# Patient Record
Sex: FEMALE | Race: BLACK OR AFRICAN AMERICAN | NOT HISPANIC OR LATINO | Employment: FULL TIME | URBAN - METROPOLITAN AREA
[De-identification: names, ages, dates, MRNs, and addresses within clinical notes are randomized per-mention and may not be internally consistent; named-entity substitution may affect disease eponyms.]

---

## 2017-09-05 ENCOUNTER — OFFICE VISIT (OUTPATIENT)
Dept: NEUROLOGY | Facility: CLINIC | Age: 57
End: 2017-09-05
Payer: COMMERCIAL

## 2017-09-05 VITALS
SYSTOLIC BLOOD PRESSURE: 133 MMHG | WEIGHT: 216.06 LBS | HEIGHT: 67 IN | HEART RATE: 82 BPM | BODY MASS INDEX: 33.91 KG/M2 | DIASTOLIC BLOOD PRESSURE: 87 MMHG

## 2017-09-05 DIAGNOSIS — R20.0 BILATERAL LEG NUMBNESS: ICD-10-CM

## 2017-09-05 PROCEDURE — 99204 OFFICE O/P NEW MOD 45 MIN: CPT | Mod: S$GLB,,, | Performed by: PSYCHIATRY & NEUROLOGY

## 2017-09-05 PROCEDURE — 3008F BODY MASS INDEX DOCD: CPT | Mod: S$GLB,,, | Performed by: PSYCHIATRY & NEUROLOGY

## 2017-09-05 PROCEDURE — 99999 PR PBB SHADOW E&M-EST. PATIENT-LVL III: CPT | Mod: PBBFAC,,, | Performed by: PSYCHIATRY & NEUROLOGY

## 2017-09-05 NOTE — PROGRESS NOTES
Subjective:       Patient ID: Brianna Cantu is a 56 y.o. female.    Chief Complaint:  Numbness      History of Present Illness  HPI   This is a 56-year-old -American female who is self-referred for evaluation of bilateral leg numbness and occasional pain.  She has had these symptoms for over 3 years and about 2 years ago was referred to see neurology by her primary care physician.  At that time she saw a neurologist at [facility] for only 1 visit and never did return back.  No workup was done.  The patient denies any history of trauma.  Symptoms are intermittent usually most prominent on awakening in the morning when her thighs feel stiff and she has numbness going down the lateral aspects of her thighs to her feet with occasional pain.  Symptoms appear to be more prominent on the right when she reports that she occasionally limps.  However as the day progresses she does feel better she starts moving.  She denies any bowel or bladder difficulties.    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  She does lift heavy loads at times.  She has had occasional back pain however reports that this is nonradiating.  Sleep and appetite is otherwise good.  She last saw her primary care physician over a year ago.  She does report having had blood work done for an annual fitness examination about 3 months ago.  Blood work done with a year ago included a hemoglobin A1c that was 5.7, B12 and thyroid studies that were normal.       Review of Systems  Review of Systems   Constitutional: Negative.    HENT: Negative.  Negative for hearing loss.    Eyes: Negative.  Negative for visual disturbance.   Respiratory: Negative.  Negative for shortness of breath.    Cardiovascular: Negative.  Negative for chest pain and palpitations.   Gastrointestinal: Negative.    Genitourinary: Negative.    Musculoskeletal: Positive for back pain. Negative for gait problem and neck pain.   Skin: Negative.    Neurological: Positive for  numbness. Negative for tremors, seizures, syncope, speech difficulty, weakness and headaches.   Psychiatric/Behavioral: Negative.  Negative for confusion and decreased concentration.       Objective:      Neurologic Exam     Mental Status   Oriented to person, place, and time.   Registration: recalls 3 of 3 objects. Follows 3 step commands.   Attention: normal. Concentration: normal.   Speech: speech is normal   Level of consciousness: alert  Knowledge: good.   Able to name object. Able to read. Able to repeat. Able to write. Normal comprehension.     Cranial Nerves   Cranial nerves II through XII intact.     Motor Exam   Muscle bulk: normal  Overall muscle tone: normal    Strength   Strength 5/5 throughout.     Sensory Exam   Light touch normal.   Proprioception normal.   Pinprick normal.     Gait, Coordination, and Reflexes     Gait  Gait: normal    Coordination   Romberg: negative  Finger to nose coordination: normal    Tremor   Resting tremor: absent  Intention tremor: absent  Action tremor: absent    Reflexes   Right brachioradialis: 1+  Left brachioradialis: 1+  Right biceps: 1+  Left biceps: 1+  Right triceps: 1+  Left triceps: 1+  Right patellar: 1+  Left patellar: 1+  Right achilles: 0  Left achilles: 0  Right plantar: normal  Left plantar: normal      Physical Exam   Constitutional: She is oriented to person, place, and time. She appears well-developed and well-nourished.   HENT:   Head: Normocephalic and atraumatic.   Neck: Normal range of motion. Neck supple. Carotid bruit is not present.   Neurological: She is oriented to person, place, and time. She has normal strength. She has a normal Finger-Nose-Finger Test and a normal Romberg Test. Gait normal.   Reflex Scores:       Tricep reflexes are 1+ on the right side and 1+ on the left side.       Bicep reflexes are 1+ on the right side and 1+ on the left side.       Brachioradialis reflexes are 1+ on the right side and 1+ on the left side.       Patellar  reflexes are 1+ on the right side and 1+ on the left side.       Achilles reflexes are 0 on the right side and 0 on the left side.  Psychiatric: Her speech is normal.   Vitals reviewed.        Assessment:        1. Bilateral leg numbness            Plan:       Discussed with patient.  Her symptoms may be related to a lumbar radiculopathy that is intermittent.  However a sensory peripheral neuropathy needs to be considered and ruled out.  Will get an EMG/NCS lower extremities.  In addition she is advised to have her physician fax copies of the blood test done about 3 months ago.  Patient will follow-up a couple of weeks after the EMG is completed.          prescription called to pharmacy

## 2017-09-29 ENCOUNTER — TELEPHONE (OUTPATIENT)
Dept: NEUROLOGY | Facility: CLINIC | Age: 57
End: 2017-09-29

## 2017-09-29 NOTE — TELEPHONE ENCOUNTER
----- Message from Chani Figueroa sent at 9/29/2017 12:25 PM CDT -----  Contact: Pt      _  1st Request  _  2nd Request  _  3rd Request    Please refill the medication(s) listed below. Please call the patient when the prescription(s) is ready for  at this phone number [phone].           Medication #1 Valacyclovir 1 mg     Preferred Pharmacy:  [facility] [address] [place] [address] [address] AT [facility]

## 2017-09-29 NOTE — TELEPHONE ENCOUNTER
Patient advised to contact PCP for this medication as he is the original prescribing doctor for this medication.

## 2018-07-31 ENCOUNTER — LAB VISIT (OUTPATIENT)
Dept: LAB | Facility: OTHER | Age: 58
End: 2018-07-31
Attending: FAMILY MEDICINE
Payer: COMMERCIAL

## 2018-07-31 ENCOUNTER — TELEPHONE (OUTPATIENT)
Dept: INTERNAL MEDICINE | Facility: CLINIC | Age: 58
End: 2018-07-31

## 2018-07-31 ENCOUNTER — OFFICE VISIT (OUTPATIENT)
Dept: INTERNAL MEDICINE | Facility: CLINIC | Age: 58
End: 2018-07-31
Attending: FAMILY MEDICINE
Payer: COMMERCIAL

## 2018-07-31 VITALS
WEIGHT: 216.5 LBS | HEART RATE: 85 BPM | DIASTOLIC BLOOD PRESSURE: 84 MMHG | SYSTOLIC BLOOD PRESSURE: 130 MMHG | BODY MASS INDEX: 33.98 KG/M2 | HEIGHT: 67 IN | OXYGEN SATURATION: 96 %

## 2018-07-31 DIAGNOSIS — Z00.00 ROUTINE GENERAL MEDICAL EXAMINATION AT A HEALTH CARE FACILITY: ICD-10-CM

## 2018-07-31 DIAGNOSIS — R35.0 FREQUENT URINATION: ICD-10-CM

## 2018-07-31 DIAGNOSIS — R10.13 INTRACTABLE EPIGASTRIC ABDOMINAL PAIN: ICD-10-CM

## 2018-07-31 DIAGNOSIS — N39.46 MIXED STRESS AND URGE URINARY INCONTINENCE: ICD-10-CM

## 2018-07-31 DIAGNOSIS — R53.83 FATIGUE, UNSPECIFIED TYPE: ICD-10-CM

## 2018-07-31 DIAGNOSIS — J45.20 MILD INTERMITTENT ASTHMA WITHOUT COMPLICATION: ICD-10-CM

## 2018-07-31 DIAGNOSIS — Z00.00 ROUTINE GENERAL MEDICAL EXAMINATION AT A HEALTH CARE FACILITY: Primary | ICD-10-CM

## 2018-07-31 LAB
ALBUMIN SERPL BCP-MCNC: 3.9 G/DL
ALP SERPL-CCNC: 68 U/L
ALT SERPL W/O P-5'-P-CCNC: 17 U/L
ANION GAP SERPL CALC-SCNC: 9 MMOL/L
AST SERPL-CCNC: 21 U/L
BASOPHILS # BLD AUTO: 0.01 K/UL
BASOPHILS NFR BLD: 0.1 %
BILIRUB SERPL-MCNC: 0.3 MG/DL
BUN SERPL-MCNC: 13 MG/DL
CALCIUM SERPL-MCNC: 9.6 MG/DL
CHLORIDE SERPL-SCNC: 107 MMOL/L
CHOLEST SERPL-MCNC: 163 MG/DL
CHOLEST/HDLC SERPL: 3 {RATIO}
CO2 SERPL-SCNC: 26 MMOL/L
CREAT SERPL-MCNC: 1.1 MG/DL
DIFFERENTIAL METHOD: NORMAL
EOSINOPHIL # BLD AUTO: 0.2 K/UL
EOSINOPHIL NFR BLD: 1.6 %
ERYTHROCYTE [DISTWIDTH] IN BLOOD BY AUTOMATED COUNT: 14.1 %
EST. GFR  (AFRICAN AMERICAN): >60 ML/MIN/1.73 M^2
EST. GFR  (NON AFRICAN AMERICAN): 56 ML/MIN/1.73 M^2
ESTIMATED AVG GLUCOSE: 105 MG/DL
GLUCOSE SERPL-MCNC: 83 MG/DL
HBA1C MFR BLD HPLC: 5.3 %
HCT VFR BLD AUTO: 40.9 %
HDLC SERPL-MCNC: 55 MG/DL
HDLC SERPL: 33.7 %
HGB BLD-MCNC: 13.4 G/DL
LDLC SERPL CALC-MCNC: 86.8 MG/DL
LYMPHOCYTES # BLD AUTO: 2.9 K/UL
LYMPHOCYTES NFR BLD: 31.4 %
MCH RBC QN AUTO: 28.9 PG
MCHC RBC AUTO-ENTMCNC: 32.8 G/DL
MCV RBC AUTO: 88 FL
MONOCYTES # BLD AUTO: 0.7 K/UL
MONOCYTES NFR BLD: 7.6 %
NEUTROPHILS # BLD AUTO: 5.4 K/UL
NEUTROPHILS NFR BLD: 59.1 %
NONHDLC SERPL-MCNC: 108 MG/DL
PLATELET # BLD AUTO: 332 K/UL
PMV BLD AUTO: 9.5 FL
POTASSIUM SERPL-SCNC: 3.5 MMOL/L
PROT SERPL-MCNC: 7.8 G/DL
RBC # BLD AUTO: 4.63 M/UL
SODIUM SERPL-SCNC: 142 MMOL/L
TRIGL SERPL-MCNC: 106 MG/DL
TSH SERPL DL<=0.005 MIU/L-ACNC: 1.36 UIU/ML
WBC # BLD AUTO: 9.21 K/UL

## 2018-07-31 PROCEDURE — 36415 COLL VENOUS BLD VENIPUNCTURE: CPT

## 2018-07-31 PROCEDURE — 99999 PR PBB SHADOW E&M-EST. PATIENT-LVL IV: CPT | Mod: PBBFAC,,, | Performed by: FAMILY MEDICINE

## 2018-07-31 PROCEDURE — 83036 HEMOGLOBIN GLYCOSYLATED A1C: CPT

## 2018-07-31 PROCEDURE — 85025 COMPLETE CBC W/AUTO DIFF WBC: CPT

## 2018-07-31 PROCEDURE — 80053 COMPREHEN METABOLIC PANEL: CPT

## 2018-07-31 PROCEDURE — 84443 ASSAY THYROID STIM HORMONE: CPT

## 2018-07-31 PROCEDURE — 99386 PREV VISIT NEW AGE 40-64: CPT | Mod: S$GLB,,, | Performed by: FAMILY MEDICINE

## 2018-07-31 PROCEDURE — 80061 LIPID PANEL: CPT

## 2018-07-31 RX ORDER — VALACYCLOVIR HYDROCHLORIDE 1 G/1
TABLET, FILM COATED ORAL
COMMUNITY
Start: 2018-07-06 | End: 2020-03-19 | Stop reason: SDUPTHER

## 2018-07-31 RX ORDER — OMEPRAZOLE 40 MG/1
40 CAPSULE, DELAYED RELEASE ORAL DAILY
Qty: 30 CAPSULE | Refills: 11 | Status: SHIPPED | OUTPATIENT
Start: 2018-07-31 | End: 2018-08-15

## 2018-07-31 NOTE — PROGRESS NOTES
"CHIEF COMPLAINT: Establish a primary care physician    HISTORY OF PRESENT ILLNESS: The patient is a very pleasant 57 year-old black female.  The patient has a 1 year h/o of worsening epigastric abd pain.  Alternating constip/diarrhea.    She also has urinary incontinence of mixed type.    It has been a while since the patient has seen a primary care physician.  The patient wishes to establish a primary care physician.  The patient would also like to get some basic blood work done.    She had a hyst for cervical CA in 1993.    REVIEW OF SYSTEMS:  GENERAL: No fever, chills, fatigability or weight loss.  SKIN: No rashes, itching or changes in color or texture of skin.  HEAD: No headaches or recent head trauma.  EYES: Visual acuity fine. No photophobia, ocular pain or diplopia.  EARS: Denies ear pain, discharge or vertigo.  NOSE: No loss of smell, no epistaxis or postnasal drip.  MOUTH & THROAT: No hoarseness or change in voice. No excessive gum bleeding.  NODES: Denies swollen glands.  CHEST: Denies HARMON, cyanosis, wheezing, cough and sputum production.  CARDIOVASCULAR: Denies chest pain, PND, orthopnea or reduced exercise tolerance.  ABDOMEN: Appetite fine. No weight loss. Denies hematemesis or blood in stool.  URINARY: No flank pain, dysuria or hematuria.  PERIPHERAL VASCULAR: No claudication or cyanosis.  MUSCULOSKELETAL: No joint stiffness or swelling. Denies back pain.  NEUROLOGIC: No history of seizures, paralysis, alteration of gait or coordination.    SOCIAL HISTORY: The patient does not smoke.  The patient consumes alcohol socially.  The patient is single.  Works at [employer] in [employer].    PHYSICAL EXAMINATION:   Blood pressure 130/84, pulse 85, height 5' 7" (1.702 m), weight 98.2 kg (216 lb 7.9 oz), SpO2 96 %.    APPEARANCE: Well nourished, well developed, in no acute distress.    HEAD: Normocephalic, atraumatic.  EYES: PERRL. EOMI.  Conjunctivae without injection and  anicteric  EARS: TM's intact. Light " reflex normal. No retraction or perforation.    NOSE: Mucosa pink. Airway clear.  MOUTH & THROAT: No tonsillar enlargement. No pharyngeal erythema or exudate. No stridor.  NECK: Supple.   NODES: No cervical, axillary or inguinal lymph node enlargement.  CHEST: Lungs clear to auscultation.  No retractions are noted.  No rales or rhonchi are present.  CARDIOVASCULAR: Normal S1, S2. No rubs, murmurs or gallops.  ABDOMEN: Bowel sounds normal. Not distended. Soft. No tenderness or masses.  No ascites is noted.  MUSCULOSKELETAL:  There is no clubbing, cyanosis, or edema of the extremities x4.  There is full range of motion of the lumbar spine.  There is full range of motion of the extremities x4.  There is no deformity noted.    NEUROLOGIC:       Normal speech development.      Hearing normal.      Normal gait.      Motor and sensory exams grossly normal.      DTR's normal.  PSYCHIATRIC: Patient is alert and oriented x3.  Thought processes are all normal.  There is no homicidality.  There is no suicidality.  There is no evidence of psychosis.    LABORATORY/RADIOLOGY:   Chart reviewed.  We will update blood work today.    ASSESSMENT:   Annual  Abd pain  Alternating diarrhea/constipation   Urinary frequency/urgency  Mixed urinary incont    PLAN:  We will follow-up blood work which we expect to be normal.  Urology and gastroenterology referrals  Return to clinic in one year.

## 2018-08-01 ENCOUNTER — TELEPHONE (OUTPATIENT)
Dept: INTERNAL MEDICINE | Facility: CLINIC | Age: 58
End: 2018-08-01

## 2018-08-01 NOTE — TELEPHONE ENCOUNTER
----- Message from Brenton Hughes MD sent at 8/1/2018 10:44 AM CDT -----  Blood work looks good.  Cholesterol is particularly good.  No changes in medications.  Followup as scheduled with gastro and Urology.

## 2018-08-03 ENCOUNTER — TELEPHONE (OUTPATIENT)
Dept: UROLOGY | Facility: CLINIC | Age: 58
End: 2018-08-03

## 2018-08-03 ENCOUNTER — OFFICE VISIT (OUTPATIENT)
Dept: UROLOGY | Facility: CLINIC | Age: 58
End: 2018-08-03
Attending: FAMILY MEDICINE
Payer: COMMERCIAL

## 2018-08-03 VITALS
BODY MASS INDEX: 33.87 KG/M2 | WEIGHT: 215.81 LBS | HEIGHT: 67 IN | HEART RATE: 83 BPM | DIASTOLIC BLOOD PRESSURE: 86 MMHG | SYSTOLIC BLOOD PRESSURE: 145 MMHG

## 2018-08-03 DIAGNOSIS — N39.46 MIXED INCONTINENCE: Primary | ICD-10-CM

## 2018-08-03 DIAGNOSIS — R35.0 URINARY FREQUENCY: ICD-10-CM

## 2018-08-03 DIAGNOSIS — N39.44 NOCTURNAL ENURESIS: ICD-10-CM

## 2018-08-03 LAB
BILIRUB SERPL-MCNC: ABNORMAL MG/DL
BLOOD URINE, POC: 50
COLOR, POC UA: YELLOW
GLUCOSE UR QL STRIP: ABNORMAL
KETONES UR QL STRIP: ABNORMAL
LEUKOCYTE ESTERASE URINE, POC: ABNORMAL
NITRITE, POC UA: ABNORMAL
PH, POC UA: 6
POC RESIDUAL URINE VOLUME: 49 ML (ref 0–100)
PROTEIN, POC: 30
SPECIFIC GRAVITY, POC UA: 1
UROBILINOGEN, POC UA: ABNORMAL

## 2018-08-03 PROCEDURE — 3008F BODY MASS INDEX DOCD: CPT | Mod: CPTII,S$GLB,, | Performed by: NURSE PRACTITIONER

## 2018-08-03 PROCEDURE — 87086 URINE CULTURE/COLONY COUNT: CPT

## 2018-08-03 PROCEDURE — 51798 US URINE CAPACITY MEASURE: CPT | Mod: S$GLB,,, | Performed by: NURSE PRACTITIONER

## 2018-08-03 PROCEDURE — 99204 OFFICE O/P NEW MOD 45 MIN: CPT | Mod: 25,S$GLB,, | Performed by: NURSE PRACTITIONER

## 2018-08-03 PROCEDURE — 81002 URINALYSIS NONAUTO W/O SCOPE: CPT | Mod: S$GLB,,, | Performed by: NURSE PRACTITIONER

## 2018-08-03 RX ORDER — OXYBUTYNIN CHLORIDE 10 MG/1
10 TABLET, EXTENDED RELEASE ORAL DAILY
Qty: 30 TABLET | Refills: 11 | Status: SHIPPED | OUTPATIENT
Start: 2018-08-03 | End: 2018-12-12

## 2018-08-03 NOTE — TELEPHONE ENCOUNTER
----- Message from Lorie Esquivel sent at 8/3/2018 11:02 AM CDT -----  Contact: Pt   Name of Who is Calling: DOM VARNER [[id number]]    What is the request in detail: Patient states the cost of her RX mirabegron 50 mg Tb24 is $300, patient is requesting a discount card or an alternative medication. Please contact to further discuss and advise      Can the clinic reply by [facility]: No       What Number to Call Back [facility] not [facility]: [phone]

## 2018-08-03 NOTE — PROGRESS NOTES
"Subjective:      Brianna Cantu is a 57 y.o. female who was referred by Dr. Hughes for evaluation of mixed incontinence.      The patient has a history of cervical cancer treated with JAVY and radiation. Reports frequency, urgency, and nocturia 3-8x/night. Also with the gradual onset of mixed incontinence over the last few years. States that she wears at least 3 pads per day, which are changed due to saturation. Also reports nocturnal enuresis with the leakage of a full bladder. Doing kegels without relief. States that she took bladder medications in the past; however she is unable to recall if they were beneficial. Denies dysuria, fever/chills and flank pain. Denies hematuria and recurrent UTIs. Also reports the leakage of stool.     The following portions of the patient's history were reviewed and updated as appropriate: allergies, current medications, past family history, past medical history, past social history, past surgical history and problem list.    Review of Systems  Constitutional: no fever or chills  ENT: no nasal congestion or sore throat  Respiratory: no cough or shortness of breath  Cardiovascular: no chest pain or palpitations  Gastrointestinal: no nausea or vomiting, tolerating diet  Genitourinary: as per HPI  Hematologic/Lymphatic: no easy bruising or lymphadenopathy  Musculoskeletal: no arthralgias or myalgias  Neurological: no seizures or tremors  Behavioral/Psych: no auditory or visual hallucinations     Objective:   Vital Signs:BP (!) 145/86 (BP Location: Right arm, Patient Position: Sitting, BP Method: Large (Automatic))   Pulse 83   Ht 5' 7" (1.702 m)   Wt 97.9 kg (215 lb 13.3 oz)   BMI 33.80 kg/m²     Physical Exam   General: alert and oriented, no acute distress  Head: normocephalic, atraumatic  Neck: supple, no lymphadenopathy, normal ROM, no masses  Respiratory: Symmetric expansion, non-labored breathing  Cardiovascular: regular rate and rhythm, nomal pulses, no peripheral " edema  Abdomen: soft, non tender, non distended, no palpable masses, no hernias, no hepatomegaly or splenomegaly  Pelvic: not examined   Lymphatic: no inguinal nodes  Skin: normal coloration and turgor, no rashes, no suspicious skin lesions noted  Neuro: alert and oriented x3, no gross deficits  Psych: normal judgment and insight, normal mood/affect and non-anxious    Lab Review   Urinalysis demonstrates positive for leukocytes (+), red blood cells (50 magi/mL), protein (+)  PVR: 49 mL  Lab Results   Component Value Date    WBC 9.21 07/31/2018    HGB 13.4 07/31/2018    HCT 40.9 07/31/2018    MCV 88 07/31/2018     07/31/2018     Lab Results   Component Value Date    CREATININE 1.1 07/31/2018    BUN 13 07/31/2018       Imaging   None    Assessment:     1. Mixed incontinence        Plan:   Brianna was seen today for urinary frequency.    Diagnoses and all orders for this visit:    Mixed incontinence  -     POCT URINE DIPSTICK WITHOUT MICROSCOPE  -     POCT Bladder Scan  -     mirabegron 50 mg Tb24; Take 1 tablet (50 mg total) by mouth once daily.  -     Ambulatory Referral to Physical/Occupational Therapy  -     Urine culture    Nocturnal enuresis    Urinary frequency    Plan:  --Urine culture today  --Discussed common bladder irritants such as caffeine, alcohol, citrus, and acidic and spicy foods. Encouraged to minimize in diet to reduce symptoms.  --Bladder symptoms are likely related to XRT   --Trial of myrbetriq 50 mg  --Referral to PFPT  --Follow up in 8 weeks with PVR

## 2018-08-03 NOTE — LETTER
August 3, 2018      Brenton Hughes MD  [address]  [facility] [address]  [facility] [address]           [facility]  [address] [place] [address], [facility] [address]  [facility] [address]  Phone: [phone]  Fax: [phone]          Patient: Brianna Cantu   MR Number: [id number]   YOB: 1960   Date of Visit: 8/3/2018       Dear Dr. Brenton Hughes:    Thank you for referring Brianna Cantu to me for evaluation. Attached you will find relevant portions of my assessment and plan of care.    If you have questions, please do not hesitate to call me. I look forward to following Brianna Cantu along with you.    Sincerely,    Michelle Guzman, NP    Enclosure  CC:  No Recipients    If you would like to receive this communication electronically, please contact [email or web address] or [phone] to request more information on [employer] access.    For providers and/or their staff who would like to refer a patient to [facility], please contact us through our one-stop-shop provider referral line, [facility] [place], at [phone].    If you feel you have received this communication in error or would no longer like to receive these types of communications, please e-mail [email or web address]

## 2018-08-05 LAB
BACTERIA UR CULT: NORMAL
BACTERIA UR CULT: NORMAL

## 2018-08-09 ENCOUNTER — TELEPHONE (OUTPATIENT)
Dept: GASTROENTEROLOGY | Facility: CLINIC | Age: 58
End: 2018-08-09

## 2018-08-15 ENCOUNTER — OFFICE VISIT (OUTPATIENT)
Dept: GASTROENTEROLOGY | Facility: CLINIC | Age: 58
End: 2018-08-15
Payer: COMMERCIAL

## 2018-08-15 VITALS
SYSTOLIC BLOOD PRESSURE: 137 MMHG | WEIGHT: 217.38 LBS | HEART RATE: 88 BPM | HEIGHT: 67 IN | DIASTOLIC BLOOD PRESSURE: 88 MMHG | BODY MASS INDEX: 34.12 KG/M2

## 2018-08-15 DIAGNOSIS — R19.4 CHANGE IN BOWEL HABIT: ICD-10-CM

## 2018-08-15 DIAGNOSIS — R19.7 DIARRHEA, UNSPECIFIED TYPE: ICD-10-CM

## 2018-08-15 DIAGNOSIS — R10.9 ABDOMINAL PAIN, UNSPECIFIED ABDOMINAL LOCATION: Primary | ICD-10-CM

## 2018-08-15 PROCEDURE — 99999 PR PBB SHADOW E&M-EST. PATIENT-LVL III: CPT | Mod: PBBFAC,,, | Performed by: INTERNAL MEDICINE

## 2018-08-15 PROCEDURE — 3008F BODY MASS INDEX DOCD: CPT | Mod: CPTII,S$GLB,, | Performed by: INTERNAL MEDICINE

## 2018-08-15 PROCEDURE — 99204 OFFICE O/P NEW MOD 45 MIN: CPT | Mod: GC,S$GLB,, | Performed by: INTERNAL MEDICINE

## 2018-08-15 NOTE — PROGRESS NOTES
Gastroenterology Clinic    Reason for visit: The primary encounter diagnosis was Abdominal pain, unspecified abdominal location. Diagnoses of Diarrhea, unspecified type and Change in bowel habit were also pertinent to this visit.  Referring Provider/PCP: Brenton Hughes MD    History of Present Illness:  Brianna Cantu is a 57 y.o. female with a history of depression, cervical cancer (1993), diverticulitis, low back pain who is presenting for initial evaluation of moses-umbilical pain, diarrhea, and fecal incontinence x2 years.    She was recently seen by her PCP (Dr. Hughes, 7/31/18) during which time she noted a 1 year history of epigastric pain and alternative constipation and diarrhea. Labs were remarkable for normal CBC, MCV, TSH, BMP, LFT.    She notes that she has never had prior abdominal issues with the exception of diverticulitis ~2008 with negative colonoscopy afterwards. She had cervical cancer in 1998 s/p JAVY-BSO and radiation therapy, otherwise no abdominal surgeries.    She reports that she was well until ~2 years ago when her symptoms began. She denies any specific inciting event/stresser. She notes that she began to develop moses-umbilical abdominal pain which would occur intermittently (1x/ms) and slowly progress from 1->7/10. Denies any associated symptoms though notes pain occasionally alleviated with BM. Denies any issues with PO intake, no dysphagia, odynophagia, occasional heartburn (1x/week, relieved with tums), no nausea or vomiting.    Regarding her diarrhea she notes that >2 yr ago she was at baseline with 1-2BM/day, but currently is having 3-5BM/day which are all diarrheal. Denies any mucus or bloody BM. Denies steatorrhea. She acknowledges frequent (weekly) large volume fecal incontinence episodes because of which she will wear pads. She notes that at time of incontinence she will have urgency to use bathroom but unable to reach the bathroom in time. +ve nocturnal bowel  movements.    On dietary review she endorses lactose free diet, is not using excessive artificial sweeteners (2 packet splenda/day). No significant intake of diet soda. Has not tried any other diets such as gluten free. Has not taken any NSAIDS x3 weeks. Was given omeprazole by PCP but noted that she was feeling off so stopped taking it. Has not tried any OTC/herbal medications. Has not tried any anti-spasmodic/diarrheal medications.    On further review she endorses associated urinary incontinence (stress and urgency) which is not always temporally associated with fecal incontinence - was recently seen by urologist and prescribed oxybutynin and referred for pelvic floor rehab. Otherwise she endorses episodes of feeling sweaty and flushed but not temporally related with BM. She endorses chronic low back pain and sciatica but denies any gait issues.    PEndoHx:  Colonoscopy (10/2008) Dr. Blanton. Indication: periumbilical abdominal pain, anal bleeding. Excellent prep.  - anal examination unremarkable  - small internal hemorrhoids  - colon unremarkable.    Endoscopy (10/2008) Dr. Blanton. Indication: epigastric pain  - small hiatal hernia  - atrophic gastritis    Review of Systems:  Constitutional: no fever, chills or change in weight. Occasional sweats. Endorses frequent thirst and drinking water throughout the day.  Eyes: no visual changes. +ve dry eyes  ENT: no sore throat or dysphagia. +ve dry throat.  Respiratory: no cough or shortness of breath   Cardiovascular: no chest pain or palpitations   Gastrointestinal: as per HPI  Hematologic/Lymphatic: no easy bruising or lymphadenopathy   Musculoskeletal: endorses muscle aches including neck, shoulder, back.  Neurological: no change in mental status. Occasional episodes of tingling of lower extremities (improving). No gait abnormalities.   Behavioral/Psych: no change in mood. Scarred to exercise due to incontinence. no recent stresses.    Medical History:  Past Medical  History:   Diagnosis Date    Allergic rhinitis 4/27/2014    Anxiety 4/27/2014    Hx of cervical cancer 4/27/2014    Obesity 4/27/2014    Vitamin D deficiency 4/27/2014       Past Surgical History:   Procedure Laterality Date    TOTAL ABDOMINAL HYSTERECTOMY     No history of other abdominal surgeries.  Nuliparous.    Family History   Problem Relation Age of Onset    Hypertension Mother    No family history of cancer. Sister and grandmother with  DM    Social History     Socioeconomic History    Marital status:      Spouse name: Not on file    Number of children: Not on file    Years of education: Not on file    Highest education level: Not on file   Social Needs    Financial resource strain: Not on file    Food insecurity - worry: Not on file    Food insecurity - inability: Not on file    Transportation needs - medical: Not on file    Transportation needs - non-medical: Not on file   Occupational History    Not on file   Tobacco Use    Smoking status: Current Every Day Smoker    Smokeless tobacco: Never Used   Substance and Sexual Activity    Alcohol use: No     Alcohol/week: 0.0 oz    Drug use: No    Sexual activity: Not Currently   Other Topics Concern    Not on file   Social History Narrative    Not on file   No smoking. Social ETOH. Single. Sonja day [facility] department)    Current Outpatient Medications on File Prior to Visit   Medication Sig Dispense Refill    albuterol 90 mcg/actuation inhaler Inhale 2 puffs into the lungs every 6 (six) hours as needed for Wheezing. 18 g 0    escitalopram (LEXAPRO) 10 MG tablet Take 1 tablet (10 mg total) by mouth once daily. 30 tablet 5    fluticasone (FLONASE) 50 mcg/actuation nasal spray 1 spray daily as needed.       ibuprofen (ADVIL,MOTRIN) 800 MG tablet Take 1 tablet (800 mg total) by mouth 2 (two) times daily as needed for Pain. 60 tablet 1    mirabegron 50 mg Tb24 Take 1 tablet (50 mg total) by mouth once daily. 30 tablet 11     mv,Ca,Fe,min-FA-hrb oviaz569 (ONE-A-DAY WEIGHTSMART) 200-18-0.4 mg Tab Take by mouth. 1 Tablet Oral Every day      oxybutynin (DITROPAN-XL) 10 MG 24 hr tablet Take 1 tablet (10 mg total) by mouth once daily. 30 tablet 11    valACYclovir (VALTREX) 1000 MG tablet       [DISCONTINUED] omeprazole (PRILOSEC) 40 MG capsule Take 1 capsule (40 mg total) by mouth once daily. 30 capsule 11     No current facility-administered medications on file prior to visit.        Review of patient's allergies indicates:  No Known Allergies    Physical Exam:  General: Alert and Oriented x3, no distress. Well dressed and groomed. No distress.  Vitals:    08/15/18 1549   BP: 137/88   Pulse: 88     HEENT: Normocephalic, Atraumatic. No scleral icterus. No oral lesions.  Lymph: No cervical lymphadenopathy  Resp: Good air entry bilaterally, no adventitious sounds  Cardiac: S1 and S2 normal  Abdomen: Hypoactive bowel sounds. Non-distended. Normal tympany. Soft. Non-tender. No peritoneal signs.  Extremities: No peripheral edema. Normal bilateral pedal and radial pulses.  Neurologic: No gross neurological Deficits  Psych: Calm, cooperative. Normal mood and affect.        Laboratory:  Lab Results   Component Value Date     07/31/2018    K 3.5 07/31/2018     07/31/2018    CO2 26 07/31/2018    BUN 13 07/31/2018    CREATININE 1.1 07/31/2018    CALCIUM 9.6 07/31/2018    ANIONGAP 9 07/31/2018    ESTGFRAFRICA >60 07/31/2018    EGFRNONAA 56 (A) 07/31/2018       Lab Results   Component Value Date    ALT 17 07/31/2018    AST 21 07/31/2018    ALKPHOS 68 07/31/2018    BILITOT 0.3 07/31/2018       Lab Results   Component Value Date    WBC 9.21 07/31/2018    HGB 13.4 07/31/2018    HCT 40.9 07/31/2018    MCV 88 07/31/2018     07/31/2018       Microbiology:  No Pertinent Microbiology    Imaging:  No Pertinent Imaging    Assessment:  Brianna Cantu is a 57 y.o. female who is presenting for initial evaluation of moses-umbilical  abdominal pain, diarrhea and incontinence.    Given her history of pelvic surgery (JAVY-BSO) and combined urinary and fecal incontinence her incontinence could be secondary to pelvic floor dysfunction.     However, this does not provide and explanation for her change in bowel habits or abdominal pain. She had a negative laboratory evaluation recently including TSH, BMP, CBC and LFT, and unremarkable EGD/[place] in 2008. Unlikely infectious related as no foreign travel and symptoms persistent x2 year. She has chronic low back pain but symptoms are not suggestive of a MSK etiology (i.e. Cauda). She has intermittent symptoms of flushing and sweating but not related to her pain or BM so unlikely a process such as carcinoid.    Plan:  1. EGD and Colonoscopy  2. Had recent negative laboratory investigation, do not need additional labs at this time.  3. If workup unremarkable will consider referral for pelvic floor dysfunction (may already be receiving this PT for her urinary incontinence).  4. Can consider evaluation for biliary etiology including RUQ but moses-umbilical pain and no PO related symptoms make this less likely.  5. Return to clinic in 8 weeks.      Xavier Crouch MD  Gastroenterology Fellow (PGY IV)  Pager: [phone]    No orders of the defined types were placed in this encounter.

## 2018-08-21 ENCOUNTER — TELEPHONE (OUTPATIENT)
Dept: GASTROENTEROLOGY | Facility: CLINIC | Age: 58
End: 2018-08-21

## 2018-08-21 ENCOUNTER — TELEPHONE (OUTPATIENT)
Dept: INTERNAL MEDICINE | Facility: CLINIC | Age: 58
End: 2018-08-21

## 2018-08-21 DIAGNOSIS — R79.89 LOW VITAMIN D LEVEL: Primary | ICD-10-CM

## 2018-08-21 DIAGNOSIS — Z12.11 SPECIAL SCREENING FOR MALIGNANT NEOPLASMS, COLON: Primary | ICD-10-CM

## 2018-08-21 RX ORDER — POLYETHYLENE GLYCOL 3350, SODIUM SULFATE ANHYDROUS, SODIUM BICARBONATE, SODIUM CHLORIDE, POTASSIUM CHLORIDE 236; 22.74; 6.74; 5.86; 2.97 G/4L; G/4L; G/4L; G/4L; G/4L
4 POWDER, FOR SOLUTION ORAL ONCE
Qty: 4000 ML | Refills: 0 | Status: SHIPPED | OUTPATIENT
Start: 2018-08-21 | End: 2018-09-15

## 2018-08-21 NOTE — TELEPHONE ENCOUNTER
Her last vitamin-D is from over a year ago.  If she wants we can repeat it and see if it has improved.  I would recommend getting on over-the-counter vitamin D supplementation for at least a month prior to rechecking the lab.

## 2018-08-21 NOTE — TELEPHONE ENCOUNTER
----- Message from Mary Lou Escalante sent at 8/21/2018  2:54 PM CDT -----  Contact: Self- [phone]  Miko- pt called to determine a stool kit would be mailed to her- states she was told it needed to be done before her scope- please contact pt at [phone]

## 2018-08-21 NOTE — TELEPHONE ENCOUNTER
----- Message from Tita Russell sent at 8/21/2018  2:49 PM CDT -----  Contact: DOM VARNER [[id number]]            Name of Who is Calling: DOM VARNER [[id number]]    What is the request in detail: Patient called she stated that when she had her colonoscopy done they told her that her Vitamin D was low. Please call her to advise.       Can the clinic reply by [facility]: no      What Number to Call Back if not in [place]: [phone]

## 2018-09-19 ENCOUNTER — TELEPHONE (OUTPATIENT)
Dept: ENDOSCOPY | Facility: HOSPITAL | Age: 58
End: 2018-09-19

## 2018-09-24 ENCOUNTER — TELEPHONE (OUTPATIENT)
Dept: GASTROENTEROLOGY | Facility: CLINIC | Age: 58
End: 2018-09-24

## 2018-09-24 ENCOUNTER — ANESTHESIA (OUTPATIENT)
Dept: ENDOSCOPY | Facility: HOSPITAL | Age: 58
End: 2018-09-24
Payer: COMMERCIAL

## 2018-09-24 ENCOUNTER — ANESTHESIA EVENT (OUTPATIENT)
Dept: ENDOSCOPY | Facility: HOSPITAL | Age: 58
End: 2018-09-24
Payer: COMMERCIAL

## 2018-09-24 ENCOUNTER — HOSPITAL ENCOUNTER (OUTPATIENT)
Facility: HOSPITAL | Age: 58
Discharge: HOME | End: 2018-09-24
Attending: INTERNAL MEDICINE | Admitting: INTERNAL MEDICINE
Payer: COMMERCIAL

## 2018-09-24 VITALS
HEIGHT: 67 IN | OXYGEN SATURATION: 100 % | SYSTOLIC BLOOD PRESSURE: 157 MMHG | WEIGHT: 216 LBS | HEART RATE: 72 BPM | RESPIRATION RATE: 18 BRPM | DIASTOLIC BLOOD PRESSURE: 88 MMHG | TEMPERATURE: 98 F | BODY MASS INDEX: 33.9 KG/M2

## 2018-09-24 DIAGNOSIS — K62.4 RECTAL STRICTURE: Primary | ICD-10-CM

## 2018-09-24 DIAGNOSIS — R19.8 CHANGE IN BOWEL FUNCTION: Primary | ICD-10-CM

## 2018-09-24 PROCEDURE — 63600175 PHARM REV CODE 636 W HCPCS: Performed by: NURSE ANESTHETIST, CERTIFIED REGISTERED

## 2018-09-24 PROCEDURE — 37000009 HC ANESTHESIA EA ADD 15 MINS: Performed by: INTERNAL MEDICINE

## 2018-09-24 PROCEDURE — 88305 TISSUE EXAM BY PATHOLOGIST: CPT | Mod: 26,,, | Performed by: PATHOLOGY

## 2018-09-24 PROCEDURE — 88342 IMHCHEM/IMCYTCHM 1ST ANTB: CPT | Mod: 26,,, | Performed by: PATHOLOGY

## 2018-09-24 PROCEDURE — E9220 PRA ENDO ANESTHESIA: HCPCS | Mod: ,,, | Performed by: NURSE ANESTHETIST, CERTIFIED REGISTERED

## 2018-09-24 PROCEDURE — 27201012 HC FORCEPS, HOT/COLD, DISP: Performed by: INTERNAL MEDICINE

## 2018-09-24 PROCEDURE — 37000008 HC ANESTHESIA 1ST 15 MINUTES: Performed by: INTERNAL MEDICINE

## 2018-09-24 PROCEDURE — 88305 TISSUE EXAM BY PATHOLOGIST: CPT | Performed by: PATHOLOGY

## 2018-09-24 PROCEDURE — 43239 EGD BIOPSY SINGLE/MULTIPLE: CPT | Mod: ,,, | Performed by: INTERNAL MEDICINE

## 2018-09-24 PROCEDURE — 43239 EGD BIOPSY SINGLE/MULTIPLE: CPT | Performed by: INTERNAL MEDICINE

## 2018-09-24 PROCEDURE — 25000003 PHARM REV CODE 250: Performed by: INTERNAL MEDICINE

## 2018-09-24 PROCEDURE — 45380 COLONOSCOPY AND BIOPSY: CPT | Performed by: INTERNAL MEDICINE

## 2018-09-24 PROCEDURE — 45380 COLONOSCOPY AND BIOPSY: CPT | Mod: 52,51,, | Performed by: INTERNAL MEDICINE

## 2018-09-24 RX ORDER — PROPOFOL 10 MG/ML
VIAL (ML) INTRAVENOUS CONTINUOUS PRN
Status: DISCONTINUED | OUTPATIENT
Start: 2018-09-24 | End: 2018-09-24

## 2018-09-24 RX ORDER — SODIUM CHLORIDE 9 MG/ML
INJECTION, SOLUTION INTRAVENOUS CONTINUOUS
Status: DISCONTINUED | OUTPATIENT
Start: 2018-09-24 | End: 2018-09-24 | Stop reason: HOSPADM

## 2018-09-24 RX ORDER — PROPOFOL 10 MG/ML
VIAL (ML) INTRAVENOUS
Status: DISCONTINUED | OUTPATIENT
Start: 2018-09-24 | End: 2018-09-24

## 2018-09-24 RX ADMIN — SODIUM CHLORIDE: 0.9 INJECTION, SOLUTION INTRAVENOUS at 10:09

## 2018-09-24 RX ADMIN — PROPOFOL 100 MG: 10 INJECTION, EMULSION INTRAVENOUS at 10:09

## 2018-09-24 RX ADMIN — PROPOFOL 150 MCG/KG/MIN: 10 INJECTION, EMULSION INTRAVENOUS at 10:09

## 2018-09-24 NOTE — ANESTHESIA POSTPROCEDURE EVALUATION
"Anesthesia Post Evaluation    Patient: Brianna Cantu    Procedure(s) Performed: Procedure(s) (LRB):  COLONOSCOPY (N/A)  EGD (ESOPHAGOGASTRODUODENOSCOPY) (N/A)    Final Anesthesia Type: general  Patient location during evaluation: PACU  Patient participation: Yes- Able to Participate  Level of consciousness: awake and alert and oriented  Post-procedure vital signs: reviewed and stable  Pain management: adequate  Airway patency: patent  PONV status at discharge: No PONV  Anesthetic complications: no      Cardiovascular status: stable  Respiratory status: unassisted  Hydration status: euvolemic  Follow-up not needed.        Visit Vitals  BP (!) 157/88 (BP Location: Left arm)   Pulse 72   Temp 36.6 °C (97.9 °F) (Temporal)   Resp 18   Ht 5' 7" (1.702 m)   Wt 98 kg (216 lb)   SpO2 100%   Breastfeeding? No   BMI 33.83 kg/m²       Pain/Phil Score: Pain Assessment Performed: Yes (9/24/2018 11:48 AM)  Presence of Pain: denies (9/24/2018 11:48 AM)  Phil Score: 9 (9/24/2018 11:18 AM)        "

## 2018-09-24 NOTE — [employer] PATIENT INSTRUCTIONS
Discharge Summary/Instructions after an Endoscopic Procedure  Patient Name: Brianna Cantu  Patient MRN: [id number]  Patient YOB: 1960 Monday, September 24, 2018  Montrell Gan MD  RESTRICTIONS:  During your procedure today, you received medications for sedation.  These   medications may affect your judgment, balance and coordination.  Therefore,   for 24 hours, you have the following restrictions:   - DO NOT drive a car, operate machinery, make legal/financial decisions,   sign important papers or drink alcohol.    ACTIVITY:  Today: no heavy lifting, straining or running due to procedural   sedation/anesthesia.  The following day: return to full activity including work.  DIET:  Eat and drink normally unless instructed otherwise.     TREATMENT FOR COMMON SIDE EFFECTS:  - Mild abdominal pain, nausea, belching, bloating or excessive gas:  rest,   eat lightly and use a heating pad.  - Sore Throat: treat with throat lozenges and/or gargle with warm salt   water.  - Because air was used during the procedure, expelling large amounts of air   from your rectum or belching is normal.  - If a bowel prep was taken, you may not have a bowel movement for 1-3 days.    This is normal.  SYMPTOMS TO WATCH FOR AND REPORT TO YOUR PHYSICIAN:  1. Abdominal pain or bloating, other than gas cramps.  2. Chest pain.  3. Back pain.  4. Signs of infection such as: chills or fever occurring within 24 hours   after the procedure.  5. Rectal bleeding, which would show as bright red, maroon, or black stools.   (A tablespoon of blood from the rectum is not serious, especially if   hemorrhoids are present.)  6. Vomiting.  7. Weakness or dizziness.  GO DIRECTLY TO THE NEAREST EMERGENCY ROOM IF YOU HAVE ANY OF THE FOLLOWING:      Difficulty breathing              Chills and/or fever over 101 F   Persistent vomiting and/or vomiting blood   Severe abdominal pain   Severe chest pain   Black, tarry stools   Bleeding- more than one  tablespoon   Any other symptom or condition that you feel may need urgent attention  Your doctor recommends these additional instructions:  If any biopsies were taken, your doctors clinic will contact you in 1 to 2   weeks with any results.  - Patient has a contact number available for emergencies.  The signs and   symptoms of potential delayed complications were discussed with the   patient.  Return to normal activities tomorrow.  Written discharge   instructions were provided to the patient.   - Discharge patient to home.   - Resume previous diet.   - Continue present medications.   - Await pathology results.   - Perform a CT scan (computed tomography) of abdomen and pelvis with oral   and rectal contrast at appointment to be scheduled.   For questions, problems or results please call your physician - Montrell Gan MD at Work:  [phone].  [facility] [place], EMERGENCY ROOM PHONE NUMBER: [phone]  IF A COMPLICATION OR EMERGENCY SITUATION ARISES AND YOU ARE UNABLE TO REACH   YOUR PHYSICIAN - GO DIRECTLY TO THE EMERGENCY ROOM.  Montrell Gan MD  9/24/2018 11:17:00 AM  This report has been verified and signed electronically.  [employer]

## 2018-09-24 NOTE — TELEPHONE ENCOUNTER
Spoke with patient.  Aware CT had been scheduled for 10/2 for 1:45.  Aware she will need to report for 12:45.  Will need to fast 4 hours prior to the appointment time.  Will mail confirmation.

## 2018-09-24 NOTE — TRANSFER OF CARE
"Anesthesia Transfer of Care Note    Patient: Brianna Cantu    Procedure(s) Performed: Procedure(s) (LRB):  COLONOSCOPY (N/A)  EGD (ESOPHAGOGASTRODUODENOSCOPY) (N/A)    Patient location: OPS    Anesthesia Type: general    Transport from OR: Transported from OR on room air with adequate spontaneous ventilation    Post pain: adequate analgesia    Post assessment: no apparent anesthetic complications and tolerated procedure well    Post vital signs: stable    Level of consciousness: awake, alert and oriented    Nausea/Vomiting: no nausea/vomiting    Complications: none    Transfer of care protocol was followed      Last vitals:   Visit Vitals  /78 (BP Location: Left arm, Patient Position: Lying)   Pulse 82   Temp 36.5 °C (97.7 °F) (Temporal)   Resp 16   Ht 5' 7" (1.702 m)   Wt 98 kg (216 lb)   SpO2 99%   Breastfeeding? No   BMI 33.83 kg/m²     "

## 2018-09-24 NOTE — TELEPHONE ENCOUNTER
----- Message from Trinity Hancock sent at 9/24/2018  1:28 PM CDT -----  Contact: self [phone]  Miko - wants to know when her pet scan will be - please call patient at [phone]

## 2018-09-24 NOTE — TELEPHONE ENCOUNTER
----- Message from Montrell Gan MD sent at 9/24/2018 11:25 AM CDT -----  Please schedule CT scan as ordered.

## 2018-09-24 NOTE — PROVATION PATIENT INSTRUCTIONS
Discharge Summary/Instructions after an Endoscopic Procedure  Patient Name: Brianna Cantu  Patient MRN: [id number]  Patient YOB: 1960 Monday, September 24, 2018  Montrell Gan MD  RESTRICTIONS:  During your procedure today, you received medications for sedation.  These   medications may affect your judgment, balance and coordination.  Therefore,   for 24 hours, you have the following restrictions:   - DO NOT drive a car, operate machinery, make legal/financial decisions,   sign important papers or drink alcohol.    ACTIVITY:  Today: no heavy lifting, straining or running due to procedural   sedation/anesthesia.  The following day: return to full activity including work.  DIET:  Eat and drink normally unless instructed otherwise.     TREATMENT FOR COMMON SIDE EFFECTS:  - Mild abdominal pain, nausea, belching, bloating or excessive gas:  rest,   eat lightly and use a heating pad.  - Sore Throat: treat with throat lozenges and/or gargle with warm salt   water.  - Because air was used during the procedure, expelling large amounts of air   from your rectum or belching is normal.  - If a bowel prep was taken, you may not have a bowel movement for 1-3 days.    This is normal.  SYMPTOMS TO WATCH FOR AND REPORT TO YOUR PHYSICIAN:  1. Abdominal pain or bloating, other than gas cramps.  2. Chest pain.  3. Back pain.  4. Signs of infection such as: chills or fever occurring within 24 hours   after the procedure.  5. Rectal bleeding, which would show as bright red, maroon, or black stools.   (A tablespoon of blood from the rectum is not serious, especially if   hemorrhoids are present.)  6. Vomiting.  7. Weakness or dizziness.  GO DIRECTLY TO THE NEAREST EMERGENCY ROOM IF YOU HAVE ANY OF THE FOLLOWING:      Difficulty breathing              Chills and/or fever over 101 F   Persistent vomiting and/or vomiting blood   Severe abdominal pain   Severe chest pain   Black, tarry stools   Bleeding- more than one  tablespoon   Any other symptom or condition that you feel may need urgent attention  Your doctor recommends these additional instructions:  If any biopsies were taken, your doctors clinic will contact you in 1 to 2   weeks with any results.  - Patient has a contact number available for emergencies.  The signs and   symptoms of potential delayed complications were discussed with the   patient.  Return to normal activities tomorrow.  Written discharge   instructions were provided to the patient.   - Discharge patient to home.   - Resume previous diet.   - Continue present medications.   - Await pathology results.   For questions, problems or results please call your physician - Montrell Gan MD at Work:  [phone].  [facility] [place], EMERGENCY ROOM PHONE NUMBER: [phone]  IF A COMPLICATION OR EMERGENCY SITUATION ARISES AND YOU ARE UNABLE TO REACH   YOUR PHYSICIAN - GO DIRECTLY TO THE EMERGENCY ROOM.  Montrell Gan MD  9/24/2018 10:59:49 AM  This report has been verified and signed electronically.  [employer]

## 2018-09-24 NOTE — H&P
Short Stay Endoscopy History and Physical    PCP - Brenton Hughes MD    Procedure - EGD/Colonoscopy  Sedation: GA  ASA - per anesthesia  Mallampati - per anesthesia  History of Anesthesia problems - no  Family history Anesthesia problems -  no     HPI:  This is a 58 y.o. female here for evaluation of : Change in bowel    Reflux - no  Dysphagia - no  Abdominal pain - no  Diarrhea - no    ROS:  Constitutional: No fevers, chills, No weight loss  ENT: No allergies  CV: No chest pain  Pulm: No cough, No shortness of breath  Ophtho: No vision changes  GI: see HPI  Medical History:  has a past medical history of Allergic rhinitis (4/27/2014), Anxiety (4/27/2014), cervical cancer (4/27/2014), Obesity (4/27/2014), and Vitamin D deficiency (4/27/2014).    Surgical History:  has a past surgical history that includes Total abdominal hysterectomy.    Family History: family history includes Hypertension in her mother.. Otherwise no colon cancer, inflammatory bowel disease, or GI malignancies.    Social History:  reports that she has been smoking.  she has never used smokeless tobacco. She reports that she does not drink alcohol or use drugs.    Review of patient's allergies indicates:  No Known Allergies    Medications:   Medications Prior to Admission   Medication Sig Dispense Refill Last Dose    albuterol 90 mcg/actuation inhaler Inhale 2 puffs into the lungs every 6 (six) hours as needed for Wheezing. 18 g 0 Past Week at Unknown time    escitalopram (LEXAPRO) 10 MG tablet Take 1 tablet (10 mg total) by mouth once daily. 30 tablet 5 Past Week at Unknown time    fluticasone (FLONASE) 50 mcg/actuation nasal spray 1 spray daily as needed.    Past Week at Unknown time    ibuprofen (ADVIL,MOTRIN) 800 MG tablet Take 1 tablet (800 mg total) by mouth 2 (two) times daily as needed for Pain. 60 tablet 1 Past Week at Unknown time    mirabegron 50 mg Tb24 Take 1 tablet (50 mg total) by mouth once daily. 30 tablet 11 Past Week  at Unknown time    mv,Ca,Fe,min-FA-hrb [id number] (ONE-A-DAY WEIGHTSMART) 200-18-0.4 mg Tab Take by mouth. 1 Tablet Oral Every day   Past Week at Unknown time    oxybutynin (DITROPAN-XL) 10 MG 24 hr tablet Take 1 tablet (10 mg total) by mouth once daily. 30 tablet 11 Past Month at Unknown time    valACYclovir (VALTREX) 1000 MG tablet    Past Week at Unknown time       Objective Findings:    Vital Signs: Per nursing notes.    Physical Exam:  General Appearance: Well appearing in no acute distress  Head:   Normocephalic, without obvious abnormality  Eyes:    No scleral icterus  Airway: Open  Neck: No restriction in mobility  Lungs: CTA bilaterally in anterior and posterior fields, no wheezes, no crackles.  Heart:  Regular rate and rhythm, S1, S2 normal, no murmurs heard  Abdomen: Soft, non tender, non distended      Labs:  Lab Results   Component Value Date    WBC 9.21 07/31/2018    HGB 13.4 07/31/2018    HCT 40.9 07/31/2018     07/31/2018    CHOL 163 07/31/2018    TRIG 106 07/31/2018    HDL 55 07/31/2018    ALT 17 07/31/2018    AST 21 07/31/2018     07/31/2018    K 3.5 07/31/2018     07/31/2018    CREATININE 1.1 07/31/2018    BUN 13 07/31/2018    CO2 26 07/31/2018    TSH 1.364 07/31/2018    HGBA1C 5.3 07/31/2018         I have explained the risks and benefits of endoscopy procedures to the patient including but not limited to bleeding, perforation, infection, and death.    Thank you so much for allowing me to participate in the care of Brianna Gan MD

## 2018-09-24 NOTE — ANESTHESIA PREPROCEDURE EVALUATION
09/24/2018  Brianna Cantu is a 58 y.o., female.  Past Medical History:   Diagnosis Date    Allergic rhinitis 4/27/2014    Anxiety 4/27/2014    Hx of cervical cancer 4/27/2014    Obesity 4/27/2014    Vitamin D deficiency 4/27/2014       Past Surgical History:   Procedure Laterality Date    COLONOSCOPY      ESOPHAGOGASTRODUODENOSCOPY      TOTAL ABDOMINAL HYSTERECTOMY       Anesthesia Evaluation    I have reviewed the Patient Summary Reports.     I have reviewed the Medications.     Review of Systems  Anesthesia Hx:  No problems with previous Anesthesia    Social:  Smoker, Social Alcohol Use    Hematology/Oncology:         -- Cancer in past history:   Cardiovascular:   Exercise tolerance: good    Pulmonary:   Asthma    Renal/:  Renal/ Normal     Hepatic/GI:  Hepatic/GI Normal    Musculoskeletal:  Musculoskeletal Normal    Neurological:  Neurology Normal    Endocrine:  Endocrine Normal    Psych:   Psychiatric History          Physical Exam  General:  Obesity    Airway/Jaw/Neck:  Airway Findings: Mouth Opening: Normal Tongue: Normal  General Airway Assessment: Adult  Mallampati: II  Improves to II with phonation.  TM Distance: Normal, at least 6 cm       Chest/Lungs:  Chest/Lungs Findings:        Mental Status:  Mental Status Findings:  Cooperative, Alert and Oriented         Anesthesia Plan  Type of Anesthesia, risks & benefits discussed:  Anesthesia Type:  general  Patient's Preference: general  Intra-op Monitoring Plan: standard ASA monitors  Intra-op Monitoring Plan Comments:   Post Op Pain Control Plan: IV/PO Opioids PRN  Post Op Pain Control Plan Comments:   Induction:   IV  Beta Blocker:  Patient is not currently on a Beta-Blocker (No further documentation required).       Informed Consent: Patient understands risks and agrees with Anesthesia plan.  Questions answered. Anesthesia consent  signed with patient.  ASA Score: 2     Day of Surgery Review of History & Physical:  There are no significant changes.  H&P update referred to the surgeon.         Ready For Surgery From Anesthesia Perspective.

## 2018-09-27 ENCOUNTER — TELEPHONE (OUTPATIENT)
Dept: GASTROENTEROLOGY | Facility: CLINIC | Age: 58
End: 2018-09-27

## 2018-09-27 NOTE — TELEPHONE ENCOUNTER
----- Message from Montrell Gan MD sent at 9/27/2018  9:51 AM CDT -----  Biopsies are fine, but will wait on CT scan.

## 2018-09-28 ENCOUNTER — TELEPHONE (OUTPATIENT)
Dept: GASTROENTEROLOGY | Facility: CLINIC | Age: 58
End: 2018-09-28

## 2018-09-28 NOTE — TELEPHONE ENCOUNTER
----- Message from Skye See sent at 9/28/2018  8:31 AM CDT -----  Contact: self [phone]  Patient Returning Call from [facility]    Who Left Message for Patient: Ely   Communication Preference: self [phone]  Additional Information:

## 2018-10-02 ENCOUNTER — HOSPITAL ENCOUNTER (OUTPATIENT)
Dept: RADIOLOGY | Facility: HOSPITAL | Age: 58
Discharge: HOME | End: 2018-10-02
Attending: INTERNAL MEDICINE
Payer: COMMERCIAL

## 2018-10-02 DIAGNOSIS — K62.4 RECTAL STRICTURE: ICD-10-CM

## 2018-10-02 PROCEDURE — 25500020 PHARM REV CODE 255: Performed by: INTERNAL MEDICINE

## 2018-10-02 PROCEDURE — 74177 CT ABD & PELVIS W/CONTRAST: CPT | Mod: TC

## 2018-10-02 PROCEDURE — 74177 CT ABD & PELVIS W/CONTRAST: CPT | Mod: 26,,, | Performed by: RADIOLOGY

## 2018-10-02 RX ADMIN — IOHEXOL 60 ML: 350 INJECTION, SOLUTION INTRAVENOUS at 02:10

## 2018-10-02 RX ADMIN — IOHEXOL 100 ML: 350 INJECTION, SOLUTION INTRAVENOUS at 02:10

## 2018-10-03 ENCOUNTER — TELEPHONE (OUTPATIENT)
Dept: GASTROENTEROLOGY | Facility: CLINIC | Age: 58
End: 2018-10-03

## 2018-10-03 NOTE — TELEPHONE ENCOUNTER
----- Message from Montrell Gan MD sent at 10/3/2018  7:20 AM CDT -----  Please schedule CRS appointment with : Rectal stricture.

## 2018-10-10 ENCOUNTER — OFFICE VISIT (OUTPATIENT)
Dept: SURGERY | Facility: CLINIC | Age: 58
End: 2018-10-10
Payer: COMMERCIAL

## 2018-10-10 VITALS
HEART RATE: 76 BPM | WEIGHT: 220.25 LBS | BODY MASS INDEX: 34.57 KG/M2 | SYSTOLIC BLOOD PRESSURE: 168 MMHG | HEIGHT: 67 IN | DIASTOLIC BLOOD PRESSURE: 89 MMHG

## 2018-10-10 DIAGNOSIS — K56.699 STENOSIS COLON: Primary | ICD-10-CM

## 2018-10-10 PROCEDURE — 99204 OFFICE O/P NEW MOD 45 MIN: CPT | Mod: S$GLB,,, | Performed by: COLON & RECTAL SURGERY

## 2018-10-10 PROCEDURE — 3008F BODY MASS INDEX DOCD: CPT | Mod: CPTII,S$GLB,, | Performed by: COLON & RECTAL SURGERY

## 2018-10-10 PROCEDURE — 99999 PR PBB SHADOW E&M-EST. PATIENT-LVL III: CPT | Mod: PBBFAC,,, | Performed by: COLON & RECTAL SURGERY

## 2018-10-10 NOTE — LETTER
October 10, 2018      Montrell Gan MD  [address]  [facility] [address]           [facility]  [address]  [facility] [address]  Phone: [phone]          Patient: Brianna Cantu   MR Number: [id number]   YOB: 1960   Date of Visit: 10/10/2018       Dear Dr. Montrell Gan:    Thank you for referring Brianna Cantu to me for evaluation. Attached you will find relevant portions of my assessment and plan of care.    If you have questions, please do not hesitate to call me. I look forward to following Brianna Cantu along with you.    Sincerely,    CELIO Washington MD    Enclosure  CC:  No Recipients    If you would like to receive this communication electronically, please contact [email or web address] or [phone] to request more information on [email or web address] Link access.    For providers and/or their staff who would like to refer a patient to [facility], please contact us through our one-stop-shop provider referral line, [facility] [place], at [phone].    If you feel you have received this communication in error or would no longer like to receive these types of communications, please e-mail [email or web address]

## 2018-10-10 NOTE — PROGRESS NOTES
CRS Office Visit History and Physical    Referring Md:   Montrell Gan Md  [address] [place] [address]    SUBJECTIVE:     Chief Complaint:  Rectal stricture    History of Present Illness:  Patient is a 58 y.o. female presents with rectal stricture seen at colonoscopy.  This could not be traversed.  Biopsies were negative for malignancy..     She reports that she developed irregularity 6 months ago.  She states that she had either loose stools or constipated bowel movements without much in between.  She had some hypogastric discomfort as well. She states that after her colonoscopy she has improved.  She states that her bowel movements are much less irregular.  They tend to be formed or mow she.  There every day.  She denies any further abdominal pain.  She denies any rectal bleeding.    She has a history of cervical cancer at the age of 33.  She underwent in 1993 total abdominal hysterectomy, external beam radiation therapy as well as catheter interstitial radiation therapy.    Last Colonoscopy:  September 24, 2018  Findings:       The perianal and digital rectal examinations were normal.       A benign-appearing, intrinsic severe stenosis was found in the        proximal rectum and was non-traversed. Biopsies were taken with a        cold forceps for histology. Estimated blood loss was minimal.    Pathology specimens:  SPECIMEN  1) Duodenum, rule out celiac.  2) Stomach, rule out H. pylori.  3) Rectal stricture biopsy.  FINAL PATHOLOGIC DIAGNOSIS  1. BIOPSIES OF DUODENUM:  NO SIGNIFICANT HISTOLOGIC ALTERATION  NO EVIDENCE OF CELIAC DISEASE  2. GASTRIC BIOPSIES:  MODERATE NONSPECIFIC CHRONIC INFLAMMATION  NO HELICOBACTER HAVE BEEN IDENTIFIED WITH A SPECIAL STAIN  THE POSITIVE AND NEGATIVE CONTROLS STAINED APPROPRIATELY  3. RECTAL BIOPSIES:  LARGELY INNOCUOUS COLONIC MUCOSA WITH A SMALL AREA OF ADENOMATOUS GLANDS  THE POSSIBILITY OF AN UN BIOPSIED MORE OMINOUS AREA CANNOT BE EXCLUDED  Diagnosed by:  Isacc Paz M.D.  (Electronically Signed: 2018-09-27 09:17:23)      Past Medical History:   Diagnosis Date    Allergic rhinitis 4/27/2014    Anxiety 4/27/2014    Hx of cervical cancer 4/27/2014    Obesity 4/27/2014    Vitamin D deficiency 4/27/2014       Past Surgical History:   Procedure Laterality Date    COLONOSCOPY      COLONOSCOPY N/A 9/24/2018    Procedure: COLONOSCOPY;  Surgeon: Montrell Gan MD;  [facility]  Service: Endoscopy;  Laterality: N/A;    COLONOSCOPY N/A 9/24/2018    [facility]formed by [facility] at [facility] (4[facility])    EGD (ESOPHAGOGASTRODUODENOSCOPY) N/A 9/24/2018    Performed by Montrell Gan MD at [facility] (4TH FLR)    ESOPHAGOGASTRODUODENOSCOPY      ESOPHAGOGASTRODUODENOSCOPY N/A 9/24/2018    Procedure: EGD (ESOPHAGOGASTRODUODENOSCOPY);  Surgeon: Montrell Gan MD;  Location: [address]);  Service: Endoscopy;  Laterality: N/A;    TOTAL ABDOMINAL HYSTERECTOMY         Review of patient's allergies indicates:  No Known Allergies    Current Outpatient Medications on File Prior to Visit   Medication Sig Dispense Refill    albuterol 90 mcg/actuation inhaler Inhale 2 puffs into the lungs every 6 (six) hours as needed for Wheezing. 18 g 0    escitalopram (LEXAPRO) 10 MG tablet Take 1 tablet (10 mg total) by mouth once daily. 30 tablet 5    fluticasone (FLONASE) 50 mcg/actuation nasal spray 1 spray daily as needed.       ibuprofen (ADVIL,MOTRIN) 800 MG tablet Take 1 tablet (800 mg total) by mouth 2 (two) times daily as needed for Pain. 60 tablet 1    mirabegron 50 mg Tb24 Take 1 tablet (50 mg total) by mouth once daily. 30 tablet 11    mv,Ca,Fe,min-FA-hrb [id number] (ONE-A-DAY WEIGHTSMART) 200-18-0.4 mg Tab Take by mouth. 1 Tablet Oral Every day      oxybutynin (DITROPAN-XL) 10 MG 24 hr tablet Take 1 tablet (10 mg total) by mouth once daily. 30 tablet 11    valACYclovir (VALTREX) 1000 MG tablet        No current facility-administered  "medications on file prior to visit.        Family History   Problem Relation Age of Onset    Hypertension Mother        Social History     Tobacco Use    Smoking status: Current Every Day Smoker     Packs/day: 0.50     Types: Cigarettes    Smokeless tobacco: Never Used   Substance Use Topics    Alcohol use: Yes     Alcohol/week: 0.0 oz     Comment: occasional    Drug use: No        Review of Systems:  ROS:  GENERAL: No fever, chills, fatigability or weight loss.  Integument:  No rashes, redness, icterus  CHEST: Denies HARMON, cyanosis, wheezing, cough and sputum production.  CARDIOVASCULAR: Denies chest pain, PND, orthopnea or reduced exercise tolerance.  GI:  Denies abd pain, dysphagia, nausea, vomiting, no hematemesis, no rectal pain  : Denies burning on urination, no hematuria, no bacteriuria  MSK:  No deformities, swelling, joint pain swelling  Neurologic:  No HAs, seizures, weakness, paresthesias, gait problems      OBJECTIVE:     Vital Signs (Most Recent)  BP (!) 168/89 (BP Location: Right arm, Patient Position: Sitting, BP Method: Large (Automatic))   Pulse 76   Ht 5' 7" (1.702 m)   Wt 99.9 kg (220 lb 3.8 oz)   BMI 34.49 kg/m²     Physical Exam:  General: Black or  female in no distress   Skin/ Sclera anicteric  HEENT: anicteric, normocephalic, extraocular movements intact   Neck trachea midline  Chest symmetric, nl excursions, no retractions  Respiratory: respirations are even and unlabored  COR RRR   Abdomen - inspection well-healed low midline incision. No hernias.  soft NT ND.  No masses, no  Organomegaly  Extremities: Warm dry and intact.  NO CCE  Neuro: alert and oriented x 4.  Moves all extremities.         CT scan abdomen pelvis        Impression       No evidence of rectal stricture.  Sigmoid colon does appear slightly tortuous and small in caliber, nonspecific.    Mild hepatomegaly.  No focal hepatic lesions.    Tiny bilateral renal hypodensities, too small to characterize " but likely cysts.  Additional hypodensity in the mid left kidney is also likely a cyst but measures slightly higher in density than would be expected for such.  Consider further evaluation with renal ultrasound if clinically indicated.    Status post hysterectomy.    This report was flagged in Epic as abnormal.  This report was flagged in Epic as containing an incidental finding.    Electronically signed by resident: Phi Sotelo  Date: 10/02/2018  Time: 15:06       ASSESSMENT/PLAN:   Probable radiation induced stricture or stenosis of the sigmoid colon  Symptoms of bowel irregularity have improved      Recommend  Long discussion with patient and her sister regarding options for management.  Although the stricture prevents passage of the colonoscope her symptoms are minimal to absent and therefore I do not believe resection is indicated  She certainly needs colorectal cancer screening and options include stool studies versus virtual colonoscopy.  I would favor the latter.  She will return to see me in 3 months and we will arrange for this.

## 2018-10-24 ENCOUNTER — OFFICE VISIT (OUTPATIENT)
Dept: GASTROENTEROLOGY | Facility: CLINIC | Age: 58
End: 2018-10-24
Payer: COMMERCIAL

## 2018-10-24 VITALS
HEIGHT: 67 IN | BODY MASS INDEX: 34.12 KG/M2 | SYSTOLIC BLOOD PRESSURE: 144 MMHG | HEART RATE: 78 BPM | WEIGHT: 217.38 LBS | DIASTOLIC BLOOD PRESSURE: 93 MMHG

## 2018-10-24 DIAGNOSIS — N81.84 PELVIC MUSCLE WASTING: Primary | ICD-10-CM

## 2018-10-24 PROCEDURE — 99213 OFFICE O/P EST LOW 20 MIN: CPT | Mod: S$GLB,,, | Performed by: INTERNAL MEDICINE

## 2018-10-24 PROCEDURE — 99999 PR PBB SHADOW E&M-EST. PATIENT-LVL III: CPT | Mod: PBBFAC,,, | Performed by: INTERNAL MEDICINE

## 2018-10-24 PROCEDURE — 3008F BODY MASS INDEX DOCD: CPT | Mod: CPTII,S$GLB,, | Performed by: INTERNAL MEDICINE

## 2018-10-24 NOTE — PROGRESS NOTES
[facility]    Reason for visit: The encounter diagnosis was Pelvic muscle wasting.  Referring Provider/PCP: Brenton Hughes MD    History of Present Illness:  Brianna Cantu is a 58 y.o. female with a history of depression, cervical cancer (1993), diverticulitis, low back pain who is presenting for follow-up evaluation of moses-umbilical pain, diarrhea, and fecal incontinence x2 years.    Was seen in clinic for evaluation of 6 months of intermittent abdominal pain. Recommended for EGD and colonoscopy for evaluation. EGD was unremarkable. Colonoscopy demonstrated a rectal stricture (not noted on follow-up CT). Biopsies were negative. She saw CRS who discussed options with the patient. They felt likely radiation stricture and as asymptomatic from the stricture did not elect for bowel resection. She is considering options for CRC surveillance and will discuss with CRS during follow-up appointment.    She presents today for follow-up evaluation. She notes that since her last appointment, and more specifically since her colonoscopy, her abdominal pain has resolved. She does however note the persistence of diarrhea but more bothersome/distressing is the persistence of fecal incontinence.    She notes that yesterday for example she was walking to work and suddenly had fecal urgency and was unable to reach a bathroom in time and had fecal incontinence. She notes that because of this she had to go home and missed a day of work. She notes this is starting to impact her work and make her very self conscious. She has tried to wear diaper in the past but notes this is not a great solution for her. She notes that this will not occur all the time, and had decreased frequency since she had her colonoscopy. She had identified some trigger such as spicy food and dairy and has been working to eliminate these from her diet. She notes that she had initially cut out coffee but has recently resumed it in  her diet (using coffee mate, no milk/cream).     Regarding diarrhea she notes that it is currently soft stools 2-3/day, non-bloody, non-mucus. Stools can vary in caliber and be large and formed on occasion. She remarks that she may be constipated the day prior to episode of fecal incontinence but is not certain. Endorses some bloating and gassy sensation.     PEndoHx:  EGD. (9/24/18). Dr. Gan. Indication:Abdominal pain.  - small hiatal hernia  - normal duodenum  - moderate non-specific chronic inflammation. H Pylori negative.    Colonoscopy. (9/24/18) Provider: Dr. Gan. Indication: Abdominal pain. Extent: Rectum. Preparation:Good.  - stricture of proximal rectum [largely innocuous colonic mucosa with small area of adenomatous glands]  - unable to transverse stricture    Colonoscopy (10/2008) Dr. Blanton. Indication: periumbilical abdominal pain, anal bleeding. Excellent prep.  - anal examination unremarkable  - small internal hemorrhoids  - colon unremarkable.     Endoscopy (10/2008) Dr. Blanton. Indication: epigastric pain  - small hiatal hernia  - atrophic gastritis    Review of Systems:   Constitutional: no fever, chills or change in weight   Eyes: no visual changes   ENT: no sore throat or dysphagia  Respiratory: no cough or shortness of breath   Cardiovascular: no chest pain or palpitations   Gastrointestinal: as per HPI  Genitourinary: no dysuria or hematuria. Frequent urinary incontinence.  Hematologic/Lymphatic: no easy bruising or lymphadenopathy   Musculoskeletal: no arthralgias or myalgias   Neurological: no change in mental status  Behavioral/Psych: feels ordonez.    Medical History:  Past Medical History:   Diagnosis Date    Allergic rhinitis 4/27/2014    Anxiety 4/27/2014    Hx of cervical cancer 4/27/2014    Obesity 4/27/2014    Vitamin D deficiency 4/27/2014       Past Surgical History:   Procedure Laterality Date    COLONOSCOPY      COLONOSCOPY N/A 9/24/2018    Procedure: COLONOSCOPY;   Surgeon: Montrell Gan MD;  Location: [address]);  Service: Endoscopy;  Laterality: N/A;    COLONOSCOPY N/A 9/24/2018    [facility]    EGD (ESOPHAGOGASTRODUODENOSCOPY) N/A 9/24/2018    [facility]    ESOPHAGOGASTRODUODENOSCOPY      ESOPHAGOGASTRODUODENOSCOPY N/A 9/24/2018    Procedure: EGD (ESOPHAGOGASTRODUODENOSCOPY);  Surgeon: Montrell Gan MD;  Location: [address]);  Service: Endoscopy;  Laterality: N/A;    TOTAL ABDOMINAL HYSTERECTOMY         Family History   Problem Relation Age of Onset    Hypertension Mother        Social History     Socioeconomic History    Marital status:      Spouse name: Not on file    Number of children: Not on file    Years of education: Not on file    Highest education level: Not on file   Social Needs    Financial resource strain: Not on file    Food insecurity - worry: Not on file    Food insecurity - inability: Not on file    Transportation needs - medical: Not on file    Transportation needs - non-medical: Not on file   Occupational History    Not on file   Tobacco Use    Smoking status: Current Every Day Smoker     Packs/day: 0.50     Types: Cigarettes    Smokeless tobacco: Never Used   Substance and Sexual Activity    Alcohol use: Yes     Alcohol/week: 0.0 oz     Comment: occasional    Drug use: No    Sexual activity: Not Currently   Other Topics Concern    Not on file   Social History Narrative    Not on file       Current Outpatient Medications on File Prior to Visit   Medication Sig Dispense Refill    fluticasone (FLONASE) 50 mcg/actuation nasal spray 1 spray daily as needed.       ibuprofen (ADVIL,MOTRIN) 800 MG tablet Take 1 tablet (800 mg total) by mouth 2 (two) times daily as needed for Pain. 60 tablet 1    mirabegron 50 mg Tb24 Take 1 tablet (50 mg total) by mouth once daily. 30 tablet 11    mv,Ca,Fe,min-FA-hrb [id number]  (ONE-A-DAY WEIGHTSMART) 200-18-0.4 mg Tab Take by mouth. 1 Tablet Oral Every day      albuterol 90 mcg/actuation inhaler Inhale 2 puffs into the lungs every 6 (six) hours as needed for Wheezing. 18 g 0    escitalopram (LEXAPRO) 10 MG tablet Take 1 tablet (10 mg total) by mouth once daily. 30 tablet 5    oxybutynin (DITROPAN-XL) 10 MG 24 hr tablet Take 1 tablet (10 mg total) by mouth once daily. 30 tablet 11    valACYclovir (VALTREX) 1000 MG tablet        No current facility-administered medications on file prior to visit.        Review of patient's allergies indicates:  No Known Allergies    Physical Exam:  General: Alert and Oriented x3, no distress. Is emotional when discussing fecal incontinence but overall is optimistic and happy the abdominal pain has resolved.  Vitals:    10/24/18 1301   BP: (!) 144/93   Pulse: 78     HEENT: Normocephalic, Atraumatic. No scleral icterus.  Lymph: No cervical lymphadenopathy  Resp: Good air entry bilaterally, no adventitious sounds.  Cardiac: S1 and S2 normal.  Abdomen: Normoactive bowel sounds. Non-distended. Normal tympany. Soft. Non-tender. No peritoneal signs.  Extremities: No peripheral edema. Normal bilateral pedal and radial pulses.  Neurologic: No gross neurological Deficits  Psych: Calm, cooperative. Normal mood and affect.    Laboratory:  Lab Results   Component Value Date     07/31/2018    K 3.5 07/31/2018     07/31/2018    CO2 26 07/31/2018    BUN 13 07/31/2018    CREATININE 1.1 07/31/2018    CALCIUM 9.6 07/31/2018    ANIONGAP 9 07/31/2018    ESTGFRAFRICA >60 07/31/2018    EGFRNONAA 56 (A) 07/31/2018       Lab Results   Component Value Date    ALT 17 07/31/2018    AST 21 07/31/2018    ALKPHOS 68 07/31/2018    BILITOT 0.3 07/31/2018       Lab Results   Component Value Date    WBC 9.21 07/31/2018    HGB 13.4 07/31/2018    HCT 40.9 07/31/2018    MCV 88 07/31/2018     07/31/2018       Microbiology:  No Pertinent Microbiology    Imaging:  CT AP  (10/2/18)  - liver enlarged (18.5cm), no focal lesions  - GB, panc, stomach normal  - bowel without obstruction or inflammation  - sigmoid colon tortuous and small caliber without wall thickening. No focal narrowing seen.    Assessment:  Brianna Cantu is a 58 y.o. female who is presenting for follow-up evaluation of fecal incontinence, abdominal pain and diarrhea.    Since last appointment she underwent an unremarkable EGD and colonoscopy with rectal stricture (benign pathology, likely secondary to prior cervical cancer radiation). Her abdominal pain has resolved but she has persistent episodes of fecal incontinence which are distressing.    Given her history of pelvic surgeries, and frequent urinary incontinence, I suspect that she has pelvic muscle wasting which is contributing to fecal incontinence. Additionally, she endorses dietary correlation with foods such as dairy and that her episodes have increased since re-introduction of coffee which may indicate coffee as a trigger or the creamer (i.e. Coffee mate). Additionally she notes that her incontinence frequently happens after an episode of constipation which given the known rectal stricture may be a factor.      Plan:  1. Fecal Incontinence  1. Recommend pelvic floor therapy (referral placed)  2. Trial of dulcolax in the evening to try to encourage BM in the AM before work and maintain normal bowel regiment  3. Dietary monitoring and elimination. Recommended trial of coffee without the coffee mate or alternative creamer. May need to wait on coffee until near a bathroom  2. CRC Screening: discussed that given the stricture will be unable to complete colonoscopy. She is following with CRS and was recently offered FIT testing vs CT Colonography. Discussed the merrits of both options. She will consider and discuss further with CRS or may return to our clinic to discuss as well.  Follow-up if symptoms worsen or fail to improve.    Xavier Crouch,  MD  Gastroenterology Fellow (PGY IV)  Phone: [phone]  Pager: [phone]    Orders Placed This Encounter   Procedures    Ambulatory Referral to Physical/Occupational Therapy

## 2018-11-08 ENCOUNTER — PATIENT MESSAGE (OUTPATIENT)
Dept: SURGERY | Facility: CLINIC | Age: 58
End: 2018-11-08

## 2018-12-11 NOTE — PROGRESS NOTES
CRS Office Visit History and Physical    Referring Md:   No referring provider defined for this encounter.    SUBJECTIVE:     Chief Complaint:  Rectal stricture    History of Present Illness:  Patient is a 58 y.o. female presents with proximal rectal stricture seen at colonoscopy.  This could not be traversed.  Biopsies were negative for malignancy..     She reports that she developed irregularity 6 months ago.  She states that she had either loose stools or constipated bowel movements without much in between. No rectal bleeding. She also notes incontinence to urine daily and to stool about once every two weeks. The consistency of the stool does not make a difference. She can occasionally hold gas but not always. This interferes with work and she is worried about losing her job. She has had an ultrasound of her bladder but no workup of fecal incontinence has been performed.    She has a history of cervical cancer at the age of 33.  She underwent in 1993 total abdominal hysterectomy, external beam radiation therapy as well as catheter interstitial radiation therapy.    Last Colonoscopy:  September 24, 2018  Findings:       The perianal and digital rectal examinations were normal.       A benign-appearing, intrinsic severe stenosis was found in the        proximal rectum and was non-traversed. Biopsies were taken with a        cold forceps for histology. Estimated blood loss was minimal.    Pathology specimens:  SPECIMEN  1) Duodenum, rule out celiac.  2) Stomach, rule out H. pylori.  3) Rectal stricture biopsy.  FINAL PATHOLOGIC DIAGNOSIS  1. BIOPSIES OF DUODENUM:  NO SIGNIFICANT HISTOLOGIC ALTERATION  NO EVIDENCE OF CELIAC DISEASE  2. GASTRIC BIOPSIES:  MODERATE NONSPECIFIC CHRONIC INFLAMMATION  NO HELICOBACTER HAVE BEEN IDENTIFIED WITH A SPECIAL STAIN  THE POSITIVE AND NEGATIVE CONTROLS STAINED APPROPRIATELY  3. RECTAL BIOPSIES:  LARGELY INNOCUOUS COLONIC MUCOSA WITH A SMALL AREA OF ADENOMATOUS GLANDS  THE  POSSIBILITY OF AN UN BIOPSIED MORE OMINOUS AREA CANNOT BE EXCLUDED  Diagnosed by: Isacc aPz M.D.  (Electronically Signed: 2018-09-27 09:17:23)      Past Medical History:   Diagnosis Date    Allergic rhinitis 4/27/2014    Anxiety 4/27/2014    Hx of cervical cancer 4/27/2014    Obesity 4/27/2014    Vitamin D deficiency 4/27/2014       Past Surgical History:   Procedure Laterality Date    COLONOSCOPY      COLONOSCOPY N/A 9/24/2018    Procedure: COLONOSCOPY;  Surgeon: Montrell Gan MD;  Location: [address]);  Service: Endoscopy;  Laterality: N/A;    COLONOSCOPY N/A 9/24/2018    Performed by Montrell Gan MD at [facility] ([address] [place] [address])    EGD (ESOPHAGOGASTRODUODENOSCOPY) N/A 9/24/2018    Performed by Montrell Gan MD at [address])    ESOPHAGOGASTRODUODENOSCOPY      ESOPHAGOGASTRODUODENOSCOPY N/A 9/24/2018    Procedure: EGD (ESOPHAGOGASTRODUODENOSCOPY);  Surgeon: Montrell Gan MD;  Location: [address]);  Service: Endoscopy;  Laterality: N/A;    TOTAL ABDOMINAL HYSTERECTOMY         Review of patient's allergies indicates:  No Known Allergies    Current Outpatient Medications on File Prior to Visit   Medication Sig Dispense Refill    fluticasone (FLONASE) 50 mcg/actuation nasal spray 1 spray daily as needed.       ibuprofen (ADVIL,MOTRIN) 800 MG tablet Take 1 tablet (800 mg total) by mouth 2 (two) times daily as needed for Pain. 60 tablet 1    mv,Ca,Fe,min-FA-hrb [id number] (ONE-A-DAY WEIGHTSMART) 200-18-0.4 mg Tab Take by mouth. 1 Tablet Oral Every day      valACYclovir (VALTREX) 1000 MG tablet       [DISCONTINUED] mirabegron 50 mg Tb24 Take 1 tablet (50 mg total) by mouth once daily. 30 tablet 11    [DISCONTINUED] oxybutynin (DITROPAN-XL) 10 MG 24 hr tablet Take 1 tablet (10 mg total) by mouth once daily. 30 tablet 11    albuterol 90 mcg/actuation inhaler Inhale 2 puffs into the lungs every 6 (six) hours as needed for Wheezing. 18 g 0    escitalopram (LEXAPRO) 10  "MG tablet Take 1 tablet (10 mg total) by mouth once daily. 30 tablet 5     No current facility-administered medications on file prior to visit.        Family History   Problem Relation Age of Onset    Hypertension Mother        Social History     Tobacco Use    Smoking status: Current Every Day Smoker     Packs/day: 0.50     Types: Cigarettes    Smokeless tobacco: Never Used   Substance Use Topics    Alcohol use: Yes     Alcohol/week: 0.0 oz     Comment: occasional    Drug use: No        Review of Systems:  ROS:  GENERAL: No fever, chills, fatigability or weight loss.  Integument:  No rashes, redness, icterus  CHEST: Denies HARMON, cyanosis, wheezing, cough and sputum production.  CARDIOVASCULAR: Denies chest pain, PND, orthopnea or reduced exercise tolerance.  GI:  Denies abd pain, dysphagia, nausea, vomiting, no hematemesis, no rectal pain  : Denies burning on urination, no hematuria, no bacteriuria  MSK:  No deformities, swelling, joint pain swelling  Neurologic:  No HAs, seizures, weakness, paresthesias, gait problems      OBJECTIVE:     Vital Signs (Most Recent)  BP (!) 143/88 (BP Location: Left arm, Patient Position: Sitting, BP Method: Large (Automatic))   Pulse 72   Ht 5' 7" (1.702 m)   Wt 99 kg (218 lb 4.1 oz)   BMI 34.18 kg/m²     Physical Exam:  Nontoxic  BP (!) 143/88 (BP Location: Left arm, Patient Position: Sitting, BP Method: Large (Automatic))   Pulse 72   Ht 5' 7" (1.702 m)   Wt 99 kg (218 lb 4.1 oz)   BMI 34.18 kg/m²     General: Black or  female in no distress   Skin/ Sclera anicteric  HEENT: anicteric, normocephalic, extraocular movements intact   Neck trachea midline  Chest symmetric, nl excursions, no retractions  Respiratory: respirations are even and unlabored  COR RRR   Abdomen - inspection well-healed low midline incision. No hernias.  Nondistended  soft NT.  No masses, no  Organomegaly  Extremities: Warm dry and intact.  NO CCE  Neuro: alert and oriented x 4.  " Moves all extremities.         CT scan abdomen pelvis        Impression       No evidence of rectal stricture.  Sigmoid colon does appear slightly tortuous and small in caliber, nonspecific.    Mild hepatomegaly.  No focal hepatic lesions.    Tiny bilateral renal hypodensities, too small to characterize but likely cysts.  Additional hypodensity in the mid left kidney is also likely a cyst but measures slightly higher in density than would be expected for such.  Consider further evaluation with renal ultrasound if clinically indicated.    Status post hysterectomy.    This report was flagged in Epic as abnormal.  This report was flagged in Epic as containing an incidental finding.    Electronically signed by resident: Phi Sotelo  Date: 10/02/2018  Time: 15:06       ASSESSMENT/PLAN:   Probable radiation induced stricture or stenosis of the upper rectum  Fecal incontinence likely related to above  Needs colon cancer screening    Recommend  Barium enema to assess status of stricture, possible progression narrowing  FIT test for screening given inability to pass upper GI scope; patient provided with kit  RTC following above 2 weeks    Terrell Lunsford     I have personally obtained a history and performed a physical exam with and independent to my resident and discussed the findings and plan with the patient.  I agree with the above findings and plan with the following exceptions:  None    If there is evidence of proximal dilatation and a severe stricture the patient will likely require surgical resection.    George GRANADOS MD, FACS, FASCRS  Staff Surgeon  Dept of Colon and Rectal Surgery  [facility] [place], [place]

## 2018-12-12 ENCOUNTER — OFFICE VISIT (OUTPATIENT)
Dept: SURGERY | Facility: CLINIC | Age: 58
End: 2018-12-12
Payer: COMMERCIAL

## 2018-12-12 VITALS
BODY MASS INDEX: 34.26 KG/M2 | WEIGHT: 218.25 LBS | HEART RATE: 72 BPM | HEIGHT: 67 IN | DIASTOLIC BLOOD PRESSURE: 88 MMHG | SYSTOLIC BLOOD PRESSURE: 143 MMHG

## 2018-12-12 DIAGNOSIS — Z12.11 SCREENING FOR MALIGNANT NEOPLASM OF COLON: ICD-10-CM

## 2018-12-12 DIAGNOSIS — K62.4 RECTAL STRICTURE: Primary | ICD-10-CM

## 2018-12-12 PROCEDURE — 99999 PR PBB SHADOW E&M-EST. PATIENT-LVL III: CPT | Mod: PBBFAC,,, | Performed by: COLON & RECTAL SURGERY

## 2018-12-12 PROCEDURE — 3008F BODY MASS INDEX DOCD: CPT | Mod: CPTII,S$GLB,, | Performed by: COLON & RECTAL SURGERY

## 2018-12-12 PROCEDURE — 99213 OFFICE O/P EST LOW 20 MIN: CPT | Mod: S$GLB,,, | Performed by: COLON & RECTAL SURGERY

## 2018-12-24 ENCOUNTER — LAB VISIT (OUTPATIENT)
Dept: LAB | Facility: HOSPITAL | Age: 58
End: 2018-12-24
Attending: FAMILY MEDICINE
Payer: COMMERCIAL

## 2018-12-24 DIAGNOSIS — Z12.11 SCREENING FOR MALIGNANT NEOPLASM OF COLON: ICD-10-CM

## 2018-12-24 LAB — HEMOCCULT STL QL IA: NEGATIVE

## 2018-12-24 PROCEDURE — 82274 ASSAY TEST FOR BLOOD FECAL: CPT

## 2018-12-26 ENCOUNTER — TELEPHONE (OUTPATIENT)
Dept: RADIOLOGY | Facility: HOSPITAL | Age: 58
End: 2018-12-26

## 2018-12-27 ENCOUNTER — HOSPITAL ENCOUNTER (OUTPATIENT)
Dept: RADIOLOGY | Facility: HOSPITAL | Age: 58
Discharge: HOME | End: 2018-12-27
Attending: STUDENT IN AN ORGANIZED HEALTH CARE EDUCATION/TRAINING PROGRAM
Payer: COMMERCIAL

## 2018-12-27 DIAGNOSIS — K62.4 RECTAL STRICTURE: ICD-10-CM

## 2018-12-27 PROCEDURE — 74270 X-RAY XM COLON 1CNTRST STD: CPT | Mod: TC

## 2018-12-27 PROCEDURE — 74270 X-RAY XM COLON 1CNTRST STD: CPT | Mod: 26,,, | Performed by: RADIOLOGY

## 2019-05-21 ENCOUNTER — HOSPITAL ENCOUNTER (OUTPATIENT)
Dept: RADIOLOGY | Facility: OTHER | Age: 59
Discharge: HOME | End: 2019-05-21
Attending: FAMILY MEDICINE
Payer: COMMERCIAL

## 2019-05-21 DIAGNOSIS — Z12.39 SCREENING FOR BREAST CANCER: ICD-10-CM

## 2019-05-21 DIAGNOSIS — Z12.39 SCREENING FOR BREAST CANCER: Primary | ICD-10-CM

## 2019-05-21 PROCEDURE — 77063 MAMMO DIGITAL SCREENING BILAT WITH TOMOSYNTHESIS_CAD: ICD-10-PCS | Mod: 26,,, | Performed by: RADIOLOGY

## 2019-05-21 PROCEDURE — 77063 BREAST TOMOSYNTHESIS BI: CPT | Mod: 26,,, | Performed by: RADIOLOGY

## 2019-05-21 PROCEDURE — 77067 SCR MAMMO BI INCL CAD: CPT | Mod: 26,,, | Performed by: RADIOLOGY

## 2019-05-21 PROCEDURE — 77067 SCR MAMMO BI INCL CAD: CPT | Mod: TC

## 2019-05-21 PROCEDURE — 77067 MAMMO DIGITAL SCREENING BILAT WITH TOMOSYNTHESIS_CAD: ICD-10-PCS | Mod: 26,,, | Performed by: RADIOLOGY

## 2019-07-15 ENCOUNTER — HOSPITAL ENCOUNTER (OUTPATIENT)
Dept: CARDIOLOGY | Facility: OTHER | Age: 59
Discharge: HOME | End: 2019-07-15
Attending: INTERNAL MEDICINE
Payer: COMMERCIAL

## 2019-07-15 ENCOUNTER — HOSPITAL ENCOUNTER (OUTPATIENT)
Dept: RADIOLOGY | Facility: OTHER | Age: 59
Discharge: HOME | End: 2019-07-15
Attending: INTERNAL MEDICINE
Payer: COMMERCIAL

## 2019-07-15 ENCOUNTER — OFFICE VISIT (OUTPATIENT)
Dept: INTERNAL MEDICINE | Facility: CLINIC | Age: 59
End: 2019-07-15
Attending: INTERNAL MEDICINE
Payer: COMMERCIAL

## 2019-07-15 VITALS
WEIGHT: 218.94 LBS | HEIGHT: 67 IN | SYSTOLIC BLOOD PRESSURE: 106 MMHG | DIASTOLIC BLOOD PRESSURE: 80 MMHG | BODY MASS INDEX: 34.36 KG/M2 | HEART RATE: 84 BPM

## 2019-07-15 DIAGNOSIS — R42 DIZZINESS: ICD-10-CM

## 2019-07-15 DIAGNOSIS — Z00.00 ANNUAL PHYSICAL EXAM: Primary | ICD-10-CM

## 2019-07-15 DIAGNOSIS — J45.20 MILD INTERMITTENT ASTHMA WITHOUT COMPLICATION: ICD-10-CM

## 2019-07-15 DIAGNOSIS — Z11.59 NEED FOR HEPATITIS C SCREENING TEST: ICD-10-CM

## 2019-07-15 DIAGNOSIS — R35.0 URINARY FREQUENCY: ICD-10-CM

## 2019-07-15 DIAGNOSIS — Z78.0 POST-MENOPAUSAL: ICD-10-CM

## 2019-07-15 DIAGNOSIS — N39.46 MIXED STRESS AND URGE URINARY INCONTINENCE: ICD-10-CM

## 2019-07-15 DIAGNOSIS — I83.93 VARICOSE VEINS OF BOTH LOWER EXTREMITIES, UNSPECIFIED WHETHER COMPLICATED: ICD-10-CM

## 2019-07-15 DIAGNOSIS — F17.200 TOBACCO USE DISORDER: ICD-10-CM

## 2019-07-15 LAB
BILIRUB UR QL STRIP: NEGATIVE
CLARITY UR REFRACT.AUTO: CLEAR
COLOR UR AUTO: YELLOW
GLUCOSE UR QL STRIP: NEGATIVE
HGB UR QL STRIP: ABNORMAL
KETONES UR QL STRIP: NEGATIVE
LEUKOCYTE ESTERASE UR QL STRIP: NEGATIVE
NITRITE UR QL STRIP: NEGATIVE
PH UR STRIP: 5 [PH] (ref 5–8)
PROT UR QL STRIP: NEGATIVE
SP GR UR STRIP: 1.01 (ref 1–1.03)
URN SPEC COLLECT METH UR: ABNORMAL

## 2019-07-15 PROCEDURE — 99999 PR PBB SHADOW E&M-EST. PATIENT-LVL IV: CPT | Mod: PBBFAC,,, | Performed by: INTERNAL MEDICINE

## 2019-07-15 PROCEDURE — 77080 DEXA BONE DENSITY SPINE HIP: ICD-10-PCS | Mod: 26,,, | Performed by: RADIOLOGY

## 2019-07-15 PROCEDURE — 99396 PR PREVENTIVE VISIT,EST,40-64: ICD-10-PCS | Mod: 25,S$GLB,, | Performed by: INTERNAL MEDICINE

## 2019-07-15 PROCEDURE — 77080 DXA BONE DENSITY AXIAL: CPT | Mod: 26,,, | Performed by: RADIOLOGY

## 2019-07-15 PROCEDURE — 81003 URINALYSIS AUTO W/O SCOPE: CPT

## 2019-07-15 PROCEDURE — 77080 DXA BONE DENSITY AXIAL: CPT | Mod: TC

## 2019-07-15 PROCEDURE — 99396 PREV VISIT EST AGE 40-64: CPT | Mod: 25,S$GLB,, | Performed by: INTERNAL MEDICINE

## 2019-07-15 PROCEDURE — 99999 PR PBB SHADOW E&M-EST. PATIENT-LVL IV: ICD-10-PCS | Mod: PBBFAC,,, | Performed by: INTERNAL MEDICINE

## 2019-07-15 PROCEDURE — 93005 ELECTROCARDIOGRAM TRACING: CPT

## 2019-07-15 PROCEDURE — 93010 EKG 12-LEAD: ICD-10-PCS | Mod: ,,, | Performed by: INTERNAL MEDICINE

## 2019-07-15 PROCEDURE — 93010 ELECTROCARDIOGRAM REPORT: CPT | Mod: ,,, | Performed by: INTERNAL MEDICINE

## 2019-07-15 RX ORDER — FLUTICASONE PROPIONATE AND SALMETEROL 250; 50 UG/1; UG/1
1 POWDER RESPIRATORY (INHALATION) 2 TIMES DAILY
Qty: 180 EACH | Refills: 0 | Status: SHIPPED | OUTPATIENT
Start: 2019-07-15 | End: 2019-08-04 | Stop reason: SDUPTHER

## 2019-07-15 NOTE — PROGRESS NOTES
Subjective:       Patient ID: Brianna Cantu is a 58 y.o. female.    Chief Complaint: Panic Attack    Here for urgent visit  Pt normally cared for by my colleague Dr. Hughes and patient is new to me. I have reviewed patient's past medical, surgical, and social history in addition to MAR and allergies.     57 yo F with PMHx of anxiety, asthma, allergic rhinitis, vit d deficiency    After having coffee this morning she was getting out of her typical warm/hot shower when she developed sudden lightheadedness and palpitations and she became panicky and nervous. She reports a Hx of anxiety and depression but denies any recent worsening of symptoms or Hx of panic attacks.  1 week earlier she had 2 seconds of lightheadedness that occurred when she turned her head quickly. She admits she does not consistently hydrate due to daytime and nighttime urinary frequency and urgency with resulting incontinence. She report chronic dry mouth and dry eyes.  Hx of alternating constipation and loose stools at baseline. She denies focal weakness, facial asymmetry.  Exam has wheezing in all fields. She reports having URI symptoms in February and has had a cough since then. She walks the stairs up and down for exercise daily and had not any change in her stamina, CP with exertion. She continues to smoke. She is not interested in quiting.   Hx of JAVY with TERRY. Had DXA approx 10 yrs prior that was reportedly normal.      Review of Systems   Constitutional: Negative for chills, fatigue, fever and unexpected weight change.   HENT: Negative for ear pain, hearing loss, postnasal drip, tinnitus, trouble swallowing and voice change.    Respiratory: Negative for cough, chest tightness, shortness of breath and wheezing.    Cardiovascular: Negative for chest pain, palpitations and leg swelling.   Gastrointestinal: Negative for abdominal pain, blood in stool, diarrhea, nausea and vomiting.   Endocrine: Negative for polydipsia, polyphagia and  "polyuria.   Genitourinary: Negative for difficulty urinating, dysuria, hematuria and vaginal bleeding.   Skin: Negative for rash.   Allergic/Immunologic: Negative for food allergies.   Neurological: Negative for dizziness, numbness and headaches.   Hematological: Does not bruise/bleed easily.   Psychiatric/Behavioral: The patient is nervous/anxious.        Objective:      Vitals:    07/15/19 0959   BP: 106/80   Pulse: 84   Weight: 99.3 kg (218 lb 14.7 oz)   Height: 5' 7" (1.702 m)      Physical Exam   Constitutional: She is oriented to person, place, and time. She appears well-developed and well-nourished. She does not have a sickly appearance. No distress.   HENT:   Head: Normocephalic and atraumatic.   Eyes: Conjunctivae and EOM are normal. Right eye exhibits no discharge. Left eye exhibits no discharge. No scleral icterus.   Pulmonary/Chest: Effort normal. No respiratory distress.   Abdominal: Normal appearance. She exhibits no distension.   Neurological: She is alert and oriented to person, place, and time.   Skin: Skin is warm and dry. She is not diaphoretic.   Psychiatric: She has a normal mood and affect. Her speech is normal.       Assessment:       1. Annual physical exam    2. Urinary frequency    3. Mixed stress and urge urinary incontinence    4. Need for hepatitis C screening test    5. Dizziness    6. Varicose veins of both lower extremities, unspecified whether complicated    7. Post-menopausal    8. Mild intermittent asthma without complication    9. Tobacco use disorder        Plan:       Brianna was seen today for panic attack.    Diagnoses and all orders for this visit:    Annual physical exam  -     Comprehensive metabolic panel; Future  -     Lipid panel; Future  -     TSH; Future  -     CBC auto differential; Future  -     Hemoglobin A1c; Future    Urinary frequency  -     Urinalysis; Future  -     Urinalysis    Mixed stress and urge urinary incontinence  -     Ambulatory consult to Physical " Therapy    Need for hepatitis C screening test  -     Hepatitis C antibody; Future    Dizziness  No red flags. Volume depletion in setting of vasodilation in hot shower. If symptoms persist will get MRI/MRA, holter, etc.   -     SCHEDULED EKG 12-LEAD (to Muse); Future    Varicose veins of both lower extremities, unspecified whether complicated  -     Ambulatory referral to Vascular Surgery    Post-menopausal  -     DXA Bone Density Spine And Hip; Future    Mild intermittent asthma without complication  -     fluticasone-salmeterol diskus inhaler 250-50 mcg; Inhale 1 puff into the lungs 2 (two) times daily.    Tobacco use disorder   Patient counseled extensively on need for tobacco cessation and the risk associated with continuing, including but not limited to head/neck cancer, lung cancer, bladder cancer, increased risk of stroke and heart attack, development of chronic lung disease, etc.  Patient has been made aware of cessation assistance including medications, nicotine replacement, personal support.     Patient is not interested in quitting at this time.  Will continue to discuss at each visit                 Sean Darby MD  Internal Medicine-[facility]        Side effects of medication(s) were discussed in detail and patient voiced understanding.  Patient will call back for any issues or complications.

## 2019-07-17 DIAGNOSIS — R07.9 ACUTE CHEST PAIN: ICD-10-CM

## 2019-07-17 DIAGNOSIS — R94.31 ABNORMAL EKG: Primary | ICD-10-CM

## 2019-07-18 ENCOUNTER — IMMUNIZATION (OUTPATIENT)
Dept: PHARMACY | Facility: CLINIC | Age: 59
End: 2019-07-18
Payer: COMMERCIAL

## 2019-07-19 ENCOUNTER — TELEPHONE (OUTPATIENT)
Dept: INTERNAL MEDICINE | Facility: CLINIC | Age: 59
End: 2019-07-19

## 2019-07-23 NOTE — TELEPHONE ENCOUNTER
----- Message from Mary Lou Escalante sent at 8/9/2018  8:36 AM CDT -----  Contact: Self- [phone]  Pt called to schedule a np appt- being referred by Dr. Hughes for Intractable epigastric abdominal pain [R10.13]- please contact pt at [phone]   Left message for patient to call the clinic back.

## 2019-08-04 DIAGNOSIS — R05.9 COUGH: ICD-10-CM

## 2019-08-04 DIAGNOSIS — J40 BRONCHITIS: Primary | ICD-10-CM

## 2019-08-04 DIAGNOSIS — M25.559 ARTHRALGIA OF HIP: ICD-10-CM

## 2019-08-04 NOTE — TELEPHONE ENCOUNTER
----- Message from Mary Lou Goodwin sent at 8/2/2019 12:31 PM CDT -----  Contact: DOM VARNER [[id number]]  Name of Who is Calling: DOM VARNER [[id number]]    What is the request in detail: Patient is calling to check the status of an inhaler for sinus and one for asthma that was suppose to be called in and is needing a refill on rx ibuprofen (ADVIL,MOTRIN) 800 MG tablet. Patient would like to establish care with Dr. Cruz as well ....Please contact to further discuss and advise      Can the clinic reply by [facility]: Yes     What [facility] [phone].

## 2019-08-05 RX ORDER — IBUPROFEN 800 MG/1
800 TABLET ORAL 2 TIMES DAILY PRN
Qty: 60 TABLET | Refills: 1 | Status: SHIPPED | OUTPATIENT
Start: 2019-08-05 | End: 2022-05-13 | Stop reason: SDUPTHER

## 2019-08-05 RX ORDER — FLUTICASONE PROPIONATE AND SALMETEROL 250; 50 UG/1; UG/1
1 POWDER RESPIRATORY (INHALATION) 2 TIMES DAILY
Qty: 180 EACH | Refills: 0 | Status: SHIPPED | OUTPATIENT
Start: 2019-08-05 | End: 2021-05-13 | Stop reason: SDUPTHER

## 2019-08-05 RX ORDER — ALBUTEROL SULFATE 90 UG/1
2 AEROSOL, METERED RESPIRATORY (INHALATION) EVERY 6 HOURS PRN
Qty: 18 G | Refills: 0 | Status: SHIPPED | OUTPATIENT
Start: 2019-08-05 | End: 2020-03-19 | Stop reason: SDUPTHER

## 2019-11-05 ENCOUNTER — TELEPHONE (OUTPATIENT)
Dept: PRIMARY CARE CLINIC | Facility: CLINIC | Age: 59
End: 2019-11-05

## 2019-11-05 NOTE — TELEPHONE ENCOUNTER
----- Message from Kirill Duffy sent at 11/5/2019 12:25 PM CST -----  Contact: DOM VARNER [[id number]]  Name of Who is Calling: DOM VARNER [[id number]]      What is the request in detail: Pt is requesting a call back from clinical team in regard to getting referral to vein clinic at [facility].Please contact to further discuss and advise.          Can the clinic reply by [facility]:       What Number to Call Back if not in [facility]: [phone]

## 2019-11-05 NOTE — TELEPHONE ENCOUNTER
Spoke to Ms. Cantu and patient states that the Vascular Surgeon that she was referred to do see patient for her type of problem.  Patient has another Dr. To see but do not have the information on her and she will call the office with the correct information.

## 2019-11-07 ENCOUNTER — TELEPHONE (OUTPATIENT)
Dept: INTERNAL MEDICINE | Facility: CLINIC | Age: 59
End: 2019-11-07

## 2019-11-07 ENCOUNTER — OFFICE VISIT (OUTPATIENT)
Dept: INTERNAL MEDICINE | Facility: CLINIC | Age: 59
End: 2019-11-07
Payer: COMMERCIAL

## 2019-11-07 VITALS
DIASTOLIC BLOOD PRESSURE: 84 MMHG | BODY MASS INDEX: 34.6 KG/M2 | SYSTOLIC BLOOD PRESSURE: 124 MMHG | OXYGEN SATURATION: 98 % | WEIGHT: 220.44 LBS | HEART RATE: 85 BPM | HEIGHT: 67 IN

## 2019-11-07 DIAGNOSIS — R03.0 ELEVATED BLOOD-PRESSURE READING WITHOUT DIAGNOSIS OF HYPERTENSION: ICD-10-CM

## 2019-11-07 DIAGNOSIS — R07.9 CHEST PAIN, UNSPECIFIED TYPE: Primary | ICD-10-CM

## 2019-11-07 DIAGNOSIS — R60.0 LEG EDEMA: ICD-10-CM

## 2019-11-07 DIAGNOSIS — I83.93 VARICOSE VEINS OF BOTH LOWER EXTREMITIES, UNSPECIFIED WHETHER COMPLICATED: Primary | ICD-10-CM

## 2019-11-07 DIAGNOSIS — F17.210 LIGHT CIGARETTE SMOKER (1-9 CIGS/DAY): ICD-10-CM

## 2019-11-07 PROCEDURE — 99215 PR OFFICE/OUTPT VISIT, EST, LEVL V, 40-54 MIN: ICD-10-PCS | Mod: S$GLB,,, | Performed by: INTERNAL MEDICINE

## 2019-11-07 PROCEDURE — 99999 PR PBB SHADOW E&M-EST. PATIENT-LVL III: CPT | Mod: PBBFAC,,, | Performed by: INTERNAL MEDICINE

## 2019-11-07 PROCEDURE — 99406 PR TOBACCO USE CESSATION INTERMEDIATE 3-10 MINUTES: ICD-10-PCS | Mod: S$GLB,,, | Performed by: INTERNAL MEDICINE

## 2019-11-07 PROCEDURE — 99406 BEHAV CHNG SMOKING 3-10 MIN: CPT | Mod: S$GLB,,, | Performed by: INTERNAL MEDICINE

## 2019-11-07 PROCEDURE — 99215 OFFICE O/P EST HI 40 MIN: CPT | Mod: S$GLB,,, | Performed by: INTERNAL MEDICINE

## 2019-11-07 PROCEDURE — 99999 PR PBB SHADOW E&M-EST. PATIENT-LVL III: ICD-10-PCS | Mod: PBBFAC,,, | Performed by: INTERNAL MEDICINE

## 2019-11-07 RX ORDER — LORATADINE 10 MG/1
10 TABLET ORAL DAILY PRN
COMMUNITY
End: 2022-06-10

## 2019-11-07 RX ORDER — PANTOPRAZOLE SODIUM 40 MG/1
40 TABLET, DELAYED RELEASE ORAL EVERY MORNING
Qty: 90 TABLET | Refills: 3 | Status: SHIPPED | OUTPATIENT
Start: 2019-11-07 | End: 2020-12-10

## 2019-11-07 NOTE — TELEPHONE ENCOUNTER
----- Message from Marissa Neal MA sent at 11/7/2019  3:25 PM CST -----  Contact: Pt       ----- Message -----  From: Justin Cardenas  Sent: 11/7/2019   2:46 PM CST  To: Moe Carrizales Staff    Type:  Patient Returning Call    Who Called: DOM VARNER [[id number]]    Who Left Message for Patient: Kiki     Does the patient know what this is regarding?:    Best Call Back Number: [phone] (home)     Additional Information:

## 2019-11-07 NOTE — PATIENT INSTRUCTIONS
Tips to Control Acid Reflux    To control acid reflux, youll need to make some basic diet and lifestyle changes. The simple steps outlined below may be all youll need to ease discomfort.  Watch what you eat  · Avoid fatty foods and spicy foods.  · Eat fewer acidic foods, such as citrus and tomato-based foods. These can increase symptoms.  · Limit drinking alcohol, caffeine, and fizzy beverages. All increase acid reflux.  · Try limiting chocolate, peppermint, and spearmint. These can worsen acid reflux in some people.  Watch when you eat  · Avoid lying down for 3 hours after eating.  · Do not snack before going to bed.  Raise your head  Raising your head and upper body by 4 to 6 inches helps limit reflux when youre lying down. Put blocks under the head of your bed frame to raise it.  Other changes  · Lose weight, if you need to  · Dont exercise near bedtime  · Avoid tight-fitting clothes  · Limit aspirin and ibuprofen  · Stop smoking   Date Last Reviewed: 7/1/2016  © 9691-0643 [employer]. [address] [place] [address], [place] [address]. All rights reserved. This information is not intended as a substitute for professional medical care. Always follow your healthcare professional's instructions.

## 2019-11-07 NOTE — LETTER
November 7, 2019      [facility] [place] [address] [facility] [address]  [facility] [address]  Phone: [phone]  Fax: [phone]       Patient: Brianna Cantu   YOB: 1960  Date of Visit: 11/07/2019    To Whom It May Concern:    Camron Cantu  was at [facility] on 11/07/2019. She may return to work/school on 11/8/2019 with no restrictions. If you have any questions or concerns, or if I can be of further assistance, please do not hesitate to contact me.    Sincerely,    Marissa Neal MA

## 2019-11-07 NOTE — PROGRESS NOTES
"Subjective:       Patient ID: Brianna Cantu is a 59 y.o. female who  has a past medical history of Allergic rhinitis (4/27/2014), Anxiety (4/27/2014), cervical cancer (4/27/2014), Obesity (4/27/2014), and Vitamin D deficiency (4/27/2014).    Chief Complaint: Chest Pain and Hypertension     History was obtained from the patient and supplemented through chart review  She is a patient of my colleague, Dr. Hughes.  Was last seen by Dr. Cruz  for a panic attack.  Wants to switch to Dr. Cruz.  -discussed care with Dr. Cruz.    Works at [employer]    CP:  Was seen  for panic attack with lightheadedness, palpitations.  Was dehydrated.  Was thought to be decreased p.o. intake.  If symptoms persisted, was considering MRI/MRA, Holter.      CBC, lytes, TSH WNL .    EKG was NSR, no axis deviation, normal P-R, QRS intervals, no LVH, atrial enlargement or ST changes.  Poor R-wave progression.  No prior for comparison.  Stress test ordered, but not performed.    Had mild heartburn yesterday.  Ate a greasy sausage, egg, cheese sandwich today.  Had a few cigs during her break.  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  Walked back to her dept and felt mid chest "pressure, bubble".  No radiation.  Felt better after she loosened her bra.  Some SOB, dizziness, felt tired.  No pleuritic pain.  CP improved with sitting/rest.  No N/V, diaphoresis.  Has chronic intermittent LLE edema x 1 year d/t L fractured ankle; intermittent L ankle pain with weather changes. No recent immobility.  Had CP before d/t decreased PO intake.  FHx h/o HTN, but no heart disease.      Called EMT at work and asked for GERD med.  Felt more uncomfortable after a few minutes, so asked EMT to check her BP.  /114.  Took PPI and chewed a few baby ASA.  Was "seeing stars".  EMT called EMS d/t CP, HTN.  /117, 160/88.  EKG was reportedly normal.  BP improved to 150/82.  Mid CP lasted 30 minutes, so she " declined going to the ED.    Elevated BP without diagnosis of HTN:  As above.  Has never been on BP meds.    Tobacco use:  Realizes that she should quit.    Review of Systems   Constitutional: Negative for fever and unexpected weight change.   HENT: Negative for rhinorrhea and sneezing.    Eyes: Negative for redness and itching.   Respiratory: Positive for shortness of breath and wheezing.         Mild wheezing at night   Cardiovascular: Positive for chest pain and leg swelling.   Gastrointestinal: Negative for nausea and vomiting.   Genitourinary: Negative for dysuria and hematuria.   Musculoskeletal: Negative for gait problem and joint swelling.   Skin: Negative for color change and rash.   Neurological: Negative for dizziness and light-headedness.   Hematological: Negative for adenopathy.   Psychiatric/Behavioral: Negative for confusion. The patient is not nervous/anxious.        Past Medical History:   Diagnosis Date    Allergic rhinitis 4/27/2014    Anxiety 4/27/2014    Hx of cervical cancer 4/27/2014    Obesity 4/27/2014    Vitamin D deficiency 4/27/2014     Past Surgical History:   Procedure Laterality Date    COLONOSCOPY      COLONOSCOPY N/A 9/24/2018    Procedure: COLONOSCOPY;  Surgeon: Montrell Gan MD;  Location: [address]);  Service: Endoscopy;  Laterality: N/A;    ESOPHAGOGASTRODUODENOSCOPY      ESOPHAGOGASTRODUODENOSCOPY N/A 9/24/2018    Procedure: EGD (ESOPHAGOGASTRODUODENOSCOPY);  Surgeon: Montrell Gan MD;  Location: [address]);  Service: Endoscopy;  Laterality: N/A;    TOTAL ABDOMINAL HYSTERECTOMY       Family History   Problem Relation Age of Onset    Hypertension Mother     Hypertension Sister     Heart disease Neg Hx      Social History     Socioeconomic History    Marital status:      Spouse name: Not on file    Number of children: Not on file    Years of education: Not on file    Highest education level: Not on file   Occupational History     "Not on file   Social Needs    Financial resource strain: Not on file    Food insecurity:     Worry: Not on file     Inability: Not on file    Transportation needs:     Medical: Not on file     Non-medical: Not on file   Tobacco Use    Smoking status: Current Every Day Smoker     Packs/day: 0.50     Types: Cigarettes    Smokeless tobacco: Never Used   Substance and Sexual Activity    Alcohol use: Yes     Alcohol/week: 0.0 standard drinks     Comment: occasional    Drug use: No    Sexual activity: Not Currently   Lifestyle    Physical activity:     Days per week: Not on file     Minutes per session: Not on file    Stress: Not on file   Relationships    Social connections:     Talks on phone: Not on file     Gets together: Not on file     Attends Baptism service: Not on file     Active member of club or organization: Not on file     Attends meetings of clubs or organizations: Not on file     Relationship status: Not on file   Other Topics Concern    Not on file   Social History Narrative    Not on file     Objective:      Vitals:    11/07/19 1311   BP: 124/84   Pulse: 85   SpO2: 98%   Weight: 100 kg (220 lb 7.4 oz)   Height: 5' 7" (1.702 m)      Physical Exam   Constitutional: She appears well-developed and well-nourished. No distress.   HENT:   Head: Normocephalic and atraumatic.   Nose: Nose normal.   Mouth/Throat: Oropharynx is clear and moist. No oropharyngeal exudate.   Eyes: Pupils are equal, round, and reactive to light. EOM are normal. Right eye exhibits no discharge. Left eye exhibits no discharge. No scleral icterus.   Neck: Neck supple. No tracheal deviation present. No thyromegaly present.   Cardiovascular: Normal rate, regular rhythm, normal heart sounds and intact distal pulses.   No murmur heard.  Pulmonary/Chest: Effort normal and breath sounds normal. No respiratory distress. She has no wheezes. She exhibits no tenderness.   Slight expiratory wheeze in all lung fields.  Speaking in " complete sentences.  No accessory muscle use.   Abdominal: Soft. Bowel sounds are normal. She exhibits no distension. There is no tenderness.   Musculoskeletal: She exhibits edema. She exhibits no tenderness or deformity.   +1 BLE Edema. No Homans' sign. No calf TTP.   Lymphadenopathy:     She has no cervical adenopathy.   Neurological: She is alert. No cranial nerve deficit. Gait normal.   Skin: Skin is warm and dry. She is not diaphoretic. No erythema.   Psychiatric: She has a normal mood and affect. Her behavior is normal.         Lab Results   Component Value Date    WBC 9.29 07/15/2019    HGB 14.4 07/15/2019    HCT 44.5 07/15/2019     07/15/2019    CHOL 154 07/15/2019    TRIG 86 07/15/2019    HDL 57 07/15/2019    ALT 17 07/15/2019    AST 20 07/15/2019     07/15/2019    K 4.1 07/15/2019     07/15/2019    CREATININE 1.1 07/15/2019    BUN 12 07/15/2019    CO2 27 07/15/2019    TSH 1.571 07/15/2019    HGBA1C 5.4 07/15/2019       The 10-year ASCVD risk score (Vance NADINE Jr., et al., 2013) is: 6.5%    Values used to calculate the score:      Age: 59 years      Sex: Female      Is Non- : Yes      Diabetic: No      Tobacco smoker: Yes      Systolic Blood Pressure: 124 mmHg      Is BP treated: No      HDL Cholesterol: 57 mg/dL      Total Cholesterol: 154 mg/dL    (Imaging have been independently reviewed)  DEXA with osteopenia.    EKG was NSR, no axis deviation, normal P-R, QRS intervals, no LVH, atrial enlargement or ST changes.  Poor R-wave progression.  No prior for comparison.    Assessment:       1. Chest pain, unspecified type    2. Leg edema    3. Elevated blood-pressure reading without diagnosis of hypertension    4. Light cigarette smoker (1-9 cigs/day)          Plan:       Brianna was seen today for chest pain and hypertension.    Diagnoses and all orders for this visit:    Chest pain, unspecified type  Comments:  Risk factors for CAD are age, race, tobacco. EKG w/o ST  changes.  Reschedule stress test. Trial of PPI.  Orders:  -     pantoprazole (PROTONIX) 40 MG tablet; Take 1 tablet (40 mg total) by mouth every morning.    Leg edema  Comments:  Possibly d/t L ankle sx, but concerning d/t CP as above. No pleuritic pain. Check venous U/S  Orders:  -     US Lower Extremity Veins Left; Future    Elevated blood-pressure reading without diagnosis of hypertension  Comments:  Wnl today.  Continue to monitor.    Light cigarette smoker (1-9 cigs/day)  Comments:  Counseled for 5 min on tobacco cessation.    Other orders  -     Cancel: EKG 12-lead; Future           Side effects of medication(s) were discussed in detail and patient voiced understanding.  Patient will call back for any issues or complications.     RTC in 1 month(s) or sooner PRN for CP, elevated BP, wheezing, results of colonoscopy.  Wants to switch to Dr. Cruz.

## 2019-11-11 ENCOUNTER — HOSPITAL ENCOUNTER (OUTPATIENT)
Dept: RADIOLOGY | Facility: OTHER | Age: 59
Discharge: HOME | End: 2019-11-11
Attending: INTERNAL MEDICINE
Payer: COMMERCIAL

## 2019-11-11 DIAGNOSIS — R60.0 LEG EDEMA: ICD-10-CM

## 2019-11-11 PROCEDURE — 93971 EXTREMITY STUDY: CPT | Mod: TC,LT

## 2019-11-11 PROCEDURE — 93971 EXTREMITY STUDY: CPT | Mod: 26,LT,, | Performed by: RADIOLOGY

## 2019-11-11 PROCEDURE — 93971 US LOWER EXTREMITY VEINS LEFT: ICD-10-PCS | Mod: 26,LT,, | Performed by: RADIOLOGY

## 2019-11-18 ENCOUNTER — TELEPHONE (OUTPATIENT)
Dept: INTERNAL MEDICINE | Facility: CLINIC | Age: 59
End: 2019-11-18

## 2019-11-18 NOTE — TELEPHONE ENCOUNTER
Spoke to Ms. Cantu, and informed her that I will check to see if Dr. Cruz received the paper work for [facility].  Patient states understanding with no further questions or concerns.

## 2019-11-18 NOTE — TELEPHONE ENCOUNTER
----- Message from Orion Osullivan, Patient Care Assistant sent at 11/18/2019  3:55 PM CST -----  Contact: Self   Pt is calling in regards to [facility] paperwork  Please advise, pt can be reached at [phone]

## 2019-11-19 ENCOUNTER — HOSPITAL ENCOUNTER (OUTPATIENT)
Dept: CARDIOLOGY | Facility: OTHER | Age: 59
Discharge: HOME | End: 2019-11-19
Attending: INTERNAL MEDICINE
Payer: COMMERCIAL

## 2019-11-19 ENCOUNTER — HOSPITAL ENCOUNTER (OUTPATIENT)
Dept: RADIOLOGY | Facility: OTHER | Age: 59
Discharge: HOME | End: 2019-11-19
Attending: INTERNAL MEDICINE
Payer: COMMERCIAL

## 2019-11-19 VITALS
HEART RATE: 73 BPM | BODY MASS INDEX: 34.53 KG/M2 | HEIGHT: 67 IN | WEIGHT: 220 LBS | DIASTOLIC BLOOD PRESSURE: 89 MMHG | SYSTOLIC BLOOD PRESSURE: 145 MMHG

## 2019-11-19 DIAGNOSIS — R07.9 ACUTE CHEST PAIN: ICD-10-CM

## 2019-11-19 DIAGNOSIS — R94.31 ABNORMAL EKG: ICD-10-CM

## 2019-11-19 LAB
CV STRESS BASE HR: 73 BPM
DIASTOLIC BLOOD PRESSURE: 89 MMHG
OHS CV CPX 85 PERCENT MAX PREDICTED HEART RATE MALE: 131
OHS CV CPX MAX PREDICTED HEART RATE: 154
OHS CV CPX PATIENT IS FEMALE: 1
OHS CV CPX PATIENT IS MALE: 0
OHS CV CPX PEAK DIASTOLIC BLOOD PRESSURE: 105 MMHG
OHS CV CPX PEAK HEAR RATE: 163 BPM
OHS CV CPX PEAK RATE PRESSURE PRODUCT: NORMAL
OHS CV CPX PEAK SYSTOLIC BLOOD PRESSURE: 212 MMHG
OHS CV CPX PERCENT MAX PREDICTED HEART RATE ACHIEVED: 106
OHS CV CPX RATE PRESSURE PRODUCT PRESENTING: NORMAL
STRESS ECHO TARGET HR: 137 BPM
SYSTOLIC BLOOD PRESSURE: 145 MMHG

## 2019-11-19 PROCEDURE — 93017 CV STRESS TEST TRACING ONLY: CPT

## 2019-11-19 PROCEDURE — 93018 CV STRESS TEST I&R ONLY: CPT | Mod: ,,, | Performed by: INTERNAL MEDICINE

## 2019-11-19 PROCEDURE — A9502 TC99M TETROFOSMIN: HCPCS

## 2019-11-19 PROCEDURE — 93018 TREADMILL STRESS TEST (CUPID ONLY): ICD-10-PCS | Mod: ,,, | Performed by: INTERNAL MEDICINE

## 2019-11-19 PROCEDURE — 78452 NM MYOCARDIAL PERFUSION SPECT MULTI STUDY: ICD-10-PCS | Mod: 26,,, | Performed by: RADIOLOGY

## 2019-11-19 PROCEDURE — 78452 HT MUSCLE IMAGE SPECT MULT: CPT | Mod: 26,,, | Performed by: RADIOLOGY

## 2019-11-19 PROCEDURE — 93016 TREADMILL STRESS TEST (CUPID ONLY): ICD-10-PCS | Mod: ,,, | Performed by: INTERNAL MEDICINE

## 2019-11-19 PROCEDURE — 93016 CV STRESS TEST SUPVJ ONLY: CPT | Mod: ,,, | Performed by: INTERNAL MEDICINE

## 2019-12-02 ENCOUNTER — TELEPHONE (OUTPATIENT)
Dept: INTERNAL MEDICINE | Facility: CLINIC | Age: 59
End: 2019-12-02

## 2019-12-02 NOTE — TELEPHONE ENCOUNTER
Called pt and she got a message from simon in [employer] and stated that the paper work needed to be fax off today I informed pt that she will need to contact that department back because that who is handling her paper work

## 2019-12-02 NOTE — TELEPHONE ENCOUNTER
----- Message from Kiki Troy sent at 12/2/2019  9:33 AM CST -----  Contact: Patient     Name of Who is Calling: DOM VARNER [[id number]]      What is the request in detail: Patient is to speak with the staff in regards to having her [place] paperwork faxed today... Please contact to further discuss and advise       Can the clinic reply by [facility]: No      What Number to Call Back if not in [facility]: [phone]

## 2019-12-09 ENCOUNTER — OFFICE VISIT (OUTPATIENT)
Dept: INTERNAL MEDICINE | Facility: CLINIC | Age: 59
End: 2019-12-09
Attending: INTERNAL MEDICINE
Payer: COMMERCIAL

## 2019-12-09 ENCOUNTER — PATIENT OUTREACH (OUTPATIENT)
Dept: ADMINISTRATIVE | Facility: OTHER | Age: 59
End: 2019-12-09

## 2019-12-09 ENCOUNTER — OFFICE VISIT (OUTPATIENT)
Dept: GASTROENTEROLOGY | Facility: CLINIC | Age: 59
End: 2019-12-09
Payer: COMMERCIAL

## 2019-12-09 VITALS
BODY MASS INDEX: 35.54 KG/M2 | WEIGHT: 221.13 LBS | DIASTOLIC BLOOD PRESSURE: 92 MMHG | SYSTOLIC BLOOD PRESSURE: 147 MMHG | HEIGHT: 66 IN | HEART RATE: 81 BPM

## 2019-12-09 VITALS
WEIGHT: 219.81 LBS | BODY MASS INDEX: 35.32 KG/M2 | DIASTOLIC BLOOD PRESSURE: 90 MMHG | HEIGHT: 66 IN | SYSTOLIC BLOOD PRESSURE: 136 MMHG | OXYGEN SATURATION: 97 % | HEART RATE: 85 BPM

## 2019-12-09 DIAGNOSIS — R15.9 INCONTINENCE OF FECES, UNSPECIFIED FECAL INCONTINENCE TYPE: Primary | ICD-10-CM

## 2019-12-09 DIAGNOSIS — K59.00 CONSTIPATION, UNSPECIFIED CONSTIPATION TYPE: ICD-10-CM

## 2019-12-09 DIAGNOSIS — R10.9 ABDOMINAL PAIN, UNSPECIFIED ABDOMINAL LOCATION: ICD-10-CM

## 2019-12-09 DIAGNOSIS — R19.7 DIARRHEA, UNSPECIFIED TYPE: ICD-10-CM

## 2019-12-09 DIAGNOSIS — K58.9 IRRITABLE BOWEL SYNDROME, UNSPECIFIED TYPE: Primary | ICD-10-CM

## 2019-12-09 DIAGNOSIS — R06.00 PND (PAROXYSMAL NOCTURNAL DYSPNEA): ICD-10-CM

## 2019-12-09 PROCEDURE — 99999 PR PBB SHADOW E&M-EST. PATIENT-LVL IV: ICD-10-PCS | Mod: PBBFAC,,, | Performed by: NURSE PRACTITIONER

## 2019-12-09 PROCEDURE — 99214 PR OFFICE/OUTPT VISIT, EST, LEVL IV, 30-39 MIN: ICD-10-PCS | Mod: S$GLB,,, | Performed by: INTERNAL MEDICINE

## 2019-12-09 PROCEDURE — 99214 OFFICE O/P EST MOD 30 MIN: CPT | Mod: S$GLB,,, | Performed by: INTERNAL MEDICINE

## 2019-12-09 PROCEDURE — 99999 PR PBB SHADOW E&M-EST. PATIENT-LVL V: CPT | Mod: PBBFAC,,, | Performed by: INTERNAL MEDICINE

## 2019-12-09 PROCEDURE — 99214 PR OFFICE/OUTPT VISIT, EST, LEVL IV, 30-39 MIN: ICD-10-PCS | Mod: S$GLB,,, | Performed by: NURSE PRACTITIONER

## 2019-12-09 PROCEDURE — 99999 PR PBB SHADOW E&M-EST. PATIENT-LVL V: ICD-10-PCS | Mod: PBBFAC,,, | Performed by: INTERNAL MEDICINE

## 2019-12-09 PROCEDURE — 99214 OFFICE O/P EST MOD 30 MIN: CPT | Mod: S$GLB,,, | Performed by: NURSE PRACTITIONER

## 2019-12-09 PROCEDURE — 99999 PR PBB SHADOW E&M-EST. PATIENT-LVL IV: CPT | Mod: PBBFAC,,, | Performed by: NURSE PRACTITIONER

## 2019-12-09 RX ORDER — DICYCLOMINE HYDROCHLORIDE 10 MG/1
10 CAPSULE ORAL
Qty: 120 CAPSULE | Refills: 3 | Status: SHIPPED | OUTPATIENT
Start: 2019-12-09 | End: 2020-01-08

## 2019-12-09 NOTE — PROGRESS NOTES
[facility] Consultation Note    Reason for Consult:  The primary encounter diagnosis was Incontinence of feces, unspecified fecal incontinence type. Diagnoses of Constipation, unspecified constipation type and Diarrhea, unspecified type were also pertinent to this visit.    PCP:   Sean Cruz   [address] / [place] [address]    Referring MD:  Sean Cruz Md  [address]  [address]  [place] [address]    HPI:  This is a 59 y.o. female here for evaluation of fecal incontinence and diarrhea. Established pt last seen by Dr. Crouch 10/2018, visit reviewed. New to me.     Pt has a hx of the fecal incontinence. Was followed by CRS. Last seen 12/2018 by CRS, was told to RTC in 2 weeks, this was not done. 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    About once a week she will have an accident.  She goes btwn constipation and diarrhea. Occasionally normal BM. Denies hematochezia.  Gets a center lower abd pain, notices this when she is constipated  She also gets muscle cramps easily, so she is not fond of having another colonoscopy   She has tried changing her diet.  Pelvic floor therapy was recommended. But she was not able to have that performed due to cost.   Rarely has GERD.      ROS:  Constitutional: No fevers, chills, No weight loss  ENT: No allergies  CV: No chest pain  Pulm: No cough, No shortness of breath  Ophtho: No vision changes  GI: see HPI  Derm: No rash  Heme: No lymphadenopathy, No bruising  MSK: No arthritis  : No dysuria, No hematuria  Endo: No hot or cold intolerance  Neuro: No syncope, No seizure  Psych: No anxiety, No depression    Medical History:  has a past medical history of Allergic rhinitis (4/27/2014), Anxiety (4/27/2014), cervical cancer (4/27/2014), Obesity (4/27/2014), and Vitamin D deficiency (4/27/2014).    Surgical History:  has a past surgical history  "that includes Total abdominal hysterectomy; Esophagogastroduodenoscopy; Colonoscopy; Colonoscopy (N/A, 9/24/2018); and Esophagogastroduodenoscopy (N/A, 9/24/2018).    Family History: family history includes Hypertension in her mother and sister..     Social History:  reports that she has been smoking cigarettes. She has been smoking about 0.50 packs per day. She has never used smokeless tobacco. She reports that she drinks alcohol. She reports that she does not use drugs.    Review of patient's allergies indicates:  No Known Allergies    Current Outpatient Medications on File Prior to Visit   Medication Sig Dispense Refill    albuterol (PROVENTIL/VENTOLIN HFA) 90 mcg/actuation inhaler Inhale 2 puffs into the lungs every 6 (six) hours as needed for Wheezing. 18 g 0    fluticasone (FLONASE) 50 mcg/actuation nasal spray 1 spray daily as needed.       ibuprofen (ADVIL,MOTRIN) 800 MG tablet Take 1 tablet (800 mg total) by mouth 2 (two) times daily as needed for Pain. 60 tablet 1    loratadine (CLARITIN) 10 mg tablet Take 10 mg by mouth daily as needed.      mv,Ca,min-folic acid-vit K1 (ONE-A-DAY WOMEN'S 50 PLUS) 400-20 mcg Tab Take by mouth once daily.      pantoprazole (PROTONIX) 40 MG tablet Take 1 tablet (40 mg total) by mouth every morning. 90 tablet 3    valACYclovir (VALTREX) 1000 MG tablet       dicyclomine (BENTYL) 10 MG capsule Take 1 capsule (10 mg total) by mouth 4 (four) times daily before meals and nightly. (Patient not taking: Reported on 12/9/2019) 120 capsule 3    fluticasone-salmeterol diskus inhaler 250-50 mcg Inhale 1 puff into the lungs 2 (two) times daily. 180 each 0     No current facility-administered medications on file prior to visit.          Objective Findings:    Vital Signs:  BP (!) 147/92   Pulse 81   Ht 5' 6" (1.676 m)   Wt 100.3 kg (221 lb 1.9 oz)   BMI 35.69 kg/m²   Body mass index is 35.69 kg/m².    Physical Exam:  General Appearance: Well appearing in no acute distress. " Very emotional and crying during   Head:   Normocephalic, without obvious abnormality  Eyes:    No scleral icterus  ENT: Neck supple  Lungs: CTA bilaterally in anterior and posterior fields, no wheezes, no crackles.  Heart:  Regular rate and rhythm, S1, S2 normal, no murmurs heard  Abdomen: Soft, non tender, non distended with positive bowel sounds in all four quadrants. No hepatosplenomegaly, ascites, or mass  Extremities: No edema  Skin: No rash  Neurologic: AAO x 3      Labs:  Lab Results   Component Value Date    WBC 9.29 07/15/2019    HGB 14.4 07/15/2019    HCT 44.5 07/15/2019     07/15/2019    CRP 0.31 08/25/2016    CHOL 154 07/15/2019    TRIG 86 07/15/2019    HDL 57 07/15/2019    ALT 17 07/15/2019    AST 20 07/15/2019     07/15/2019    K 4.1 07/15/2019     07/15/2019    CREATININE 1.1 07/15/2019    BUN 12 07/15/2019    CO2 27 07/15/2019    TSH 1.571 07/15/2019    HGBA1C 5.4 07/15/2019       Imaging reviewed:  12/27/18 barium enema normal.  Endoscopy: reviewed    9/2018 colonoscopy Stricture in the proximal rectum, biopsied.                         Unable to traverse.  9/2018 egd   - Small hiatal hernia.                        - Normal examined duodenum, biopsied.                        - Biopsies were taken also for Helicobacter pylori                         testing.    Assessment:  Ms. Cantu is a 59-year-old female with:  1. Incontinence of feces, unspecified fecal incontinence type    2. Constipation, unspecified constipation type    3. Diarrhea, unspecified type         Would like pt to meed with CRS given questionable hx of rectal stricture in the past. We did discuss in detail treatment for her sx which includes fiber and Pelvic floor therapy. She did not want to have PFT at this time but may be open in the future.    Recommendations:  1. Fiber diet/supplement  2. CRS refferal  3. Bentyl for abd pain  4. FODMAP diet discussed and printed in AVS for gas and abd discomfort    F/u with  GI PRN    Order summary:  Orders Placed This Encounter    Ambulatory Referral to Colorectal Surgery     25 MINUTES TOTAL FACE TO FACE >50% SPENT IN COUNSELING AND COORDINATION OF CARE     Thank you so much for allowing me to participate in the care of Brianna Lynn, TESSAP-C

## 2019-12-09 NOTE — PATIENT INSTRUCTIONS
Foods to avoid to help abdominal bloating:  -Dairy products  -raffinose, stachyose, and verbascose, the undigestible oligosaccharides abundant in legumes  -foods with high levels of fructose  -cabbage, onions, broccoli, brussel sprouts, wheat, and potatoes   -sorbitol (which may be contained in diet foods and chewing gum  -trial of low FODMAP diet.     http://www.ibsdiets.org/fodmap-diet/fodmap-food-list/   Foods that contribute to excess gas formation:   -Lactose-Milk or dairy containing products such as cheese, yogurt, and ice cream  -Fructose-Fructose is a sugar found naturally in fruits, fruit juices, some vegetables and honey. Fructose is also a basic component in table sugar (sucrose), and high-fructose corn syrup is used to miracle many processed foods and beverages. This includes High-fructose corn syrup, honey, agave syrup, invert sugar, maple-flavored syrup, molasses, palm or coconut sugar, and sorghum  Sorbitol- A sugar substitute found in many gums, candies, and products with sugar substitutes as well as naturally occurring food items such as apples, pears, peaches, and prunes.  Raffinose. A complex sugar found in beans, cabbage, brussels sprouts, broccoli, asparagus, other vegetables, and whole grains.  -Carbonated beverages

## 2019-12-09 NOTE — LETTER
December 11, 2019      Sean Cruz MD  [address]  [address]  [facility] [address]           [facility] - Gastroenterology  [address]  [facility] [address]  Phone: [phone]  Fax: [phone]          Patient: Brianna Cantu   MR Number: [id number]   YOB: 1960   Date of Visit: 12/9/2019       Dear Dr. Sean Cruz:    Thank you for referring Brianna Cantu to me for evaluation. Attached you will find relevant portions of my assessment and plan of care.    If you have questions, please do not hesitate to call me. I look forward to following Brianna Cantu along with you.    Sincerely,    Rossana Lynn, NP    Enclosure  CC:  No Recipients    If you would like to receive this communication electronically, please contact [email or web address] or [phone] to request more information on [employer] Link access.    For providers and/or their staff who would like to refer a patient to [facility], please contact us through our one-stop-shop provider referral line, [facility], at [phone].    If you feel you have received this communication in error or would no longer like to receive these types of communications, please e-mail [email or web address]

## 2019-12-09 NOTE — PATIENT INSTRUCTIONS
1)Start taking fiber supplement every day with 64 oz of water.  2) Start taking the Bentyl Every day  3) Start the Fodmap diet.     The following foods have been identified as being high in FODMAPs:   THESE ARE FOODS TO AVOID     Apples  Apricots  Blackberries  Cherries  Grapefruit  Wes  Nectarines  Peaches  Pears  Plums and prunes  Pomegranates  Watermelon  High concentration of fructose from canned fruit, dried fruit or fruit juice  Grains    Barley  Couscous  Farro  Rye  Semolina  Wheat  Lactose-Containing Foods    Buttermilk  Cream  Custard  Ice cream  Margarine  Milk (cow, goat, sheep)  Soft cheese, including cottage cheese and ricotta  Yogurt (regular and Greek)  Dairy Substitutes    Oat milk (although a 1/8 serving is considered low-FODMAP)  Soy milk (U[place]  Legumes    Baked beans  Black-eyed peas  Butter beans  Chickpeas  Lentils  Kidney beans  Lima beans  Soybeans  Split peas  Sweeteners    Agave  Fructose  High fructose corn syrup  Honey  Isomalt  Maltitol  Mannitol  Molasses  Sorbitol  Xylitol  Vegetables    Artichokes  Asparagus  Beets  Tyler sprouts  Cauliflower  Celery  Garlic  Leeks  Mushrooms  Okra  Onions  Peas  Scallions (white parts)  Shallots  Snow peas  Sugar snap peas          The following foods have been identified as being low in FODMAPs:  THESE ARE FOODS THAT ARE OKAY TO EAT  Fruits    Avocado (limit 1/8 of whole)  Banana  Blueberry  Cantaloupe  Grapes  Honeydew melon  Kiwi  Lemon  Lime  Mandarin oranges  Olives  Orange  Papaya  Plantain  Pineapple  Raspberry  Rhubarb  Strawberry  Tangelo  Sweeteners    Artificial sweeteners that do not end in -ol  Brown sugar  Glucose  Maple syrup  Powdered sugar  Sugar (sucrose)  Dairy and Alternatives    Montrose milk  Coconut milk (limit 1/2 cup)  Hemp milk  Rice milk  Butter  Certain cheeses, such as  brie, camembert, mozzarella, Parmesan  Lactose-free products, such as lactose-free milk, ice cream, and yogurt  Vegetables    Arugula ([facility]  lettuce)  Bamboo shoots  Bell peppers  Broccoli  Bok nelly  Carrots  Celeriac  Litzy greens  Common Cabbage  Corn (half a cob)  Eggplant  Endive  Fennel  Green beans  Kale  Lettuce  Parsley  Parsnip  Potato  Radicchio   Scallions (green parts only)  Spinach, baby  Squash  Sweet potato  Swiss chard  Tomato  Turnip  Water chestnut  Zucchini  Grains    Amaranth  Brown rice  Bulgur wheat (limit to 1/4 cup cooked)  Oats  Gluten-free products  Quinoa  Spelt products  Nuts    Almonds (limit 10)  Brazil nuts  Hazelnuts (limit 10)  Macadamia nuts  Peanuts  Pecan  Pine nuts  Walnuts  Seeds    [place]  Rao  Pumpkin  Sesame  Sunflower  Protein Sources    Beef  Chicken  Eggs  Fish  Lamb  Pork  Shellfish  Tofu and tempeh  [place]

## 2019-12-09 NOTE — PROGRESS NOTES
Subjective:       Patient ID: Brianna Cantu is a 59 y.o. female.    Chief Complaint: Hypertension and Follow-up    Here for urgent visit    57 yo F with PMHx of anxiety, asthma, allergic rhinitis, vit d deficiency here at my request. She was seen by my colleague for increase stress and panic attacks and elevated BP in the setting of CP after eating a breakfast sandwich. In the interim I, not her PCP have received [facility] paperwork for pt to be allowed to belate or miss work for panic attacks. She has chosen me as her PCP but her and I have not yet established care yet.     Abdominal pain-once a week, lower abdominal pain,  Feels like a pressure and soreness,stays for minutes to hours, aggravated by stand up and feels a lot of pressure. She reports fecal incontinence once a week which results in her having to go back home and change her clothes. She had colonoscopy 09/2018 and was unable to complete due to stricture. She had CT followed by barium enema ordered via [facility] in colorectal that was also normal. She admits to an intolerance to dairy and for the most, aprt, but not all the time avoids it. She has been adjusting her diet,ea ting more fruits and vegetables. She does not add sugar. She reports alternating loose stools and constipation. Has tried probiotics.     She suffers from chronic insomnia, waking up choking at times, wakes feeling un refreshed, chronic fatigue.   Stress levels have continued due to job itself and symptoms above.       Review of Systems   Constitutional: Positive for fatigue. Negative for chills, fever and unexpected weight change.   HENT: Negative for ear pain, hearing loss, postnasal drip, tinnitus, trouble swallowing and voice change.    Respiratory: Negative for cough, chest tightness, shortness of breath and wheezing.    Cardiovascular: Negative for chest pain, palpitations and leg swelling.   Gastrointestinal: Positive for abdominal distention, abdominal pain, constipation and  "diarrhea. Negative for blood in stool, nausea and vomiting.   Endocrine: Negative for polydipsia, polyphagia and polyuria.   Genitourinary: Negative for difficulty urinating, dysuria, hematuria and vaginal bleeding.   Musculoskeletal: Positive for arthralgias and back pain.   Skin: Negative for rash.   Allergic/Immunologic: Negative for food allergies.   Neurological: Negative for dizziness, numbness and headaches.   Hematological: Does not bruise/bleed easily.   Psychiatric/Behavioral: Positive for dysphoric mood. The patient is nervous/anxious.        Objective:      Vitals:    12/09/19 0903   BP: (!) 136/90   Pulse: 85   SpO2: 97%   Weight: 99.7 kg (219 lb 12.8 oz)   Height: 5' 6" (1.676 m)      Physical Exam   Constitutional: She is oriented to person, place, and time. She appears well-developed and well-nourished. No distress.   HENT:   Head: Normocephalic and atraumatic.   Mouth/Throat: Oropharynx is clear and moist. No oropharyngeal exudate.   Eyes: Pupils are equal, round, and reactive to light. Conjunctivae and EOM are normal. No scleral icterus.   Neck: No thyromegaly present.   Cardiovascular: Normal rate, regular rhythm and normal heart sounds.   No murmur heard.  Pulmonary/Chest: Effort normal and breath sounds normal. She has no wheezes. She has no rales.   Abdominal: Soft. She exhibits no distension. There is no tenderness.   Musculoskeletal: She exhibits no edema or tenderness.   Lymphadenopathy:     She has no cervical adenopathy.   Neurological: She is alert and oriented to person, place, and time.   Skin: Skin is warm and dry.   Psychiatric: She has a normal mood and affect. Her behavior is normal.       Assessment:       1. Irritable bowel syndrome, unspecified type    2. PND (paroxysmal nocturnal dyspnea)        Plan:       Brianna was seen today for hypertension and follow-up.    Diagnoses and all orders for this visit:    Irritable bowel syndrome, unspecified type  -     Ambulatory Referral " to Psychiatry  -     Ambulatory Referral to Gastroenterology  -     Ambulatory Referral to Psychology  -     Tissue transglutaminase, IgA; Future  -     IgA; Future  -     dicyclomine (BENTYL) 10 MG capsule; Take 1 capsule (10 mg total) by mouth 4 (four) times daily before meals and nightly.    FMLA paperwork completed but pt aware she must continue to work towards Tx of symptoms. Start with psyllium husk everyday, probiotics and f/u with GI. Avoid dairy.     PND (paroxysmal nocturnal dyspnea)  -     Ambulatory referral to Sleep Disorders    F/u in 6-8 weeks                 Sean Darby MD  Internal Medicine-[facility]        Side effects of medication(s) were discussed in detail and patient voiced understanding.  Patient will call back for any issues or complications.

## 2019-12-10 ENCOUNTER — IMMUNIZATION (OUTPATIENT)
Dept: PHARMACY | Facility: CLINIC | Age: 59
End: 2019-12-10
Payer: COMMERCIAL

## 2019-12-11 ENCOUNTER — TELEPHONE (OUTPATIENT)
Dept: GASTROENTEROLOGY | Facility: CLINIC | Age: 59
End: 2019-12-11

## 2019-12-11 NOTE — TELEPHONE ENCOUNTER
MA contacted pt to give lab results per Rossana . Pt yanely not answer, MA left message for pt to give the clinic a call.      ----- Message from Rossana Lynn NP sent at 12/11/2019 11:34 AM CST -----  Celiac labs normal

## 2019-12-11 NOTE — TELEPHONE ENCOUNTER
MA returned pt call to give test results per Rossana. pt verbalized understanding.       ----- Message from Maulik Ellis sent at 12/11/2019  2:00 PM CST -----  Contact: Patient @ [phone]  Patient returning a missed call, pls return call

## 2019-12-18 ENCOUNTER — PATIENT OUTREACH (OUTPATIENT)
Dept: ADMINISTRATIVE | Facility: OTHER | Age: 59
End: 2019-12-18

## 2019-12-18 NOTE — PROGRESS NOTES
"CRS Office Visit History and Physical    Referring MD:   Rossana Lynn, Teressa  [address] [place] [address]    SUBJECTIVE:     Chief Complaint: Fecal incontinence.    History of Present Illness:  The patient is established patient to this practice.   Course is as follows:  Patient is a 59 y.o. female presents with long standing fecal incontinence.      Seen by Dr Washington in 2018 for proximal rectal stricture, not able to be traversed with endoscope. Recommended barium enema and FIT test.  She has a history of cervical cancer at the age of 33.  She underwent in 1993 total abdominal hysterectomy, external beam radiation therapy as well as catheter interstitial radiation therapy.    Last Colonoscopy: September 24, 2018  The perianal and digital rectal examinations were normal.  A benign-appearing, intrinsic severe stenosis was found in the proximal rectum and was non-traversed. Biopsies were taken with a cold forceps for histology. Estimated blood loss was minimal.  Pathology specimens:  3. RECTAL BIOPSIES:  LARGELY INNOCUOUS COLONIC MUCOSA WITH A SMALL AREA OF ADENOMATOUS GLANDS THE POSSIBILITY OF AN UN BIOPSIED MORE OMINOUS AREA CANNOT BE EXCLUDED    Since last visit, symptoms are relatively unchanged.  Has about 1 accident per week.  Usually more with loose stools but can happen with solid stool 1-2x/year.   Was recommended to start fiber supplementation and Bentyl during her last GI visit, has not started these yet.      Prior colonoscopy: Yes - 2018 failed because of "stricture", likely tight angulation related to radiation  Prior abdominal surgery: Yes - JAVY w/ pelvic XRT  Prior pelvic or anorectal surgery: No  Family history of colon/rectal cancer: No  Family history of IBD: No  Steroid or other immunosuppression: No  Blood thinners: No  Current stool softeners or fiber supplement: No  Active smoking: Yes   Prior deliveries: No  complicated by tear: No    [place] (FI):  Leakage of solid stool: " Rarely (1)     Leakage of liquid stool: Usually (3)        Leakage of flatus:  Usually (3)      Wear a pad:   Sometimes (2)         Alter life:   Usually (3)        Total: 12    [place] (ODS):  How long on toilet:  11-20min(2)   How many times/day: 2 (1)    Digitation:  0   Laxatives:  0   Enemas:   0   Incomplete stool: Always (4)  Strain:   <50% (2)   Total: 9    Stool consistency/[place]: 5  Bowel movements per month: Daily     Leakage of urine: Yes (seeing urology)    Feel something bulging through vagina? No      Family History of Colon Cancer / IBD: none    Review of patient's allergies indicates:  No Known Allergies    Past Medical History:   Diagnosis Date    Allergic rhinitis 4/27/2014    Anxiety 4/27/2014    Hx of cervical cancer 4/27/2014    Obesity 4/27/2014    Vitamin D deficiency 4/27/2014     Past Surgical History:   Procedure Laterality Date    COLONOSCOPY      COLONOSCOPY N/A 9/24/2018    Procedure: COLONOSCOPY;  Surgeon: Montrell Gan MD;  Location: [address]);  Service: Endoscopy;  Laterality: N/A;    ESOPHAGOGASTRODUODENOSCOPY      ESOPHAGOGASTRODUODENOSCOPY N/A 9/24/2018    Procedure: EGD (ESOPHAGOGASTRODUODENOSCOPY);  Surgeon: Montrell Gan MD;  Location: [address]);  Service: Endoscopy;  Laterality: N/A;    TOTAL ABDOMINAL HYSTERECTOMY       Family History   Problem Relation Age of Onset    Hypertension Mother     Hypertension Sister     Heart disease Neg Hx      Social History     Tobacco Use    Smoking status: Current Every Day Smoker     Packs/day: 0.50     Types: Cigarettes    Smokeless tobacco: Never Used   Substance Use Topics    Alcohol use: Yes     Alcohol/week: 0.0 standard drinks     Comment: occasional    Drug use: No        Review of Systems:  Review of Systems   Gastrointestinal: Positive for abdominal pain and diarrhea. Negative for blood in stool, nausea and vomiting.   All other systems reviewed and are negative.      OBJECTIVE:  "    Vital Signs (Most Recent)  BP (!) 154/90 (BP Location: Left arm, Patient Position: Sitting, BP Method: Large (Automatic))   Pulse 80   Ht 5' 6" (1.676 m)   Wt 99.2 kg (218 lb 11.1 oz)   BMI 35.30 kg/m²     Physical Exam:  General: Black or  female in no distress   Neuro: Alert and oriented x 4.  Moves all extremities.     HEENT: No icterus.  Trachea midline  Respiratory: Respirations are even and unlabored  Cardiac: Regular rate  Abdomen: soft, obese  Extremities: Warm dry and intact  Skin: No rashes  Anorectal:  External exam with no skin excoriation, no evidence of scarring.  Full perineal body. No prolapse of rectal or hemorrhoidal tissue with Valsalva.  No anterior compartment prolapse with Valsalva.  Digital exam reveals strong resting tone, and strong squeeze.  Good pelvic floor coordination with Valsalva, no significant rectocele.  No levator tenderness.    Imaging:   Barium enema (12/2018)  FINDINGS:  There was filling of the rectum and colon. The appendix filled.  No reflux in the terminal ileum.  No filling defects, constricting lesions, mass or mucosal changes identified.  There are no diverticuli demonstrated.  Mild to moderate residual seen post evacuation.      Impression       1.   Normal barium enema with appendiceal reflux.     FIT (Dec 2018) --> Negative    ASSESSMENT/PLAN:     58 y/o F with fecal incontinence, mixed liquid and solid stool.  Leakage seems to revolve around abdominal cramps and inability to make it to the restroom in time.  This may be related to radiation changes in the rectum and descending/sigmoid colon.  There is evidence of some lead-piping on barium enema, no evidence of severe stricture or obstruction.  Agree with Evelin   Will add fibercon to see if a bulk agent gives her more control  She already has pelvic floor PT ordered, previously unable to afford out of pocket but can go now  Rectal manometry to assess for compliance  She needs yearly FIT " testing for colon cancer screening as colonoscopy may be high risk for her given the appearance of her colon on BE.    We discussed that given history of radiation and long segment of rectum and colon involved on barium enema, surgery with resection and anastomosis may not give her significant benefit from a continence standpoint.  F/u in 3 months    Kiki Izaguirre MD  Staff Surgeon  Colon & Rectal Surgery

## 2019-12-20 ENCOUNTER — OFFICE VISIT (OUTPATIENT)
Dept: SURGERY | Facility: CLINIC | Age: 59
End: 2019-12-20
Attending: COLON & RECTAL SURGERY
Payer: COMMERCIAL

## 2019-12-20 VITALS
WEIGHT: 218.69 LBS | HEIGHT: 66 IN | SYSTOLIC BLOOD PRESSURE: 154 MMHG | DIASTOLIC BLOOD PRESSURE: 90 MMHG | HEART RATE: 80 BPM | BODY MASS INDEX: 35.15 KG/M2

## 2019-12-20 DIAGNOSIS — Z12.11 SCREENING FOR COLON CANCER: Primary | ICD-10-CM

## 2019-12-20 DIAGNOSIS — R15.9 INCONTINENCE OF FECES, UNSPECIFIED FECAL INCONTINENCE TYPE: ICD-10-CM

## 2019-12-20 PROCEDURE — 99213 OFFICE O/P EST LOW 20 MIN: CPT | Mod: S$GLB,,, | Performed by: COLON & RECTAL SURGERY

## 2019-12-20 PROCEDURE — 99999 PR PBB SHADOW E&M-EST. PATIENT-LVL III: ICD-10-PCS | Mod: PBBFAC,,, | Performed by: COLON & RECTAL SURGERY

## 2019-12-20 PROCEDURE — 99213 PR OFFICE/OUTPT VISIT, EST, LEVL III, 20-29 MIN: ICD-10-PCS | Mod: S$GLB,,, | Performed by: COLON & RECTAL SURGERY

## 2019-12-20 PROCEDURE — 99999 PR PBB SHADOW E&M-EST. PATIENT-LVL III: CPT | Mod: PBBFAC,,, | Performed by: COLON & RECTAL SURGERY

## 2019-12-20 NOTE — PATIENT INSTRUCTIONS
You were seen today for incontinence, likely related to radiation changes in your colon.    Please start FIberCon.  Take as directed on package instructions  Please follow up with pelvic floor physical therapy if able  We will order anorectal manometry to assess compliance of the lower colon and muscle tone  We will order a FIT Test.  Your last test was a year ago.  You will need these in place of colonoscopy, yearly.

## 2019-12-20 NOTE — LETTER
December 20, 2019      Rossana Lynn, NP  [address]  [facility] [address]           [facility]  [address]  [facility] [address]  Phone: [phone]          Patient: Brianna Cantu   MR Number: [id number]   YOB: 1960   Date of Visit: 12/20/2019       Dear Rossana Lynn:    Thank you for referring Brianna Cantu to me for evaluation. Attached you will find relevant portions of my assessment and plan of care.    If you have questions, please do not hesitate to call me. I look forward to following Brianna Cantu along with you.    Sincerely,    Kiki Izaguirre MD    Enclosure  CC:  No Recipients    If you would like to receive this communication electronically, please contact [email or web address] or [phone] to request more information on [facility] Link access.    For providers and/or their staff who would like to refer a patient to [facility], please contact us through our one-stop-shop provider referral line, [facility], at [phone].    If you feel you have received this communication in error or would no longer like to receive these types of communications, please e-mail [email or web address]

## 2020-01-28 ENCOUNTER — TELEPHONE (OUTPATIENT)
Dept: INTERNAL MEDICINE | Facility: CLINIC | Age: 60
End: 2020-01-28

## 2020-03-02 ENCOUNTER — TELEPHONE (OUTPATIENT)
Dept: INTERNAL MEDICINE | Facility: CLINIC | Age: 60
End: 2020-03-02

## 2020-03-02 NOTE — TELEPHONE ENCOUNTER
----- Message from Soni Concepcion sent at 3/2/2020  8:58 AM CST -----  Contact: Self/   [phone]  Type: RX Refill Request    Who Called:   Patient    Refill or New Rx:  Rx    RX Name and Strength:  Antibiotics due to cold    Preferred Pharmacy with phone number:  [id number] - [address] AT [facility] [place] [place] [address]    Local or Mail Order:  Local    Ordering Provider:  BRIDGER Cruz    Would the patient rather a call back or a response via [facility]?   Call back    Best Call Back Number:   [phone]

## 2020-03-05 ENCOUNTER — PATIENT OUTREACH (OUTPATIENT)
Dept: ADMINISTRATIVE | Facility: OTHER | Age: 60
End: 2020-03-05

## 2020-03-06 NOTE — PROGRESS NOTES
LINKS immunization registry, Care Everywhere and Health Maintenance updated.  Chart reviewed for overdue Proactive [facility] health maintenance testing.  Patient needs annual FitKit per Dr Izaguirre's 12/20/19 visit note.

## 2020-03-11 ENCOUNTER — PATIENT OUTREACH (OUTPATIENT)
Dept: ADMINISTRATIVE | Facility: OTHER | Age: 60
End: 2020-03-11

## 2020-03-12 NOTE — PROGRESS NOTES
Chart reviewed.   Immunizations: Triggered Imm Registry     Orders placed: n/a  Upcoming appts to satisfy ADILIA topics: n/a

## 2020-03-16 ENCOUNTER — TELEPHONE (OUTPATIENT)
Dept: INTERNAL MEDICINE | Facility: CLINIC | Age: 60
End: 2020-03-16

## 2020-03-16 NOTE — TELEPHONE ENCOUNTER
----- Message from Alicia Bright sent at 3/16/2020  1:38 PM CDT -----  Contact: Patient [phone]  Type:  Patient Returning Call    Who Called: Patient    Who Left Message for Patient: Not sure    Does the patient know what this is regarding?: Video Conference Appointment    Would the patient rather a call back or a response via [facility]? Call back    Best Call Back Number: [phone]

## 2020-03-18 ENCOUNTER — TELEPHONE (OUTPATIENT)
Dept: INTERNAL MEDICINE | Facility: CLINIC | Age: 60
End: 2020-03-18

## 2020-03-18 ENCOUNTER — TELEPHONE (OUTPATIENT)
Dept: ADMINISTRATIVE | Facility: HOSPITAL | Age: 60
End: 2020-03-18

## 2020-03-18 NOTE — TELEPHONE ENCOUNTER
----- Message from Ary Castle sent at 3/18/2020  2:11 PM CDT -----  Contact: DOM VARNER [[id number]]  Name of Who is Calling: DOM VARNER [[id number]]      What is the request in detail: Pt is calling to speak to staff in regards to confirming her virtual visit .... Please call to further assist .       Can the clinic reply by [facility]: N       What [facility]: [phone]

## 2020-03-19 ENCOUNTER — TELEPHONE (OUTPATIENT)
Dept: INTERNAL MEDICINE | Facility: CLINIC | Age: 60
End: 2020-03-19

## 2020-06-05 ENCOUNTER — TELEPHONE (OUTPATIENT)
Dept: INTERNAL MEDICINE | Facility: CLINIC | Age: 60
End: 2020-06-05

## 2020-06-05 NOTE — TELEPHONE ENCOUNTER
----- Message from Afshan Unger sent at 6/5/2020 10:51 AM CDT -----  Contact: DOM VARNER [[id number]]  Who called:DOM VARNER [[id number]]    What is the request in detail: Patient is requesting a call back. She states she would like to schedule her physical. There was no available appointments for physicals. She would like to further discuss.   Please advise.    Can the clinic reply by [facility]? Y    Best call back number: [phone]    Additional Information: N/A

## 2020-07-10 ENCOUNTER — TELEPHONE (OUTPATIENT)
Dept: INTERNAL MEDICINE | Facility: CLINIC | Age: 60
End: 2020-07-10

## 2020-07-10 DIAGNOSIS — Z12.31 ENCOUNTER FOR SCREENING MAMMOGRAM FOR BREAST CANCER: Primary | ICD-10-CM

## 2020-07-10 NOTE — TELEPHONE ENCOUNTER
----- Message from Goldie Castro sent at 7/10/2020 10:15 AM CDT -----  Regarding: Patient Access  Name of Who is Calling:   Brianna Cordero      What is the request in detail:  Patient called requesting to schedule her annual mammogram. Please put the orders in at your earliest convenience further advise.    THANKS!      Reply by [facility]:  NO      Call Back:  [phone]

## 2020-07-17 ENCOUNTER — OFFICE VISIT (OUTPATIENT)
Dept: INTERNAL MEDICINE | Facility: CLINIC | Age: 60
End: 2020-07-17
Attending: INTERNAL MEDICINE
Payer: COMMERCIAL

## 2020-07-17 ENCOUNTER — LAB VISIT (OUTPATIENT)
Dept: LAB | Facility: OTHER | Age: 60
End: 2020-07-17
Attending: INTERNAL MEDICINE
Payer: COMMERCIAL

## 2020-07-17 VITALS
OXYGEN SATURATION: 96 % | SYSTOLIC BLOOD PRESSURE: 142 MMHG | BODY MASS INDEX: 35.22 KG/M2 | HEART RATE: 92 BPM | HEIGHT: 66 IN | DIASTOLIC BLOOD PRESSURE: 96 MMHG | WEIGHT: 219.13 LBS

## 2020-07-17 DIAGNOSIS — K56.699 STENOSIS COLON: ICD-10-CM

## 2020-07-17 DIAGNOSIS — Z00.00 ANNUAL PHYSICAL EXAM: Primary | ICD-10-CM

## 2020-07-17 DIAGNOSIS — J45.20 MILD INTERMITTENT ASTHMA WITHOUT COMPLICATION: ICD-10-CM

## 2020-07-17 DIAGNOSIS — Z12.11 COLON CANCER SCREENING: ICD-10-CM

## 2020-07-17 DIAGNOSIS — Z00.00 ANNUAL PHYSICAL EXAM: ICD-10-CM

## 2020-07-17 PROBLEM — R19.8 CHANGE IN BOWEL FUNCTION: Status: RESOLVED | Noted: 2018-09-24 | Resolved: 2020-07-17

## 2020-07-17 PROBLEM — R07.9 CHEST PAIN: Status: RESOLVED | Noted: 2019-11-07 | Resolved: 2020-07-17

## 2020-07-17 LAB
ALBUMIN SERPL BCP-MCNC: 3.9 G/DL (ref 3.5–5.2)
ALP SERPL-CCNC: 66 U/L (ref 55–135)
ALT SERPL W/O P-5'-P-CCNC: 17 U/L (ref 10–44)
ANION GAP SERPL CALC-SCNC: 9 MMOL/L (ref 8–16)
AST SERPL-CCNC: 20 U/L (ref 10–40)
BASOPHILS # BLD AUTO: 0.02 K/UL (ref 0–0.2)
BASOPHILS NFR BLD: 0.2 % (ref 0–1.9)
BILIRUB SERPL-MCNC: 0.2 MG/DL (ref 0.1–1)
BUN SERPL-MCNC: 10 MG/DL (ref 6–20)
CALCIUM SERPL-MCNC: 9.6 MG/DL (ref 8.7–10.5)
CHLORIDE SERPL-SCNC: 106 MMOL/L (ref 95–110)
CO2 SERPL-SCNC: 24 MMOL/L (ref 23–29)
CREAT SERPL-MCNC: 1 MG/DL (ref 0.5–1.4)
DIFFERENTIAL METHOD: NORMAL
EOSINOPHIL # BLD AUTO: 0.3 K/UL (ref 0–0.5)
EOSINOPHIL NFR BLD: 3.1 % (ref 0–8)
ERYTHROCYTE [DISTWIDTH] IN BLOOD BY AUTOMATED COUNT: 13.2 % (ref 11.5–14.5)
EST. GFR  (AFRICAN AMERICAN): >60 ML/MIN/1.73 M^2
EST. GFR  (NON AFRICAN AMERICAN): >60 ML/MIN/1.73 M^2
GLUCOSE SERPL-MCNC: 89 MG/DL (ref 70–110)
HCT VFR BLD AUTO: 42 % (ref 37–48.5)
HGB BLD-MCNC: 13.6 G/DL (ref 12–16)
IMM GRANULOCYTES # BLD AUTO: 0.04 K/UL (ref 0–0.04)
IMM GRANULOCYTES NFR BLD AUTO: 0.5 % (ref 0–0.5)
LYMPHOCYTES # BLD AUTO: 2.4 K/UL (ref 1–4.8)
LYMPHOCYTES NFR BLD: 27.7 % (ref 18–48)
MCH RBC QN AUTO: 29 PG (ref 27–31)
MCHC RBC AUTO-ENTMCNC: 32.4 G/DL (ref 32–36)
MCV RBC AUTO: 90 FL (ref 82–98)
MONOCYTES # BLD AUTO: 0.8 K/UL (ref 0.3–1)
MONOCYTES NFR BLD: 8.9 % (ref 4–15)
NEUTROPHILS # BLD AUTO: 5.2 K/UL (ref 1.8–7.7)
NEUTROPHILS NFR BLD: 59.6 % (ref 38–73)
NRBC BLD-RTO: 0 /100 WBC
PLATELET # BLD AUTO: 332 K/UL (ref 150–350)
PMV BLD AUTO: 9.3 FL (ref 9.2–12.9)
POTASSIUM SERPL-SCNC: 4.1 MMOL/L (ref 3.5–5.1)
PROT SERPL-MCNC: 7.8 G/DL (ref 6–8.4)
RBC # BLD AUTO: 4.69 M/UL (ref 4–5.4)
SODIUM SERPL-SCNC: 139 MMOL/L (ref 136–145)
TSH SERPL DL<=0.005 MIU/L-ACNC: 1.25 UIU/ML (ref 0.4–4)
WBC # BLD AUTO: 8.74 K/UL (ref 3.9–12.7)

## 2020-07-17 PROCEDURE — 85025 COMPLETE CBC W/AUTO DIFF WBC: CPT

## 2020-07-17 PROCEDURE — 80053 COMPREHEN METABOLIC PANEL: CPT

## 2020-07-17 PROCEDURE — 84443 ASSAY THYROID STIM HORMONE: CPT

## 2020-07-17 PROCEDURE — 99396 PREV VISIT EST AGE 40-64: CPT | Mod: S$GLB,,, | Performed by: INTERNAL MEDICINE

## 2020-07-17 PROCEDURE — 99999 PR PBB SHADOW E&M-EST. PATIENT-LVL V: ICD-10-PCS | Mod: PBBFAC,,, | Performed by: INTERNAL MEDICINE

## 2020-07-17 PROCEDURE — 99999 PR PBB SHADOW E&M-EST. PATIENT-LVL V: CPT | Mod: PBBFAC,,, | Performed by: INTERNAL MEDICINE

## 2020-07-17 PROCEDURE — 83036 HEMOGLOBIN GLYCOSYLATED A1C: CPT

## 2020-07-17 PROCEDURE — 36415 COLL VENOUS BLD VENIPUNCTURE: CPT

## 2020-07-17 PROCEDURE — 80061 LIPID PANEL: CPT

## 2020-07-17 PROCEDURE — 99396 PR PREVENTIVE VISIT,EST,40-64: ICD-10-PCS | Mod: S$GLB,,, | Performed by: INTERNAL MEDICINE

## 2020-07-17 RX ORDER — HYDROCHLOROTHIAZIDE 12.5 MG/1
12.5 TABLET ORAL DAILY
Qty: 90 TABLET | Refills: 1 | Status: SHIPPED | OUTPATIENT
Start: 2020-07-17 | End: 2021-05-13 | Stop reason: SDUPTHER

## 2020-07-17 NOTE — PROGRESS NOTES
"Subjective:       Patient ID: Brianna Cantu is a 59 y.o. female.    Chief Complaint: Annual Exam    Here for annual visit    BP remains elevated. Denies frequent HA intermittent blurry vision, dizziness, CP, or SOB.    Rectal stricture and unable to get successful colonoscopy in 2018. Was seen by colorectal department Zina who recommended continuing witth FIT kit for colon CA screening on account of her barium enema studies and concern she is too high risk. Concern for post XRT changes 2/2 cervical CA Tx Hx. For fecal urge incontinence rec fiber and bentyl. Manometry is next step.            Review of Systems   Constitutional: Negative for chills, fatigue, fever and unexpected weight change.   HENT: Negative for ear pain, hearing loss, postnasal drip, tinnitus, trouble swallowing and voice change.    Respiratory: Negative for cough, chest tightness, shortness of breath and wheezing.    Cardiovascular: Negative for chest pain, palpitations and leg swelling.   Gastrointestinal: Negative for abdominal pain, blood in stool, diarrhea, nausea and vomiting.   Endocrine: Negative for polydipsia, polyphagia and polyuria.   Genitourinary: Negative for difficulty urinating, dysuria, hematuria and vaginal bleeding.   Skin: Negative for rash.   Allergic/Immunologic: Negative for food allergies.   Neurological: Negative for dizziness, numbness and headaches.   Hematological: Does not bruise/bleed easily.   Psychiatric/Behavioral: The patient is not nervous/anxious.        Objective:      Vitals:    07/17/20 1051   BP: (!) 142/96   Pulse: 92   SpO2: 96%   Weight: 99.4 kg (219 lb 2.2 oz)   Height: 5' 6" (1.676 m)      Physical Exam  Constitutional:       General: She is not in acute distress.     Appearance: She is well-developed.   HENT:      Head: Normocephalic and atraumatic.      Mouth/Throat:      Pharynx: No oropharyngeal exudate.   Eyes:      General: No scleral icterus.     Conjunctiva/sclera: Conjunctivae normal.    "   Pupils: Pupils are equal, round, and reactive to light.   Neck:      Thyroid: No thyromegaly.   Cardiovascular:      Rate and Rhythm: Normal rate and regular rhythm.      Heart sounds: Normal heart sounds. No murmur.   Pulmonary:      Effort: Pulmonary effort is normal.      Breath sounds: Normal breath sounds. No wheezing or rales.   Abdominal:      General: There is no distension.      Palpations: Abdomen is soft.      Tenderness: There is no abdominal tenderness.   Musculoskeletal:         General: No tenderness.   Lymphadenopathy:      Cervical: No cervical adenopathy.   Skin:     General: Skin is warm and dry.   Neurological:      Mental Status: She is alert and oriented to person, place, and time.   Psychiatric:         Behavior: Behavior normal.         Assessment:       1. Annual physical exam    2. Colon cancer screening    3. Mild intermittent asthma without complication    4. Stenosis colon        Plan:       Brianna was seen today for annual exam.    Diagnoses and all orders for this visit:    Annual physical exam  -     Comprehensive metabolic panel; Future  -     Lipid Panel; Future  -     TSH; Future  -     CBC auto differential; Future  -     Hemoglobin A1C; Future    Colon cancer screening  -     Ambulatory referral/consult to Gastroenterology; Future  -     Fecal Immunochemical Test (iFOBT); Future    HTN  -    START  hydroCHLOROthiazide (HYDRODIURIL) 12.5 MG Tab; Take 1 tablet (12.5 mg total) by mouth once daily.   f/u in 3-4 weeks for nurse visit for BP check vs virtual visit to review detailed home BP log    Mild intermittent asthma without complication   controlled. Continue current regimen    Stenosis colon   F/u colorectal      RTC in 6 months or sooner prn            Sean Darby MD  Internal Medicine-[facility]        Side effects of medication(s) were discussed in detail and patient voiced understanding.  Patient will call back for any issues or complications.

## 2020-07-18 LAB
CHOLEST SERPL-MCNC: 149 MG/DL (ref 120–199)
CHOLEST/HDLC SERPL: 2.8 {RATIO} (ref 2–5)
ESTIMATED AVG GLUCOSE: 103 MG/DL (ref 68–131)
HBA1C MFR BLD HPLC: 5.2 % (ref 4–5.6)
HDLC SERPL-MCNC: 53 MG/DL (ref 40–75)
HDLC SERPL: 35.6 % (ref 20–50)
LDLC SERPL CALC-MCNC: 78.2 MG/DL (ref 63–159)
NONHDLC SERPL-MCNC: 96 MG/DL
TRIGL SERPL-MCNC: 89 MG/DL (ref 30–150)

## 2020-07-24 ENCOUNTER — HOSPITAL ENCOUNTER (OUTPATIENT)
Dept: RADIOLOGY | Facility: OTHER | Age: 60
Discharge: HOME | End: 2020-07-24
Attending: INTERNAL MEDICINE
Payer: COMMERCIAL

## 2020-07-24 DIAGNOSIS — Z12.31 ENCOUNTER FOR SCREENING MAMMOGRAM FOR BREAST CANCER: ICD-10-CM

## 2020-07-24 PROCEDURE — 77063 BREAST TOMOSYNTHESIS BI: CPT | Mod: 26,,, | Performed by: RADIOLOGY

## 2020-07-24 PROCEDURE — 77067 SCR MAMMO BI INCL CAD: CPT | Mod: TC

## 2020-07-24 PROCEDURE — 77063 MAMMO DIGITAL SCREENING BILAT WITH TOMOSYNTHESIS_CAD: ICD-10-PCS | Mod: 26,,, | Performed by: RADIOLOGY

## 2020-07-24 PROCEDURE — 77067 MAMMO DIGITAL SCREENING BILAT WITH TOMOSYNTHESIS_CAD: ICD-10-PCS | Mod: 26,,, | Performed by: RADIOLOGY

## 2020-07-24 PROCEDURE — 77067 SCR MAMMO BI INCL CAD: CPT | Mod: 26,,, | Performed by: RADIOLOGY

## 2021-01-05 ENCOUNTER — PATIENT MESSAGE (OUTPATIENT)
Dept: ADMINISTRATIVE | Facility: HOSPITAL | Age: 61
End: 2021-01-05

## 2021-03-10 ENCOUNTER — IMMUNIZATION (OUTPATIENT)
Dept: INTERNAL MEDICINE | Facility: CLINIC | Age: 61
End: 2021-03-10
Payer: COMMERCIAL

## 2021-03-10 DIAGNOSIS — Z23 NEED FOR VACCINATION: Primary | ICD-10-CM

## 2021-03-10 PROCEDURE — 0001A COVID-19, MRNA, LNP-S, PF, 30 MCG/0.3 ML DOSE VACCINE: ICD-10-PCS | Mod: CV19,S$GLB,, | Performed by: INTERNAL MEDICINE

## 2021-03-10 PROCEDURE — 91300 COVID-19, MRNA, LNP-S, PF, 30 MCG/0.3 ML DOSE VACCINE: CPT | Mod: S$GLB,,, | Performed by: INTERNAL MEDICINE

## 2021-03-10 PROCEDURE — 0001A COVID-19, MRNA, LNP-S, PF, 30 MCG/0.3 ML DOSE VACCINE: CPT | Mod: CV19,S$GLB,, | Performed by: INTERNAL MEDICINE

## 2021-03-10 PROCEDURE — 91300 COVID-19, MRNA, LNP-S, PF, 30 MCG/0.3 ML DOSE VACCINE: ICD-10-PCS | Mod: S$GLB,,, | Performed by: INTERNAL MEDICINE

## 2021-03-31 ENCOUNTER — IMMUNIZATION (OUTPATIENT)
Dept: INTERNAL MEDICINE | Facility: CLINIC | Age: 61
End: 2021-03-31
Payer: COMMERCIAL

## 2021-03-31 DIAGNOSIS — Z23 NEED FOR VACCINATION: Primary | ICD-10-CM

## 2021-03-31 PROCEDURE — 0002A COVID-19, MRNA, LNP-S, PF, 30 MCG/0.3 ML DOSE VACCINE: CPT | Mod: CV19,S$GLB,, | Performed by: INTERNAL MEDICINE

## 2021-03-31 PROCEDURE — 0002A COVID-19, MRNA, LNP-S, PF, 30 MCG/0.3 ML DOSE VACCINE: ICD-10-PCS | Mod: CV19,S$GLB,, | Performed by: INTERNAL MEDICINE

## 2021-03-31 PROCEDURE — 91300 COVID-19, MRNA, LNP-S, PF, 30 MCG/0.3 ML DOSE VACCINE: ICD-10-PCS | Mod: S$GLB,,, | Performed by: INTERNAL MEDICINE

## 2021-03-31 PROCEDURE — 91300 COVID-19, MRNA, LNP-S, PF, 30 MCG/0.3 ML DOSE VACCINE: CPT | Mod: S$GLB,,, | Performed by: INTERNAL MEDICINE

## 2021-04-02 ENCOUNTER — NURSE TRIAGE (OUTPATIENT)
Dept: ADMINISTRATIVE | Facility: CLINIC | Age: 61
End: 2021-04-02

## 2021-04-02 DIAGNOSIS — J30.2 SEASONAL ALLERGIC RHINITIS, UNSPECIFIED TRIGGER: Primary | ICD-10-CM

## 2021-04-05 ENCOUNTER — PATIENT MESSAGE (OUTPATIENT)
Dept: ADMINISTRATIVE | Facility: HOSPITAL | Age: 61
End: 2021-04-05

## 2021-04-05 DIAGNOSIS — J30.2 SEASONAL ALLERGIC RHINITIS, UNSPECIFIED TRIGGER: Primary | ICD-10-CM

## 2021-04-05 RX ORDER — FLUTICASONE PROPIONATE 50 MCG
1 SPRAY, SUSPENSION (ML) NASAL DAILY
Qty: 16 G | Refills: 3 | Status: SHIPPED | OUTPATIENT
Start: 2021-04-05 | End: 2022-07-01

## 2021-04-05 RX ORDER — FLUTICASONE PROPIONATE 50 MCG
1 SPRAY, SUSPENSION (ML) NASAL DAILY PRN
Qty: 16 G | Refills: 0 | Status: SHIPPED | OUTPATIENT
Start: 2021-04-05

## 2021-04-27 ENCOUNTER — TELEPHONE (OUTPATIENT)
Dept: INTERNAL MEDICINE | Facility: CLINIC | Age: 61
End: 2021-04-27

## 2021-05-04 ENCOUNTER — TELEPHONE (OUTPATIENT)
Dept: INTERNAL MEDICINE | Facility: CLINIC | Age: 61
End: 2021-05-04

## 2021-05-06 ENCOUNTER — TELEPHONE (OUTPATIENT)
Dept: INTERNAL MEDICINE | Facility: CLINIC | Age: 61
End: 2021-05-06

## 2021-05-11 ENCOUNTER — TELEPHONE (OUTPATIENT)
Dept: INTERNAL MEDICINE | Facility: CLINIC | Age: 61
End: 2021-05-11

## 2021-05-13 ENCOUNTER — LAB VISIT (OUTPATIENT)
Dept: LAB | Facility: OTHER | Age: 61
End: 2021-05-13
Attending: INTERNAL MEDICINE
Payer: COMMERCIAL

## 2021-05-13 ENCOUNTER — PATIENT OUTREACH (OUTPATIENT)
Dept: ADMINISTRATIVE | Facility: OTHER | Age: 61
End: 2021-05-13

## 2021-05-13 ENCOUNTER — OFFICE VISIT (OUTPATIENT)
Dept: INTERNAL MEDICINE | Facility: CLINIC | Age: 61
End: 2021-05-13
Attending: INTERNAL MEDICINE
Payer: COMMERCIAL

## 2021-05-13 VITALS
SYSTOLIC BLOOD PRESSURE: 142 MMHG | WEIGHT: 230.63 LBS | OXYGEN SATURATION: 97 % | BODY MASS INDEX: 36.2 KG/M2 | HEART RATE: 85 BPM | DIASTOLIC BLOOD PRESSURE: 96 MMHG | HEIGHT: 67 IN

## 2021-05-13 DIAGNOSIS — R25.2 MUSCLE CRAMPING: ICD-10-CM

## 2021-05-13 DIAGNOSIS — Z11.4 SCREENING FOR HIV WITHOUT PRESENCE OF RISK FACTORS: ICD-10-CM

## 2021-05-13 DIAGNOSIS — R06.81 WITNESSED EPISODE OF APNEA: ICD-10-CM

## 2021-05-13 DIAGNOSIS — N39.46 MIXED STRESS AND URGE URINARY INCONTINENCE: ICD-10-CM

## 2021-05-13 DIAGNOSIS — J40 BRONCHITIS: ICD-10-CM

## 2021-05-13 DIAGNOSIS — I10 BENIGN ESSENTIAL HYPERTENSION: ICD-10-CM

## 2021-05-13 DIAGNOSIS — Z12.11 COLON CANCER SCREENING: ICD-10-CM

## 2021-05-13 DIAGNOSIS — K58.9 IRRITABLE BOWEL SYNDROME, UNSPECIFIED TYPE: Primary | ICD-10-CM

## 2021-05-13 DIAGNOSIS — K56.699 COLONIC STRICTURE: ICD-10-CM

## 2021-05-13 DIAGNOSIS — J45.20 MILD INTERMITTENT ASTHMA WITHOUT COMPLICATION: ICD-10-CM

## 2021-05-13 LAB
25(OH)D3+25(OH)D2 SERPL-MCNC: 19 NG/ML (ref 30–96)
ALBUMIN SERPL BCP-MCNC: 3.7 G/DL (ref 3.5–5.2)
ALP SERPL-CCNC: 72 U/L (ref 55–135)
ALT SERPL W/O P-5'-P-CCNC: 18 U/L (ref 10–44)
ANION GAP SERPL CALC-SCNC: 9 MMOL/L (ref 8–16)
AST SERPL-CCNC: 17 U/L (ref 10–40)
BASOPHILS # BLD AUTO: 0.03 K/UL (ref 0–0.2)
BASOPHILS NFR BLD: 0.4 % (ref 0–1.9)
BILIRUB SERPL-MCNC: 0.3 MG/DL (ref 0.1–1)
BUN SERPL-MCNC: 15 MG/DL (ref 6–20)
CALCIUM SERPL-MCNC: 9.4 MG/DL (ref 8.7–10.5)
CHLORIDE SERPL-SCNC: 107 MMOL/L (ref 95–110)
CHOLEST SERPL-MCNC: 145 MG/DL (ref 120–199)
CHOLEST/HDLC SERPL: 2.8 {RATIO} (ref 2–5)
CO2 SERPL-SCNC: 26 MMOL/L (ref 23–29)
CREAT SERPL-MCNC: 0.9 MG/DL (ref 0.5–1.4)
DIFFERENTIAL METHOD: NORMAL
EOSINOPHIL # BLD AUTO: 0.2 K/UL (ref 0–0.5)
EOSINOPHIL NFR BLD: 2.3 % (ref 0–8)
ERYTHROCYTE [DISTWIDTH] IN BLOOD BY AUTOMATED COUNT: 13.4 % (ref 11.5–14.5)
EST. GFR  (AFRICAN AMERICAN): >60 ML/MIN/1.73 M^2
EST. GFR  (NON AFRICAN AMERICAN): >60 ML/MIN/1.73 M^2
ESTIMATED AVG GLUCOSE: 105 MG/DL (ref 68–131)
GLUCOSE SERPL-MCNC: 84 MG/DL (ref 70–110)
HBA1C MFR BLD: 5.3 % (ref 4–5.6)
HCT VFR BLD AUTO: 42.3 % (ref 37–48.5)
HDLC SERPL-MCNC: 51 MG/DL (ref 40–75)
HDLC SERPL: 35.2 % (ref 20–50)
HGB BLD-MCNC: 13.6 G/DL (ref 12–16)
HIV 1+2 AB+HIV1 P24 AG SERPL QL IA: NEGATIVE
IMM GRANULOCYTES # BLD AUTO: 0.03 K/UL (ref 0–0.04)
IMM GRANULOCYTES NFR BLD AUTO: 0.4 % (ref 0–0.5)
LDLC SERPL CALC-MCNC: 80.8 MG/DL (ref 63–159)
LYMPHOCYTES # BLD AUTO: 2.2 K/UL (ref 1–4.8)
LYMPHOCYTES NFR BLD: 25.8 % (ref 18–48)
MAGNESIUM SERPL-MCNC: 2.5 MG/DL (ref 1.6–2.6)
MCH RBC QN AUTO: 29.2 PG (ref 27–31)
MCHC RBC AUTO-ENTMCNC: 32.2 G/DL (ref 32–36)
MCV RBC AUTO: 91 FL (ref 82–98)
MONOCYTES # BLD AUTO: 0.8 K/UL (ref 0.3–1)
MONOCYTES NFR BLD: 9 % (ref 4–15)
NEUTROPHILS # BLD AUTO: 5.3 K/UL (ref 1.8–7.7)
NEUTROPHILS NFR BLD: 62.1 % (ref 38–73)
NONHDLC SERPL-MCNC: 94 MG/DL
NRBC BLD-RTO: 0 /100 WBC
PLATELET # BLD AUTO: 353 K/UL (ref 150–450)
PMV BLD AUTO: 9.7 FL (ref 9.2–12.9)
POTASSIUM SERPL-SCNC: 3.8 MMOL/L (ref 3.5–5.1)
PROT SERPL-MCNC: 7.5 G/DL (ref 6–8.4)
RBC # BLD AUTO: 4.65 M/UL (ref 4–5.4)
SODIUM SERPL-SCNC: 142 MMOL/L (ref 136–145)
TRIGL SERPL-MCNC: 66 MG/DL (ref 30–150)
TSH SERPL DL<=0.005 MIU/L-ACNC: 1.66 UIU/ML (ref 0.4–4)
WBC # BLD AUTO: 8.44 K/UL (ref 3.9–12.7)

## 2021-05-13 PROCEDURE — 99214 OFFICE O/P EST MOD 30 MIN: CPT | Mod: S$GLB,,, | Performed by: INTERNAL MEDICINE

## 2021-05-13 PROCEDURE — 99214 PR OFFICE/OUTPT VISIT, EST, LEVL IV, 30-39 MIN: ICD-10-PCS | Mod: S$GLB,,, | Performed by: INTERNAL MEDICINE

## 2021-05-13 PROCEDURE — 82306 VITAMIN D 25 HYDROXY: CPT | Performed by: INTERNAL MEDICINE

## 2021-05-13 PROCEDURE — 86703 HIV-1/HIV-2 1 RESULT ANTBDY: CPT | Performed by: INTERNAL MEDICINE

## 2021-05-13 PROCEDURE — 36415 COLL VENOUS BLD VENIPUNCTURE: CPT | Performed by: INTERNAL MEDICINE

## 2021-05-13 PROCEDURE — 84443 ASSAY THYROID STIM HORMONE: CPT | Performed by: INTERNAL MEDICINE

## 2021-05-13 PROCEDURE — 99999 PR PBB SHADOW E&M-EST. PATIENT-LVL V: ICD-10-PCS | Mod: PBBFAC,,, | Performed by: INTERNAL MEDICINE

## 2021-05-13 PROCEDURE — 99999 PR PBB SHADOW E&M-EST. PATIENT-LVL V: CPT | Mod: PBBFAC,,, | Performed by: INTERNAL MEDICINE

## 2021-05-13 PROCEDURE — 85025 COMPLETE CBC W/AUTO DIFF WBC: CPT | Performed by: INTERNAL MEDICINE

## 2021-05-13 PROCEDURE — 83735 ASSAY OF MAGNESIUM: CPT | Performed by: INTERNAL MEDICINE

## 2021-05-13 PROCEDURE — 83036 HEMOGLOBIN GLYCOSYLATED A1C: CPT | Performed by: INTERNAL MEDICINE

## 2021-05-13 PROCEDURE — 80061 LIPID PANEL: CPT | Performed by: INTERNAL MEDICINE

## 2021-05-13 PROCEDURE — 80053 COMPREHEN METABOLIC PANEL: CPT | Performed by: INTERNAL MEDICINE

## 2021-05-13 RX ORDER — HYDROCHLOROTHIAZIDE 12.5 MG/1
12.5 TABLET ORAL DAILY
Qty: 90 TABLET | Refills: 2 | Status: SHIPPED | OUTPATIENT
Start: 2021-05-13 | End: 2021-05-13

## 2021-05-13 RX ORDER — AMLODIPINE AND VALSARTAN 5; 160 MG/1; MG/1
1 TABLET ORAL DAILY
Qty: 90 TABLET | Refills: 3 | Status: SHIPPED | OUTPATIENT
Start: 2021-05-13 | End: 2022-03-31 | Stop reason: SDUPTHER

## 2021-05-13 RX ORDER — FLUTICASONE PROPIONATE AND SALMETEROL 250; 50 UG/1; UG/1
1 POWDER RESPIRATORY (INHALATION) 2 TIMES DAILY
Qty: 180 EACH | Refills: 1 | Status: SHIPPED | OUTPATIENT
Start: 2021-05-13 | End: 2022-03-31 | Stop reason: SDUPTHER

## 2021-05-17 ENCOUNTER — PATIENT OUTREACH (OUTPATIENT)
Dept: ADMINISTRATIVE | Facility: HOSPITAL | Age: 61
End: 2021-05-17

## 2021-05-20 ENCOUNTER — OFFICE VISIT (OUTPATIENT)
Dept: SLEEP MEDICINE | Facility: CLINIC | Age: 61
End: 2021-05-20
Attending: INTERNAL MEDICINE
Payer: COMMERCIAL

## 2021-05-20 DIAGNOSIS — R06.81 WITNESSED EPISODE OF APNEA: ICD-10-CM

## 2021-05-20 PROCEDURE — 99499 UNLISTED E&M SERVICE: CPT | Mod: S$GLB,,, | Performed by: INTERNAL MEDICINE

## 2021-05-20 PROCEDURE — 99499 NO LOS: ICD-10-PCS | Mod: S$GLB,,, | Performed by: INTERNAL MEDICINE

## 2021-05-26 ENCOUNTER — TELEPHONE (OUTPATIENT)
Dept: UROLOGY | Facility: CLINIC | Age: 61
End: 2021-05-26

## 2021-05-27 ENCOUNTER — OFFICE VISIT (OUTPATIENT)
Dept: UROGYNECOLOGY | Facility: CLINIC | Age: 61
End: 2021-05-27
Payer: COMMERCIAL

## 2021-05-27 VITALS
SYSTOLIC BLOOD PRESSURE: 141 MMHG | BODY MASS INDEX: 37.21 KG/M2 | HEIGHT: 66 IN | WEIGHT: 231.5 LBS | DIASTOLIC BLOOD PRESSURE: 91 MMHG

## 2021-05-27 DIAGNOSIS — Z12.11 SPECIAL SCREENING FOR MALIGNANT NEOPLASM OF COLON: Primary | ICD-10-CM

## 2021-05-27 DIAGNOSIS — N39.46 MIXED STRESS AND URGE URINARY INCONTINENCE: ICD-10-CM

## 2021-05-27 PROCEDURE — 99204 PR OFFICE/OUTPT VISIT, NEW, LEVL IV, 45-59 MIN: ICD-10-PCS | Mod: S$GLB,,, | Performed by: OBSTETRICS & GYNECOLOGY

## 2021-05-27 PROCEDURE — 99999 PR PBB SHADOW E&M-EST. PATIENT-LVL III: CPT | Mod: PBBFAC,,, | Performed by: OBSTETRICS & GYNECOLOGY

## 2021-05-27 PROCEDURE — 99999 PR PBB SHADOW E&M-EST. PATIENT-LVL III: ICD-10-PCS | Mod: PBBFAC,,, | Performed by: OBSTETRICS & GYNECOLOGY

## 2021-05-27 PROCEDURE — 99204 OFFICE O/P NEW MOD 45 MIN: CPT | Mod: S$GLB,,, | Performed by: OBSTETRICS & GYNECOLOGY

## 2021-06-03 RX ORDER — MIRABEGRON 25 MG/1
25 TABLET, FILM COATED, EXTENDED RELEASE ORAL DAILY
Qty: 30 TABLET | Refills: 11 | Status: SHIPPED | OUTPATIENT
Start: 2021-06-03 | End: 2022-06-10

## 2021-06-04 LAB — NONINV COLON CA DNA+OCC BLD SCRN STL QL: NEGATIVE

## 2021-12-09 ENCOUNTER — IMMUNIZATION (OUTPATIENT)
Dept: INTERNAL MEDICINE | Facility: CLINIC | Age: 61
End: 2021-12-09
Payer: OTHER GOVERNMENT

## 2021-12-09 DIAGNOSIS — Z23 NEED FOR VACCINATION: Primary | ICD-10-CM

## 2021-12-09 PROCEDURE — 91300 COVID-19, MRNA, LNP-S, PF, 30 MCG/0.3 ML DOSE VACCINE: CPT | Mod: PBBFAC

## 2021-12-09 PROCEDURE — 0003A COVID-19, MRNA, LNP-S, PF, 30 MCG/0.3 ML DOSE VACCINE: CPT | Mod: PBBFAC

## 2022-02-23 DIAGNOSIS — Z12.31 OTHER SCREENING MAMMOGRAM: ICD-10-CM

## 2022-03-31 ENCOUNTER — LAB VISIT (OUTPATIENT)
Dept: LAB | Facility: OTHER | Age: 62
End: 2022-03-31
Payer: COMMERCIAL

## 2022-03-31 ENCOUNTER — OFFICE VISIT (OUTPATIENT)
Dept: INTERNAL MEDICINE | Facility: CLINIC | Age: 62
End: 2022-03-31
Payer: COMMERCIAL

## 2022-03-31 VITALS
DIASTOLIC BLOOD PRESSURE: 90 MMHG | OXYGEN SATURATION: 98 % | WEIGHT: 233.25 LBS | BODY MASS INDEX: 37.09 KG/M2 | SYSTOLIC BLOOD PRESSURE: 136 MMHG | HEART RATE: 87 BPM

## 2022-03-31 DIAGNOSIS — R35.0 URINARY FREQUENCY: ICD-10-CM

## 2022-03-31 DIAGNOSIS — R60.0 LOWER EXTREMITY EDEMA: ICD-10-CM

## 2022-03-31 DIAGNOSIS — R05.9 COUGH: Primary | ICD-10-CM

## 2022-03-31 DIAGNOSIS — R05.9 COUGH: ICD-10-CM

## 2022-03-31 DIAGNOSIS — J45.20 MILD INTERMITTENT ASTHMA WITHOUT COMPLICATION: ICD-10-CM

## 2022-03-31 LAB
BILIRUB UR QL STRIP: NEGATIVE
BNP SERPL-MCNC: 15 PG/ML (ref 0–99)
CLARITY UR REFRACT.AUTO: CLEAR
COLOR UR AUTO: YELLOW
GLUCOSE UR QL STRIP: NEGATIVE
HGB UR QL STRIP: NEGATIVE
KETONES UR QL STRIP: NEGATIVE
LEUKOCYTE ESTERASE UR QL STRIP: NEGATIVE
NITRITE UR QL STRIP: NEGATIVE
PH UR STRIP: 6 [PH] (ref 5–8)
PROT UR QL STRIP: NEGATIVE
SP GR UR STRIP: 1.01 (ref 1–1.03)
URN SPEC COLLECT METH UR: NORMAL

## 2022-03-31 PROCEDURE — 3080F DIAST BP >= 90 MM HG: CPT | Mod: CPTII,S$GLB,, | Performed by: PHYSICIAN ASSISTANT

## 2022-03-31 PROCEDURE — 3080F PR MOST RECENT DIASTOLIC BLOOD PRESSURE >= 90 MM HG: ICD-10-PCS | Mod: CPTII,S$GLB,, | Performed by: PHYSICIAN ASSISTANT

## 2022-03-31 PROCEDURE — 4010F ACE/ARB THERAPY RXD/TAKEN: CPT | Mod: CPTII,S$GLB,, | Performed by: PHYSICIAN ASSISTANT

## 2022-03-31 PROCEDURE — 99999 PR PBB SHADOW E&M-EST. PATIENT-LVL IV: ICD-10-PCS | Mod: PBBFAC,,, | Performed by: PHYSICIAN ASSISTANT

## 2022-03-31 PROCEDURE — 99999 PR PBB SHADOW E&M-EST. PATIENT-LVL IV: CPT | Mod: PBBFAC,,, | Performed by: PHYSICIAN ASSISTANT

## 2022-03-31 PROCEDURE — 36415 COLL VENOUS BLD VENIPUNCTURE: CPT | Performed by: PHYSICIAN ASSISTANT

## 2022-03-31 PROCEDURE — 1160F PR REVIEW ALL MEDS BY PRESCRIBER/CLIN PHARMACIST DOCUMENTED: ICD-10-PCS | Mod: CPTII,S$GLB,, | Performed by: PHYSICIAN ASSISTANT

## 2022-03-31 PROCEDURE — 1159F PR MEDICATION LIST DOCUMENTED IN MEDICAL RECORD: ICD-10-PCS | Mod: CPTII,S$GLB,, | Performed by: PHYSICIAN ASSISTANT

## 2022-03-31 PROCEDURE — 99214 OFFICE O/P EST MOD 30 MIN: CPT | Mod: S$GLB,,, | Performed by: PHYSICIAN ASSISTANT

## 2022-03-31 PROCEDURE — 3008F BODY MASS INDEX DOCD: CPT | Mod: CPTII,S$GLB,, | Performed by: PHYSICIAN ASSISTANT

## 2022-03-31 PROCEDURE — 1159F MED LIST DOCD IN RCRD: CPT | Mod: CPTII,S$GLB,, | Performed by: PHYSICIAN ASSISTANT

## 2022-03-31 PROCEDURE — 1160F RVW MEDS BY RX/DR IN RCRD: CPT | Mod: CPTII,S$GLB,, | Performed by: PHYSICIAN ASSISTANT

## 2022-03-31 PROCEDURE — 99214 PR OFFICE/OUTPT VISIT, EST, LEVL IV, 30-39 MIN: ICD-10-PCS | Mod: S$GLB,,, | Performed by: PHYSICIAN ASSISTANT

## 2022-03-31 PROCEDURE — 83880 ASSAY OF NATRIURETIC PEPTIDE: CPT | Performed by: PHYSICIAN ASSISTANT

## 2022-03-31 PROCEDURE — 3008F PR BODY MASS INDEX (BMI) DOCUMENTED: ICD-10-PCS | Mod: CPTII,S$GLB,, | Performed by: PHYSICIAN ASSISTANT

## 2022-03-31 PROCEDURE — 4010F PR ACE/ARB THEARPY RXD/TAKEN: ICD-10-PCS | Mod: CPTII,S$GLB,, | Performed by: PHYSICIAN ASSISTANT

## 2022-03-31 PROCEDURE — 3075F PR MOST RECENT SYSTOLIC BLOOD PRESS GE 130-139MM HG: ICD-10-PCS | Mod: CPTII,S$GLB,, | Performed by: PHYSICIAN ASSISTANT

## 2022-03-31 PROCEDURE — 3075F SYST BP GE 130 - 139MM HG: CPT | Mod: CPTII,S$GLB,, | Performed by: PHYSICIAN ASSISTANT

## 2022-03-31 PROCEDURE — 81003 URINALYSIS AUTO W/O SCOPE: CPT | Performed by: PHYSICIAN ASSISTANT

## 2022-03-31 RX ORDER — AMLODIPINE AND VALSARTAN 5; 160 MG/1; MG/1
1 TABLET ORAL DAILY
Qty: 90 TABLET | Refills: 3 | Status: SHIPPED | OUTPATIENT
Start: 2022-03-31 | End: 2023-08-24 | Stop reason: SDUPTHER

## 2022-03-31 RX ORDER — ALBUTEROL SULFATE 90 UG/1
2 AEROSOL, METERED RESPIRATORY (INHALATION) EVERY 6 HOURS PRN
Qty: 18 G | Refills: 11 | Status: SHIPPED | OUTPATIENT
Start: 2022-03-31

## 2022-03-31 RX ORDER — FLUTICASONE PROPIONATE AND SALMETEROL 250; 50 UG/1; UG/1
1 POWDER RESPIRATORY (INHALATION) 2 TIMES DAILY
Qty: 180 EACH | Refills: 1 | Status: SHIPPED | OUTPATIENT
Start: 2022-03-31 | End: 2022-08-18

## 2022-03-31 NOTE — PROGRESS NOTES
"INTERNAL MEDICINE URGENT VISIT NOTE    CHIEF COMPLAINT     Chief Complaint   Patient presents with    Back Pain       HPI     Brianna DEV Cantu is a 61 y.o. female who presents for an urgent visit today.    PCP is Sean Cruz MD, patient is new to me.     Patient presents with complaints of lower abdominal pain   HA with sinus pressure   Had diarrhea yesterday -triggered by stress and poor sleep.     Lower back pain on and off for the past three years  -seems to be better when she takes valtrex. Sometimes back pain is associated with a rash too but rash is not just on back, everywhere. Rash also comes and goes, no rash present today  Rash is itchy and sometimes resembles "shingles blisters"  First time she had shingles was in her teens -has been on valtrex daily for years.   Compliant with daily valtrex prophylaxis.   She admits she is concerned that this back pain is related to HSV virus?     Post nasal drip has been present for years.   Takes xyzal daily   Does nasal sprays often Flonase and saline  She is out of prescription inhalers  She has trouble laying down at night because of SOB that she attributes to post nasal drip and mucous.   She has noticed swelling in her legs left leg greater than right -has been present for the past few months.   Has varicose veins - wears compression socks daily   Has noticed weight gain -3-4 lbs, no changes in dietary habits.   Has noticed abd bloating and has IBS flares with stress - had a big stressor two nights ago -thought her  went to the [facility] in the middle of the night without telling her -he suffers form PTSD and Ms. Cantu often worries about him.     She had a negative cardiac stress test in 2019    Has been checking BP at home, reports that it has been in normal range 120-117/70's   Checks BP every other day.   Has not been taking BP meds for the past 3 months - "BP has been good without them-- 120's"      Past Medical History:  Past Medical " History:   Diagnosis Date    Allergic rhinitis 4/27/2014    Anxiety 4/27/2014    Hx of cervical cancer 4/27/2014    Obesity 4/27/2014    Vitamin D deficiency 4/27/2014       Home Medications:  Prior to Admission medications    Medication Sig Start Date End Date Taking? Authorizing Provider   albuterol (PROVENTIL/VENTOLIN HFA) 90 mcg/actuation inhaler Inhale 2 puffs into the lungs every 6 (six) hours as needed for Wheezing. 4/1/21 4/1/22 Yes Sean Cruz MD   amlodipine-valsartan (EXFORGE) 5-160 mg per tablet Take 1 tablet by mouth once daily. 5/13/21 5/13/22 Yes Sean Cruz MD   ergocalciferol (ERGOCALCIFEROL) 50,000 unit Cap Take 1 capsule (50,000 Units total) by mouth every 7 days. 5/24/21  Yes Sean Cruz MD   fluticasone propionate (FLONASE) 50 mcg/actuation nasal spray 1 spray (50 mcg total) by Each Nostril route once daily. 4/5/21  Yes Lior Mckoy MD   fluticasone propionate (FLONASE) 50 mcg/actuation nasal spray 1 spray (50 mcg total) by Each Nostril route daily as needed for Allergies. 4/5/21  Yes Lior Mckoy MD   ibuprofen (ADVIL,MOTRIN) 800 MG tablet Take 1 tablet (800 mg total) by mouth 2 (two) times daily as needed for Pain. 8/5/19  Yes Brenton Hughes MD   loratadine (CLARITIN) 10 mg tablet Take 10 mg by mouth daily as needed.   Yes Historical Provider   mirabegron (MYRBETRIQ) 25 mg Tb24 ER tablet Take 1 tablet (25 mg total) by mouth once daily. 6/3/21 6/3/22 Yes Abdon English MD   mv,Ca,min-folic acid-vit K1 400-20 mcg Tab Take by mouth once daily.   Yes Historical Provider   pantoprazole (PROTONIX) 40 MG tablet TAKE 1 TABLET(40 MG) BY MOUTH EVERY MORNING 12/10/20  Yes Sean Cruz MD   valACYclovir (VALTREX) 1000 MG tablet Take 1 tablet (1,000 mg total) by mouth every 12 (twelve) hours. 3/19/20  Yes Sean Cruz MD   fluticasone-salmeterol diskus inhaler 250-50 mcg Inhale 1 puff into the lungs 2 (two) times daily. 5/13/21 6/12/21   Sean Cruz MD       Review of Systems:  Review of Systems   Constitutional: Negative for chills and fever.   HENT: Positive for congestion. Negative for sore throat and trouble swallowing.    Eyes: Negative for visual disturbance.   Respiratory: Positive for cough and shortness of breath.    Cardiovascular: Negative for chest pain.   Gastrointestinal: Positive for abdominal pain. Negative for constipation, diarrhea, nausea and vomiting.   Genitourinary: Negative for dysuria and flank pain.   Musculoskeletal: Positive for back pain. Negative for neck pain and neck stiffness.        Lower extremity edema left greater than right    Skin: Negative for rash.   Neurological: Negative for dizziness, syncope, weakness and headaches.   Psychiatric/Behavioral: Negative for confusion.       Health Maintainence:   Immunizations:  Health Maintenance       Date Due Completion Date    Pneumococcal Vaccines (Age 0-64) (1 of 2 - PPSV23) Never done ---    Mammogram 07/24/2021 7/24/2020    Influenza Vaccine (1) 09/01/2021 10/10/2017    COVID-19 Vaccine (4 - Booster for Pfizer series) 05/09/2022 12/9/2021    Lipid Panel 05/13/2022 5/13/2021    Colorectal Cancer Screening 09/24/2028 6/1/2021    TETANUS VACCINE 07/15/2029 7/15/2019           PHYSICAL EXAM     BP (!) 136/90 (BP Location: Right arm, Patient Position: Sitting)   Pulse 87   Wt 105.8 kg (233 lb 4 oz)   SpO2 98%   BMI 37.09 kg/m²     Physical Exam  Vitals and nursing note reviewed.   Constitutional:       Appearance: Normal appearance.      Comments: Overweight female in NAD or apparent pain. She makes good eye contact, speaks in clear full sentences and ambulates with ease.   She becomes tearful when she speaks of her  and the stress that she has been under lately.    HENT:      Head: Normocephalic and atraumatic.      Nose: Nose normal.      Mouth/Throat:      Pharynx: Oropharynx is clear.   Eyes:      Conjunctiva/sclera: Conjunctivae normal.    Cardiovascular:      Rate and Rhythm: Normal rate and regular rhythm.      Pulses: Normal pulses.      Comments: Varicose veins in lower extremities   Pulmonary:      Effort: No respiratory distress.   Abdominal:      Tenderness: There is no abdominal tenderness.   Musculoskeletal:         General: Normal range of motion.      Cervical back: No rigidity.      Comments: There is no C T or L midline bony TTP crepitus or step-offs  There is some soft tissue TTP in the lumbar region b/l  No overlying skin changes  No rashes   Skin:     General: Skin is warm and dry.      Capillary Refill: Capillary refill takes less than 2 seconds.      Findings: No rash.   Neurological:      General: No focal deficit present.      Mental Status: She is alert.      Gait: Gait normal.   Psychiatric:         Mood and Affect: Mood normal.         LABS     Lab Results   Component Value Date    LABA1C 5.7 (H) 08/25/2016    HGBA1C 5.3 05/13/2021     CMP  Sodium   Date Value Ref Range Status   05/13/2021 142 136 - 145 mmol/L Final     Potassium   Date Value Ref Range Status   05/13/2021 3.8 3.5 - 5.1 mmol/L Final     Chloride   Date Value Ref Range Status   05/13/2021 107 95 - 110 mmol/L Final     CO2   Date Value Ref Range Status   05/13/2021 26 23 - 29 mmol/L Final     Glucose   Date Value Ref Range Status   05/13/2021 84 70 - 110 mg/dL Final     BUN   Date Value Ref Range Status   05/13/2021 15 6 - 20 mg/dL Final     Creatinine   Date Value Ref Range Status   05/13/2021 0.9 0.5 - 1.4 mg/dL Final     Calcium   Date Value Ref Range Status   05/13/2021 9.4 8.7 - 10.5 mg/dL Final     Total Protein   Date Value Ref Range Status   05/13/2021 7.5 6.0 - 8.4 g/dL Final     Albumin   Date Value Ref Range Status   05/13/2021 3.7 3.5 - 5.2 g/dL Final     Total Bilirubin   Date Value Ref Range Status   05/13/2021 0.3 0.1 - 1.0 mg/dL Final     Comment:     For infants and newborns, interpretation of results should be based  on gestational age, weight  and in agreement with clinical  observations.    Premature Infant recommended reference ranges:  Up to 24 hours.............<8.0 mg/dL  Up to 48 hours............<12.0 mg/dL  3-5 days..................<15.0 mg/dL  6-29 days.................<15.0 mg/dL       Alkaline Phosphatase   Date Value Ref Range Status   05/13/2021 72 55 - 135 U/L Final     AST   Date Value Ref Range Status   05/13/2021 17 10 - 40 U/L Final     ALT   Date Value Ref Range Status   05/13/2021 18 10 - 44 U/L Final     Anion Gap   Date Value Ref Range Status   05/13/2021 9 8 - 16 mmol/L Final     eGFR if    Date Value Ref Range Status   05/13/2021 >60 >60 mL/min/1.73 m^2 Final     eGFR if non    Date Value Ref Range Status   05/13/2021 >60 >60 mL/min/1.73 m^2 Final     Comment:     Calculation used to obtain the estimated glomerular filtration  rate (eGFR) is the CKD-EPI equation.        Lab Results   Component Value Date    WBC 8.44 05/13/2021    HGB 13.6 05/13/2021    HCT 42.3 05/13/2021    MCV 91 05/13/2021     05/13/2021     Lab Results   Component Value Date    CHOL 145 05/13/2021    CHOL 149 07/17/2020    CHOL 154 07/15/2019     Lab Results   Component Value Date    HDL 51 05/13/2021    HDL 53 07/17/2020    HDL 57 07/15/2019     Lab Results   Component Value Date    LDLCALC 80.8 05/13/2021    LDLCALC 78.2 07/17/2020    LDLCALC 79.8 07/15/2019     Lab Results   Component Value Date    TRIG 66 05/13/2021    TRIG 89 07/17/2020    TRIG 86 07/15/2019     Lab Results   Component Value Date    CHOLHDL 35.2 05/13/2021    CHOLHDL 35.6 07/17/2020    CHOLHDL 37.0 07/15/2019     Lab Results   Component Value Date    TSH 1.664 05/13/2021       ASSESSMENT/PLAN     Brianna Cantu is a 61 y.o. female     Brianna was seen today for multiple complaints. She needs to bet back on BP meds for better BP control. Will check echo, bnp, CXR and US of lower extremity to r/o cardiovascular cause of symptoms. Expect that if  these test are reassuring, her back pain is from msk strain. Will refill inhaler. Will check UA for UTI as cause of constellation of symptoms without clear etiology. She is amenable to plan and aware of ED prompts.     Diagnoses and all orders for this visit:    Cough  -     albuterol (PROVENTIL/VENTOLIN HFA) 90 mcg/actuation inhaler; Inhale 2 puffs into the lungs every 6 (six) hours as needed for Wheezing.  -     BNP; Future  -     X-Ray Chest PA And Lateral; Future  -     Echo; Future  -     US Lower Extremity Veins Bilateral; Future    Mild intermittent asthma without complication  -     fluticasone-salmeterol diskus inhaler 250-50 mcg; Inhale 1 puff into the lungs 2 (two) times daily.  -     X-Ray Chest PA And Lateral; Future    Lower extremity edema  -     BNP; Future  -     Echo; Future    Urinary frequency  -     URINALYSIS    Other orders  -     amlodipine-valsartan (EXFORGE) 5-160 mg per tablet; Take 1 tablet by mouth once daily.          RTC in 4 weeks, sooner if needed.     Patient was counseled on when and how to seek emergent care.       Mary Ann Houston PA-C   Department of Internal Medicine - [facility]   10:37 AM

## 2022-04-01 ENCOUNTER — HOSPITAL ENCOUNTER (OUTPATIENT)
Dept: RADIOLOGY | Facility: OTHER | Age: 62
Discharge: HOME | End: 2022-04-01
Attending: PHYSICIAN ASSISTANT
Payer: COMMERCIAL

## 2022-04-01 DIAGNOSIS — R05.9 COUGH: ICD-10-CM

## 2022-04-01 DIAGNOSIS — J45.20 MILD INTERMITTENT ASTHMA WITHOUT COMPLICATION: ICD-10-CM

## 2022-04-01 PROCEDURE — 93970 EXTREMITY STUDY: CPT | Mod: TC

## 2022-04-01 PROCEDURE — 71046 XR CHEST PA AND LATERAL: ICD-10-PCS | Mod: 26,,, | Performed by: RADIOLOGY

## 2022-04-01 PROCEDURE — 71046 X-RAY EXAM CHEST 2 VIEWS: CPT | Mod: TC,FY

## 2022-04-01 PROCEDURE — 93970 EXTREMITY STUDY: CPT | Mod: 26,,, | Performed by: RADIOLOGY

## 2022-04-01 PROCEDURE — 71046 X-RAY EXAM CHEST 2 VIEWS: CPT | Mod: 26,,, | Performed by: RADIOLOGY

## 2022-04-01 PROCEDURE — 93970 US LOWER EXTREMITY VEINS BILATERAL: ICD-10-PCS | Mod: 26,,, | Performed by: RADIOLOGY

## 2022-04-07 ENCOUNTER — HOSPITAL ENCOUNTER (OUTPATIENT)
Dept: CARDIOLOGY | Facility: OTHER | Age: 62
Discharge: HOME | End: 2022-04-07
Attending: PHYSICIAN ASSISTANT
Payer: COMMERCIAL

## 2022-04-07 VITALS
WEIGHT: 233 LBS | SYSTOLIC BLOOD PRESSURE: 136 MMHG | HEIGHT: 66 IN | HEART RATE: 87 BPM | DIASTOLIC BLOOD PRESSURE: 90 MMHG | BODY MASS INDEX: 37.45 KG/M2

## 2022-04-07 DIAGNOSIS — R60.0 LOWER EXTREMITY EDEMA: ICD-10-CM

## 2022-04-07 DIAGNOSIS — R05.9 COUGH: ICD-10-CM

## 2022-04-07 LAB
ASCENDING AORTA: 2.76 CM
AV INDEX (PROSTH): 0.86
AV MEAN GRADIENT: 3 MMHG
AV PEAK GRADIENT: 6 MMHG
AV VALVE AREA: 3.58 CM2
AV VELOCITY RATIO: 0.74
BSA FOR ECHO PROCEDURE: 2.22 M2
CV ECHO LV RWT: 0.32 CM
DOP CALC AO PEAK VEL: 1.21 M/S
DOP CALC AO VTI: 20.9 CM
DOP CALC LVOT AREA: 4.2 CM2
DOP CALC LVOT DIAMETER: 2.31 CM
DOP CALC LVOT PEAK VEL: 0.9 M/S
DOP CALC LVOT STROKE VOLUME: 74.85 CM3
DOP CALCLVOT PEAK VEL VTI: 17.87 CM
E WAVE DECELERATION TIME: 177.23 MSEC
E/A RATIO: 0.61
E/E' RATIO: 12.89 M/S
ECHO LV POSTERIOR WALL: 0.81 CM (ref 0.6–1.1)
EJECTION FRACTION: 55 %
FRACTIONAL SHORTENING: 29 % (ref 28–44)
INTERVENTRICULAR SEPTUM: 0.79 CM (ref 0.6–1.1)
IVRT: 92.27 MSEC
LA MAJOR: 4.63 CM
LA MINOR: 4.94 CM
LA WIDTH: 3.72 CM
LEFT ATRIUM SIZE: 3.55 CM
LEFT ATRIUM VOLUME INDEX MOD: 26.8 ML/M2
LEFT ATRIUM VOLUME INDEX: 25.2 ML/M2
LEFT ATRIUM VOLUME MOD: 57 CM3
LEFT ATRIUM VOLUME: 53.66 CM3
LEFT INTERNAL DIMENSION IN SYSTOLE: 3.6 CM (ref 2.1–4)
LEFT VENTRICLE DIASTOLIC VOLUME INDEX: 57.02 ML/M2
LEFT VENTRICLE DIASTOLIC VOLUME: 121.46 ML
LEFT VENTRICLE MASS INDEX: 65 G/M2
LEFT VENTRICLE SYSTOLIC VOLUME INDEX: 25.6 ML/M2
LEFT VENTRICLE SYSTOLIC VOLUME: 54.5 ML
LEFT VENTRICULAR INTERNAL DIMENSION IN DIASTOLE: 5.06 CM (ref 3.5–6)
LEFT VENTRICULAR MASS: 138.59 G
LV LATERAL E/E' RATIO: 9.67 M/S
LV SEPTAL E/E' RATIO: 19.33 M/S
MV A" WAVE DURATION": 9.23 MSEC
MV PEAK A VEL: 0.95 M/S
MV PEAK E VEL: 0.58 M/S
MV STENOSIS PRESSURE HALF TIME: 51.4 MS
MV VALVE AREA P 1/2 METHOD: 4.28 CM2
PISA TR MAX VEL: 1.41 M/S
PULM VEIN S/D RATIO: 1.79
PV PEAK D VEL: 0.29 M/S
PV PEAK S VEL: 0.52 M/S
PV PEAK VELOCITY: 0.64 CM/S
RA MAJOR: 4.17 CM
RA PRESSURE: 3 MMHG
RIGHT VENTRICULAR END-DIASTOLIC DIMENSION: 3.11 CM
RV TISSUE DOPPLER FREE WALL SYSTOLIC VELOCITY 1 (APICAL 4 CHAMBER VIEW): 12.94 CM/S
SINUS: 3.17 CM
STJ: 3 CM
TDI LATERAL: 0.06 M/S
TDI SEPTAL: 0.03 M/S
TDI: 0.05 M/S
TR MAX PG: 8 MMHG
TRICUSPID ANNULAR PLANE SYSTOLIC EXCURSION: 2.47 CM
TV REST PULMONARY ARTERY PRESSURE: 11 MMHG

## 2022-04-07 PROCEDURE — 93306 TTE W/DOPPLER COMPLETE: CPT

## 2022-04-07 PROCEDURE — 93306 TTE W/DOPPLER COMPLETE: CPT | Mod: 26,,, | Performed by: INTERNAL MEDICINE

## 2022-04-07 PROCEDURE — 93306 ECHO (CUPID ONLY): ICD-10-PCS | Mod: 26,,, | Performed by: INTERNAL MEDICINE

## 2022-04-08 ENCOUNTER — TELEPHONE (OUTPATIENT)
Dept: INTERNAL MEDICINE | Facility: CLINIC | Age: 62
End: 2022-04-08
Payer: COMMERCIAL

## 2022-04-08 NOTE — TELEPHONE ENCOUNTER
Patient states that she will consult in provider at upcoming appointment for recommendations of provider at was discussed at visit. Patient upcoming appointment is 4/21/22 at 10:30.

## 2022-04-08 NOTE — TELEPHONE ENCOUNTER
----- Message from Haresh Monique sent at 4/8/2022  1:18 PM CDT -----  Contact: Patient  The pt called and would like to have a nurse call her back    Mary Ann Houston referred her to someone and the pt forgot the name    The pt can be reached at [phone]

## 2022-04-18 ENCOUNTER — PATIENT OUTREACH (OUTPATIENT)
Dept: ADMINISTRATIVE | Facility: HOSPITAL | Age: 62
End: 2022-04-18
Payer: OTHER GOVERNMENT

## 2022-04-21 ENCOUNTER — OFFICE VISIT (OUTPATIENT)
Dept: INTERNAL MEDICINE | Facility: CLINIC | Age: 62
End: 2022-04-21
Payer: COMMERCIAL

## 2022-04-21 VITALS
DIASTOLIC BLOOD PRESSURE: 74 MMHG | BODY MASS INDEX: 37.5 KG/M2 | SYSTOLIC BLOOD PRESSURE: 122 MMHG | OXYGEN SATURATION: 98 % | WEIGHT: 232.38 LBS | HEART RATE: 92 BPM

## 2022-04-21 DIAGNOSIS — F41.9 ANXIETY: ICD-10-CM

## 2022-04-21 DIAGNOSIS — R19.7 DIARRHEA, UNSPECIFIED TYPE: ICD-10-CM

## 2022-04-21 DIAGNOSIS — Z09 FOLLOW UP: Primary | ICD-10-CM

## 2022-04-21 PROCEDURE — 1159F MED LIST DOCD IN RCRD: CPT | Mod: CPTII,S$GLB,, | Performed by: PHYSICIAN ASSISTANT

## 2022-04-21 PROCEDURE — 99214 PR OFFICE/OUTPT VISIT, EST, LEVL IV, 30-39 MIN: ICD-10-PCS | Mod: S$GLB,,, | Performed by: PHYSICIAN ASSISTANT

## 2022-04-21 PROCEDURE — 3044F HG A1C LEVEL LT 7.0%: CPT | Mod: CPTII,S$GLB,, | Performed by: PHYSICIAN ASSISTANT

## 2022-04-21 PROCEDURE — 3078F DIAST BP <80 MM HG: CPT | Mod: CPTII,S$GLB,, | Performed by: PHYSICIAN ASSISTANT

## 2022-04-21 PROCEDURE — 1160F PR REVIEW ALL MEDS BY PRESCRIBER/CLIN PHARMACIST DOCUMENTED: ICD-10-PCS | Mod: CPTII,S$GLB,, | Performed by: PHYSICIAN ASSISTANT

## 2022-04-21 PROCEDURE — 3008F PR BODY MASS INDEX (BMI) DOCUMENTED: ICD-10-PCS | Mod: CPTII,S$GLB,, | Performed by: PHYSICIAN ASSISTANT

## 2022-04-21 PROCEDURE — 1160F RVW MEDS BY RX/DR IN RCRD: CPT | Mod: CPTII,S$GLB,, | Performed by: PHYSICIAN ASSISTANT

## 2022-04-21 PROCEDURE — 3074F PR MOST RECENT SYSTOLIC BLOOD PRESSURE < 130 MM HG: ICD-10-PCS | Mod: CPTII,S$GLB,, | Performed by: PHYSICIAN ASSISTANT

## 2022-04-21 PROCEDURE — 3008F BODY MASS INDEX DOCD: CPT | Mod: CPTII,S$GLB,, | Performed by: PHYSICIAN ASSISTANT

## 2022-04-21 PROCEDURE — 99999 PR PBB SHADOW E&M-EST. PATIENT-LVL V: CPT | Mod: PBBFAC,,, | Performed by: PHYSICIAN ASSISTANT

## 2022-04-21 PROCEDURE — 1159F PR MEDICATION LIST DOCUMENTED IN MEDICAL RECORD: ICD-10-PCS | Mod: CPTII,S$GLB,, | Performed by: PHYSICIAN ASSISTANT

## 2022-04-21 PROCEDURE — 3078F PR MOST RECENT DIASTOLIC BLOOD PRESSURE < 80 MM HG: ICD-10-PCS | Mod: CPTII,S$GLB,, | Performed by: PHYSICIAN ASSISTANT

## 2022-04-21 PROCEDURE — 3074F SYST BP LT 130 MM HG: CPT | Mod: CPTII,S$GLB,, | Performed by: PHYSICIAN ASSISTANT

## 2022-04-21 PROCEDURE — 99214 OFFICE O/P EST MOD 30 MIN: CPT | Mod: S$GLB,,, | Performed by: PHYSICIAN ASSISTANT

## 2022-04-21 PROCEDURE — 3044F PR MOST RECENT HEMOGLOBIN A1C LEVEL <7.0%: ICD-10-PCS | Mod: CPTII,S$GLB,, | Performed by: PHYSICIAN ASSISTANT

## 2022-04-21 PROCEDURE — 4010F ACE/ARB THERAPY RXD/TAKEN: CPT | Mod: CPTII,S$GLB,, | Performed by: PHYSICIAN ASSISTANT

## 2022-04-21 PROCEDURE — 99999 PR PBB SHADOW E&M-EST. PATIENT-LVL V: ICD-10-PCS | Mod: PBBFAC,,, | Performed by: PHYSICIAN ASSISTANT

## 2022-04-21 PROCEDURE — 4010F PR ACE/ARB THEARPY RXD/TAKEN: ICD-10-PCS | Mod: CPTII,S$GLB,, | Performed by: PHYSICIAN ASSISTANT

## 2022-04-21 RX ORDER — METHOCARBAMOL 500 MG/1
500 TABLET, FILM COATED ORAL 4 TIMES DAILY
Qty: 40 TABLET | Refills: 0 | Status: SHIPPED | OUTPATIENT
Start: 2022-04-21 | End: 2022-05-01

## 2022-04-21 NOTE — PROGRESS NOTES
INTERNAL MEDICINE PROGRESS NOTE    CHIEF COMPLAINT     Chief Complaint   Patient presents with    Follow-up       HPI     Brianna Cantu is a 61 y.o. female who presents for a follow-up visit today.    PCP is Sean Cruz MD, patient is known to me.     Last OV Plan  Brianna was seen today for multiple complaints. She needs to bet back on BP meds for better BP control. Will check echo, bnp, CXR and US of lower extremity to r/o cardiovascular cause of symptoms. Expect that if these test are reassuring, her back pain is from msk strain. Will refill inhaler. Will check UA for UTI as cause of constellation of symptoms without clear etiology. She is amenable to plan and aware of ED prompts.     BP in good range today in office   Echo - EF is normal, G1DD  BNP - normal   CXR - no acute pathology   US of lower extremities - normal   UA - normal    Today in clinic:     Still having bowel trobl -needs to follow-up with [facility]  Anxiety - referral placed to   abdominal muscle crampiong/spams - seems to be related ot abdominal wall muscles  - will start trila of robaxin      Past Medical History:  Past Medical History:   Diagnosis Date    Allergic rhinitis 4/27/2014    Anxiety 4/27/2014    Hx of cervical cancer 4/27/2014    Obesity 4/27/2014    Vitamin D deficiency 4/27/2014       Home Medications:  Prior to Admission medications    Medication Sig Start Date End Date Taking? Authorizing Provider   albuterol (PROVENTIL/VENTOLIN HFA) 90 mcg/actuation inhaler Inhale 2 puffs into the lungs every 6 (six) hours as needed for Wheezing. 3/31/22 3/31/23 Yes Mary Ann Houston PA-C   amlodipine-valsartan (EXFORGE) 5-160 mg per tablet Take 1 tablet by mouth once daily. 3/31/22 3/31/23 Yes Mary Ann Houston PA-C   ergocalciferol (ERGOCALCIFEROL) 50,000 unit Cap Take 1 capsule (50,000 Units total) by mouth every 7 days. 5/24/21  Yes Sean Cruz MD   fluticasone propionate (FLONASE) 50 mcg/actuation nasal spray 1  spray (50 mcg total) by Each Nostril route once daily. 4/5/21  Yes Lior Mckoy MD   fluticasone propionate (FLONASE) 50 mcg/actuation nasal spray 1 spray (50 mcg total) by Each Nostril route daily as needed for Allergies. 4/5/21  Yes Lior Mckoy MD   fluticasone-salmeterol diskus inhaler 250-50 mcg Inhale 1 puff into the lungs 2 (two) times daily. 3/31/22 4/30/22 Yes Mary Ann Houston PA-C   ibuprofen (ADVIL,MOTRIN) 800 MG tablet Take 1 tablet (800 mg total) by mouth 2 (two) times daily as needed for Pain. 8/5/19  Yes Brenton Hughes MD   loratadine (CLARITIN) 10 mg tablet Take 10 mg by mouth daily as needed.   Yes Historical Provider   mirabegron (MYRBETRIQ) 25 mg Tb24 ER tablet Take 1 tablet (25 mg total) by mouth once daily. 6/3/21 6/3/22 Yes Abdon English MD   mv,Ca,min-folic acid-vit K1 400-20 mcg Tab Take by mouth once daily.   Yes Historical Provider   pantoprazole (PROTONIX) 40 MG tablet TAKE 1 TABLET(40 MG) BY MOUTH EVERY MORNING 12/10/20  Yes Sean Cruz MD   valACYclovir (VALTREX) 1000 MG tablet Take 1 tablet (1,000 mg total) by mouth every 12 (twelve) hours. 3/19/20  Yes Sean Curz MD       Review of Systems:  Review of Systems   Constitutional: Negative for chills and fever.   HENT: Negative for sore throat and trouble swallowing.    Eyes: Negative for visual disturbance.   Respiratory: Negative for cough and shortness of breath.    Cardiovascular: Negative for chest pain.   Gastrointestinal: Negative for abdominal pain, constipation, diarrhea, nausea and vomiting.   Genitourinary: Negative for dysuria and flank pain.   Musculoskeletal: Negative for back pain, neck pain and neck stiffness.   Skin: Negative for rash.   Neurological: Negative for dizziness, syncope, weakness and headaches.   Psychiatric/Behavioral: Negative for confusion.       Health Maintainence:   Immunizations:  Health Maintenance       Date Due Completion Date    Pneumococcal Vaccines  (Age 0-64) (1 - PCV) Never done ---    Mammogram 07/24/2021 7/24/2020    Influenza Vaccine (1) 09/01/2021 10/10/2017    COVID-19 Vaccine (4 - Booster for Pfizer series) 03/09/2022 12/9/2021    Lipid Panel 05/13/2022 5/13/2021    Colorectal Cancer Screening 09/24/2028 6/1/2021    TETANUS VACCINE 07/15/2029 7/15/2019           PHYSICAL EXAM     /74 (BP Location: Right arm, Patient Position: Sitting)   Pulse 92   Wt 105.4 kg (232 lb 5.8 oz)   SpO2 98%   BMI 37.50 kg/m²     Physical Exam  Vitals and nursing note reviewed.   Constitutional:       Appearance: Normal appearance.      Comments: Elderly female in NAD or apparent pain. She makes good eye contact speaks in clear full sentences and ambulates with ease.    HENT:      Head: Normocephalic and atraumatic.      Nose: Nose normal.      Mouth/Throat:      Pharynx: Oropharynx is clear.   Eyes:      Conjunctiva/sclera: Conjunctivae normal.   Cardiovascular:      Rate and Rhythm: Normal rate and regular rhythm.      Pulses: Normal pulses.   Pulmonary:      Effort: No respiratory distress.   Abdominal:      Tenderness: There is no abdominal tenderness.   Musculoskeletal:         General: Normal range of motion.      Cervical back: No rigidity.   Skin:     General: Skin is warm and dry.      Capillary Refill: Capillary refill takes less than 2 seconds.      Findings: No rash.   Neurological:      General: No focal deficit present.      Mental Status: She is alert.      Gait: Gait normal.   Psychiatric:         Mood and Affect: Mood normal.         LABS     Lab Results   Component Value Date    LABA1C 5.7 (H) 08/25/2016    HGBA1C 5.3 05/13/2021     CMP  Sodium   Date Value Ref Range Status   05/13/2021 142 136 - 145 mmol/L Final     Potassium   Date Value Ref Range Status   05/13/2021 3.8 3.5 - 5.1 mmol/L Final     Chloride   Date Value Ref Range Status   05/13/2021 107 95 - 110 mmol/L Final     CO2   Date Value Ref Range Status   05/13/2021 26 23 - 29 mmol/L  Final     Glucose   Date Value Ref Range Status   05/13/2021 84 70 - 110 mg/dL Final     BUN   Date Value Ref Range Status   05/13/2021 15 6 - 20 mg/dL Final     Creatinine   Date Value Ref Range Status   05/13/2021 0.9 0.5 - 1.4 mg/dL Final     Calcium   Date Value Ref Range Status   05/13/2021 9.4 8.7 - 10.5 mg/dL Final     Total Protein   Date Value Ref Range Status   05/13/2021 7.5 6.0 - 8.4 g/dL Final     Albumin   Date Value Ref Range Status   05/13/2021 3.7 3.5 - 5.2 g/dL Final     Total Bilirubin   Date Value Ref Range Status   05/13/2021 0.3 0.1 - 1.0 mg/dL Final     Comment:     For infants and newborns, interpretation of results should be based  on gestational age, weight and in agreement with clinical  observations.    Premature Infant recommended reference ranges:  Up to 24 hours.............<8.0 mg/dL  Up to 48 hours............<12.0 mg/dL  3-5 days..................<15.0 mg/dL  6-29 days.................<15.0 mg/dL       Alkaline Phosphatase   Date Value Ref Range Status   05/13/2021 72 55 - 135 U/L Final     AST   Date Value Ref Range Status   05/13/2021 17 10 - 40 U/L Final     ALT   Date Value Ref Range Status   05/13/2021 18 10 - 44 U/L Final     Anion Gap   Date Value Ref Range Status   05/13/2021 9 8 - 16 mmol/L Final     eGFR if    Date Value Ref Range Status   05/13/2021 >60 >60 mL/min/1.73 m^2 Final     eGFR if non    Date Value Ref Range Status   05/13/2021 >60 >60 mL/min/1.73 m^2 Final     Comment:     Calculation used to obtain the estimated glomerular filtration  rate (eGFR) is the CKD-EPI equation.        Lab Results   Component Value Date    WBC 8.44 05/13/2021    HGB 13.6 05/13/2021    HCT 42.3 05/13/2021    MCV 91 05/13/2021     05/13/2021     Lab Results   Component Value Date    CHOL 145 05/13/2021    CHOL 149 07/17/2020    CHOL 154 07/15/2019     Lab Results   Component Value Date    HDL 51 05/13/2021    HDL 53 07/17/2020    HDL 57 07/15/2019      Lab Results   Component Value Date    LDLCALC 80.8 05/13/2021    LDLCALC 78.2 07/17/2020    LDLCALC 79.8 07/15/2019     Lab Results   Component Value Date    TRIG 66 05/13/2021    TRIG 89 07/17/2020    TRIG 86 07/15/2019     Lab Results   Component Value Date    CHOLHDL 35.2 05/13/2021    CHOLHDL 35.6 07/17/2020    CHOLHDL 37.0 07/15/2019     Lab Results   Component Value Date    TSH 1.664 05/13/2021       ASSESSMENT/PLAN     Brianna Cantu is a 61 y.o. female     Brianna was seen today for follow-up.    Diagnoses and all orders for this visit:    Follow up    Diarrhea, unspecified type  -     Ambulatory referral/consult to Colorectal Surgery; Future    Anxiety  -     Ambulatory referral/consult to Primary Care Behavioral Health (Non-Opioids); Future    Other orders  -     methocarbamoL (ROBAXIN) 500 MG Tab; Take 1 tablet (500 mg total) by mouth 4 (four) times daily. for 10 days    Follow up with PCP in 3 months for symptom re-check sooner if needed.         Mary Ann Houston PA-C   Department of Internal Medicine - [facility]   10:52 AM

## 2022-04-26 ENCOUNTER — LAB VISIT (OUTPATIENT)
Dept: LAB | Facility: OTHER | Age: 62
End: 2022-04-26
Attending: INTERNAL MEDICINE
Payer: COMMERCIAL

## 2022-04-26 ENCOUNTER — OFFICE VISIT (OUTPATIENT)
Dept: INTERNAL MEDICINE | Facility: CLINIC | Age: 62
End: 2022-04-26
Attending: INTERNAL MEDICINE
Payer: COMMERCIAL

## 2022-04-26 ENCOUNTER — TELEPHONE (OUTPATIENT)
Dept: INTERNAL MEDICINE | Facility: CLINIC | Age: 62
End: 2022-04-26
Payer: OTHER GOVERNMENT

## 2022-04-26 VITALS
SYSTOLIC BLOOD PRESSURE: 124 MMHG | OXYGEN SATURATION: 97 % | DIASTOLIC BLOOD PRESSURE: 88 MMHG | WEIGHT: 233.44 LBS | HEIGHT: 66 IN | HEART RATE: 99 BPM | BODY MASS INDEX: 37.52 KG/M2

## 2022-04-26 DIAGNOSIS — Z00.00 ANNUAL PHYSICAL EXAM: ICD-10-CM

## 2022-04-26 DIAGNOSIS — Z71.6 ENCOUNTER FOR TOBACCO USE CESSATION COUNSELING: ICD-10-CM

## 2022-04-26 DIAGNOSIS — I10 BENIGN ESSENTIAL HYPERTENSION: ICD-10-CM

## 2022-04-26 DIAGNOSIS — J45.20 MILD INTERMITTENT ASTHMA WITHOUT COMPLICATION: ICD-10-CM

## 2022-04-26 DIAGNOSIS — E66.9 OBESITY, UNSPECIFIED CLASSIFICATION, UNSPECIFIED OBESITY TYPE, UNSPECIFIED WHETHER SERIOUS COMORBIDITY PRESENT: ICD-10-CM

## 2022-04-26 DIAGNOSIS — F17.200 TOBACCO USE DISORDER: ICD-10-CM

## 2022-04-26 DIAGNOSIS — Z12.31 ENCOUNTER FOR SCREENING MAMMOGRAM FOR MALIGNANT NEOPLASM OF BREAST: ICD-10-CM

## 2022-04-26 DIAGNOSIS — E55.9 VITAMIN D DEFICIENCY: ICD-10-CM

## 2022-04-26 DIAGNOSIS — Z00.00 ANNUAL PHYSICAL EXAM: Primary | ICD-10-CM

## 2022-04-26 LAB
ALBUMIN SERPL BCP-MCNC: 3.7 G/DL (ref 3.5–5.2)
ALP SERPL-CCNC: 66 U/L (ref 55–135)
ALT SERPL W/O P-5'-P-CCNC: 15 U/L (ref 10–44)
ANION GAP SERPL CALC-SCNC: 11 MMOL/L (ref 8–16)
AST SERPL-CCNC: 20 U/L (ref 10–40)
BASOPHILS # BLD AUTO: 0.02 K/UL (ref 0–0.2)
BASOPHILS NFR BLD: 0.2 % (ref 0–1.9)
BILIRUB SERPL-MCNC: 0.4 MG/DL (ref 0.1–1)
BUN SERPL-MCNC: 13 MG/DL (ref 8–23)
CALCIUM SERPL-MCNC: 9.3 MG/DL (ref 8.7–10.5)
CHLORIDE SERPL-SCNC: 109 MMOL/L (ref 95–110)
CHOLEST SERPL-MCNC: 145 MG/DL (ref 120–199)
CHOLEST/HDLC SERPL: 2.7 {RATIO} (ref 2–5)
CO2 SERPL-SCNC: 23 MMOL/L (ref 23–29)
CREAT SERPL-MCNC: 1.1 MG/DL (ref 0.5–1.4)
DIFFERENTIAL METHOD: NORMAL
EOSINOPHIL # BLD AUTO: 0.2 K/UL (ref 0–0.5)
EOSINOPHIL NFR BLD: 2 % (ref 0–8)
ERYTHROCYTE [DISTWIDTH] IN BLOOD BY AUTOMATED COUNT: 13.5 % (ref 11.5–14.5)
EST. GFR  (AFRICAN AMERICAN): >60 ML/MIN/1.73 M^2
EST. GFR  (NON AFRICAN AMERICAN): 54 ML/MIN/1.73 M^2
ESTIMATED AVG GLUCOSE: 108 MG/DL (ref 68–131)
GLUCOSE SERPL-MCNC: 86 MG/DL (ref 70–110)
HBA1C MFR BLD: 5.4 % (ref 4–5.6)
HCT VFR BLD AUTO: 42 % (ref 37–48.5)
HDLC SERPL-MCNC: 54 MG/DL (ref 40–75)
HDLC SERPL: 37.2 % (ref 20–50)
HGB BLD-MCNC: 13.9 G/DL (ref 12–16)
IMM GRANULOCYTES # BLD AUTO: 0.03 K/UL (ref 0–0.04)
IMM GRANULOCYTES NFR BLD AUTO: 0.3 % (ref 0–0.5)
LDLC SERPL CALC-MCNC: 72.8 MG/DL (ref 63–159)
LYMPHOCYTES # BLD AUTO: 2.1 K/UL (ref 1–4.8)
LYMPHOCYTES NFR BLD: 21.6 % (ref 18–48)
MCH RBC QN AUTO: 29.8 PG (ref 27–31)
MCHC RBC AUTO-ENTMCNC: 33.1 G/DL (ref 32–36)
MCV RBC AUTO: 90 FL (ref 82–98)
MONOCYTES # BLD AUTO: 0.9 K/UL (ref 0.3–1)
MONOCYTES NFR BLD: 8.8 % (ref 4–15)
NEUTROPHILS # BLD AUTO: 6.7 K/UL (ref 1.8–7.7)
NEUTROPHILS NFR BLD: 67.1 % (ref 38–73)
NONHDLC SERPL-MCNC: 91 MG/DL
NRBC BLD-RTO: 0 /100 WBC
PLATELET # BLD AUTO: 328 K/UL (ref 150–450)
PMV BLD AUTO: 9.5 FL (ref 9.2–12.9)
POTASSIUM SERPL-SCNC: 3.7 MMOL/L (ref 3.5–5.1)
PROT SERPL-MCNC: 7.2 G/DL (ref 6–8.4)
RBC # BLD AUTO: 4.67 M/UL (ref 4–5.4)
SODIUM SERPL-SCNC: 143 MMOL/L (ref 136–145)
TRIGL SERPL-MCNC: 91 MG/DL (ref 30–150)
TSH SERPL DL<=0.005 MIU/L-ACNC: 2.68 UIU/ML (ref 0.4–4)
WBC # BLD AUTO: 9.93 K/UL (ref 3.9–12.7)

## 2022-04-26 PROCEDURE — 80061 LIPID PANEL: CPT | Performed by: INTERNAL MEDICINE

## 2022-04-26 PROCEDURE — 80053 COMPREHEN METABOLIC PANEL: CPT | Performed by: INTERNAL MEDICINE

## 2022-04-26 PROCEDURE — 3079F PR MOST RECENT DIASTOLIC BLOOD PRESSURE 80-89 MM HG: ICD-10-PCS | Mod: CPTII,S$GLB,, | Performed by: INTERNAL MEDICINE

## 2022-04-26 PROCEDURE — 3074F PR MOST RECENT SYSTOLIC BLOOD PRESSURE < 130 MM HG: ICD-10-PCS | Mod: CPTII,S$GLB,, | Performed by: INTERNAL MEDICINE

## 2022-04-26 PROCEDURE — 3044F PR MOST RECENT HEMOGLOBIN A1C LEVEL <7.0%: ICD-10-PCS | Mod: CPTII,S$GLB,, | Performed by: INTERNAL MEDICINE

## 2022-04-26 PROCEDURE — 3044F HG A1C LEVEL LT 7.0%: CPT | Mod: CPTII,S$GLB,, | Performed by: INTERNAL MEDICINE

## 2022-04-26 PROCEDURE — 84443 ASSAY THYROID STIM HORMONE: CPT | Performed by: INTERNAL MEDICINE

## 2022-04-26 PROCEDURE — 99999 PR PBB SHADOW E&M-EST. PATIENT-LVL V: ICD-10-PCS | Mod: PBBFAC,,, | Performed by: INTERNAL MEDICINE

## 2022-04-26 PROCEDURE — 83036 HEMOGLOBIN GLYCOSYLATED A1C: CPT | Performed by: INTERNAL MEDICINE

## 2022-04-26 PROCEDURE — 3079F DIAST BP 80-89 MM HG: CPT | Mod: CPTII,S$GLB,, | Performed by: INTERNAL MEDICINE

## 2022-04-26 PROCEDURE — 99999 PR PBB SHADOW E&M-EST. PATIENT-LVL V: CPT | Mod: PBBFAC,,, | Performed by: INTERNAL MEDICINE

## 2022-04-26 PROCEDURE — 4010F PR ACE/ARB THEARPY RXD/TAKEN: ICD-10-PCS | Mod: CPTII,S$GLB,, | Performed by: INTERNAL MEDICINE

## 2022-04-26 PROCEDURE — 3008F BODY MASS INDEX DOCD: CPT | Mod: CPTII,S$GLB,, | Performed by: INTERNAL MEDICINE

## 2022-04-26 PROCEDURE — 4010F ACE/ARB THERAPY RXD/TAKEN: CPT | Mod: CPTII,S$GLB,, | Performed by: INTERNAL MEDICINE

## 2022-04-26 PROCEDURE — 99396 PR PREVENTIVE VISIT,EST,40-64: ICD-10-PCS | Mod: S$GLB,,, | Performed by: INTERNAL MEDICINE

## 2022-04-26 PROCEDURE — 85025 COMPLETE CBC W/AUTO DIFF WBC: CPT | Performed by: INTERNAL MEDICINE

## 2022-04-26 PROCEDURE — 3008F PR BODY MASS INDEX (BMI) DOCUMENTED: ICD-10-PCS | Mod: CPTII,S$GLB,, | Performed by: INTERNAL MEDICINE

## 2022-04-26 PROCEDURE — 3074F SYST BP LT 130 MM HG: CPT | Mod: CPTII,S$GLB,, | Performed by: INTERNAL MEDICINE

## 2022-04-26 PROCEDURE — 36415 COLL VENOUS BLD VENIPUNCTURE: CPT | Performed by: INTERNAL MEDICINE

## 2022-04-26 PROCEDURE — 99396 PREV VISIT EST AGE 40-64: CPT | Mod: S$GLB,,, | Performed by: INTERNAL MEDICINE

## 2022-04-26 RX ORDER — OXYBUTYNIN CHLORIDE 10 MG/1
10 TABLET, EXTENDED RELEASE ORAL DAILY
Qty: 90 TABLET | Refills: 1 | Status: SHIPPED | OUTPATIENT
Start: 2022-04-26 | End: 2022-08-18

## 2022-04-26 NOTE — TELEPHONE ENCOUNTER
Upon leaving room 7 to discharge pt, pt asked me about her FMLA papers that Dr. Cruz. I was confused and informed pt that Dr. Cruz never mentioned these forms to me. Pt said please make sure that we don't lose and said that its in my hands. She stated that it was lost the last time and asked to please not let that happen again. I was barely able to explain to the pt the process of how FMLA works because she said she had already knew the process. Pt says she'll come pick forms up

## 2022-04-26 NOTE — PROGRESS NOTES
Subjective:       Patient ID: Brianna Cantu is a 61 y.o. female.    Chief Complaint: Generalized Body Aches    Here for urgent visit  60 yo F with PMHx of anxiety, asthma, allergic rhinitis, vit d deficiency     Memory difficulty lately. Forgetful lately. Fatigued, chronic and daily. Nocturia 3 times nightly, up to 5-6 times nightly. We will start here. No night sweats, weight loss,       Still having bowel trouble - needs to follow-up with CRS. Requesting continued FMLA for this.  Anxiety - referral placed to bobby      Rectal stricture and unable to get successful colonoscopy in 2018. Was seen by colorectal department Zina who recommended continuing [facility] FIT kit for colon CA screening on account of her barium enema studies and concern she is too high risk. Concern for post XRT changes 2/2 cervical CA Tx Hx. For fecal urge incontinence rec fiber and bentyl. Manometry is next step     ### fecal incontinence ###  12/9/2019 HPI She reports fecal incontinence once a week which results in her having to go back home and change her clothes. She had colonoscopy 09/2018 and was unable to complete due to stricture. She had CT followed by barium enema ordered via Felix in colorectal that was also normal. She admits to an intolerance to dairy and for the most, aprt, but not all the time avoids it. She has been adjusting her diet,ea ting more fruits and vegetables. She does not add sugar. She reports alternating loose stools and constipation. Has tried probiotics.     ### HTN ###  amlodipine-valsartan (EXFORGE) 5-160 mg per tablet; Take 1 tablet by mouth once daily.      Seen by Dr Washington in 2018 for proximal rectal stricture, not able to be traversed with endoscope. Recommended barium enema and FIT test.  She has a history of cervical cancer at the age of 33.  She underwent in 1993 total abdominal hysterectomy, external beam radiation therapy as well as catheter interstitial radiation therapy  Generalized body  "ach      Review of Systems   Constitutional: Negative for chills, fatigue, fever and unexpected weight change.   HENT: Negative for ear pain, hearing loss, postnasal drip, tinnitus, trouble swallowing and voice change.    Respiratory: Negative for cough, chest tightness, shortness of breath and wheezing.    Cardiovascular: Negative for chest pain, palpitations and leg swelling.   Gastrointestinal: Positive for diarrhea. Negative for abdominal pain, blood in stool, nausea and vomiting.   Endocrine: Negative for polydipsia, polyphagia and polyuria.   Genitourinary: Negative for difficulty urinating, dysuria, hematuria and vaginal bleeding.   Skin: Negative for rash.   Allergic/Immunologic: Negative for food allergies.   Neurological: Negative for dizziness, numbness and headaches.   Hematological: Does not bruise/bleed easily.   Psychiatric/Behavioral: The patient is not nervous/anxious.        Objective:      Vitals:    04/26/22 0805   BP: 124/88   Pulse: 99   SpO2: 97%   Weight: 105.9 kg (233 lb 7.5 oz)   Height: 5' 6" (1.676 m)      Physical Exam    Assessment:       1. Annual physical exam    2. Tobacco use disorder    3. Benign essential hypertension    4. Vitamin D deficiency    5. Mild intermittent asthma without complication    6. Obesity, unspecified classification, unspecified obesity type, unspecified whether serious comorbidity present    7. Encounter for screening mammogram for malignant neoplasm of breast    8. Encounter for tobacco use cessation counseling        Plan:       Brianna was seen today for generalized body aches.    Diagnoses and all orders for this visit:    Annual physical exam  -     Comprehensive Metabolic Panel; Future  -     Lipid Panel; Future  -     TSH; Future  -     CBC Auto Differential; Future  -     Hemoglobin A1C; Future    Tobacco use disorder    Benign essential hypertension    Vitamin D deficiency    Mild intermittent asthma without complication    Obesity, unspecified " classification, unspecified obesity type, unspecified whether serious comorbidity present    Encounter for screening mammogram for malignant neoplasm of breast  -     Mammo Digital Screening Bilat w/ Quang; Future    Encounter for tobacco use cessation counseling  -     Ambulatory referral/consult to Smoking Cessation Program; Future    Other orders  -     oxybutynin (DITROPAN-XL) 10 MG 24 hr tablet; Take 1 tablet (10 mg total) by mouth once daily.           Sean Darby MD  Internal Medicine-[facility]        Side effects of medication(s) were discussed in detail and patient voiced understanding.  Patient will call back for any issues or complications.

## 2022-04-28 ENCOUNTER — HOSPITAL ENCOUNTER (OUTPATIENT)
Dept: RADIOLOGY | Facility: HOSPITAL | Age: 62
Discharge: HOME | End: 2022-04-28
Attending: INTERNAL MEDICINE
Payer: COMMERCIAL

## 2022-04-28 VITALS — BODY MASS INDEX: 37.45 KG/M2 | WEIGHT: 233 LBS | HEIGHT: 66 IN

## 2022-04-28 DIAGNOSIS — Z12.31 OTHER SCREENING MAMMOGRAM: ICD-10-CM

## 2022-04-28 PROCEDURE — 77067 SCR MAMMO BI INCL CAD: CPT | Mod: 26,,, | Performed by: RADIOLOGY

## 2022-04-28 PROCEDURE — 77063 BREAST TOMOSYNTHESIS BI: CPT | Mod: 26,,, | Performed by: RADIOLOGY

## 2022-04-28 PROCEDURE — 77067 MAMMO DIGITAL SCREENING BILAT WITH TOMO: ICD-10-PCS | Mod: 26,,, | Performed by: RADIOLOGY

## 2022-04-28 PROCEDURE — 77063 BREAST TOMOSYNTHESIS BI: CPT | Mod: TC

## 2022-04-28 PROCEDURE — 77063 MAMMO DIGITAL SCREENING BILAT WITH TOMO: ICD-10-PCS | Mod: 26,,, | Performed by: RADIOLOGY

## 2022-05-04 ENCOUNTER — PATIENT MESSAGE (OUTPATIENT)
Dept: INTERNAL MEDICINE | Facility: CLINIC | Age: 62
End: 2022-05-04
Payer: OTHER GOVERNMENT

## 2022-05-05 ENCOUNTER — TELEPHONE (OUTPATIENT)
Dept: INTERNAL MEDICINE | Facility: CLINIC | Age: 62
End: 2022-05-05
Payer: OTHER GOVERNMENT

## 2022-05-05 NOTE — TELEPHONE ENCOUNTER
Scanned/emailed FMLA forms to [email or web address] on 5/4/2022 and 5/5/2022 attached confirmation sent forms to these forms located in my file cabinet.

## 2022-05-09 ENCOUNTER — PATIENT MESSAGE (OUTPATIENT)
Dept: INTERNAL MEDICINE | Facility: CLINIC | Age: 62
End: 2022-05-09
Payer: OTHER GOVERNMENT

## 2022-05-11 ENCOUNTER — PATIENT MESSAGE (OUTPATIENT)
Dept: INTERNAL MEDICINE | Facility: CLINIC | Age: 62
End: 2022-05-11
Payer: OTHER GOVERNMENT

## 2022-05-12 ENCOUNTER — PATIENT MESSAGE (OUTPATIENT)
Dept: SMOKING CESSATION | Facility: CLINIC | Age: 62
End: 2022-05-12
Payer: OTHER GOVERNMENT

## 2022-05-12 ENCOUNTER — CLINICAL SUPPORT (OUTPATIENT)
Dept: SMOKING CESSATION | Facility: CLINIC | Age: 62
End: 2022-05-12
Payer: COMMERCIAL

## 2022-05-12 DIAGNOSIS — F17.210 LIGHT CIGARETTE SMOKER (1-9 CIGS/DAY): Primary | ICD-10-CM

## 2022-05-12 PROCEDURE — 99404 PREV MED CNSL INDIV APPRX 60: CPT | Mod: S$GLB,,,

## 2022-05-12 PROCEDURE — 99999 PR PBB SHADOW E&M-EST. PATIENT-LVL I: CPT | Mod: PBBFAC,,,

## 2022-05-12 PROCEDURE — 99404 PR PREVENT COUNSEL,INDIV,60 MIN: ICD-10-PCS | Mod: S$GLB,,,

## 2022-05-12 PROCEDURE — 99999 PR PBB SHADOW E&M-EST. PATIENT-LVL I: ICD-10-PCS | Mod: PBBFAC,,,

## 2022-05-12 RX ORDER — IBUPROFEN 200 MG
1 TABLET ORAL DAILY
Qty: 28 PATCH | Refills: 0 | Status: SHIPPED | OUTPATIENT
Start: 2022-05-12 | End: 2022-07-01

## 2022-05-12 RX ORDER — DM/P-EPHED/ACETAMINOPH/DOXYLAM 30-7.5/3
2 LIQUID (ML) ORAL
Qty: 144 LOZENGE | Refills: 0 | Status: SHIPPED | OUTPATIENT
Start: 2022-05-12 | End: 2022-07-01

## 2022-05-13 DIAGNOSIS — M25.559 ARTHRALGIA OF HIP: ICD-10-CM

## 2022-05-13 DIAGNOSIS — E55.9 VITAMIN D DEFICIENCY: ICD-10-CM

## 2022-05-13 DIAGNOSIS — B00.9 HSV (HERPES SIMPLEX VIRUS) INFECTION: Primary | ICD-10-CM

## 2022-05-13 NOTE — TELEPHONE ENCOUNTER
No new care gaps identified.  [facility] Embedded Care Gaps. Reference number: [id number]. 5/13/2022   12:28:48 PM CDT

## 2022-05-15 RX ORDER — ERGOCALCIFEROL 1.25 MG/1
50000 CAPSULE ORAL
Qty: 20 CAPSULE | Refills: 3 | Status: SHIPPED | OUTPATIENT
Start: 2022-05-15 | End: 2022-07-01

## 2022-05-15 RX ORDER — IBUPROFEN 800 MG/1
800 TABLET ORAL 2 TIMES DAILY PRN
Qty: 60 TABLET | Refills: 1 | Status: SHIPPED | OUTPATIENT
Start: 2022-05-15 | End: 2022-07-01

## 2022-05-15 RX ORDER — VALACYCLOVIR HYDROCHLORIDE 1 G/1
1000 TABLET, FILM COATED ORAL EVERY 12 HOURS
Qty: 60 TABLET | Refills: 0 | Status: SHIPPED | OUTPATIENT
Start: 2022-05-15 | End: 2022-06-18

## 2022-05-17 ENCOUNTER — PATIENT OUTREACH (OUTPATIENT)
Dept: ADMINISTRATIVE | Facility: OTHER | Age: 62
End: 2022-05-17
Payer: OTHER GOVERNMENT

## 2022-05-17 NOTE — PROGRESS NOTES
Health Maintenance Due   Topic Date Due    Pneumococcal Vaccines (Age 0-64) (1 - PCV) Never done    COVID-19 Vaccine (4 - Booster for Pfizer series) 03/09/2022     Updates were requested from care everywhere.  Chart was reviewed for overdue Proactive [facility] Encounters ([facility]) topics (CRS, Breast Cancer Screening, Eye exam)  Health Maintenance has been updated.  LINKS immunization registry triggered.  Immunizations were reconciled.

## 2022-05-18 ENCOUNTER — TELEPHONE (OUTPATIENT)
Dept: GASTROENTEROLOGY | Facility: CLINIC | Age: 62
End: 2022-05-18
Payer: OTHER GOVERNMENT

## 2022-05-19 ENCOUNTER — TELEPHONE (OUTPATIENT)
Dept: GASTROENTEROLOGY | Facility: CLINIC | Age: 62
End: 2022-05-19
Payer: OTHER GOVERNMENT

## 2022-05-19 NOTE — TELEPHONE ENCOUNTER
Spoke to patient to let her know that [facility] Barragan stated Referral was originally placed for colorectal surgery.  Given patient's h/o stenosis of colon and since she has previously followed with colorectal surgery for this and diarrhea, she should schedule appointment with them.patient stated she will call her doctor to schedule an appointment Verbal understanding.

## 2022-05-19 NOTE — TELEPHONE ENCOUNTER
----- Message from Laurel Oneill NP sent at 5/18/2022 11:49 PM CDT -----  Regarding: crs referral  Referral was originally placed for colorectal surgery.  Given patient's h/o stenosis of colon and since she has previously followed with colorectal surgery for this and diarrhea, she should schedule appointment with them.

## 2022-06-02 ENCOUNTER — TELEPHONE (OUTPATIENT)
Dept: SURGERY | Facility: CLINIC | Age: 62
End: 2022-06-02
Payer: COMMERCIAL

## 2022-06-10 ENCOUNTER — OFFICE VISIT (OUTPATIENT)
Dept: INTERNAL MEDICINE | Facility: CLINIC | Age: 62
End: 2022-06-10
Payer: COMMERCIAL

## 2022-06-10 ENCOUNTER — TELEPHONE (OUTPATIENT)
Dept: INTERNAL MEDICINE | Facility: CLINIC | Age: 62
End: 2022-06-10
Payer: COMMERCIAL

## 2022-06-10 ENCOUNTER — OFFICE VISIT (OUTPATIENT)
Dept: SURGERY | Facility: OTHER | Age: 62
End: 2022-06-10
Attending: SURGERY
Payer: COMMERCIAL

## 2022-06-10 VITALS
DIASTOLIC BLOOD PRESSURE: 91 MMHG | BODY MASS INDEX: 37.63 KG/M2 | SYSTOLIC BLOOD PRESSURE: 152 MMHG | HEART RATE: 94 BPM | WEIGHT: 234.13 LBS | HEIGHT: 66 IN

## 2022-06-10 VITALS
DIASTOLIC BLOOD PRESSURE: 82 MMHG | SYSTOLIC BLOOD PRESSURE: 124 MMHG | OXYGEN SATURATION: 96 % | WEIGHT: 226 LBS | BODY MASS INDEX: 36.47 KG/M2 | HEART RATE: 84 BPM

## 2022-06-10 DIAGNOSIS — F32.A DEPRESSION, UNSPECIFIED DEPRESSION TYPE: Primary | ICD-10-CM

## 2022-06-10 DIAGNOSIS — R19.7 DIARRHEA, UNSPECIFIED TYPE: Primary | ICD-10-CM

## 2022-06-10 PROCEDURE — 4010F PR ACE/ARB THEARPY RXD/TAKEN: ICD-10-PCS | Mod: CPTII,S$GLB,, | Performed by: SURGERY

## 2022-06-10 PROCEDURE — 3080F DIAST BP >= 90 MM HG: CPT | Mod: CPTII,S$GLB,, | Performed by: SURGERY

## 2022-06-10 PROCEDURE — 3044F PR MOST RECENT HEMOGLOBIN A1C LEVEL <7.0%: ICD-10-PCS | Mod: CPTII,S$GLB,, | Performed by: SURGERY

## 2022-06-10 PROCEDURE — 99999 PR PBB SHADOW E&M-EST. PATIENT-LVL V: ICD-10-PCS | Mod: PBBFAC,,, | Performed by: PHYSICIAN ASSISTANT

## 2022-06-10 PROCEDURE — 4010F PR ACE/ARB THEARPY RXD/TAKEN: ICD-10-PCS | Mod: CPTII,S$GLB,, | Performed by: PHYSICIAN ASSISTANT

## 2022-06-10 PROCEDURE — 99999 PR PBB SHADOW E&M-EST. PATIENT-LVL III: ICD-10-PCS | Mod: PBBFAC,,, | Performed by: SURGERY

## 2022-06-10 PROCEDURE — 3074F PR MOST RECENT SYSTOLIC BLOOD PRESSURE < 130 MM HG: ICD-10-PCS | Mod: CPTII,S$GLB,, | Performed by: PHYSICIAN ASSISTANT

## 2022-06-10 PROCEDURE — 99999 PR PBB SHADOW E&M-EST. PATIENT-LVL III: CPT | Mod: PBBFAC,,, | Performed by: SURGERY

## 2022-06-10 PROCEDURE — 3044F HG A1C LEVEL LT 7.0%: CPT | Mod: CPTII,S$GLB,, | Performed by: PHYSICIAN ASSISTANT

## 2022-06-10 PROCEDURE — 3044F PR MOST RECENT HEMOGLOBIN A1C LEVEL <7.0%: ICD-10-PCS | Mod: CPTII,S$GLB,, | Performed by: PHYSICIAN ASSISTANT

## 2022-06-10 PROCEDURE — 3008F PR BODY MASS INDEX (BMI) DOCUMENTED: ICD-10-PCS | Mod: CPTII,S$GLB,, | Performed by: SURGERY

## 2022-06-10 PROCEDURE — 3079F PR MOST RECENT DIASTOLIC BLOOD PRESSURE 80-89 MM HG: ICD-10-PCS | Mod: CPTII,S$GLB,, | Performed by: PHYSICIAN ASSISTANT

## 2022-06-10 PROCEDURE — 3077F PR MOST RECENT SYSTOLIC BLOOD PRESSURE >= 140 MM HG: ICD-10-PCS | Mod: CPTII,S$GLB,, | Performed by: SURGERY

## 2022-06-10 PROCEDURE — 3080F PR MOST RECENT DIASTOLIC BLOOD PRESSURE >= 90 MM HG: ICD-10-PCS | Mod: CPTII,S$GLB,, | Performed by: SURGERY

## 2022-06-10 PROCEDURE — 1159F PR MEDICATION LIST DOCUMENTED IN MEDICAL RECORD: ICD-10-PCS | Mod: CPTII,S$GLB,, | Performed by: SURGERY

## 2022-06-10 PROCEDURE — 99213 PR OFFICE/OUTPT VISIT, EST, LEVL III, 20-29 MIN: ICD-10-PCS | Mod: S$GLB,,, | Performed by: SURGERY

## 2022-06-10 PROCEDURE — 3079F DIAST BP 80-89 MM HG: CPT | Mod: CPTII,S$GLB,, | Performed by: PHYSICIAN ASSISTANT

## 2022-06-10 PROCEDURE — 3044F HG A1C LEVEL LT 7.0%: CPT | Mod: CPTII,S$GLB,, | Performed by: SURGERY

## 2022-06-10 PROCEDURE — 1159F MED LIST DOCD IN RCRD: CPT | Mod: CPTII,S$GLB,, | Performed by: SURGERY

## 2022-06-10 PROCEDURE — 1159F PR MEDICATION LIST DOCUMENTED IN MEDICAL RECORD: ICD-10-PCS | Mod: CPTII,S$GLB,, | Performed by: PHYSICIAN ASSISTANT

## 2022-06-10 PROCEDURE — 99213 OFFICE O/P EST LOW 20 MIN: CPT | Mod: S$GLB,,, | Performed by: SURGERY

## 2022-06-10 PROCEDURE — 1160F PR REVIEW ALL MEDS BY PRESCRIBER/CLIN PHARMACIST DOCUMENTED: ICD-10-PCS | Mod: CPTII,S$GLB,, | Performed by: PHYSICIAN ASSISTANT

## 2022-06-10 PROCEDURE — 99212 OFFICE O/P EST SF 10 MIN: CPT | Mod: S$GLB,,, | Performed by: PHYSICIAN ASSISTANT

## 2022-06-10 PROCEDURE — 3008F PR BODY MASS INDEX (BMI) DOCUMENTED: ICD-10-PCS | Mod: CPTII,S$GLB,, | Performed by: PHYSICIAN ASSISTANT

## 2022-06-10 PROCEDURE — 3008F BODY MASS INDEX DOCD: CPT | Mod: CPTII,S$GLB,, | Performed by: PHYSICIAN ASSISTANT

## 2022-06-10 PROCEDURE — 1159F MED LIST DOCD IN RCRD: CPT | Mod: CPTII,S$GLB,, | Performed by: PHYSICIAN ASSISTANT

## 2022-06-10 PROCEDURE — 1160F RVW MEDS BY RX/DR IN RCRD: CPT | Mod: CPTII,S$GLB,, | Performed by: PHYSICIAN ASSISTANT

## 2022-06-10 PROCEDURE — 4010F ACE/ARB THERAPY RXD/TAKEN: CPT | Mod: CPTII,S$GLB,, | Performed by: PHYSICIAN ASSISTANT

## 2022-06-10 PROCEDURE — 3008F BODY MASS INDEX DOCD: CPT | Mod: CPTII,S$GLB,, | Performed by: SURGERY

## 2022-06-10 PROCEDURE — 99999 PR PBB SHADOW E&M-EST. PATIENT-LVL V: CPT | Mod: PBBFAC,,, | Performed by: PHYSICIAN ASSISTANT

## 2022-06-10 PROCEDURE — 99212 PR OFFICE/OUTPT VISIT, EST, LEVL II, 10-19 MIN: ICD-10-PCS | Mod: S$GLB,,, | Performed by: PHYSICIAN ASSISTANT

## 2022-06-10 PROCEDURE — 3074F SYST BP LT 130 MM HG: CPT | Mod: CPTII,S$GLB,, | Performed by: PHYSICIAN ASSISTANT

## 2022-06-10 PROCEDURE — 3077F SYST BP >= 140 MM HG: CPT | Mod: CPTII,S$GLB,, | Performed by: SURGERY

## 2022-06-10 PROCEDURE — 4010F ACE/ARB THERAPY RXD/TAKEN: CPT | Mod: CPTII,S$GLB,, | Performed by: SURGERY

## 2022-06-10 RX ORDER — GABAPENTIN 100 MG/1
100 CAPSULE ORAL NIGHTLY
Qty: 14 CAPSULE | Refills: 0 | Status: SHIPPED | OUTPATIENT
Start: 2022-06-10 | End: 2022-07-24 | Stop reason: SDUPTHER

## 2022-06-10 NOTE — TELEPHONE ENCOUNTER
----- Message from Jose Fox sent at 6/10/2022 12:37 PM CDT -----  Name of Who is Calling: DOM VARNER          What is the request in detail: The patient is calling to speak to the nurse in regards to questions/a problem the patient have. Please advise          Can the clinic reply by [facility]: No         What Number to Call Back if not in [place]: [phone]

## 2022-06-10 NOTE — PROGRESS NOTES
INTERNAL MEDICINE PROGRESS NOTE    CHIEF COMPLAINT     Chief Complaint   Patient presents with    Annual Exam       HPI     Brianna Cantu is a 61 y.o. female who presents for a follow-up visit today.    PCP is Sean Cruz MD, patient is known toe me.     She thought that she was being seen today for her annual exam - however she had an annual with her PCP two months ago- no need for repeat annual exam.     She is quite frustrated with her colon symptoms -she was seen by CRS today and was told that her symptoms are related to her history of pelvic radiation and that she was instructed to increase dietary fiber to help with stool consistency.     She also complaints of persistent nerve pain related to frequently outbreaks of shingles -she takes valtrex only when she feels an outbreak coming - usually rash in on her lower back. She is not sure how many times she has had a flare in the past 12 months.     Finally, she never received a call back from the [facility] program here in this office, despite order being placed 2 months ago. I will re-order and attempt to expedite both therapy apt and psych apt.     She is amenable to this plan.     Past Medical History:  Past Medical History:   Diagnosis Date    Allergic rhinitis 4/27/2014    Anxiety 4/27/2014    Hx of cervical cancer 4/27/2014    Obesity 4/27/2014    Vitamin D deficiency 4/27/2014       Home Medications:  Prior to Admission medications    Medication Sig Start Date End Date Taking? Authorizing Provider   albuterol (PROVENTIL/VENTOLIN HFA) 90 mcg/actuation inhaler Inhale 2 puffs into the lungs every 6 (six) hours as needed for Wheezing. 3/31/22 3/31/23 Yes Mary Ann Houston PA-C   amlodipine-valsartan (EXFORGE) 5-160 mg per tablet Take 1 tablet by mouth once daily. 3/31/22 3/31/23 Yes Mary Ann Houston PA-C   ergocalciferol (ERGOCALCIFEROL) 50,000 unit Cap Take 1 capsule (50,000 Units total) by mouth every 7 days. 5/15/22  Yes Sean HINES  MD Anthony   fluticasone propionate (FLONASE) 50 mcg/actuation nasal spray 1 spray (50 mcg total) by Each Nostril route daily as needed for Allergies. 4/5/21  Yes Lior Mckoy MD   ibuprofen (ADVIL,MOTRIN) 800 MG tablet Take 1 tablet (800 mg total) by mouth 2 (two) times daily as needed for Pain. 5/15/22  Yes Sean Cruz MD   nicotine (NICODERM CQ) 14 mg/24 hr Place 1 patch onto the skin once daily. 5/12/22  Yes Vanesa Harrell MD   nicotine polacrilex 2 MG Lozg Take 1 lozenge (2 mg total) by mouth as needed (Use in place of cigarettes). 5/12/22  Yes Vanesa Harrell MD   oxybutynin (DITROPAN-XL) 10 MG 24 hr tablet Take 1 tablet (10 mg total) by mouth once daily. 4/26/22 4/26/23 Yes Sean Cruz MD   pantoprazole (PROTONIX) 40 MG tablet TAKE 1 TABLET(40 MG) BY MOUTH EVERY MORNING 12/10/20  Yes Sean Cruz MD   valACYclovir (VALTREX) 1000 MG tablet Take 1 tablet (1,000 mg total) by mouth every 12 (twelve) hours. 5/15/22  Yes Sean Cruz MD   fluticasone propionate (FLONASE) 50 mcg/actuation nasal spray 1 spray (50 mcg total) by Each Nostril route once daily. 4/5/21   Lior Mckoy MD   fluticasone-salmeterol diskus inhaler 250-50 mcg Inhale 1 puff into the lungs 2 (two) times daily. 3/31/22 4/30/22  Mary Ann Houston PA-C   loratadine (CLARITIN) 10 mg tablet Take 10 mg by mouth daily as needed.    Historical Provider   mirabegron (MYRBETRIQ) 25 mg Tb24 ER tablet Take 1 tablet (25 mg total) by mouth once daily. 6/3/21 6/3/22  Abdon English MD   mv,Ca,min-folic acid-vit K1 400-20 mcg Tab Take by mouth once daily.    Historical Provider       Review of Systems:  Review of Systems    Health Maintainence:   Immunizations:  Health Maintenance       Date Due Completion Date    Pneumococcal Vaccines (Age 0-64) (1 - PCV) Never done ---    COVID-19 Vaccine (4 - Booster for Pfizer series) 03/09/2022 12/9/2021    Influenza Vaccine (Season Ended) 09/01/2022 10/10/2017     Lipid Panel 04/26/2023 4/26/2022    Mammogram 04/28/2023 4/28/2022    Colorectal Cancer Screening 09/24/2028 6/1/2021    TETANUS VACCINE 07/15/2029 7/15/2019           PHYSICAL EXAM     /82 (BP Location: Right arm, Patient Position: Sitting)   Pulse 84   Wt 102.5 kg (225 lb 15.5 oz)   SpO2 96%   BMI 36.47 kg/m²     Physical Exam    LABS     Lab Results   Component Value Date    LABA1C 5.7 (H) 08/25/2016    HGBA1C 5.4 04/26/2022     CMP  Sodium   Date Value Ref Range Status   04/26/2022 143 136 - 145 mmol/L Final     Potassium   Date Value Ref Range Status   04/26/2022 3.7 3.5 - 5.1 mmol/L Final     Chloride   Date Value Ref Range Status   04/26/2022 109 95 - 110 mmol/L Final     CO2   Date Value Ref Range Status   04/26/2022 23 23 - 29 mmol/L Final     Glucose   Date Value Ref Range Status   04/26/2022 86 70 - 110 mg/dL Final     BUN   Date Value Ref Range Status   04/26/2022 13 8 - 23 mg/dL Final     Creatinine   Date Value Ref Range Status   04/26/2022 1.1 0.5 - 1.4 mg/dL Final     Calcium   Date Value Ref Range Status   04/26/2022 9.3 8.7 - 10.5 mg/dL Final     Total Protein   Date Value Ref Range Status   04/26/2022 7.2 6.0 - 8.4 g/dL Final     Albumin   Date Value Ref Range Status   04/26/2022 3.7 3.5 - 5.2 g/dL Final     Total Bilirubin   Date Value Ref Range Status   04/26/2022 0.4 0.1 - 1.0 mg/dL Final     Comment:     For infants and newborns, interpretation of results should be based  on gestational age, weight and in agreement with clinical  observations.    Premature Infant recommended reference ranges:  Up to 24 hours.............<8.0 mg/dL  Up to 48 hours............<12.0 mg/dL  3-5 days..................<15.0 mg/dL  6-29 days.................<15.0 mg/dL       Alkaline Phosphatase   Date Value Ref Range Status   04/26/2022 66 55 - 135 U/L Final     AST   Date Value Ref Range Status   04/26/2022 20 10 - 40 U/L Final     ALT   Date Value Ref Range Status   04/26/2022 15 10 - 44 U/L Final      Anion Gap   Date Value Ref Range Status   04/26/2022 11 8 - 16 mmol/L Final     eGFR if    Date Value Ref Range Status   04/26/2022 >60 >60 mL/min/1.73 m^2 Final     eGFR if non    Date Value Ref Range Status   04/26/2022 54 (A) >60 mL/min/1.73 m^2 Final     Comment:     Calculation used to obtain the estimated glomerular filtration  rate (eGFR) is the CKD-EPI equation.        Lab Results   Component Value Date    WBC 9.93 04/26/2022    HGB 13.9 04/26/2022    HCT 42.0 04/26/2022    MCV 90 04/26/2022     04/26/2022     Lab Results   Component Value Date    CHOL 145 04/26/2022    CHOL 145 05/13/2021    CHOL 149 07/17/2020     Lab Results   Component Value Date    HDL 54 04/26/2022    HDL 51 05/13/2021    HDL 53 07/17/2020     Lab Results   Component Value Date    LDLCALC 72.8 04/26/2022    LDLCALC 80.8 05/13/2021    LDLCALC 78.2 07/17/2020     Lab Results   Component Value Date    TRIG 91 04/26/2022    TRIG 66 05/13/2021    TRIG 89 07/17/2020     Lab Results   Component Value Date    CHOLHDL 37.2 04/26/2022    CHOLHDL 35.2 05/13/2021    CHOLHDL 35.6 07/17/2020     Lab Results   Component Value Date    TSH 2.681 04/26/2022       ASSESSMENT/PLAN     Brianna Cantu is a 61 y.o. female     Brianna was seen today for follow-up.     Diagnoses and all orders for this visit:    Depression, unspecified depression type  -     Ambulatory referral/consult to Primary Care Behavioral Health (Non-Opioids); Future  -     Ambulatory referral/consult to Psychiatry; Future    Other orders  -     gabapentin (NEURONTIN) 100 MG capsule; Take 1 capsule (100 mg total) by mouth every evening. for 14 days      Mary Ann Houston PA-C   Department of Internal Medicine - [facility]   10:39 AM

## 2022-06-10 NOTE — TELEPHONE ENCOUNTER
----- Message from Jose Fox sent at 6/10/2022  9:32 AM CDT -----  Name of Who is Calling: DOM VARNER          What is the request in detail: The patient states she was suppose to have labs done before her appointment today and there are no lab orders in, the patient is at the lab now. Please advise          Can the clinic reply by [facility]: No         What [facility]: [phone]

## 2022-06-10 NOTE — TELEPHONE ENCOUNTER
Returned pt's call.  Informed after chart review that it seems all her blood work is up to date from her April 2022 visit. SANJEEV Radford can review with her and will let her know if anything is needed. Pt appreciates the info and states the person who scheduled her informed her of needed blood work, but she felt - even then, that this seemed incorrect.

## 2022-06-10 NOTE — TELEPHONE ENCOUNTER
LVM for patient to return call to office. Please see message below for regards. Thanks.     Sent portal message.

## 2022-06-10 NOTE — PROGRESS NOTES
CRS Office Visit History and Physical    Referring Md:   No referring provider defined for this encounter.    SUBJECTIVE:     Chief Complaint: diarrhea    History of Present Illness:  The patient is an established patient to this practice.   Course is as follows:  Brianna Cantu is a 61 y.o. female with history of cervical cancer and pelvic radiation who presents for evaluation of diarrhea and fecal incontinence.  She has previously been seen by Dr. Washington and Dr. Izaguirre.  She was found to have a proximal rectal stricture on flexible sigmoidoscopy and lead piping of her rectum on BE.  She was having weekly episodes of fecal incontinence associated with loose stools.  She reports that her diarrhea has been worsening since her visit with Dr. Izaguirre.  She reports early morning episodes of diarrhea that persist throughout the morning.  She takes benefiber every morning with some relief throughout the day.  Has tried imodium without success.  No blood per rectum.  No rectal pain. Has had a recent cologuard test for colon cancer screening.       Review of patient's allergies indicates:  No Known Allergies    Past Medical History:   Diagnosis Date    Allergic rhinitis 4/27/2014    Anxiety 4/27/2014    Hx of cervical cancer 4/27/2014    Obesity 4/27/2014    Vitamin D deficiency 4/27/2014     Past Surgical History:   Procedure Laterality Date    COLONOSCOPY      COLONOSCOPY N/A 9/24/2018    Procedure: COLONOSCOPY;  Surgeon: Montrell Gan MD;  Location: [address]);  Service: Endoscopy;  Laterality: N/A;    ESOPHAGOGASTRODUODENOSCOPY      ESOPHAGOGASTRODUODENOSCOPY N/A 9/24/2018    Procedure: EGD (ESOPHAGOGASTRODUODENOSCOPY);  Surgeon: Montrell Gan MD;  Location: [address]);  Service: Endoscopy;  Laterality: N/A;    TOTAL ABDOMINAL HYSTERECTOMY       Family History   Problem Relation Age of Onset    Hypertension Mother     Hypertension Sister     Heart disease Neg Hx      Social  "History     Tobacco Use    Smoking status: Current Every Day Smoker     Packs/day: 0.50     Types: Cigarettes    Smokeless tobacco: Never Used   Substance Use Topics    Alcohol use: Yes     Alcohol/week: 0.0 standard drinks     Comment: occasional    Drug use: No        Review of Systems:  Review of Systems   Constitutional: Negative.    HENT: Negative.    Eyes: Negative.    Respiratory: Negative.    Cardiovascular: Negative.    Gastrointestinal:        See HPI   Genitourinary: Positive for frequency.   Musculoskeletal: Positive for back pain.   Skin: Negative.    Neurological: Negative.    Psychiatric/Behavioral: Positive for memory loss.       OBJECTIVE:     Vital Signs (Most Recent)  BP (!) 152/91 (BP Location: Left arm, Patient Position: Sitting, BP Method: Large (Automatic))   Pulse 94   Ht 5' 6" (1.676 m)   Wt 106.2 kg (234 lb 2.1 oz)   BMI 37.79 kg/m²     Physical Exam:  General: 61 y.o. female in no distress   Neuro: alert and oriented x 4.  Moves all extremities.     HEENT: normocephalic, atraumatic, PERRL, EOMI   Respiratory: respirations are even and unlabored  Cardiac: regular rate and rhythm  Abdomen: soft, NTND  Extremities: Warm dry and intact  Skin: no rashes  Anorectal: no external masses or lesions, digital rectal exam with good tone and squeeze, no blood, masses or pain    Labs: NA    Imaging: NA      ASSESSMENT/PLAN:     There are no diagnoses linked to this encounter.    61 y.o. female with diarrhea and fecal incontinence.     - Incontinence episodes are related to loose stools.  This is worse in the mornings.  Patient appears to respond well to fiber.   - Recommend increasing fiber to twice daily and taking a dose at night to see if that helps with her morning diarrhea.   - follow up in 1 month if no improvement.     Lorie Ochoa MD  Staff Surgeon  Colon & Rectal Surgery    "

## 2022-06-18 DIAGNOSIS — B00.9 HSV (HERPES SIMPLEX VIRUS) INFECTION: ICD-10-CM

## 2022-06-18 RX ORDER — VALACYCLOVIR HYDROCHLORIDE 1 G/1
TABLET, FILM COATED ORAL
Qty: 60 TABLET | Refills: 11 | Status: SHIPPED | OUTPATIENT
Start: 2022-06-18 | End: 2022-08-15

## 2022-06-18 NOTE — TELEPHONE ENCOUNTER
Refill Authorization Note   Brianna Gascashua  is requesting a refill authorization.  Brief Assessment and Rationale for Refill:  Approve     Medication Therapy Plan:       Medication Reconciliation Completed: No   Comments:     No Care Gaps recommended.     Note composed:3:57 PM 06/18/2022

## 2022-06-18 NOTE — TELEPHONE ENCOUNTER
No new care gaps identified.  [facility] Embedded Care Gaps. Reference number: [id number]. 6/18/2022   5:55:28 AM DEBBIET

## 2022-06-20 ENCOUNTER — TELEPHONE (OUTPATIENT)
Dept: INTERNAL MEDICINE | Facility: CLINIC | Age: 62
End: 2022-06-20
Payer: COMMERCIAL

## 2022-06-20 NOTE — TELEPHONE ENCOUNTER
Spoke to patient and she states that she will like to start the process for disability. She was scheduled for upcoming appointment on 6/24/22 with PCP for questions and concerns for starting this process. Thanks.

## 2022-06-20 NOTE — TELEPHONE ENCOUNTER
----- Message from Ruel Vinson sent at 6/20/2022  8:23 AM CDT -----  Who called?:PT      What is the request in detail:PT would like to speak with staff about starting disability. Please advise         Can the clinic reply by [facility]?:No        Best Call Back Number: [phone]

## 2022-06-23 ENCOUNTER — TELEPHONE (OUTPATIENT)
Dept: SMOKING CESSATION | Facility: CLINIC | Age: 62
End: 2022-06-23
Payer: COMMERCIAL

## 2022-06-24 ENCOUNTER — LAB VISIT (OUTPATIENT)
Dept: LAB | Facility: OTHER | Age: 62
End: 2022-06-24
Attending: INTERNAL MEDICINE
Payer: COMMERCIAL

## 2022-06-24 ENCOUNTER — OFFICE VISIT (OUTPATIENT)
Dept: INTERNAL MEDICINE | Facility: CLINIC | Age: 62
End: 2022-06-24
Attending: INTERNAL MEDICINE
Payer: COMMERCIAL

## 2022-06-24 VITALS
BODY MASS INDEX: 37.14 KG/M2 | HEIGHT: 66 IN | OXYGEN SATURATION: 98 % | SYSTOLIC BLOOD PRESSURE: 132 MMHG | WEIGHT: 231.06 LBS | HEART RATE: 91 BPM | DIASTOLIC BLOOD PRESSURE: 86 MMHG

## 2022-06-24 DIAGNOSIS — I10 BENIGN ESSENTIAL HYPERTENSION: ICD-10-CM

## 2022-06-24 DIAGNOSIS — F43.23 ADJUSTMENT REACTION WITH ANXIETY AND DEPRESSION: ICD-10-CM

## 2022-06-24 DIAGNOSIS — R21 SKIN RASH: ICD-10-CM

## 2022-06-24 DIAGNOSIS — R41.3 MEMORY LOSS: ICD-10-CM

## 2022-06-24 DIAGNOSIS — R20.0 BILATERAL LEG NUMBNESS: ICD-10-CM

## 2022-06-24 DIAGNOSIS — F32.A DEPRESSION, UNSPECIFIED DEPRESSION TYPE: ICD-10-CM

## 2022-06-24 DIAGNOSIS — R41.3 MEMORY LOSS: Primary | ICD-10-CM

## 2022-06-24 LAB
25(OH)D3+25(OH)D2 SERPL-MCNC: 25 NG/ML (ref 30–96)
CRP SERPL-MCNC: 5.9 MG/L (ref 0–8.2)
ERYTHROCYTE [SEDIMENTATION RATE] IN BLOOD: 22 MM/HR (ref 0–20)
FOLATE SERPL-MCNC: 12.4 NG/ML (ref 4–24)
MAGNESIUM SERPL-MCNC: 2.2 MG/DL (ref 1.6–2.6)
VIT B12 SERPL-MCNC: 717 PG/ML (ref 210–950)

## 2022-06-24 PROCEDURE — 1160F PR REVIEW ALL MEDS BY PRESCRIBER/CLIN PHARMACIST DOCUMENTED: ICD-10-PCS | Mod: CPTII,S$GLB,, | Performed by: INTERNAL MEDICINE

## 2022-06-24 PROCEDURE — 1159F PR MEDICATION LIST DOCUMENTED IN MEDICAL RECORD: ICD-10-PCS | Mod: CPTII,S$GLB,, | Performed by: INTERNAL MEDICINE

## 2022-06-24 PROCEDURE — 3075F PR MOST RECENT SYSTOLIC BLOOD PRESS GE 130-139MM HG: ICD-10-PCS | Mod: CPTII,S$GLB,, | Performed by: INTERNAL MEDICINE

## 2022-06-24 PROCEDURE — 99214 PR OFFICE/OUTPT VISIT, EST, LEVL IV, 30-39 MIN: ICD-10-PCS | Mod: S$GLB,,, | Performed by: INTERNAL MEDICINE

## 2022-06-24 PROCEDURE — 82607 VITAMIN B-12: CPT | Performed by: INTERNAL MEDICINE

## 2022-06-24 PROCEDURE — 99999 PR PBB SHADOW E&M-EST. PATIENT-LVL V: CPT | Mod: PBBFAC,,, | Performed by: INTERNAL MEDICINE

## 2022-06-24 PROCEDURE — 99214 OFFICE O/P EST MOD 30 MIN: CPT | Mod: S$GLB,,, | Performed by: INTERNAL MEDICINE

## 2022-06-24 PROCEDURE — 3079F PR MOST RECENT DIASTOLIC BLOOD PRESSURE 80-89 MM HG: ICD-10-PCS | Mod: CPTII,S$GLB,, | Performed by: INTERNAL MEDICINE

## 2022-06-24 PROCEDURE — 83735 ASSAY OF MAGNESIUM: CPT | Performed by: INTERNAL MEDICINE

## 2022-06-24 PROCEDURE — 1160F RVW MEDS BY RX/DR IN RCRD: CPT | Mod: CPTII,S$GLB,, | Performed by: INTERNAL MEDICINE

## 2022-06-24 PROCEDURE — 82306 VITAMIN D 25 HYDROXY: CPT | Performed by: INTERNAL MEDICINE

## 2022-06-24 PROCEDURE — 86140 C-REACTIVE PROTEIN: CPT | Performed by: INTERNAL MEDICINE

## 2022-06-24 PROCEDURE — 85651 RBC SED RATE NONAUTOMATED: CPT | Performed by: INTERNAL MEDICINE

## 2022-06-24 PROCEDURE — 36415 COLL VENOUS BLD VENIPUNCTURE: CPT | Performed by: INTERNAL MEDICINE

## 2022-06-24 PROCEDURE — 4010F ACE/ARB THERAPY RXD/TAKEN: CPT | Mod: CPTII,S$GLB,, | Performed by: INTERNAL MEDICINE

## 2022-06-24 PROCEDURE — 99999 PR PBB SHADOW E&M-EST. PATIENT-LVL V: ICD-10-PCS | Mod: PBBFAC,,, | Performed by: INTERNAL MEDICINE

## 2022-06-24 PROCEDURE — 3008F BODY MASS INDEX DOCD: CPT | Mod: CPTII,S$GLB,, | Performed by: INTERNAL MEDICINE

## 2022-06-24 PROCEDURE — 3044F HG A1C LEVEL LT 7.0%: CPT | Mod: CPTII,S$GLB,, | Performed by: INTERNAL MEDICINE

## 2022-06-24 PROCEDURE — 3075F SYST BP GE 130 - 139MM HG: CPT | Mod: CPTII,S$GLB,, | Performed by: INTERNAL MEDICINE

## 2022-06-24 PROCEDURE — 4010F PR ACE/ARB THEARPY RXD/TAKEN: ICD-10-PCS | Mod: CPTII,S$GLB,, | Performed by: INTERNAL MEDICINE

## 2022-06-24 PROCEDURE — 3008F PR BODY MASS INDEX (BMI) DOCUMENTED: ICD-10-PCS | Mod: CPTII,S$GLB,, | Performed by: INTERNAL MEDICINE

## 2022-06-24 PROCEDURE — 86038 ANTINUCLEAR ANTIBODIES: CPT | Performed by: INTERNAL MEDICINE

## 2022-06-24 PROCEDURE — 82746 ASSAY OF FOLIC ACID SERUM: CPT | Performed by: INTERNAL MEDICINE

## 2022-06-24 PROCEDURE — 3044F PR MOST RECENT HEMOGLOBIN A1C LEVEL <7.0%: ICD-10-PCS | Mod: CPTII,S$GLB,, | Performed by: INTERNAL MEDICINE

## 2022-06-24 PROCEDURE — 1159F MED LIST DOCD IN RCRD: CPT | Mod: CPTII,S$GLB,, | Performed by: INTERNAL MEDICINE

## 2022-06-24 PROCEDURE — 3079F DIAST BP 80-89 MM HG: CPT | Mod: CPTII,S$GLB,, | Performed by: INTERNAL MEDICINE

## 2022-06-24 RX ORDER — METHOCARBAMOL 500 MG/1
TABLET, FILM COATED ORAL
COMMUNITY
Start: 2022-04-21 | End: 2022-08-18

## 2022-06-24 RX ORDER — LORATADINE 10 MG/1
10 TABLET ORAL
COMMUNITY

## 2022-06-24 NOTE — PROGRESS NOTES
Subjective:       Patient ID: Brianna Cantu is a 61 y.o. female.    Chief Complaint: Hypertension (FMLA questions)    Here for urgent visit  62 yo F with PMHx of anxiety, asthma, allergic rhinitis, vit d deficiency         Can't cope. Little things aggravate her. Forgetful. Birthdays. Dates. Names. itred of being told she should be able to do something. She is scared to leave the house due to fecal urgency and incontinence.         Continues to have fecal incontinence. This results in her being late for work often. She is requesting [facility] paperwork. Partial improvement with benefiber but stopped b/c her boss told her fiber causes diarrhea. She has fecal incontinance 3 times a week. She suffers from stress and urge urinary incontinence daily.    Seen by Dr Washington in 2018 for proximal rectal stricture, not able to be traversed with endoscope. She has a history of cervical cancer at the age of 33.  She underwent in 1993 total abdominal hysterectomy, external beam radiation therapy as well as catheter interstitial radiation therapy.     Using albuterol more often as of late. No overt attacks. Not taking maintenance. Will re-fill.     Self d/c'd HCTZ. No particular reason.       She reports crawling sensation of legs at night, numbness    Low back, shoulder, neck and shoulders, HA, muscle spasms in her calves, and can not move her legs.         Review of Systems   Constitutional: Positive for activity change and fatigue. Negative for chills, fever and unexpected weight change.   HENT: Negative for ear pain, hearing loss, postnasal drip, tinnitus, trouble swallowing and voice change.    Respiratory: Negative for cough, chest tightness, shortness of breath and wheezing.    Cardiovascular: Negative for chest pain, palpitations and leg swelling.   Gastrointestinal: Negative for abdominal pain, blood in stool, diarrhea, nausea and vomiting.   Endocrine: Negative for polydipsia, polyphagia and polyuria.   Genitourinary:  "Negative for difficulty urinating and dysuria.   Skin: Positive for rash.   Allergic/Immunologic: Negative for food allergies.   Neurological: Positive for tremors, weakness and numbness. Negative for dizziness and headaches.   Hematological: Does not bruise/bleed easily.   Psychiatric/Behavioral: Positive for agitation, behavioral problems, confusion, decreased concentration, dysphoric mood and sleep disturbance. The patient is nervous/anxious.        Objective:      Vitals:    06/24/22 1117   BP: 132/86   Pulse: 91   SpO2: 98%   Weight: 104.8 kg (231 lb 0.7 oz)   Height: 5' 6" (1.676 m)      Physical Exam  Constitutional:       General: She is not in acute distress.     Appearance: Normal appearance. She is well-developed. She is not diaphoretic.   HENT:      Head: Normocephalic and atraumatic.   Eyes:      General: No scleral icterus.        Right eye: No discharge.         Left eye: No discharge.      Conjunctiva/sclera: Conjunctivae normal.   Pulmonary:      Effort: Pulmonary effort is normal. No respiratory distress.   Abdominal:      General: There is no distension.   Skin:     General: Skin is warm and dry.   Neurological:      Mental Status: She is alert and oriented to person, place, and time.   Psychiatric:         Speech: Speech normal.         Assessment:       1. Memory loss    2. Benign essential hypertension    3. Depression, unspecified depression type    4. Skin rash    5. Bilateral leg numbness    6. Adjustment reaction with anxiety and depression        Plan:       Brianna was seen today for hypertension.    Diagnoses and all orders for this visit:    Memory loss  -     Ambulatory referral/consult to Adult Neuropsychology; Future  -     Ambulatory referral/consult to Psychology; Future  -     Vitamin B12; Future  -     Folate; Future  -     SAROJ Screen w/Reflex; Future  -     Magnesium; Future  -     Vitamin D; Future  -     Ambulatory referral/consult to Adult Neuropsychology; Future  -     " Sedimentation rate; Future  -     C-reactive protein; Future    Benign essential hypertension   Controlled and asymptomatic.  Continue current Rx regimen.    Adjustment reaction with Anxiety and Depression    Skin rash  -     Ambulatory referral/consult to Dermatology; Future    Bilateral leg numbness  -     EMG W/ ULTRASOUND AND NERVE CONDUCTION TEST 2 Extremities; Future         RTC in 6 months or sooner malena Darby MD  Internal Medicine-[facility]        Side effects of medication(s) were discussed in detail and patient voiced understanding.  Patient will call back for any issues or complications.

## 2022-06-27 LAB — ANA SER QL IF: NORMAL

## 2022-06-28 ENCOUNTER — TELEPHONE (OUTPATIENT)
Dept: INTERNAL MEDICINE | Facility: CLINIC | Age: 62
End: 2022-06-28
Payer: COMMERCIAL

## 2022-06-28 ENCOUNTER — TELEPHONE (OUTPATIENT)
Dept: ALLERGY | Facility: CLINIC | Age: 62
End: 2022-06-28
Payer: COMMERCIAL

## 2022-06-28 NOTE — TELEPHONE ENCOUNTER
----- Message from Libzeth Fraire sent at 6/28/2022 11:49 AM CDT -----  Contact: [phone]  Patient is returning a phone call.  Who left a message for the patient: Lori Richardson LPN     Does patient know what this is regarding:  yes   Would you like a call back, or a response through your [facility] portal?:   call back   Comments:

## 2022-06-28 NOTE — PROGRESS NOTES
Labs overall look good. B12 levels are normal. Acute markers of inflammation are normal. Initial auto-immune disease screening is normal. Magnesium is normal.    Your vitamin D levels are low.  I have sent a prescription to your pharmacy for vitamin D 50,000 units once a week.

## 2022-07-01 ENCOUNTER — LAB VISIT (OUTPATIENT)
Dept: LAB | Facility: HOSPITAL | Age: 62
End: 2022-07-01
Payer: COMMERCIAL

## 2022-07-01 ENCOUNTER — OFFICE VISIT (OUTPATIENT)
Dept: ALLERGY | Facility: CLINIC | Age: 62
End: 2022-07-01
Payer: OTHER GOVERNMENT

## 2022-07-01 VITALS
WEIGHT: 233.94 LBS | HEART RATE: 78 BPM | BODY MASS INDEX: 37.75 KG/M2 | DIASTOLIC BLOOD PRESSURE: 86 MMHG | SYSTOLIC BLOOD PRESSURE: 140 MMHG | OXYGEN SATURATION: 98 %

## 2022-07-01 DIAGNOSIS — F17.200 TOBACCO USE DISORDER: ICD-10-CM

## 2022-07-01 DIAGNOSIS — R05.9 COUGH: ICD-10-CM

## 2022-07-01 DIAGNOSIS — J31.0 CHRONIC RHINITIS: ICD-10-CM

## 2022-07-01 DIAGNOSIS — R07.9 CHEST PAIN, UNSPECIFIED TYPE: ICD-10-CM

## 2022-07-01 DIAGNOSIS — K13.70 MOUTH PROBLEM: ICD-10-CM

## 2022-07-01 DIAGNOSIS — R25.2 MUSCLE CRAMPING: ICD-10-CM

## 2022-07-01 DIAGNOSIS — R06.2 WHEEZING: ICD-10-CM

## 2022-07-01 DIAGNOSIS — R21 RASH: ICD-10-CM

## 2022-07-01 DIAGNOSIS — R68.89 THROAT SYMPTOM: Primary | ICD-10-CM

## 2022-07-01 LAB
IGE SERPL-ACNC: <35 IU/ML (ref 0–100)
MAGNESIUM SERPL-MCNC: 2.2 MG/DL (ref 1.6–2.6)

## 2022-07-01 PROCEDURE — 99205 OFFICE O/P NEW HI 60 MIN: CPT | Mod: S$PBB,ICN,, | Performed by: STUDENT IN AN ORGANIZED HEALTH CARE EDUCATION/TRAINING PROGRAM

## 2022-07-01 PROCEDURE — 99999 PR PBB SHADOW E&M-EST. PATIENT-LVL IV: ICD-10-PCS | Mod: PBBFAC,,, | Performed by: STUDENT IN AN ORGANIZED HEALTH CARE EDUCATION/TRAINING PROGRAM

## 2022-07-01 PROCEDURE — 99214 OFFICE O/P EST MOD 30 MIN: CPT | Mod: PBBFAC,PO | Performed by: STUDENT IN AN ORGANIZED HEALTH CARE EDUCATION/TRAINING PROGRAM

## 2022-07-01 PROCEDURE — 36415 COLL VENOUS BLD VENIPUNCTURE: CPT | Mod: PN | Performed by: STUDENT IN AN ORGANIZED HEALTH CARE EDUCATION/TRAINING PROGRAM

## 2022-07-01 PROCEDURE — 86003 ALLG SPEC IGE CRUDE XTRC EA: CPT | Performed by: STUDENT IN AN ORGANIZED HEALTH CARE EDUCATION/TRAINING PROGRAM

## 2022-07-01 PROCEDURE — 82785 ASSAY OF IGE: CPT | Performed by: STUDENT IN AN ORGANIZED HEALTH CARE EDUCATION/TRAINING PROGRAM

## 2022-07-01 PROCEDURE — 86003 ALLG SPEC IGE CRUDE XTRC EA: CPT | Mod: 59 | Performed by: STUDENT IN AN ORGANIZED HEALTH CARE EDUCATION/TRAINING PROGRAM

## 2022-07-01 PROCEDURE — 99999 PR PBB SHADOW E&M-EST. PATIENT-LVL IV: CPT | Mod: PBBFAC,,, | Performed by: STUDENT IN AN ORGANIZED HEALTH CARE EDUCATION/TRAINING PROGRAM

## 2022-07-01 PROCEDURE — 99205 PR OFFICE/OUTPT VISIT, NEW, LEVL V, 60-74 MIN: ICD-10-PCS | Mod: S$PBB,ICN,, | Performed by: STUDENT IN AN ORGANIZED HEALTH CARE EDUCATION/TRAINING PROGRAM

## 2022-07-01 PROCEDURE — 99417 PR PROLONGED SVC, OUTPT, W/WO DIRECT PT CONTACT,  EA ADDTL 15 MIN: ICD-10-PCS | Mod: S$PBB,ICN,, | Performed by: STUDENT IN AN ORGANIZED HEALTH CARE EDUCATION/TRAINING PROGRAM

## 2022-07-01 PROCEDURE — 83735 ASSAY OF MAGNESIUM: CPT | Performed by: STUDENT IN AN ORGANIZED HEALTH CARE EDUCATION/TRAINING PROGRAM

## 2022-07-01 PROCEDURE — 99417 PROLNG OP E/M EACH 15 MIN: CPT | Mod: S$PBB,ICN,, | Performed by: STUDENT IN AN ORGANIZED HEALTH CARE EDUCATION/TRAINING PROGRAM

## 2022-07-01 RX ORDER — PANTOPRAZOLE SODIUM 40 MG/1
40 TABLET, DELAYED RELEASE ORAL 2 TIMES DAILY
Qty: 180 TABLET | Refills: 3 | Status: SHIPPED | OUTPATIENT
Start: 2022-07-01 | End: 2022-08-18

## 2022-07-01 RX ORDER — AZELASTINE 1 MG/ML
2 SPRAY, METERED NASAL 2 TIMES DAILY PRN
Qty: 30 ML | Refills: 11 | Status: SHIPPED | OUTPATIENT
Start: 2022-07-01 | End: 2023-08-24

## 2022-07-01 RX ORDER — ALBUTEROL SULFATE 90 UG/1
2 AEROSOL, METERED RESPIRATORY (INHALATION) DAILY PRN
COMMUNITY
End: 2022-07-01

## 2022-07-01 RX ORDER — ERGOCALCIFEROL 1.25 MG/1
1 CAPSULE ORAL
COMMUNITY
Start: 2022-05-15 | End: 2022-07-01

## 2022-07-01 RX ORDER — ALBUTEROL SULFATE 90 UG/1
2 AEROSOL, METERED RESPIRATORY (INHALATION)
COMMUNITY
End: 2022-07-01

## 2022-07-01 NOTE — PROGRESS NOTES
Allergy Clinic Note  [facility]    This note was created by combination of typed  and M-Modal dictation. Transcription errors may be present.  If there are any questions, please contact me.    Subjective:      Patient ID: Brianna Cantu is a 61 y.o. female.    Chief Complaint: Rash, Itching, Allergic Reaction, and Urticaria      Referring Provider: Self, Aaareferral    History of Present Illness: Brianna Cantu is a 61 y.o. female presents complaining of chronic rhinitis and recent rashes.  She is here alone, and she is a fair historian.    Related medications and other interventions  Advair 250/50 once daily   Albuterol MDI - twice a week  Tums  Xyzal  Flonase 2 SEN daily as needed  -- at least 2x/wk  Benefiber    07/01/2022:  Patient's chief complaint  Is coughing and wheezing.  She also describes a rattling her chest that she can both hear and feel.  This is worse when supine. She is not sure that he she has been diagnosed with asthma but has been given both Advair and an albuterol inhaler.  She is using the Advair once daily.  She is using the albuterol about twice a month.    Client also complains of rhinitis and throat symptoms.  Her main complaints are postnasal drip and throat clearing.  Additional symptoms include nasal congestion, runny nose, sneezing, itchy eyes, scratchy throat, rare dysphagia, and rare hoarseness.      Patient has a history of GERD.  She says this is manifest by a burning sensation in her lower chest.  She admits occasional reflux of material into her mouth.    She says it is better with loosening her bra.  She was taking pantoprazole in the past but is not currently.  Patient's other complaint is of 2 recent rashes.  The 1st consist of tiny bumps under her breasts and on her buttocks.  This was itchy and lasted a couple days.  The 2nd was on her face.  She does not have any photos and rash is not present today.    Additional History:   Past medical  history is significant for smoking, anxiety and a remote history of cervical cancer.  No Hx of ENT surgery. and remote history  Family history is significant for allergies in mother and 2 sisters plus a history of asthma and 1 sibling.  Client  reports that she has been smoking cigarettes. She has been smoking about 0.50 packs per day. She has never used smokeless tobacco.  Exposures are notable for chemicals.  No regular exposure to passive smoke, pets, or mold.       Patient Active Problem List   Diagnosis    Allergic rhinitis    Anxiety    Obesity    Vitamin D deficiency    Hx of cervical cancer    Laryngopharyngeal reflux    Bilateral leg numbness    Bronchitis    Constipation    Benign essential hypertension    Other depressive disorder    Tobacco use disorder    Mild intermittent asthma without complication    Stenosis colon    Light cigarette smoker (1-9 cigs/day)    Leg edema    Diarrhea     Medication List with Changes/Refills   New Medications    AZELASTINE (ASTELIN) 137 MCG (0.1 %) NASAL SPRAY    2 sprays (274 mcg total) by Nasal route 2 (two) times daily as needed for Rhinitis. Donot snort (because it tastes bad)       Start Date: 7/1/2022  End Date: 7/1/2023   Current Medications    ALBUTEROL (PROVENTIL/VENTOLIN HFA) 90 MCG/ACTUATION INHALER    Inhale 2 puffs into the lungs every 6 (six) hours as needed for Wheezing.       Start Date: 3/31/2022 End Date: 3/31/2023    AMLODIPINE-VALSARTAN (EXFORGE) 5-160 MG PER TABLET    Take 1 tablet by mouth once daily.       Start Date: 3/31/2022 End Date: 3/31/2023    CHOLECALCIFEROL, VITAMIN D3, 1,250 MCG (50,000 UNIT) CAPSULE    Take 1 capsule (50,000 Units total) by mouth once a week.       Start Date: 6/28/2022 End Date: --    FLUTICASONE PROPIONATE (FLONASE) 50 MCG/ACTUATION NASAL SPRAY    1 spray (50 mcg total) by Each Nostril route once daily.       Start Date: 4/5/2021  End Date: --    FLUTICASONE PROPIONATE (FLONASE) 50 MCG/ACTUATION NASAL  SPRAY    1 spray (50 mcg total) by Each Nostril route daily as needed for Allergies.       Start Date: 4/5/2021  End Date: --    FLUTICASONE-SALMETEROL DISKUS INHALER 250-50 MCG    Inhale 1 puff into the lungs 2 (two) times daily.       Start Date: 3/31/2022 End Date: 7/1/2022    GABAPENTIN (NEURONTIN) 100 MG CAPSULE    Take 1 capsule (100 mg total) by mouth every evening. for 14 days       Start Date: 6/10/2022 End Date: 7/1/2022    IBUPROFEN (ADVIL,MOTRIN) 800 MG TABLET    Take 1 tablet (800 mg total) by mouth 2 (two) times daily as needed for Pain.       Start Date: 5/15/2022 End Date: --    LORATADINE (CLARITIN) 10 MG TABLET    Take 10 mg by mouth.       Start Date: --        End Date: --    METHOCARBAMOL (ROBAXIN) 500 MG TAB           Start Date: 4/21/2022 End Date: --    OXYBUTYNIN (DITROPAN-XL) 10 MG 24 HR TABLET    Take 1 tablet (10 mg total) by mouth once daily.       Start Date: 4/26/2022 End Date: 4/26/2023    VALACYCLOVIR (VALTREX) 1000 MG TABLET    TAKE 1 TABLET(1000 MG) BY MOUTH EVERY 12 HOURS       Start Date: 6/18/2022 End Date: --   Changed and/or Refilled Medications    Modified Medication Previous Medication    PANTOPRAZOLE (PROTONIX) 40 MG TABLET pantoprazole (PROTONIX) 40 MG tablet       Take 1 tablet (40 mg total) by mouth 2 (two) times daily.    TAKE 1 TABLET(40 MG) BY MOUTH EVERY MORNING       Start Date: 7/1/2022  End Date: --    Start Date: 12/10/2020End Date: 7/1/2022   Discontinued Medications    ALBUTEROL (PROVENTIL/VENTOLIN HFA) 90 MCG/ACTUATION INHALER    Inhale 2 puffs into the lungs daily as needed.       Start Date: --        End Date: 7/1/2022    ALBUTEROL (PROVENTIL/VENTOLIN HFA) 90 MCG/ACTUATION INHALER    Inhale 2 puffs into the lungs.       Start Date: --        End Date: 7/1/2022    ERGOCALCIFEROL (ERGOCALCIFEROL) 50,000 UNIT CAP    Take 1 capsule (50,000 Units total) by mouth every 7 days.       Start Date: 5/15/2022 End Date: 7/1/2022    ERGOCALCIFEROL (ERGOCALCIFEROL)  50,000 UNIT CAP    Take 1 capsule by mouth every 7 days.       Start Date: 5/15/2022 End Date: 7/1/2022    NICOTINE (NICODERM CQ) 14 MG/24 HR    Place 1 patch onto the skin once daily.       Start Date: 5/12/2022 End Date: 7/1/2022    NICOTINE POLACRILEX 2 MG LOZG    Take 1 lozenge (2 mg total) by mouth as needed (Use in place of cigarettes).       Start Date: 5/12/2022 End Date: 7/1/2022         Review of Systems   Constitutional: Negative for chills and fever.   HENT: Negative for ear discharge and nosebleeds.         Postnasal drip sensation, scratchy throat, occasional hoarseness   Eyes: Negative for discharge and redness.   Respiratory: Positive for cough and wheezing. Negative for hemoptysis, sputum production and stridor.    Cardiovascular: Negative for palpitations.   Gastrointestinal: Positive for heartburn. Negative for blood in stool, melena and vomiting.        Occasional dysphagia   Genitourinary: Negative for flank pain and hematuria.   Musculoskeletal:        Muscle cramping   Skin: Positive for rash. Negative for itching.   Neurological: Negative for seizures and loss of consciousness.       Objective:   BP (!) 140/86 (BP Location: Left arm, Patient Position: Sitting, BP Method: Large (Automatic))   Pulse 78   Wt 106.1 kg (233 lb 14.5 oz)   SpO2 98%   BMI 37.75 kg/m²       Physical Exam  HENT:      Head: Normocephalic and atraumatic.      Comments: Nares are pink with severe t turbinate swelling.  Oropharynx shows brown-black areas on her soft palate bilaterally.  Otherwise oropharynx is benign without exudate Tongue is not coated.       Nose: Nose normal.   Eyes:      General:         Right eye: No discharge.         Left eye: No discharge.      Conjunctiva/sclera: Conjunctivae normal.   Cardiovascular:      Rate and Rhythm: Normal rate and regular rhythm.      Pulses: Normal pulses.      Heart sounds: Normal heart sounds.   Pulmonary:      Effort: Pulmonary effort is normal. No respiratory  distress.      Breath sounds: No stridor. Wheezing (Inspiratory and expiratory wheezing in all posterior lung fields.  No wheezing over trachea.) present. No rales.   Abdominal:      General: There is no distension.   Musculoskeletal:         General: Deformity (Some fingers hyperextended) present. No signs of injury.      Cervical back: Neck supple.   Skin:     Findings: No erythema or rash.      Comments: No rash appreciated   Neurological:      General: No focal deficit present.      Mental Status: She is alert.   Psychiatric:         Mood and Affect: Affect normal.         Behavior: Behavior normal.         Data:   Eosinophil count 200 on CBC of 4/26/2022    Assessment:     1. Throat symptom    2. Mouth problem:  dark areas on soft palate    3. Tobacco use disorder    4. Wheezing    5. Chronic rhinitis    6. Cough    7. Chest pain, unspecified type    8. Muscle cramping    9. Rash        Plan:     Medical decision making:  This smoker is presenting with active wheezing.  It is not clear whether she is getting any benefit from her Advair.  I recommend pre and post bronchodilator PFT's as soon as possible.  I also instructed her to bring her disc in inhalers with her to her next visit.  Patient is comfortable and does not require systemic steroids or a higher level of care.      Patient's throat symptoms may be related to laryngeal pharyngeal reflux.  She appears to have rhinitis as well.  Diagnostically I am recommending screening for allergies by the Immunocap method.  Therapeutically I am recommending she restart her PPI at 40 mg b.i.d. and also recommended a trial of azelastine nasal spray.    The dark spots on patient's soft palate may be related to her smoking.  If they are still present next visit, I plan to refer her to ENT.    Throat symptom    Mouth problem:  dark areas on soft palate    Tobacco use disorder    Wheezing  -     Complete PFT with bronchodilator; Future    Chronic rhinitis  -     azelastine  (ASTELIN) 137 mcg (0.1 %) nasal spray; 2 sprays (274 mcg total) by Nasal route 2 (two) times daily as needed for Rhinitis. Donot snort (because it tastes bad)  Dispense: 30 mL; Refill: 11  -     IgE; Future; Expected date: 07/01/2022  -     Dermatophagoides [place]; Future; Expected date: 07/01/2022  -     Dermatophagoides Pteronyssinus; Future; Expected date: 07/01/2022  -     Bermuda; Future; Expected date: 07/01/2022  -     Stef; Future; Expected date: 07/01/2022  -     Cedar; Future; Expected date: 07/01/2022  -     English Plantain; Future; Expected date: 07/01/2022  -     [place] Pecan; Future; Expected date: 07/01/2022  -     Pecan; Future; Expected date: 07/01/2022  -     Ragweed; Future; Expected date: 07/01/2022  -     Alternaria; Future; Expected date: 07/01/2022  -     Aspergillus; Future; Expected date: 07/01/2022  -     Cat; Future; Expected date: 07/01/2022  -     Cockroach; Future; Expected date: 07/01/2022  -     Dog; Future; Expected date: 07/01/2022    Cough  -     Complete PFT with bronchodilator; Future    Chest pain, unspecified type  Comments:  Risk factors for CAD are age, race, tobacco. EKG w/o ST changes.  Reschedule stress test. Trial of PPI.  Orders:  -     pantoprazole (PROTONIX) 40 MG tablet; Take 1 tablet (40 mg total) by mouth 2 (two) times daily.  Dispense: 180 tablet; Refill: 3    Muscle cramping  -     Magnesium; Future; Expected date: 07/01/2022    Rash    Not present currently.  Recommend dermatology evaluation if recurs.      Patient Instructions:     Patient Instructions   Testing  Blood work for allergy testing today       Check [facility] in one week for results or call [phone]       Contact me with questions or concerns       I will contact you if anything needs immediate attention.    Breathing test at [facility]  We will call you with date and time  Please do not use any inhaler morning of the test    Treatment    Restart reflux medicine    Astelin = azelastine nasal  spray:    2 squirts each nostril as needed, up to twice a day   Do not snort (because it burns and tastes bad)      Return in 2-3 weeks  Bring purple disc and all inhailers with you.        Follow up in about 2 years (around 7/1/2024).    Anayeli Rubio MD      Coding by time with an additional 15 minute increment.

## 2022-07-01 NOTE — PATIENT INSTRUCTIONS
Testing  Blood work for allergy testing today       Check [facility] in one week for results or call [phone]       Contact me with questions or concerns       I will contact you if anything needs immediate attention.    Breathing test at [facility]  We will call you with date and time  Please do not use any inhaler morning of the test    Treatment    Restart reflux medicine    Astelin = azelastine nasal spray:    2 squirts each nostril as needed, up to twice a day   Do not snort (because it burns and tastes bad)      Return in 2-3 weeks  Bring purple disc and all inhailers with you.

## 2022-07-05 ENCOUNTER — PATIENT MESSAGE (OUTPATIENT)
Dept: BEHAVIORAL HEALTH | Facility: OTHER | Age: 62
End: 2022-07-05
Payer: COMMERCIAL

## 2022-07-06 LAB
A ALTERNATA IGE QN: <0.1 KU/L
A FUMIGATUS IGE QN: <0.1 KU/L
BERMUDA GRASS IGE QN: <0.1 KU/L
CAT DANDER IGE QN: <0.1 KU/L
CEDAR IGE QN: <0.1 KU/L
D FARINAE IGE QN: <0.1 KU/L
D PTERONYSS IGE QN: <0.1 KU/L
DEPRECATED A ALTERNATA IGE RAST QL: NORMAL
DEPRECATED A FUMIGATUS IGE RAST QL: NORMAL
DEPRECATED BERMUDA GRASS IGE RAST QL: NORMAL
DEPRECATED CAT DANDER IGE RAST QL: NORMAL
DEPRECATED CEDAR IGE RAST QL: NORMAL
DEPRECATED D FARINAE IGE RAST QL: NORMAL
DEPRECATED D PTERONYSS IGE RAST QL: NORMAL
DEPRECATED DOG DANDER IGE RAST QL: NORMAL
DEPRECATED ENGL PLANTAIN IGE RAST QL: NORMAL
DEPRECATED PECAN/HICK TREE IGE RAST QL: NORMAL
DEPRECATED ROACH IGE RAST QL: NORMAL
DEPRECATED TIMOTHY IGE RAST QL: NORMAL
DEPRECATED WEST RAGWEED IGE RAST QL: NORMAL
DEPRECATED WHITE OAK IGE RAST QL: NORMAL
DOG DANDER IGE QN: <0.1 KU/L
ENGL PLANTAIN IGE QN: <0.1 KU/L
PECAN/HICK TREE IGE QN: <0.1 KU/L
ROACH IGE QN: <0.1 KU/L
TIMOTHY IGE QN: <0.1 KU/L
WEST RAGWEED IGE QN: <0.1 KU/L
WHITE OAK IGE QN: <0.1 KU/L

## 2022-07-08 ENCOUNTER — HOSPITAL ENCOUNTER (OUTPATIENT)
Dept: PULMONOLOGY | Facility: CLINIC | Age: 62
Discharge: HOME | End: 2022-07-08
Payer: COMMERCIAL

## 2022-07-08 DIAGNOSIS — R06.2 WHEEZING: ICD-10-CM

## 2022-07-08 DIAGNOSIS — R05.9 COUGH: ICD-10-CM

## 2022-07-08 LAB
DLCO SINGLE BREATH LLN: 18.11
DLCO SINGLE BREATH PRE REF: 73.1 %
DLCO SINGLE BREATH REF: 23.84
DLCOC SBVA LLN: 3.14
DLCOC SBVA REF: 4.52
DLCOC SINGLE BREATH LLN: 18.11
DLCOC SINGLE BREATH REF: 23.84
DLCOCSBVAULN: 5.9
DLCOCSINGLEBREATHULN: 29.57
DLCOSINGLEBREATHULN: 29.57
DLCOVA LLN: 3.14
DLCOVA PRE REF: 113.8 %
DLCOVA PRE: 5.14 ML/(MIN*MMHG*L) (ref 3.14–5.9)
DLCOVA REF: 4.52
DLCOVAULN: 5.9
ERV LLN: -16449.19
ERV PRE REF: 62.8 %
ERV REF: 0.81
ERVULN: ABNORMAL
FEF 25 75 LLN: 0.86
FEF 25 75 PRE REF: 122.1 %
FEF 25 75 REF: 2.02
FET100 CHG: 9.3 %
FEV05 LLN: 1.07
FEV05 REF: 1.93
FEV1 CHG: 6.3 %
FEV1 FVC LLN: 67
FEV1 FVC PRE REF: 103.2 %
FEV1 FVC REF: 79
FEV1 LLN: 1.63
FEV1 PRE REF: 93.4 %
FEV1 REF: 2.25
FEV1 VOL CHG: 0.13
FRCPLETH LLN: 1.99
FRCPLETH PREREF: 82.1 %
FRCPLETH REF: 2.82
FRCPLETHULN: 3.64
FVC CHG: 0.7 %
FVC LLN: 2.09
FVC PRE REF: 90 %
FVC REF: 2.85
FVC VOL CHG: 0.02
IVC PRE: 2.22 L (ref 2.09–3.66)
IVC SINGLE BREATH LLN: 2.09
IVC SINGLE BREATH PRE REF: 77.9 %
IVC SINGLE BREATH REF: 2.85
IVCSINGLEBREATHULN: 3.66
LLN IC: -16447.74
PEF LLN: 3.73
PEF PRE REF: 81.1 %
PEF REF: 5.87
PHYSICIAN COMMENT: ABNORMAL
POST FEF 25 75: 3.23 L/S (ref 0.86–3.71)
POST FET 100: 6.63 SEC
POST FEV1 FVC: 86.42 % (ref 67.45–89.47)
POST FEV1: 2.23 L (ref 1.63–2.85)
POST FEV5: 1.91 L (ref 1.07–2.78)
POST FVC: 2.59 L (ref 2.09–3.66)
POST PEF: 5.29 L/S (ref 3.73–8)
PRE DLCO: 17.44 ML/(MIN*MMHG) (ref 18.11–29.57)
PRE ERV: 0.51 L (ref -16449.19–16450.81)
PRE FEF 25 75: 2.47 L/S (ref 0.86–3.71)
PRE FET 100: 6.07 SEC
PRE FEV05 REF: 92.1 %
PRE FEV1 FVC: 81.81 % (ref 67.45–89.47)
PRE FEV1: 2.1 L (ref 1.63–2.85)
PRE FEV5: 1.77 L (ref 1.07–2.78)
PRE FRC PL: 2.31 L (ref 1.99–3.64)
PRE FVC: 2.57 L (ref 2.09–3.66)
PRE IC: 2.06 L (ref -16447.74–16452.26)
PRE PEF: 4.75 L/S (ref 3.73–8)
PRE REF IC: 91.2 %
PRE RV: 1.81 L (ref 1.43–2.59)
PRE TLC: 4.37 L (ref 4.29–6.26)
PRE VTG: 2.39 L
RAW PRE REF: 129.6 %
RAW PRE: 3.96 CMH2O*S/L (ref 3.06–3.06)
RAW REF: 3.06
REF IC: 2.26
RV LLN: 1.43
RV PRE REF: 89.8 %
RV REF: 2.01
RVTLC LLN: 30
RVTLC PRE REF: 104 %
RVTLC PRE: 41.27 % (ref 30.11–49.29)
RVTLC REF: 40
RVTLCULN: 49
RVULN: 2.59
SGAW PRE REF: 90.9 %
SGAW PRE: 0.09 1/(CMH2O*S) (ref 0.1–0.1)
SGAW REF: 0.1
TLC LLN: 4.29
TLC PRE REF: 82.9 %
TLC REF: 5.27
TLC ULN: 6.26
ULN IC: ABNORMAL
VA PRE: 3.39 L (ref 5.12–5.12)
VA SINGLE BREATH LLN: 5.12
VA SINGLE BREATH PRE REF: 66.2 %
VA SINGLE BREATH REF: 5.12
VASINGLEBREATHULN: 5.12
VC LLN: 2.09
VC PRE REF: 90 %
VC PRE: 2.57 L (ref 2.09–3.66)
VC REF: 2.85
VC ULN: 3.66

## 2022-07-08 PROCEDURE — 94729 PR C02/MEMBANE DIFFUSE CAPACITY: ICD-10-PCS | Mod: S$GLB,,, | Performed by: INTERNAL MEDICINE

## 2022-07-08 PROCEDURE — 94060 PR EVAL OF BRONCHOSPASM: ICD-10-PCS | Mod: S$GLB,,, | Performed by: INTERNAL MEDICINE

## 2022-07-08 PROCEDURE — 94060 EVALUATION OF WHEEZING: CPT | Mod: S$GLB,,, | Performed by: INTERNAL MEDICINE

## 2022-07-08 PROCEDURE — 94729 DIFFUSING CAPACITY: CPT | Mod: S$GLB,,, | Performed by: INTERNAL MEDICINE

## 2022-07-08 PROCEDURE — 94726 PULM FUNCT TST PLETHYSMOGRAP: ICD-10-PCS | Mod: S$GLB,,, | Performed by: INTERNAL MEDICINE

## 2022-07-08 PROCEDURE — 94726 PLETHYSMOGRAPHY LUNG VOLUMES: CPT | Mod: S$GLB,,, | Performed by: INTERNAL MEDICINE

## 2022-07-11 ENCOUNTER — PATIENT OUTREACH (OUTPATIENT)
Dept: BEHAVIORAL HEALTH | Facility: OTHER | Age: 62
End: 2022-07-11
Payer: COMMERCIAL

## 2022-07-11 ENCOUNTER — TELEPHONE (OUTPATIENT)
Dept: BEHAVIORAL HEALTH | Facility: OTHER | Age: 62
End: 2022-07-11
Payer: COMMERCIAL

## 2022-07-11 DIAGNOSIS — F41.1 GAD (GENERALIZED ANXIETY DISORDER): Primary | ICD-10-CM

## 2022-07-11 PROCEDURE — 99484 PR CARE MGMT SVCS, BEHAVIORAL HEALTH, >= 20 MIN: ICD-10-PCS | Mod: S$GLB,,, | Performed by: SOCIAL WORKER

## 2022-07-11 PROCEDURE — 99484 CARE MGMT SVC BHVL HLTH COND: CPT | Mod: S$GLB,,, | Performed by: SOCIAL WORKER

## 2022-07-11 NOTE — PROGRESS NOTES
Behavioral Health Community Health Worker  Initial Assessment  Completed by:  Vnaesa    Date:  7/11/2022    Patient Enrollment in Behavioral Health Program:  · Patient verbalized understanding of Behavioral Health Integration services to include:  · Patient understands that CHW, LCSW, PharmD and consulting Psychiatrist are members of the care team working collaboratively with his/her primary care provider: Yes  · Patient understands that activation of their [facility] patient portal account is required for accessing the full scope of team services: Yes  · Patient understands that some counseling sessions may occur via video: Yes  · Clinic visits with the psychiatrist may be subject to a co-pay based on your insurance: Yes  · Patient consents to enroll in BHI program: Yes    Assessments     Single Item Health Literacy Scale:  · How often do you need to have someone help you read instructions, pamphlets or other written material from your doctor or pharmacy?: Rarely    Promis 10:  · Promis 10 Responses  · In general, would you say your health is: Poor  · In general, would you say your quality of life is: Fair  · In general, how would you rate your physical health?: Poor  · In general, how would you rate your mental health, including your mood and your ability to think?: Poor  · In general, how would you rate your satisfaction with your social activities and relationships?: Poor  · In general, please rate how well you carry out your usual social activities and roles. (This includes activities at home, at work and in your community, and responsibilities as a parent, child, spouse, employee, friend, etc.): Poor  · To what extent are you able to carry out your everyday physical activities such as walking, climbing stairs, carrying groceries, or moving a chair? : A little  · How often have you been bothered by emotional problems such as feeling anxious, depressed or irritable?: Always  · In the past 7 days, how would you  rate your fatigue on average?: Very Severe  · In the past 7 days, on a scale of 0 to 10 (where 0 is no pain and 10 is the worst pain imaginable) how would you rate your pain on average?: 7  · Global Physical Health: 15  · Global Mental health Score: 9    Depression PHQ:  PHQ9 7/11/2022   Total Score 23         Generalized Anxiety Disorder 7-Item Scale:  GAD7 7/11/2022   1. Feeling nervous, anxious, or on edge? 2   2. Not being able to stop or control worrying? 2   3. Worrying too much about different things? 2   4. Trouble relaxing? 3   5. Being so restless that it is hard to sit still? 3   6. Becoming easily annoyed or irritable? 2   7. Feeling afraid as if something awful might happen? 2   8. If you checked off any problems, how difficult have these problems made it for you to do your work, take care of things at home, or get along with other people? 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 Patient feels overwhelmed and was tearful during assessments.

## 2022-07-14 ENCOUNTER — OFFICE VISIT (OUTPATIENT)
Dept: SLEEP MEDICINE | Facility: CLINIC | Age: 62
End: 2022-07-14
Payer: COMMERCIAL

## 2022-07-14 VITALS
SYSTOLIC BLOOD PRESSURE: 145 MMHG | HEART RATE: 79 BPM | DIASTOLIC BLOOD PRESSURE: 89 MMHG | BODY MASS INDEX: 37.12 KG/M2 | WEIGHT: 231 LBS | HEIGHT: 66 IN

## 2022-07-14 DIAGNOSIS — G47.30 SLEEP APNEA, UNSPECIFIED TYPE: ICD-10-CM

## 2022-07-14 DIAGNOSIS — G47.10 HYPERSOMNOLENCE: Primary | ICD-10-CM

## 2022-07-14 DIAGNOSIS — F51.09 OTHER INSOMNIA NOT DUE TO A SUBSTANCE OR KNOWN PHYSIOLOGICAL CONDITION: ICD-10-CM

## 2022-07-14 DIAGNOSIS — R35.1 NOCTURIA: ICD-10-CM

## 2022-07-14 PROCEDURE — 1159F PR MEDICATION LIST DOCUMENTED IN MEDICAL RECORD: ICD-10-PCS | Mod: CPTII,S$GLB,, | Performed by: INTERNAL MEDICINE

## 2022-07-14 PROCEDURE — 3044F HG A1C LEVEL LT 7.0%: CPT | Mod: CPTII,S$GLB,, | Performed by: INTERNAL MEDICINE

## 2022-07-14 PROCEDURE — 3079F DIAST BP 80-89 MM HG: CPT | Mod: CPTII,S$GLB,, | Performed by: INTERNAL MEDICINE

## 2022-07-14 PROCEDURE — 3077F SYST BP >= 140 MM HG: CPT | Mod: CPTII,S$GLB,, | Performed by: INTERNAL MEDICINE

## 2022-07-14 PROCEDURE — 99213 PR OFFICE/OUTPT VISIT, EST, LEVL III, 20-29 MIN: ICD-10-PCS | Mod: S$GLB,,, | Performed by: INTERNAL MEDICINE

## 2022-07-14 PROCEDURE — 4010F ACE/ARB THERAPY RXD/TAKEN: CPT | Mod: CPTII,S$GLB,, | Performed by: INTERNAL MEDICINE

## 2022-07-14 PROCEDURE — 1159F MED LIST DOCD IN RCRD: CPT | Mod: CPTII,S$GLB,, | Performed by: INTERNAL MEDICINE

## 2022-07-14 PROCEDURE — 99999 PR PBB SHADOW E&M-EST. PATIENT-LVL III: CPT | Mod: PBBFAC,,, | Performed by: INTERNAL MEDICINE

## 2022-07-14 PROCEDURE — 4010F PR ACE/ARB THEARPY RXD/TAKEN: ICD-10-PCS | Mod: CPTII,S$GLB,, | Performed by: INTERNAL MEDICINE

## 2022-07-14 PROCEDURE — 3079F PR MOST RECENT DIASTOLIC BLOOD PRESSURE 80-89 MM HG: ICD-10-PCS | Mod: CPTII,S$GLB,, | Performed by: INTERNAL MEDICINE

## 2022-07-14 PROCEDURE — 3008F PR BODY MASS INDEX (BMI) DOCUMENTED: ICD-10-PCS | Mod: CPTII,S$GLB,, | Performed by: INTERNAL MEDICINE

## 2022-07-14 PROCEDURE — 99999 PR PBB SHADOW E&M-EST. PATIENT-LVL III: ICD-10-PCS | Mod: PBBFAC,,, | Performed by: INTERNAL MEDICINE

## 2022-07-14 PROCEDURE — 3008F BODY MASS INDEX DOCD: CPT | Mod: CPTII,S$GLB,, | Performed by: INTERNAL MEDICINE

## 2022-07-14 PROCEDURE — 3044F PR MOST RECENT HEMOGLOBIN A1C LEVEL <7.0%: ICD-10-PCS | Mod: CPTII,S$GLB,, | Performed by: INTERNAL MEDICINE

## 2022-07-14 PROCEDURE — 99213 OFFICE O/P EST LOW 20 MIN: CPT | Mod: S$GLB,,, | Performed by: INTERNAL MEDICINE

## 2022-07-14 PROCEDURE — 3077F PR MOST RECENT SYSTOLIC BLOOD PRESSURE >= 140 MM HG: ICD-10-PCS | Mod: CPTII,S$GLB,, | Performed by: INTERNAL MEDICINE

## 2022-07-14 NOTE — PROGRESS NOTES
Referred by No ref. provider found     NEW PATIENT VISIT    Brianna Cantu  is a pleasant 61 y.o. female  with PMH significant for AR, HTN, Vit D def,  who presents today for evaluation of snoring, sleepiness, frequent nocturia.    SLEEP SCHEDULE   Environment    Bed Time 11p - 1A (MN-3A)   Sleep Latency Not long   Arousals Very frequent 2' urination   Nocturia 3-8   Back to sleep Not long   Wake time 8A-8:45A (9A)   Naps 1-2   Work        Past Medical History:   Diagnosis Date    Allergic rhinitis 4/27/2014    Anxiety 4/27/2014    Asthma     Hx of cervical cancer 4/27/2014    Obesity 4/27/2014    Vitamin D deficiency 4/27/2014     Patient Active Problem List   Diagnosis    Allergic rhinitis    Anxiety    Obesity    Vitamin D deficiency    Hx of cervical cancer    Laryngopharyngeal reflux    Bilateral leg numbness    Bronchitis    Constipation    Benign essential hypertension    Other depressive disorder    Tobacco use disorder    Mild intermittent asthma without complication    Stenosis colon    Light cigarette smoker (1-9 cigs/day)    Leg edema    Diarrhea    Cough    Wheezing       Current Outpatient Medications:     albuterol (PROVENTIL/VENTOLIN HFA) 90 mcg/actuation inhaler, Inhale 2 puffs into the lungs every 6 (six) hours as needed for Wheezing. (Patient not taking: Reported on 7/1/2022), Disp: 18 g, Rfl: 11    amlodipine-valsartan (EXFORGE) 5-160 mg per tablet, Take 1 tablet by mouth once daily., Disp: 90 tablet, Rfl: 3    azelastine (ASTELIN) 137 mcg (0.1 %) nasal spray, 2 sprays (274 mcg total) by Nasal route 2 (two) times daily as needed for Rhinitis. Donot snort (because it tastes bad), Disp: 30 mL, Rfl: 11    cholecalciferol, vitamin D3, 1,250 mcg (50,000 unit) capsule, Take 1 capsule (50,000 Units total) by mouth once a week., Disp: 12 capsule, Rfl: 3    fluticasone propionate (FLONASE) 50 mcg/actuation nasal spray, 1 spray (50 mcg total) by Each Nostril route daily  "as needed for Allergies., Disp: 16 g, Rfl: 0    fluticasone-salmeterol diskus inhaler 250-50 mcg, Inhale 1 puff into the lungs 2 (two) times daily. (Patient not taking: Reported on 7/1/2022), Disp: 180 each, Rfl: 1    gabapentin (NEURONTIN) 100 MG capsule, Take 1 capsule (100 mg total) by mouth every evening. for 14 days, Disp: 14 capsule, Rfl: 0    loratadine (CLARITIN) 10 mg tablet, Take 10 mg by mouth., Disp: , Rfl:     methocarbamoL (ROBAXIN) 500 MG Tab, , Disp: , Rfl:     oxybutynin (DITROPAN-XL) 10 MG 24 hr tablet, Take 1 tablet (10 mg total) by mouth once daily., Disp: 90 tablet, Rfl: 1    pantoprazole (PROTONIX) 40 MG tablet, Take 1 tablet (40 mg total) by mouth 2 (two) times daily., Disp: 180 tablet, Rfl: 3    valACYclovir (VALTREX) 1000 MG tablet, TAKE 1 TABLET(1000 MG) BY MOUTH EVERY 12 HOURS, Disp: 60 tablet, Rfl: 11       Vitals:    07/14/22 1144   BP: (!) 145/89   BP Location: Right arm   Patient Position: Sitting   BP Method: Large (Automatic)   Pulse: 79   Weight: 104.8 kg (231 lb)   Height: 5' 6" (1.676 m)     Physical Exam:    GEN:   Well-appearing  Psych:  Appropriate affect, demonstrates insight  SKIN:  No rash on the face or bridge of the nose  HEENT: MP1, + oropharynx shallow in A-P dimension    LABS:   Lab Results   Component Value Date    HGB 13.9 04/26/2022    CO2 23 04/26/2022       RECORDS REVIEWED PREVIOUSLY:    No prior sleep testing.    ASSESSMENT    PROBLEM DESCRIPTION/ Sx on Presentation  STATUS   possible VIVIAN   + loud snoring, occasional snoring arousals, her  was scared one night that she wasn't breathing in her sleep, + gasping arousal once in the past    New   Daytime Sx   + sleepiness when inactive   denies sleepiness when driving   ESS 14/24 on intake  New   Insomnia   Waking frequently due to nocturia, back to sleep fairly quickly  Prior hypnotics:        Current hypnotics:     New   Nocturia   x 3-8 per sleep period, no frequency during the day  New   Other " issues:     PLAN     -recommend sleep testing   -discussed trial therapy if VIVIAN present and the patient is  open to a trial of CPAP therapy  -driving precautions were discussed with the patient    RTC          The patient was given open opportunity to ask questions and/or express concerns about treatment plan.   All questions/concerns were discussed.     Two patient identifiers used prior to evaluation.

## 2022-07-15 ENCOUNTER — OFFICE VISIT (OUTPATIENT)
Dept: ALLERGY | Facility: CLINIC | Age: 62
End: 2022-07-15
Payer: COMMERCIAL

## 2022-07-15 VITALS — HEART RATE: 80 BPM | BODY MASS INDEX: 37.14 KG/M2 | WEIGHT: 231.06 LBS | OXYGEN SATURATION: 97 % | HEIGHT: 66 IN

## 2022-07-15 DIAGNOSIS — F17.200 TOBACCO USE DISORDER: ICD-10-CM

## 2022-07-15 DIAGNOSIS — Q38.5 ABNORMALITY OF PALATE: ICD-10-CM

## 2022-07-15 DIAGNOSIS — R68.89 THROAT SYMPTOM: Primary | ICD-10-CM

## 2022-07-15 DIAGNOSIS — Z87.898 HX OF WHEEZING: ICD-10-CM

## 2022-07-15 PROCEDURE — 3044F PR MOST RECENT HEMOGLOBIN A1C LEVEL <7.0%: ICD-10-PCS | Mod: CPTII,S$GLB,, | Performed by: STUDENT IN AN ORGANIZED HEALTH CARE EDUCATION/TRAINING PROGRAM

## 2022-07-15 PROCEDURE — 1159F PR MEDICATION LIST DOCUMENTED IN MEDICAL RECORD: ICD-10-PCS | Mod: CPTII,S$GLB,, | Performed by: STUDENT IN AN ORGANIZED HEALTH CARE EDUCATION/TRAINING PROGRAM

## 2022-07-15 PROCEDURE — 3008F BODY MASS INDEX DOCD: CPT | Mod: CPTII,S$GLB,, | Performed by: STUDENT IN AN ORGANIZED HEALTH CARE EDUCATION/TRAINING PROGRAM

## 2022-07-15 PROCEDURE — 1160F RVW MEDS BY RX/DR IN RCRD: CPT | Mod: CPTII,S$GLB,, | Performed by: STUDENT IN AN ORGANIZED HEALTH CARE EDUCATION/TRAINING PROGRAM

## 2022-07-15 PROCEDURE — 99215 PR OFFICE/OUTPT VISIT, EST, LEVL V, 40-54 MIN: ICD-10-PCS | Mod: S$GLB,,, | Performed by: STUDENT IN AN ORGANIZED HEALTH CARE EDUCATION/TRAINING PROGRAM

## 2022-07-15 PROCEDURE — 1160F PR REVIEW ALL MEDS BY PRESCRIBER/CLIN PHARMACIST DOCUMENTED: ICD-10-PCS | Mod: CPTII,S$GLB,, | Performed by: STUDENT IN AN ORGANIZED HEALTH CARE EDUCATION/TRAINING PROGRAM

## 2022-07-15 PROCEDURE — 99999 PR PBB SHADOW E&M-EST. PATIENT-LVL V: CPT | Mod: PBBFAC,,, | Performed by: STUDENT IN AN ORGANIZED HEALTH CARE EDUCATION/TRAINING PROGRAM

## 2022-07-15 PROCEDURE — 3008F PR BODY MASS INDEX (BMI) DOCUMENTED: ICD-10-PCS | Mod: CPTII,S$GLB,, | Performed by: STUDENT IN AN ORGANIZED HEALTH CARE EDUCATION/TRAINING PROGRAM

## 2022-07-15 PROCEDURE — 1159F MED LIST DOCD IN RCRD: CPT | Mod: CPTII,S$GLB,, | Performed by: STUDENT IN AN ORGANIZED HEALTH CARE EDUCATION/TRAINING PROGRAM

## 2022-07-15 PROCEDURE — 99999 PR PBB SHADOW E&M-EST. PATIENT-LVL V: ICD-10-PCS | Mod: PBBFAC,,, | Performed by: STUDENT IN AN ORGANIZED HEALTH CARE EDUCATION/TRAINING PROGRAM

## 2022-07-15 PROCEDURE — 99215 OFFICE O/P EST HI 40 MIN: CPT | Mod: S$GLB,,, | Performed by: STUDENT IN AN ORGANIZED HEALTH CARE EDUCATION/TRAINING PROGRAM

## 2022-07-15 PROCEDURE — 3044F HG A1C LEVEL LT 7.0%: CPT | Mod: CPTII,S$GLB,, | Performed by: STUDENT IN AN ORGANIZED HEALTH CARE EDUCATION/TRAINING PROGRAM

## 2022-07-15 PROCEDURE — 4010F ACE/ARB THERAPY RXD/TAKEN: CPT | Mod: CPTII,S$GLB,, | Performed by: STUDENT IN AN ORGANIZED HEALTH CARE EDUCATION/TRAINING PROGRAM

## 2022-07-15 PROCEDURE — 4010F PR ACE/ARB THEARPY RXD/TAKEN: ICD-10-PCS | Mod: CPTII,S$GLB,, | Performed by: STUDENT IN AN ORGANIZED HEALTH CARE EDUCATION/TRAINING PROGRAM

## 2022-07-15 NOTE — PROGRESS NOTES
Allergy Clinic Note  [facility]    This note was created by combination of typed  and M-Modal dictation. Transcription errors may be present.  If there are any questions, please contact me.    Subjective:      Patient ID: Brianna Cantu is a 61 y.o. female.    Chief Complaint: Follow-up (Allergy and labs)      Referring Provider:     Throat symptom, suspect LPR  DARK AREA ON SOFT PALATE in smoker  Chronic rhinitis    History of Present Illness: Brianna Cantu is a 61 y.o. female presents complaining of chronic rhinitis and recent rashes.  She is here alone, and she is a fair historian.    Related medications and other interventions  Advair 250/50 once daily --not taking  Albuterol MDI - twice a week -- using as needed  Azelastine -- new last visit; -- did not   Xyzal  Benefiber  Pantoprazole 40 mg BID -- restarted last visit; --did not   Tums  (Flonase discontinued last visit)    07/15/2022:  Patient reports no significant difference from last visit.  She reports that she has not picked up either the azelastine or the Protonix from the pharmacy.  She has significant psychosocial stressors at present.  She was able to do her blood work and her PFTs which are described below.    07/01/2022:  Patient's chief complaint  Is coughing and wheezing.  She also describes a rattling her chest that she can both hear and feel.  This is worse when supine. She is not sure that he she has been diagnosed with asthma but has been given both Advair and an albuterol inhaler.  She is using the Advair once daily.  She is using the albuterol about twice a month.    Client also complains of rhinitis and throat symptoms.  Her main complaints are postnasal drip and throat clearing.  Additional symptoms include nasal congestion, runny nose, sneezing, itchy eyes, scratchy throat, rare dysphagia, and rare hoarseness.      Patient has a history of GERD.  She says this is manifest by a burning  sensation in her lower chest.  She admits occasional reflux of material into her mouth.  She says it is better with loosening her bra.  She was taking pantoprazole in the past but is not currently.      Patient's other complaint was of 2 recent rashes.  The 1st consisted of tiny bumps under her breasts and on her buttocks.  This was itchy and lasted a couple days.  The 2nd was on her face.  She does not have any photos and rash is not present today.    Additional History:   Past medical history is significant for smoking, anxiety and a remote history of cervical cancer.  No Hx of ENT surgery.  Family history is significant for allergies in mother and 2 sisters plus a history of asthma in a sibling.  Client  reports that she has been smoking cigarettes. She has been smoking about 0.50 packs per day. She has never used smokeless tobacco.  Exposures are notable for chemicals.  No regular exposure to passive smoke, pets, or mold.       Patient Active Problem List   Diagnosis    Allergic rhinitis    Anxiety    Obesity    Vitamin D deficiency    Hx of cervical cancer    Laryngopharyngeal reflux    Bilateral leg numbness    Bronchitis    Constipation    Benign essential hypertension    Other depressive disorder    Tobacco use disorder    Mild intermittent asthma without complication    Stenosis colon    Light cigarette smoker (1-9 cigs/day)    Leg edema    Diarrhea    Cough    Wheezing     Medication List with Changes/Refills   Current Medications    ALBUTEROL (PROVENTIL/VENTOLIN HFA) 90 MCG/ACTUATION INHALER    Inhale 2 puffs into the lungs every 6 (six) hours as needed for Wheezing.       Start Date: 3/31/2022 End Date: 3/31/2023    AMLODIPINE-VALSARTAN (EXFORGE) 5-160 MG PER TABLET    Take 1 tablet by mouth once daily.       Start Date: 3/31/2022 End Date: 3/31/2023    AZELASTINE (ASTELIN) 137 MCG (0.1 %) NASAL SPRAY    2 sprays (274 mcg total) by Nasal route 2 (two) times daily as needed for  Rhinitis. Donot snort (because it tastes bad)       Start Date: 7/1/2022  End Date: 7/1/2023    CHOLECALCIFEROL, VITAMIN D3, 1,250 MCG (50,000 UNIT) CAPSULE    Take 1 capsule (50,000 Units total) by mouth once a week.       Start Date: 6/28/2022 End Date: --    FLUTICASONE PROPIONATE (FLONASE) 50 MCG/ACTUATION NASAL SPRAY    1 spray (50 mcg total) by Each Nostril route daily as needed for Allergies.       Start Date: 4/5/2021  End Date: --    FLUTICASONE-SALMETEROL DISKUS INHALER 250-50 MCG    Inhale 1 puff into the lungs 2 (two) times daily.       Start Date: 3/31/2022 End Date: 7/1/2022    GABAPENTIN (NEURONTIN) 100 MG CAPSULE    Take 1 capsule (100 mg total) by mouth every evening. for 14 days       Start Date: 6/10/2022 End Date: 7/1/2022    LORATADINE (CLARITIN) 10 MG TABLET    Take 10 mg by mouth.       Start Date: --        End Date: --    METHOCARBAMOL (ROBAXIN) 500 MG TAB           Start Date: 4/21/2022 End Date: --    OXYBUTYNIN (DITROPAN-XL) 10 MG 24 HR TABLET    Take 1 tablet (10 mg total) by mouth once daily.       Start Date: 4/26/2022 End Date: 4/26/2023    PANTOPRAZOLE (PROTONIX) 40 MG TABLET    Take 1 tablet (40 mg total) by mouth 2 (two) times daily.       Start Date: 7/1/2022  End Date: --    VALACYCLOVIR (VALTREX) 1000 MG TABLET    TAKE 1 TABLET(1000 MG) BY MOUTH EVERY 12 HOURS       Start Date: 6/18/2022 End Date: --         Review of Systems   Constitutional: Negative for chills and fever.   HENT: Negative for ear discharge and nosebleeds.         Throat symptom unchanged   Eyes: Negative for discharge and redness.   Respiratory: Positive for cough. Negative for hemoptysis, sputum production and stridor.    Cardiovascular: Negative for palpitations.   Gastrointestinal: Negative for blood in stool, melena and vomiting.   Genitourinary: Negative for dysuria, flank pain and hematuria.   Musculoskeletal: Positive for back pain.        Right leg pain   Skin: Negative for itching and rash.  "  Neurological: Negative for seizures and loss of consciousness.       Objective:   Pulse 80   Ht 5' 6" (1.676 m)   Wt 104.8 kg (231 lb 0.7 oz)   SpO2 97%   BMI 37.29 kg/m²       Physical Exam  HENT:      Head: Normocephalic and atraumatic.      Comments: Brown spots on palate behind upper molars bilaterally are still present.  Also examined beneath upper plate today:  There is some redness but nothing brown.  Oropharynx is otherwise benign.  Tongue is not coated.  Nares are pink with moderate to severe turbinates swelling.     Nose: No rhinorrhea.   Eyes:      General: No scleral icterus.     Conjunctiva/sclera: Conjunctivae normal.   Cardiovascular:      Rate and Rhythm: Normal rate and regular rhythm.      Heart sounds: Normal heart sounds.      Comments: Radial pulses +2. Unable to palpate pedal pulses.  Pulmonary:      Effort: Pulmonary effort is normal. No respiratory distress.      Breath sounds: Normal breath sounds. No stridor. No wheezing.   Abdominal:      General: There is no distension.   Musculoskeletal:         General: No deformity.      Cervical back: Neck supple.      Right lower leg: No edema.      Left lower leg: No edema.   Lymphadenopathy:      Cervical: No cervical adenopathy.   Skin:     Findings: No erythema or rash.   Neurological:      General: No focal deficit present.      Mental Status: She is alert.   Psychiatric:         Mood and Affect: Affect normal.         Cognition and Memory: Memory normal.         Data:   Demonstrates inadequate use of MDI (07/14/2022)    Aeroallergen testrin less than 35. by the Immunocap method (07/01/2022) was entirely negative.  Total serum IgE    Pulmonary function testing (07/08/2022)  Tech reported difficulty with testing.  Inspiratory and expiratory flow volume loops inconsistent but grossly normal  Baseline spirometry normal  Post bronchodilator no change in FEV1 (6%).  FEF 25-75 improves 31%.  Normal TLC 0  DLCO uninterpretable due to patient " "difficulty with past  My interpretation:  Probable improvement in small airways following bronchodilator; however, patient difficulties with testing make this finding nondiagnostic.    Eosinophil count 200 on CBC of 4/26/2022    Assessment:     1. Throat symptom    2. Abnormality of palate    3. Tobacco use disorder    4. Hx of wheezing        Plan:     Medical decision making:  This smoker had active wheezing at her first visit.  PFT's were technically limited but spirometry was normal with no significant change in FEV1. Lungs are clear today.  As she demonstrated poor use of inhaler, I doubt she ever got any benefit from either Advair or Ventolin.  Advised her to stay off Advair and to continue Ventolin as needed, using technique I taught her today.    For her throat symptom, she has not yet started either azelastine nasal or Protonix.  Assessment is the same as last visit:"  Patient's throat symptoms may be related to laryngeal pharyngeal reflux.  She appears to have rhinitis as well.  Diagnostically I am recommending screening for allergies by the Immunocap method.  Therapeutically I am recommending she restart her PPI at 40 mg twice daily. and also recommended a trial of azelastine nasal spray."    The persistent dark spots on patient's soft palate may be related to her smoking.  I referred her to ENT on a routine basis to rule out significant pathology.      Throat symptom    Abnormality of palate  -     Ambulatory referral/consult to ENT; Future; Expected date: 07/22/2022    Tobacco use disorder    Hx of wheezing         Patient Instructions:     Patient Instructions   Testing    See ENT at your covenience about brown spots on back of your mouth behind your partial      Treatment    Start pantaprozole 1 pill twice a day  Takes 4-6 weeks to start working    Start Astelin = azelastine nasal spray:    2 squirts each nostril as needed, up to twice a day   Do not snort (because it burns and tastes bad)    Continue " blue inhaler as needed          Follow up in about 6 weeks (around 8/26/2022).    Anayeli Rubio MD    Coding by time

## 2022-07-15 NOTE — PATIENT INSTRUCTIONS
Testing    See ENT at your covenience about brown spots on back of your mouth behind your partial      Treatment    Start pantaprozole 1 pill twice a day  Takes 4-6 weeks to start working    Start Astelin = azelastine nasal spray:    2 squirts each nostril as needed, up to twice a day   Do not snort (because it burns and tastes bad)    Continue blue inhaler as needed

## 2022-07-19 ENCOUNTER — TELEPHONE (OUTPATIENT)
Dept: INTERNAL MEDICINE | Facility: CLINIC | Age: 62
End: 2022-07-19
Payer: COMMERCIAL

## 2022-07-19 NOTE — TELEPHONE ENCOUNTER
----- Message from Maciel Clemens sent at 7/19/2022 12:33 PM CDT -----  Name of Who is Calling: DOM VARNER [[id number]]           What is the request in detail: patient request a call back for Mary Ann about sciatic nerve flaring up. Please assist           Can the clinic reply by [facility]: no           What Number to Call Back if not in [facility]: [phone]

## 2022-07-19 NOTE — TELEPHONE ENCOUNTER
Returned pt's call.  Pt states she has been having sciatic nerve pain that returned recently. Pt states she saw her work wellness center provider today and rec'd a steroid shot. She's hoping it helps. Pt is concerned because she's had this before, but has not been x-ray'd. Informed that x-ray's don't show sciatic nerves unfortunately - pt concerned that it could be a disc causing pain. Offered appt. Pt declined at this time as she has many upcoming appointments for other things. Pt states she will call back as needed for appt. if steroid injection doesn't work.

## 2022-07-22 ENCOUNTER — OFFICE VISIT (OUTPATIENT)
Dept: OTOLARYNGOLOGY | Facility: CLINIC | Age: 62
End: 2022-07-22
Payer: COMMERCIAL

## 2022-07-22 ENCOUNTER — TELEPHONE (OUTPATIENT)
Dept: BEHAVIORAL HEALTH | Facility: CLINIC | Age: 62
End: 2022-07-22
Payer: COMMERCIAL

## 2022-07-22 VITALS
WEIGHT: 231 LBS | HEART RATE: 80 BPM | BODY MASS INDEX: 37.28 KG/M2 | DIASTOLIC BLOOD PRESSURE: 90 MMHG | SYSTOLIC BLOOD PRESSURE: 147 MMHG

## 2022-07-22 DIAGNOSIS — Q38.5 ABNORMALITY OF PALATE: ICD-10-CM

## 2022-07-22 DIAGNOSIS — K13.79 LESION OF HARD PALATE: ICD-10-CM

## 2022-07-22 PROCEDURE — 3077F PR MOST RECENT SYSTOLIC BLOOD PRESSURE >= 140 MM HG: ICD-10-PCS | Mod: CPTII,S$GLB,, | Performed by: NURSE PRACTITIONER

## 2022-07-22 PROCEDURE — 99203 PR OFFICE/OUTPT VISIT, NEW, LEVL III, 30-44 MIN: ICD-10-PCS | Mod: S$GLB,,, | Performed by: NURSE PRACTITIONER

## 2022-07-22 PROCEDURE — 3077F SYST BP >= 140 MM HG: CPT | Mod: CPTII,S$GLB,, | Performed by: NURSE PRACTITIONER

## 2022-07-22 PROCEDURE — 3080F PR MOST RECENT DIASTOLIC BLOOD PRESSURE >= 90 MM HG: ICD-10-PCS | Mod: CPTII,S$GLB,, | Performed by: NURSE PRACTITIONER

## 2022-07-22 PROCEDURE — 99999 PR PBB SHADOW E&M-EST. PATIENT-LVL III: CPT | Mod: PBBFAC,,, | Performed by: NURSE PRACTITIONER

## 2022-07-22 PROCEDURE — 99999 PR PBB SHADOW E&M-EST. PATIENT-LVL III: ICD-10-PCS | Mod: PBBFAC,,, | Performed by: NURSE PRACTITIONER

## 2022-07-22 PROCEDURE — 4010F PR ACE/ARB THEARPY RXD/TAKEN: ICD-10-PCS | Mod: CPTII,S$GLB,, | Performed by: NURSE PRACTITIONER

## 2022-07-22 PROCEDURE — 99203 OFFICE O/P NEW LOW 30 MIN: CPT | Mod: S$GLB,,, | Performed by: NURSE PRACTITIONER

## 2022-07-22 PROCEDURE — 3080F DIAST BP >= 90 MM HG: CPT | Mod: CPTII,S$GLB,, | Performed by: NURSE PRACTITIONER

## 2022-07-22 PROCEDURE — 4010F ACE/ARB THERAPY RXD/TAKEN: CPT | Mod: CPTII,S$GLB,, | Performed by: NURSE PRACTITIONER

## 2022-07-22 PROCEDURE — 3044F HG A1C LEVEL LT 7.0%: CPT | Mod: CPTII,S$GLB,, | Performed by: NURSE PRACTITIONER

## 2022-07-22 PROCEDURE — 3008F BODY MASS INDEX DOCD: CPT | Mod: CPTII,S$GLB,, | Performed by: NURSE PRACTITIONER

## 2022-07-22 PROCEDURE — 3008F PR BODY MASS INDEX (BMI) DOCUMENTED: ICD-10-PCS | Mod: CPTII,S$GLB,, | Performed by: NURSE PRACTITIONER

## 2022-07-22 PROCEDURE — 3044F PR MOST RECENT HEMOGLOBIN A1C LEVEL <7.0%: ICD-10-PCS | Mod: CPTII,S$GLB,, | Performed by: NURSE PRACTITIONER

## 2022-07-22 NOTE — ASSESSMENT & PLAN NOTE
Small lesion to hard palate. Possible related to partial. Magic mouthwash prescribed. RTC in 2 weeks for repeat evaluation. Consider biopsy if still present. Questions answered.

## 2022-07-22 NOTE — PROGRESS NOTES
Subjective:       Patient ID: Brianna Cantu is a 61 y.o. female.    Chief Complaint: dark area on palate /smoker    HPI     rBianna Cantu is a 61 year old female who was referred to the head and neck clinic for palate lesions. These were recently noticed at a primary care visit. They are sometimes painful, mostly where here partial rubs on her mouth. There is no bleeding. She denies throat or ear pain. She has occasional dysphagia to pills. Liquids and solids are not hard to swallow. She is a current smoker.    Past Medical History:   Diagnosis Date    Allergic rhinitis 4/27/2014    Anxiety 4/27/2014    Asthma     Hx of cervical cancer 4/27/2014    Obesity 4/27/2014    Vitamin D deficiency 4/27/2014       Past Surgical History:   Procedure Laterality Date    COLONOSCOPY      COLONOSCOPY N/A 9/24/2018    Procedure: COLONOSCOPY;  Surgeon: Montrell Gan MD;  Location: [address]);  Service: Endoscopy;  Laterality: N/A;    ESOPHAGOGASTRODUODENOSCOPY      ESOPHAGOGASTRODUODENOSCOPY N/A 9/24/2018    Procedure: EGD (ESOPHAGOGASTRODUODENOSCOPY);  Surgeon: Montrell Gan MD;  Location: [address]);  Service: Endoscopy;  Laterality: N/A;    TOTAL ABDOMINAL HYSTERECTOMY           Current Outpatient Medications:     (Magic mouthwash) 1:1:1 diphenhydramine(Benadryl) 12.5mg/5ml liq, aluminum & magnesium hydroxide-simethicone (Maalox), LIDOcaine viscous 2%, Swish and spit 10 mLs every 4 (four) hours as needed (mouth ulcer). for mouth sores, Disp: 450 mL, Rfl: 0    albuterol (PROVENTIL/VENTOLIN HFA) 90 mcg/actuation inhaler, Inhale 2 puffs into the lungs every 6 (six) hours as needed for Wheezing. (Patient not taking: No sig reported), Disp: 18 g, Rfl: 11    amlodipine-valsartan (EXFORGE) 5-160 mg per tablet, Take 1 tablet by mouth once daily., Disp: 90 tablet, Rfl: 3    azelastine (ASTELIN) 137 mcg (0.1 %) nasal spray, 2 sprays (274 mcg total) by Nasal route 2 (two) times daily as  needed for Rhinitis. Donot snort (because it tastes bad), Disp: 30 mL, Rfl: 11    cholecalciferol, vitamin D3, 1,250 mcg (50,000 unit) capsule, Take 1 capsule (50,000 Units total) by mouth once a week., Disp: 12 capsule, Rfl: 3    fluticasone propionate (FLONASE) 50 mcg/actuation nasal spray, 1 spray (50 mcg total) by Each Nostril route daily as needed for Allergies., Disp: 16 g, Rfl: 0    fluticasone-salmeterol diskus inhaler 250-50 mcg, Inhale 1 puff into the lungs 2 (two) times daily. (Patient not taking: Reported on 7/1/2022), Disp: 180 each, Rfl: 1    gabapentin (NEURONTIN) 100 MG capsule, Take 1 capsule (100 mg total) by mouth every evening. for 14 days, Disp: 14 capsule, Rfl: 0    loratadine (CLARITIN) 10 mg tablet, Take 10 mg by mouth., Disp: , Rfl:     methocarbamoL (ROBAXIN) 500 MG Tab, , Disp: , Rfl:     oxybutynin (DITROPAN-XL) 10 MG 24 hr tablet, Take 1 tablet (10 mg total) by mouth once daily., Disp: 90 tablet, Rfl: 1    pantoprazole (PROTONIX) 40 MG tablet, Take 1 tablet (40 mg total) by mouth 2 (two) times daily., Disp: 180 tablet, Rfl: 3    valACYclovir (VALTREX) 1000 MG tablet, TAKE 1 TABLET(1000 MG) BY MOUTH EVERY 12 HOURS, Disp: 60 tablet, Rfl: 11    Review of patient's allergies indicates:  No Known Allergies    Social History     Socioeconomic History    Marital status:    Tobacco Use    Smoking status: Current Every Day Smoker     Packs/day: 0.50     Types: Cigarettes    Smokeless tobacco: Never Used   Substance and Sexual Activity    Alcohol use: Yes     Alcohol/week: 0.0 standard drinks     Comment: occasional    Drug use: No    Sexual activity: Not Currently       Family History   Problem Relation Age of Onset    Hypertension Mother     Allergies Mother     Hypertension Sister     Allergies Sister     Allergies Sister     Heart disease Neg Hx        Review of Systems   Constitutional: Negative for appetite change, chills, diaphoresis, fatigue, fever and  unexpected weight change.   HENT: Positive for mouth sores, postnasal drip, sinus pressure/congestion, trouble swallowing and voice change. Negative for nasal congestion, dental problem, drooling, ear discharge, ear pain, facial swelling, hearing loss, nosebleeds, rhinorrhea, sneezing, sore throat and tinnitus.    Eyes: Positive for itching. Negative for pain, discharge and redness.   Respiratory: Positive for cough, shortness of breath and wheezing.    Cardiovascular: Negative for chest pain.   Gastrointestinal: Positive for abdominal pain, constipation and diarrhea. Negative for abdominal distention, nausea and vomiting.   Endocrine: Positive for cold intolerance and heat intolerance.   Genitourinary: Negative for difficulty urinating.   Musculoskeletal: Positive for back pain and neck pain. Negative for neck stiffness.   Integumentary:  Positive for rash.   Allergic/Immunologic: Positive for food allergies.   Neurological: Positive for dizziness and light-headedness. Negative for weakness and headaches.   Hematological: Negative.  Negative for adenopathy.   Psychiatric/Behavioral: Positive for decreased concentration and sleep disturbance. The patient is nervous/anxious.          Objective:      Physical Exam  Vitals reviewed.   Constitutional:       General: She is not in acute distress.     Appearance: She is well-developed. She is not ill-appearing or diaphoretic.   HENT:      Head: Normocephalic and atraumatic.      Jaw: No trismus.      Right Ear: Hearing, tympanic membrane, ear canal and external ear normal.      Left Ear: Hearing, tympanic membrane, ear canal and external ear normal.      Nose: Nose normal. No nasal deformity, mucosal edema or rhinorrhea.      Right Sinus: No maxillary sinus tenderness or frontal sinus tenderness.      Left Sinus: No maxillary sinus tenderness or frontal sinus tenderness.      Mouth/Throat:      Mouth: Mucous membranes are not pale, not dry and not cyanotic. No oral  lesions.      Dentition: Normal dentition. Does not have dentures. No dental caries.      Pharynx: Uvula midline. No oropharyngeal exudate, posterior oropharyngeal erythema or uvula swelling.      Tonsils: No tonsillar abscesses.     Eyes:      General: No scleral icterus.        Right eye: No discharge.         Left eye: No discharge.      Conjunctiva/sclera: Conjunctivae normal.   Neck:      Thyroid: No thyroid mass or thyromegaly.      Vascular: No JVD.      Trachea: Trachea and phonation normal. No tracheal tenderness or tracheal deviation.      Comments: Salivary glands - there are no lesions or asymmetric findings in the submandibular or parotid glands    Cardiovascular:      Rate and Rhythm: Normal rate.   Pulmonary:      Effort: Pulmonary effort is normal. No respiratory distress.      Breath sounds: No stridor.   Musculoskeletal:      Cervical back: Normal range of motion and neck supple.   Lymphadenopathy:      Head:      Right side of head: No submental, submandibular, tonsillar or preauricular adenopathy.      Left side of head: No submental, submandibular, tonsillar or preauricular adenopathy.      Cervical: No cervical adenopathy.   Skin:     General: Skin is warm and dry.      Coloration: Skin is not pale.      Findings: No erythema or rash.   Neurological:      Mental Status: She is alert and oriented to person, place, and time.      Cranial Nerves: No cranial nerve deficit.   Psychiatric:         Behavior: Behavior normal. Behavior is cooperative.         Thought Content: Thought content normal.         Assessment:       Problem List Items Addressed This Visit        ENT    Lesion of hard palate     Small lesion to hard palate. Possible related to partial. Magic mouthwash prescribed. RTC in 2 weeks for repeat evaluation. Consider biopsy if still present. Questions answered.             Other Visit Diagnoses     Abnormality of palate              Plan:       Problem List Items Addressed This Visit         ENT    Lesion of hard palate     Small lesion to hard palate. Possible related to partial. Magic mouthwash prescribed. RTC in 2 weeks for repeat evaluation. Consider biopsy if still present. Questions answered.             Other Visit Diagnoses     Abnormality of palate

## 2022-07-22 NOTE — PROGRESS NOTES
CHW reached out to new pt to remind her of office appointment with Merlyn Reno LCSW, on Monday. Pt confirmed the appointment.

## 2022-07-25 ENCOUNTER — PATIENT MESSAGE (OUTPATIENT)
Dept: BEHAVIORAL HEALTH | Facility: OTHER | Age: 62
End: 2022-07-25
Payer: COMMERCIAL

## 2022-07-25 ENCOUNTER — CLINICAL SUPPORT (OUTPATIENT)
Dept: BEHAVIORAL HEALTH | Facility: OTHER | Age: 62
End: 2022-07-25
Payer: COMMERCIAL

## 2022-07-25 DIAGNOSIS — F41.9 ANXIETY: ICD-10-CM

## 2022-07-25 PROCEDURE — 90791 PSYCH DIAGNOSTIC EVALUATION: CPT | Mod: S$GLB,,, | Performed by: SOCIAL WORKER

## 2022-07-25 PROCEDURE — 99999 PR PBB SHADOW E&M-EST. PATIENT-LVL II: CPT | Mod: PBBFAC,,, | Performed by: SOCIAL WORKER

## 2022-07-25 PROCEDURE — 90791 PR PSYCHIATRIC DIAGNOSTIC EVALUATION: ICD-10-PCS | Mod: S$GLB,,, | Performed by: SOCIAL WORKER

## 2022-07-25 PROCEDURE — 99999 PR PBB SHADOW E&M-EST. PATIENT-LVL II: ICD-10-PCS | Mod: PBBFAC,,, | Performed by: SOCIAL WORKER

## 2022-07-25 NOTE — PROGRESS NOTES
[facility] BEHAVIORAL HEALTH INTEGRATION INTAKE    DATE:  7/25/2022  REFERRAL SOURCE:  Sean Cruz MD  TYPE OF VISIT:  In person  LENGTH OF SESSION: 60  .  HISTORY OF PRESENTING ILLNESS:  Brianna Cantu, a 61 y.o. female with history of Anxiety disorders; generalized anxiety disorder [F41.1].  Met with patient.     Patient does not currently have a psychiatrist.    Patient does not currently have a therapist.     Previous Psychiatric Outpatient Treatment:  No  PT is not taking any mental health medication. They are not interested in medication changes.    Current symptoms:  · Depression: dysphoric mood, anhedonia, insomnia, worthlessness/guilt, hopelessness, fatigue, difficulty concentrating, increased appetite, decreased appetite and SI.  · Anxiety: excessive worrying, restlessness, muscle tension, panic attacks, obsessions/compulsions and flashbacks/nightmares.  · Insomnia: frequent night time awakening and non-restful sleep.  · Vy:  racing thoughts or rumination.  · Psychosis: disorganized speech/behavior.    PHQ9 7/11/2022   Total Score 23     GAD7 7/11/2022   1. Feeling nervous, anxious, or on edge? 2   2. Not being able to stop or control worrying? 2   3. Worrying too much about different things? 2   4. Trouble relaxing? 3   5. Being so restless that it is hard to sit still? 3   6. Becoming easily annoyed or irritable? 2   7. Feeling afraid as if something awful might happen? 2   8. If you checked off any problems, how difficult have these problems made it for you to do your work, take care of things at home, or get along with other people? 2   JOSE ANGEL-7 Score 16        Current social stressors:   PT stated work is a big stressor for her. A member of the management team is a control freak and micro manages. PT went to , but did not want manager to lose her job, so PT did not document it. PT later was told if she and her manager cant get along, then PT may get fired. That manager has left since then. PT  stated that her  is another stressor. He was in the [employer] and when PT met him, he did not drink or smoke and worked 3 jobs. After they got ,  flipped the switch and changed. He has PTSD and its like living with 3 different people. He has a gambling problem and drink alcohol excessively. He checked himself into treatment and stayed there for a month and a half. PT had cervical cancer in 1993 and had a full hysterectomy.     Risk assessment:  Patient reports suicidal ideation: PT has passive thoughts of death, but no plan or means  Patient reports no homicidal ideation  Patient reports no self-injurious behavior  Patient reports no violent behavior    PSYCHIATRIC HISTORY:  Previous Psychiatric Hospitalizations:  No  Previous SI/HI:   Yes - Passive thoughts of death but no plan or means  Previous Suicide Attempts:  No  Previous Medication Trials: No   Head trauma:  No  History of Trauma:  Yes  Legal Issues: No  Access to a Gun:  No    SUBSTANCE ABUSE HISTORY:  Tobacco:  Yes - 1/2 pack per day   Alcohol: none  Illicit Substances: No  Misuse of Prescription Medications:  No    MEDICAL HISTORY:  Past Medical History:   Diagnosis Date    Allergic rhinitis 4/27/2014    Anxiety 4/27/2014    Asthma     Hx of cervical cancer 4/27/2014    Obesity 4/27/2014    Vitamin D deficiency 4/27/2014       SOCIAL HISTORY (MARRIAGE, EMPLOYMENT, etc.):  Living Situation: PT lives with her , mom, and brother  Nuclear/Marriage: PT has been  30 years with no kids.  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  Mosque/Spirituality: Yes  Hobbies and Interests: PT like to walk in the park, watching movies, dancing    PSYCHIATRIC FAMILY HISTORY: sister had a mental breakdown, brother had some mental issues as well      MENTAL HEALTH STATUS EXAM  General Appearance:  unremarkable, age appropriate   Speech: normal tone, normal rate, normal pitch, normal  volume      Level of Cooperation: cooperative      Thought Processes: normal and logical   Mood: steady      Thought Content: normal, no suicidality, no homicidality, delusions, or paranoia   Affect: congruent and appropriate   Orientation: Oriented x3   Memory: recent >  intact   Attention Span & Concentration: intact   Fund of General Knowledge: intact and appropriate to age and level of education   Abstract Reasoning: interpretation of similarities was abstract   Judgment & Insight: fair     Language  intact       IMPRESSION:   My diagnostic impression is Anxiety disorders; generalized anxiety disorder [F41.1] and MAJOR DEPRESSIVE DISORDER, SINGLE EPISODE, Moderate (F32.1), as evidenced by PHQ9, GAD7, and intake assessment..     PROVISIONAL DIAGNOSES:  1. Anxiety         STRENGTHS AND LIABILITIES: Strength: Patient accepts guidance/feedback, Strength: Patient is expressive/articulate., Strength: Patient is intelligent., Strength: Patient is motivated for change., Strength: Patient has positive support network., Strength: Patient has reasonable judgment., Liability: Patient lacks coping skills.    TREATMENT GOALS: Anxiety: acquiring relapse prevention skills, reducing negative automatic thoughts, reducing physical symptoms of anxiety and reducing time spent worrying (<30 minutes/day)  Depression: acquiring relapse prevention skills, increasing energy, increasing motivation, reducing excessive guilt, reducing fatigue and reducing negative automatic thoughts    PLAN: In this session a psych evaluation was conducted to get history and process pt's life. CBT, Problem-solving Therapy, Solution-focused Therapy and Narrative Therapy  will be utilized in future individual  therapy sessions to increase insight, support and behavior modification.     RETURN TO CLINIC: 2 weeks

## 2022-07-27 ENCOUNTER — HOSPITAL ENCOUNTER (OUTPATIENT)
Dept: RADIOLOGY | Facility: OTHER | Age: 62
Discharge: HOME | End: 2022-07-27
Attending: CLINICAL NURSE SPECIALIST
Payer: COMMERCIAL

## 2022-07-27 DIAGNOSIS — M54.41 ACUTE BACK PAIN WITH SCIATICA, RIGHT: ICD-10-CM

## 2022-07-27 PROCEDURE — 72100 X-RAY EXAM L-S SPINE 2/3 VWS: CPT | Mod: 26,,, | Performed by: RADIOLOGY

## 2022-07-27 PROCEDURE — 72100 XR LUMBAR SPINE 2 OR 3 VIEWS: ICD-10-PCS | Mod: 26,,, | Performed by: RADIOLOGY

## 2022-07-27 PROCEDURE — 72100 X-RAY EXAM L-S SPINE 2/3 VWS: CPT | Mod: TC,FY

## 2022-07-28 ENCOUNTER — TELEPHONE (OUTPATIENT)
Dept: SLEEP MEDICINE | Facility: OTHER | Age: 62
End: 2022-07-28
Payer: COMMERCIAL

## 2022-08-01 ENCOUNTER — PATIENT MESSAGE (OUTPATIENT)
Dept: INTERNAL MEDICINE | Facility: CLINIC | Age: 62
End: 2022-08-01
Payer: COMMERCIAL

## 2022-08-01 ENCOUNTER — TELEPHONE (OUTPATIENT)
Dept: INTERNAL MEDICINE | Facility: CLINIC | Age: 62
End: 2022-08-01
Payer: COMMERCIAL

## 2022-08-01 NOTE — TELEPHONE ENCOUNTER
----- Message from Brandi Albrecht sent at 8/1/2022 10:31 AM CDT -----  Type: Patient Call Back    Who called:Self    What is the request in detail: Pt state that she is dropping off Leave of Absence paper for Dr. Cruz to complete within 15 days    Can the clinic reply by [facility]? no    Would the patient rather a call back or a response via [facility]? Call back    Best call back number: [phone]

## 2022-08-01 NOTE — TELEPHONE ENCOUNTER
Called pt as we have not rec'd paperwork at this time. Pt states her  will drop off in the AM or she will add to her MyChart and send via portal message.

## 2022-08-03 ENCOUNTER — TELEPHONE (OUTPATIENT)
Dept: SLEEP MEDICINE | Facility: OTHER | Age: 62
End: 2022-08-03
Payer: COMMERCIAL

## 2022-08-04 ENCOUNTER — PATIENT MESSAGE (OUTPATIENT)
Dept: INTERNAL MEDICINE | Facility: CLINIC | Age: 62
End: 2022-08-04
Payer: COMMERCIAL

## 2022-08-04 ENCOUNTER — TELEPHONE (OUTPATIENT)
Dept: INTERNAL MEDICINE | Facility: CLINIC | Age: 62
End: 2022-08-04
Payer: COMMERCIAL

## 2022-08-04 ENCOUNTER — HOSPITAL ENCOUNTER (OUTPATIENT)
Dept: SLEEP MEDICINE | Facility: OTHER | Age: 62
Discharge: HOME | End: 2022-08-04
Attending: INTERNAL MEDICINE
Payer: COMMERCIAL

## 2022-08-04 DIAGNOSIS — F51.09 OTHER INSOMNIA NOT DUE TO A SUBSTANCE OR KNOWN PHYSIOLOGICAL CONDITION: ICD-10-CM

## 2022-08-04 DIAGNOSIS — G47.30 SLEEP APNEA, UNSPECIFIED TYPE: ICD-10-CM

## 2022-08-04 DIAGNOSIS — G47.10 HYPERSOMNOLENCE: ICD-10-CM

## 2022-08-04 DIAGNOSIS — R35.1 NOCTURIA: ICD-10-CM

## 2022-08-04 DIAGNOSIS — M54.41 ACUTE RIGHT-SIDED LOW BACK PAIN WITH RIGHT-SIDED SCIATICA: Primary | ICD-10-CM

## 2022-08-04 PROBLEM — F41.1 GAD (GENERALIZED ANXIETY DISORDER): Status: ACTIVE | Noted: 2022-08-04

## 2022-08-04 PROCEDURE — 95806 SLEEP STUDY UNATT&RESP EFFT: CPT | Mod: 26,,, | Performed by: INTERNAL MEDICINE

## 2022-08-04 PROCEDURE — 95806 PR SLEEP STUDY, UNATTENDED, SIMUL RECORD HR/O2 SAT/RESP FLOW/RESP EFFT: ICD-10-PCS | Mod: 26,,, | Performed by: INTERNAL MEDICINE

## 2022-08-04 PROCEDURE — 95800 SLP STDY UNATTENDED: CPT

## 2022-08-04 NOTE — TELEPHONE ENCOUNTER
Scanned/emailed FMLA forms to [email or web address]  . I Have attached a confirmation SENT sheet to this email located in my file cabinet.

## 2022-08-04 NOTE — TELEPHONE ENCOUNTER
Additional info:    Scanned/emailed FMLA forms for [employer] requesting pt r records to support FMLA request to [email or web address]  . I Have attached a confirmation SENT sheet to this email located in my file cabinet

## 2022-08-05 ENCOUNTER — TELEPHONE (OUTPATIENT)
Dept: PAIN MEDICINE | Facility: CLINIC | Age: 62
End: 2022-08-05
Payer: COMMERCIAL

## 2022-08-05 NOTE — TELEPHONE ENCOUNTER
This message is for patient in regards to his/her appointment 08/80/22 at 1:00p  With Dr. Sai Oneil MD.        [facility] Policy: For the safety of all patients and staff members.     Patient Visitor policy: During this visit we're informing all patients that face mask are now required.  If you have any questions or concerns please contact [phone]       also inquired IPM within message.    [facility] providers and Mid-levels offer interventional, procedure--based options to treat chronic pain. The goal is to manage chronic pain by reducing pain frequency and intensity and address your functional goals for activities of daily living while simultaneously reducing or eliminating your reliance on medications. Please bring any records or images that you have from prior treatments for your pain. You will be presented with multi-modal treatment plan that may or may not include imaging, interventional procedures, physical/occupational/aqua therapy, pain creams, and non-narcotic pain medications used for the treatments of chronic pain.

## 2022-08-08 ENCOUNTER — TELEPHONE (OUTPATIENT)
Dept: INTERNAL MEDICINE | Facility: CLINIC | Age: 62
End: 2022-08-08
Payer: COMMERCIAL

## 2022-08-08 ENCOUNTER — PATIENT MESSAGE (OUTPATIENT)
Dept: SLEEP MEDICINE | Facility: CLINIC | Age: 62
End: 2022-08-08
Payer: COMMERCIAL

## 2022-08-08 ENCOUNTER — OFFICE VISIT (OUTPATIENT)
Dept: PAIN MEDICINE | Facility: CLINIC | Age: 62
End: 2022-08-08
Payer: COMMERCIAL

## 2022-08-08 VITALS
BODY MASS INDEX: 37.77 KG/M2 | HEIGHT: 66 IN | SYSTOLIC BLOOD PRESSURE: 153 MMHG | DIASTOLIC BLOOD PRESSURE: 101 MMHG | WEIGHT: 235 LBS | HEART RATE: 83 BPM | RESPIRATION RATE: 18 BRPM

## 2022-08-08 DIAGNOSIS — M54.9 DORSALGIA, UNSPECIFIED: ICD-10-CM

## 2022-08-08 DIAGNOSIS — G89.4 CHRONIC PAIN DISORDER: ICD-10-CM

## 2022-08-08 DIAGNOSIS — G47.33 SEVERE OBSTRUCTIVE SLEEP APNEA: Primary | ICD-10-CM

## 2022-08-08 DIAGNOSIS — M51.36 DDD (DEGENERATIVE DISC DISEASE), LUMBAR: Primary | ICD-10-CM

## 2022-08-08 PROCEDURE — 3077F SYST BP >= 140 MM HG: CPT | Mod: CPTII,S$GLB,, | Performed by: ANESTHESIOLOGY

## 2022-08-08 PROCEDURE — 3044F PR MOST RECENT HEMOGLOBIN A1C LEVEL <7.0%: ICD-10-PCS | Mod: CPTII,S$GLB,, | Performed by: ANESTHESIOLOGY

## 2022-08-08 PROCEDURE — 3008F PR BODY MASS INDEX (BMI) DOCUMENTED: ICD-10-PCS | Mod: CPTII,S$GLB,, | Performed by: ANESTHESIOLOGY

## 2022-08-08 PROCEDURE — 99999 PR PBB SHADOW E&M-EST. PATIENT-LVL III: ICD-10-PCS | Mod: PBBFAC,,, | Performed by: ANESTHESIOLOGY

## 2022-08-08 PROCEDURE — 3077F PR MOST RECENT SYSTOLIC BLOOD PRESSURE >= 140 MM HG: ICD-10-PCS | Mod: CPTII,S$GLB,, | Performed by: ANESTHESIOLOGY

## 2022-08-08 PROCEDURE — 1160F PR REVIEW ALL MEDS BY PRESCRIBER/CLIN PHARMACIST DOCUMENTED: ICD-10-PCS | Mod: CPTII,S$GLB,, | Performed by: ANESTHESIOLOGY

## 2022-08-08 PROCEDURE — 1159F MED LIST DOCD IN RCRD: CPT | Mod: CPTII,S$GLB,, | Performed by: ANESTHESIOLOGY

## 2022-08-08 PROCEDURE — 3044F HG A1C LEVEL LT 7.0%: CPT | Mod: CPTII,S$GLB,, | Performed by: ANESTHESIOLOGY

## 2022-08-08 PROCEDURE — 99999 PR PBB SHADOW E&M-EST. PATIENT-LVL III: CPT | Mod: PBBFAC,,, | Performed by: ANESTHESIOLOGY

## 2022-08-08 PROCEDURE — 1160F RVW MEDS BY RX/DR IN RCRD: CPT | Mod: CPTII,S$GLB,, | Performed by: ANESTHESIOLOGY

## 2022-08-08 PROCEDURE — 99204 PR OFFICE/OUTPT VISIT, NEW, LEVL IV, 45-59 MIN: ICD-10-PCS | Mod: S$GLB,,, | Performed by: ANESTHESIOLOGY

## 2022-08-08 PROCEDURE — 1159F PR MEDICATION LIST DOCUMENTED IN MEDICAL RECORD: ICD-10-PCS | Mod: CPTII,S$GLB,, | Performed by: ANESTHESIOLOGY

## 2022-08-08 PROCEDURE — 3080F DIAST BP >= 90 MM HG: CPT | Mod: CPTII,S$GLB,, | Performed by: ANESTHESIOLOGY

## 2022-08-08 PROCEDURE — 3080F PR MOST RECENT DIASTOLIC BLOOD PRESSURE >= 90 MM HG: ICD-10-PCS | Mod: CPTII,S$GLB,, | Performed by: ANESTHESIOLOGY

## 2022-08-08 PROCEDURE — 4010F PR ACE/ARB THEARPY RXD/TAKEN: ICD-10-PCS | Mod: CPTII,S$GLB,, | Performed by: ANESTHESIOLOGY

## 2022-08-08 PROCEDURE — 4010F ACE/ARB THERAPY RXD/TAKEN: CPT | Mod: CPTII,S$GLB,, | Performed by: ANESTHESIOLOGY

## 2022-08-08 PROCEDURE — 3008F BODY MASS INDEX DOCD: CPT | Mod: CPTII,S$GLB,, | Performed by: ANESTHESIOLOGY

## 2022-08-08 PROCEDURE — 99204 OFFICE O/P NEW MOD 45 MIN: CPT | Mod: S$GLB,,, | Performed by: ANESTHESIOLOGY

## 2022-08-08 RX ORDER — MELOXICAM 15 MG/1
15 TABLET ORAL DAILY
Qty: 30 TABLET | Refills: 0 | Status: SHIPPED | OUTPATIENT
Start: 2022-08-08 | End: 2022-09-07

## 2022-08-08 RX ORDER — CYCLOBENZAPRINE HCL 5 MG
5 TABLET ORAL 3 TIMES DAILY PRN
Qty: 90 TABLET | Refills: 0 | Status: SHIPPED | OUTPATIENT
Start: 2022-08-08 | End: 2022-09-07

## 2022-08-08 RX ORDER — GABAPENTIN 300 MG/1
300 CAPSULE ORAL 3 TIMES DAILY
Qty: 90 CAPSULE | Refills: 2 | Status: SHIPPED | OUTPATIENT
Start: 2022-08-08 | End: 2022-09-07

## 2022-08-08 NOTE — PROGRESS NOTES
Chronic Pain - New Consult    Referring Physician: Mariaa Gordon NP    Chief Complaint: Lower back and leg pain     SUBJECTIVE:    Brianna Cantu presents to the clinic for the evaluation of lower back pain. The pain started about a months ago following no incident and symptoms have been unchanged.The pain is located in the right lower back area area and radiates to the right leg.  The pain is described as aching, sharp and shooting and is rated as 7/10. The pain is rated with a score of  4/10 on the BEST day and a score of 10/10 on the WORST day.  Symptoms interfere with daily activity and sleeping. The pain is exacerbated by Standing, Laying and Walking.  The pain is mitigated by sitting and medications.  Patient denies night fever/night sweats, urinary incontinence, bowel incontinence, significant weight loss, significant motor weakness and loss of sensations. She was started on Gabapentin and IM injection of steroid and possible NSAID was given.     Physical Therapy/Home Exercise: no      Pain Disability Index Review:  Last 3 PDI Scores 8/8/2022   Pain Disability Index (PDI) 49       Pain Medications:    Gabapentin 300 mg at night.  Ibuprofen as needed  Tylenol       report:  Not applicable    Pain Procedures: None    Imaging:     X-Ray Lumbar Spine 2 Or 3 Views  Order: [id number]   Status: Final result     Visible to patient: Yes (seen)     Next appt: 08/09/2022 at 12:30 PM in Behavioral Health (Merlyn Reno LCSW)     Dx: Acute back pain with sciatica, right     0 Result Notes    Details    Reading Physician Reading Date Result Priority   Olayinka Jacob DO  [phone]  [phone] 7/27/2022 Routine     Narrative & Impression  EXAMINATION:  XR LUMBAR SPINE 2 OR 3 VIEWS     CLINICAL HISTORY:  Lumbago with sciatica, right side     TECHNIQUE:  AP and lateral views of the lumbar spine     COMPARISON:  None     FINDINGS:  Trace grade 1 anterolisthesis of L L4 on L5 with trace grade 1  retrolisthesis of L3 on L4.  The lumbar vertebral body heights and contours are within normal is without evidence for acute fracture.  Facet degenerative change lower lumbar levels.  Short-segment linear hyperdensity within the pelvic soft tissues only seen on the lateral view which may be extrinsic to the patient however indeterminate.  Further evaluation as warranted clinically.     Impression:     Please see above        Electronically signed by: Olayinka Jacob DO  Date:                                            07/27/2022  Time:                                           15:45         Past Medical History:   Diagnosis Date    Allergic rhinitis 4/27/2014    Anxiety 4/27/2014    Asthma     Hx of cervical cancer 4/27/2014    Obesity 4/27/2014    Vitamin D deficiency 4/27/2014     Past Surgical History:   Procedure Laterality Date    COLONOSCOPY      COLONOSCOPY N/A 9/24/2018    Procedure: COLONOSCOPY;  Surgeon: Montrell Gan MD;  Location: [address]);  Service: Endoscopy;  Laterality: N/A;    ESOPHAGOGASTRODUODENOSCOPY      ESOPHAGOGASTRODUODENOSCOPY N/A 9/24/2018    Procedure: EGD (ESOPHAGOGASTRODUODENOSCOPY);  Surgeon: Montrell Gan MD;  Location: [address]);  Service: Endoscopy;  Laterality: N/A;    TOTAL ABDOMINAL HYSTERECTOMY       Social History     Socioeconomic History    Marital status:    Tobacco Use    Smoking status: Current Every Day Smoker     Packs/day: 0.50     Types: Cigarettes    Smokeless tobacco: Never Used   Substance and Sexual Activity    Alcohol use: Yes     Alcohol/week: 0.0 standard drinks     Comment: occasional    Drug use: No    Sexual activity: Not Currently     Family History   Problem Relation Age of Onset    Hypertension Mother     Allergies Mother     Hypertension Sister     Allergies Sister     Allergies Sister     Heart disease Neg Hx        Review of patient's allergies indicates:  No Known Allergies    Current Outpatient  Medications   Medication Sig    (Magic mouthwash) 1:1:1 diphenhydramine(Benadryl) 12.5mg/5ml liq, aluminum & magnesium hydroxide-simethicone (Maalox), LIDOcaine viscous 2% Swish and spit 10 mLs every 4 (four) hours as needed (mouth ulcer). for mouth sores    albuterol (PROVENTIL/VENTOLIN HFA) 90 mcg/actuation inhaler Inhale 2 puffs into the lungs every 6 (six) hours as needed for Wheezing. (Patient not taking: No sig reported)    amlodipine-valsartan (EXFORGE) 5-160 mg per tablet Take 1 tablet by mouth once daily.    azelastine (ASTELIN) 137 mcg (0.1 %) nasal spray 2 sprays (274 mcg total) by Nasal route 2 (two) times daily as needed for Rhinitis. Donot snort (because it tastes bad)    cholecalciferol, vitamin D3, 1,250 mcg (50,000 unit) capsule Take 1 capsule (50,000 Units total) by mouth once a week.    fluticasone propionate (FLONASE) 50 mcg/actuation nasal spray 1 spray (50 mcg total) by Each Nostril route daily as needed for Allergies.    fluticasone-salmeterol diskus inhaler 250-50 mcg Inhale 1 puff into the lungs 2 (two) times daily. (Patient not taking: Reported on 7/1/2022)    gabapentin (NEURONTIN) 100 MG capsule Take 1 capsule (100 mg total) by mouth every evening. for 14 days    loratadine (CLARITIN) 10 mg tablet Take 10 mg by mouth.    methocarbamoL (ROBAXIN) 500 MG Tab     oxybutynin (DITROPAN-XL) 10 MG 24 hr tablet Take 1 tablet (10 mg total) by mouth once daily.    pantoprazole (PROTONIX) 40 MG tablet Take 1 tablet (40 mg total) by mouth 2 (two) times daily.    valACYclovir (VALTREX) 1000 MG tablet TAKE 1 TABLET(1000 MG) BY MOUTH EVERY 12 HOURS     No current facility-administered medications for this visit.       REVIEW OF SYSTEMS:    GENERAL:  No weight loss, malaise or fevers.  HEENT:  Negative for frequent or significant headaches.  NECK:  Negative for lumps, goiter, pain and significant neck swelling.  RESPIRATORY:  Negative for cough, wheezing or shortness of  "breath.  CARDIOVASCULAR:  Negative for chest pain, leg swelling or palpitations.  GI:  Negative for abdominal discomfort, blood in stools or black stools or change in bowel habits.  MUSCULOSKELETAL:  See HPI.  SKIN:  Negative for lesions, rash, and itching.  PSYCH:  Negative for sleep disturbance, mood disorder and recent psychosocial stressors.  HEMATOLOGY/LYMPHOLOGY:  Negative for prolonged bleeding, bruising easily or swollen nodes.  NEURO:   No history of headaches, syncope, paralysis, seizures or tremors.  All other reviewed and negative other than HPI.    OBJECTIVE:    BP (!) 153/101 (BP Location: Right arm, Patient Position: Sitting, BP Method: Medium (Automatic))   Pulse 83   Resp 18   Ht 5' 6" (1.676 m)   Wt 106.6 kg (235 lb)   BMI 37.93 kg/m²     PHYSICAL EXAMINATION:    General appearance: Well appearing, in no acute distress, alert and oriented x3.  Psych:  Mood and affect appropriate.  Skin: Skin color, texture, turgor normal, no rashes or lesions, in both upper and lower body.  Head/face:  Normocephalic, atraumatic. No palpable lymph nodes.  Neck: No pain to palpation over the cervical paraspinous muscles. Spurling Negative. No pain with neck flexion, extension, or lateral flexion.   Cor: RRR  Pulm: CTA  GI:  Soft and non-tender.  Back: Straight leg raising in the sitting and supine positions is negative to radicular pain. Tenderness to palpation over lumbar paraspinal muscles. Limited ROM of lumbar spine.    Extremities: Peripheral joint ROM is full and pain free without obvious instability or laxity in all four extremities. No deformities, edema, or skin discoloration. Good capillary refill.  Musculoskeletal: Shoulder, hip, sacroiliac and knee provocative maneuvers are negative. Bilateral upper and lower extremity strength is normal and symmetric.  No atrophy or tone abnormalities are noted.  Neuro: Bilateral upper and lower extremity coordination and muscle stretch reflexes are physiologic and " symmetric.  Plantar response are downgoing. No loss of sensation is noted.  Gait: Antalgic.    ASSESSMENT: 61 y.o. year old female with chronic lower back pain with radiation to right leg consistent with lumbar radiculopathy.       1. DDD (degenerative disc disease), lumbar  Ambulatory referral/consult to Physical/Occupational Therapy   2. Chronic pain disorder  Ambulatory referral/consult to Physical/Occupational Therapy   3. Dorsalgia, unspecified  MRI Lumbar Spine Without Contrast         PLAN:     - I have stressed the importance of physical activity and a home exercise plan to help with pain and improve health.  - Referral to Physical therapy for Lumbar stabilization, core strengthening, and a home exercise program.  - Order MRI of the Lumbar Spine to help evaluate possible source of pain.  - Start Neurontin 300mg gradually to three times a day to help with radicular pain.  - Start Flexeril 5 mg TID as needed.  - Start MOBIC 15 mg once a day as needed.  - RTC in 6-8 weeks.  - Counseled patient regarding the importance of activity modification and physical therapy.    The above plan and management options were discussed at length with patient. Patient is in agreement with the above and verbalized understanding. It will be communicated with the referring physician via electronic record, fax, or mail.    Sai Oneil  08/08/2022

## 2022-08-08 NOTE — TELEPHONE ENCOUNTER
Please sign pages 3,4, & 5 of FMLA forms      Pt came in today to f/u up on her FMLA forms and I informed her that we have received the forms by email from disability dept requesting the above. And once Dr. Cruz signs the forms we will email the forms back to disability dept. Pt verbalized understanding

## 2022-08-09 ENCOUNTER — CLINICAL SUPPORT (OUTPATIENT)
Dept: BEHAVIORAL HEALTH | Facility: OTHER | Age: 62
End: 2022-08-09
Payer: COMMERCIAL

## 2022-08-09 ENCOUNTER — TELEPHONE (OUTPATIENT)
Dept: INTERNAL MEDICINE | Facility: CLINIC | Age: 62
End: 2022-08-09
Payer: COMMERCIAL

## 2022-08-09 ENCOUNTER — PATIENT MESSAGE (OUTPATIENT)
Dept: BEHAVIORAL HEALTH | Facility: OTHER | Age: 62
End: 2022-08-09
Payer: COMMERCIAL

## 2022-08-09 DIAGNOSIS — F41.1 GAD (GENERALIZED ANXIETY DISORDER): Primary | ICD-10-CM

## 2022-08-09 DIAGNOSIS — F32.9 REACTIVE DEPRESSION: ICD-10-CM

## 2022-08-09 PROCEDURE — 99999 PR PBB SHADOW E&M-EST. PATIENT-LVL II: ICD-10-PCS | Mod: PBBFAC,,, | Performed by: SOCIAL WORKER

## 2022-08-09 PROCEDURE — 99999 PR PBB SHADOW E&M-EST. PATIENT-LVL II: CPT | Mod: PBBFAC,,, | Performed by: SOCIAL WORKER

## 2022-08-09 NOTE — PROGRESS NOTES
Individual Psychotherapy (LCSW/PhD)  Brianna DEV Cantu,  8/9/2022    REFERRAL SOURCE:  Sean Cruz MD  TYPE OF VISIT:  In person  LENGTH OF SESSION: 30  Site:  [facility]    Therapeutic Intervention: Met with patient for individual psychotherapy.    Chief complaint/reason for encounter: depression and anxiety     Interval history and content of current session: PT and  decided to seperate but he will still be supportive throughout her care. [facility]  and [facility] card. PT was not able to make it to the family reunion due to a panic attack. PT attempted to send SW a msg but was not successful. PT msg was about past trauma and possibly needing long term therapy. SW submitted a referral for long term therapy and encouraged PT to call and schedule an appt. That way, if the [employer] card does not come in and [facility] is unable [facility], she will have a psych appt on the books.     Treatment plan:  · Target symptoms: depression, anxiety   · Why chosen therapy is appropriate versus another modality: relevant to diagnosis  · Outcome monitoring methods: self-report  · Therapeutic intervention type: insight oriented psychotherapy, behavior modifying psychotherapy, supportive psychotherapy    Risk parameters:  Patient reports no suicidal ideation  Patient reports no homicidal ideation  Patient reports no self-injurious behavior  Patient reports no violent behavior    Verbal deficits: None    Patient's response to intervention:  The patient's response to intervention is accepting.    Progress toward goals and other mental status changes:  The patient's progress toward goals is fair .    Diagnosis: JOSE ANGEL and Reactive Depression      Plan: Pt plans to continue individual psychotherapy    Return to clinic: 2 weeks    Length of Service (minutes): 30

## 2022-08-09 NOTE — TELEPHONE ENCOUNTER
"LVM on pt cell stating that Dr. Cruz has reviewed her FMLA forms that was sent back to my email from disability and that Dr. Cruz says, "I do not agree nor recommend" and that he'd already spoken to her about this  "

## 2022-08-10 ENCOUNTER — TELEPHONE (OUTPATIENT)
Dept: INTERNAL MEDICINE | Facility: CLINIC | Age: 62
End: 2022-08-10
Payer: COMMERCIAL

## 2022-08-10 ENCOUNTER — TELEPHONE (OUTPATIENT)
Dept: BEHAVIORAL HEALTH | Facility: OTHER | Age: 62
End: 2022-08-10
Payer: COMMERCIAL

## 2022-08-10 NOTE — TELEPHONE ENCOUNTER
Spoke w/ pt and she states that she needs a new pcp bc she states the one that she has is not a great fit for her. I scheduled pt to [facility] care w/ dr. fu next week. Apt agreed.

## 2022-08-10 NOTE — TELEPHONE ENCOUNTER
----- Message from Haresh Monique sent at 8/10/2022 11:44 AM CDT -----  Contact: Patient  The pt called and would like to schedule an appt    Please call her back at [phone]

## 2022-08-10 NOTE — TELEPHONE ENCOUNTER
Jaspreet Velazquez, Anthony,    This PT left a vm. Can you clarify what's going on with her Disability/FMLA denial. I didn't even know she had a request for disability in, Im confused.

## 2022-08-10 NOTE — PROGRESS NOTES
Patient called CHW and left voicemail regarding PCP denying Disability/FMLA leave request. Patient is very upset and is requesting LCSWs assistance looking into this.

## 2022-08-11 ENCOUNTER — OFFICE VISIT (OUTPATIENT)
Dept: OTOLARYNGOLOGY | Facility: CLINIC | Age: 62
End: 2022-08-11
Payer: COMMERCIAL

## 2022-08-11 ENCOUNTER — PATIENT MESSAGE (OUTPATIENT)
Dept: BEHAVIORAL HEALTH | Facility: OTHER | Age: 62
End: 2022-08-11
Payer: COMMERCIAL

## 2022-08-11 VITALS
BODY MASS INDEX: 37.93 KG/M2 | SYSTOLIC BLOOD PRESSURE: 139 MMHG | HEART RATE: 82 BPM | DIASTOLIC BLOOD PRESSURE: 83 MMHG | WEIGHT: 235 LBS

## 2022-08-11 DIAGNOSIS — K13.79 LESION OF HARD PALATE: Primary | ICD-10-CM

## 2022-08-11 PROCEDURE — 3075F SYST BP GE 130 - 139MM HG: CPT | Mod: CPTII,S$GLB,, | Performed by: NURSE PRACTITIONER

## 2022-08-11 PROCEDURE — 3079F PR MOST RECENT DIASTOLIC BLOOD PRESSURE 80-89 MM HG: ICD-10-PCS | Mod: CPTII,S$GLB,, | Performed by: NURSE PRACTITIONER

## 2022-08-11 PROCEDURE — 3079F DIAST BP 80-89 MM HG: CPT | Mod: CPTII,S$GLB,, | Performed by: NURSE PRACTITIONER

## 2022-08-11 PROCEDURE — 99213 PR OFFICE/OUTPT VISIT, EST, LEVL III, 20-29 MIN: ICD-10-PCS | Mod: S$GLB,,, | Performed by: NURSE PRACTITIONER

## 2022-08-11 PROCEDURE — 3075F PR MOST RECENT SYSTOLIC BLOOD PRESS GE 130-139MM HG: ICD-10-PCS | Mod: CPTII,S$GLB,, | Performed by: NURSE PRACTITIONER

## 2022-08-11 PROCEDURE — 1159F PR MEDICATION LIST DOCUMENTED IN MEDICAL RECORD: ICD-10-PCS | Mod: CPTII,S$GLB,, | Performed by: NURSE PRACTITIONER

## 2022-08-11 PROCEDURE — 1159F MED LIST DOCD IN RCRD: CPT | Mod: CPTII,S$GLB,, | Performed by: NURSE PRACTITIONER

## 2022-08-11 PROCEDURE — 4010F ACE/ARB THERAPY RXD/TAKEN: CPT | Mod: CPTII,S$GLB,, | Performed by: NURSE PRACTITIONER

## 2022-08-11 PROCEDURE — 99999 PR PBB SHADOW E&M-EST. PATIENT-LVL III: ICD-10-PCS | Mod: PBBFAC,,, | Performed by: NURSE PRACTITIONER

## 2022-08-11 PROCEDURE — 3044F HG A1C LEVEL LT 7.0%: CPT | Mod: CPTII,S$GLB,, | Performed by: NURSE PRACTITIONER

## 2022-08-11 PROCEDURE — 4010F PR ACE/ARB THEARPY RXD/TAKEN: ICD-10-PCS | Mod: CPTII,S$GLB,, | Performed by: NURSE PRACTITIONER

## 2022-08-11 PROCEDURE — 3008F BODY MASS INDEX DOCD: CPT | Mod: CPTII,S$GLB,, | Performed by: NURSE PRACTITIONER

## 2022-08-11 PROCEDURE — 99999 PR PBB SHADOW E&M-EST. PATIENT-LVL III: CPT | Mod: PBBFAC,,, | Performed by: NURSE PRACTITIONER

## 2022-08-11 PROCEDURE — 99213 OFFICE O/P EST LOW 20 MIN: CPT | Mod: S$GLB,,, | Performed by: NURSE PRACTITIONER

## 2022-08-11 PROCEDURE — 3008F PR BODY MASS INDEX (BMI) DOCUMENTED: ICD-10-PCS | Mod: CPTII,S$GLB,, | Performed by: NURSE PRACTITIONER

## 2022-08-11 PROCEDURE — 3044F PR MOST RECENT HEMOGLOBIN A1C LEVEL <7.0%: ICD-10-PCS | Mod: CPTII,S$GLB,, | Performed by: NURSE PRACTITIONER

## 2022-08-11 RX ORDER — LIDOCAINE HYDROCHLORIDE 20 MG/ML
SOLUTION ORAL; TOPICAL
COMMUNITY
Start: 2022-07-22

## 2022-08-11 NOTE — ASSESSMENT & PLAN NOTE
Palate lesion no longer tender. Discussed that this is likely due to irritation from her loose fitting plate. She will follow up with dentistry for this. RTC here in 1 month to recheck site. Questions answered.

## 2022-08-15 ENCOUNTER — OFFICE VISIT (OUTPATIENT)
Dept: DERMATOLOGY | Facility: CLINIC | Age: 62
End: 2022-08-15
Payer: COMMERCIAL

## 2022-08-15 DIAGNOSIS — D23.5 DILATED PORE OF WINER OF BACK: Primary | ICD-10-CM

## 2022-08-15 DIAGNOSIS — A60.00 GENITAL HERPES SIMPLEX, UNSPECIFIED SITE: ICD-10-CM

## 2022-08-15 PROCEDURE — 1159F MED LIST DOCD IN RCRD: CPT | Mod: CPTII,S$GLB,, | Performed by: DERMATOLOGY

## 2022-08-15 PROCEDURE — 1160F PR REVIEW ALL MEDS BY PRESCRIBER/CLIN PHARMACIST DOCUMENTED: ICD-10-PCS | Mod: CPTII,S$GLB,, | Performed by: DERMATOLOGY

## 2022-08-15 PROCEDURE — 3044F HG A1C LEVEL LT 7.0%: CPT | Mod: CPTII,S$GLB,, | Performed by: DERMATOLOGY

## 2022-08-15 PROCEDURE — 4010F PR ACE/ARB THEARPY RXD/TAKEN: ICD-10-PCS | Mod: CPTII,S$GLB,, | Performed by: DERMATOLOGY

## 2022-08-15 PROCEDURE — 1159F PR MEDICATION LIST DOCUMENTED IN MEDICAL RECORD: ICD-10-PCS | Mod: CPTII,S$GLB,, | Performed by: DERMATOLOGY

## 2022-08-15 PROCEDURE — 99999 PR PBB SHADOW E&M-EST. PATIENT-LVL III: CPT | Mod: PBBFAC,,, | Performed by: DERMATOLOGY

## 2022-08-15 PROCEDURE — 99999 PR PBB SHADOW E&M-EST. PATIENT-LVL III: ICD-10-PCS | Mod: PBBFAC,,, | Performed by: DERMATOLOGY

## 2022-08-15 PROCEDURE — 4010F ACE/ARB THERAPY RXD/TAKEN: CPT | Mod: CPTII,S$GLB,, | Performed by: DERMATOLOGY

## 2022-08-15 PROCEDURE — 99204 PR OFFICE/OUTPT VISIT, NEW, LEVL IV, 45-59 MIN: ICD-10-PCS | Mod: S$GLB,,, | Performed by: DERMATOLOGY

## 2022-08-15 PROCEDURE — 1160F RVW MEDS BY RX/DR IN RCRD: CPT | Mod: CPTII,S$GLB,, | Performed by: DERMATOLOGY

## 2022-08-15 PROCEDURE — 3044F PR MOST RECENT HEMOGLOBIN A1C LEVEL <7.0%: ICD-10-PCS | Mod: CPTII,S$GLB,, | Performed by: DERMATOLOGY

## 2022-08-15 PROCEDURE — 99204 OFFICE O/P NEW MOD 45 MIN: CPT | Mod: S$GLB,,, | Performed by: DERMATOLOGY

## 2022-08-15 RX ORDER — VALACYCLOVIR HYDROCHLORIDE 1 G/1
TABLET, FILM COATED ORAL
Qty: 30 TABLET | Refills: 3 | Status: SHIPPED | OUTPATIENT
Start: 2022-08-15

## 2022-08-15 NOTE — PROGRESS NOTES
Subjective:       Patient ID:  Brianna Cantu is a 61 y.o. female who presents for   Chief Complaint   Patient presents with    Itching     Lower body     History of Present Illness: The patient presents to establish care and for evaluation of rashes (none present today) and itching.    The patient has never been to a dermatologist before.     Patient with new complaint of lesion(s)  Location: above buttocks (same area) - has prodrome prior to eruption  Duration: intermittent x many years  Symptoms: believes it was Shingles/ never diagnosed  Relieving factors/Previous treatments: Carmex    Patient with new complaint of lesion(s)  Location: back  Duration: 5-6 years  Symptoms: blackhead  Relieving factors/Previous treatments: none                  Review of Systems   Skin: Positive for itching and rash.        Objective:    Physical Exam   Constitutional: She appears well-developed and well-nourished. She is obese.  No distress.   Neurological: She is alert and oriented to person, place, and time. She is not disoriented.   Psychiatric: She has a normal mood and affect.   Skin:   Areas Examined (abnormalities noted in diagram):   Genitals / Buttocks / Groin Inspection Performed  Back Inspection Performed              Diagram Legend     Erythematous scaling macule/papule c/w actinic keratosis       Vascular papule c/w angioma      Pigmented verrucoid papule/plaque c/w seborrheic keratosis      Yellow umbilicated papule c/w sebaceous hyperplasia      Irregularly shaped tan macule c/w lentigo     1-2 mm smooth white papules consistent with Milia      Movable subcutaneous cyst with punctum c/w epidermal inclusion cyst      Subcutaneous movable cyst c/w pilar cyst      Firm pink to brown papule c/w dermatofibroma      Pedunculated fleshy papule(s) c/w skin tag(s)      Evenly pigmented macule c/w junctional nevus     Mildly variegated pigmented, slightly irregular-bordered macule c/w mildly atypical nevus      Flesh  colored to evenly pigmented papule c/w intradermal nevus       Pink pearly papule/plaque c/w basal cell carcinoma      Erythematous hyperkeratotic cursted plaque c/w SCC      Surgical scar with no sign of skin cancer recurrence      Open and closed comedones      Inflammatory papules and pustules      Verrucoid papule consistent consistent with wart     Erythematous eczematous patches and plaques     Dystrophic onycholytic nail with subungual debris c/w onychomycosis     Umbilicated papule    Erythematous-base heme-crusted tan verrucoid plaque consistent with inflamed seborrheic keratosis     Erythematous Silvery Scaling Plaque c/w Psoriasis     See annotation      Assessment / Plan:        Dilated pore of Regulo of back x 2  Reassurance given to patient. No treatment is necessary.   D/c manipulation    Genital herpes simplex, unspecified site - currently just PIPA; flares with prodrome 3x/year  -     valACYclovir (VALTREX) 1000 MG tablet; Take 2 pills po bid x 2 days prn first signs of outbreak  Dispense: 30 tablet; Refill: 3             No follow-ups on file.

## 2022-08-18 ENCOUNTER — TELEPHONE (OUTPATIENT)
Dept: NEUROLOGY | Facility: CLINIC | Age: 62
End: 2022-08-18
Payer: COMMERCIAL

## 2022-08-18 ENCOUNTER — OFFICE VISIT (OUTPATIENT)
Dept: INTERNAL MEDICINE | Facility: CLINIC | Age: 62
End: 2022-08-18
Payer: COMMERCIAL

## 2022-08-18 VITALS
BODY MASS INDEX: 38.3 KG/M2 | HEIGHT: 66 IN | DIASTOLIC BLOOD PRESSURE: 72 MMHG | WEIGHT: 238.31 LBS | HEART RATE: 80 BPM | OXYGEN SATURATION: 99 % | SYSTOLIC BLOOD PRESSURE: 112 MMHG

## 2022-08-18 DIAGNOSIS — F41.1 GAD (GENERALIZED ANXIETY DISORDER): ICD-10-CM

## 2022-08-18 DIAGNOSIS — K58.0 IRRITABLE BOWEL SYNDROME WITH DIARRHEA: ICD-10-CM

## 2022-08-18 DIAGNOSIS — I10 BENIGN ESSENTIAL HYPERTENSION: ICD-10-CM

## 2022-08-18 DIAGNOSIS — M54.2 CERVICAL PAIN (NECK): Primary | ICD-10-CM

## 2022-08-18 DIAGNOSIS — R07.9 CHEST PAIN, UNSPECIFIED TYPE: ICD-10-CM

## 2022-08-18 PROCEDURE — 4010F PR ACE/ARB THEARPY RXD/TAKEN: ICD-10-PCS | Mod: CPTII,S$GLB,, | Performed by: INTERNAL MEDICINE

## 2022-08-18 PROCEDURE — 4010F ACE/ARB THERAPY RXD/TAKEN: CPT | Mod: CPTII,S$GLB,, | Performed by: INTERNAL MEDICINE

## 2022-08-18 PROCEDURE — 3044F HG A1C LEVEL LT 7.0%: CPT | Mod: CPTII,S$GLB,, | Performed by: INTERNAL MEDICINE

## 2022-08-18 PROCEDURE — 3074F SYST BP LT 130 MM HG: CPT | Mod: CPTII,S$GLB,, | Performed by: INTERNAL MEDICINE

## 2022-08-18 PROCEDURE — 3078F PR MOST RECENT DIASTOLIC BLOOD PRESSURE < 80 MM HG: ICD-10-PCS | Mod: CPTII,S$GLB,, | Performed by: INTERNAL MEDICINE

## 2022-08-18 PROCEDURE — 99213 OFFICE O/P EST LOW 20 MIN: CPT | Mod: S$GLB,,, | Performed by: INTERNAL MEDICINE

## 2022-08-18 PROCEDURE — 99999 PR PBB SHADOW E&M-EST. PATIENT-LVL IV: CPT | Mod: PBBFAC,,, | Performed by: INTERNAL MEDICINE

## 2022-08-18 PROCEDURE — 3008F BODY MASS INDEX DOCD: CPT | Mod: CPTII,S$GLB,, | Performed by: INTERNAL MEDICINE

## 2022-08-18 PROCEDURE — 99213 PR OFFICE/OUTPT VISIT, EST, LEVL III, 20-29 MIN: ICD-10-PCS | Mod: S$GLB,,, | Performed by: INTERNAL MEDICINE

## 2022-08-18 PROCEDURE — 1159F PR MEDICATION LIST DOCUMENTED IN MEDICAL RECORD: ICD-10-PCS | Mod: CPTII,S$GLB,, | Performed by: INTERNAL MEDICINE

## 2022-08-18 PROCEDURE — 3044F PR MOST RECENT HEMOGLOBIN A1C LEVEL <7.0%: ICD-10-PCS | Mod: CPTII,S$GLB,, | Performed by: INTERNAL MEDICINE

## 2022-08-18 PROCEDURE — 3074F PR MOST RECENT SYSTOLIC BLOOD PRESSURE < 130 MM HG: ICD-10-PCS | Mod: CPTII,S$GLB,, | Performed by: INTERNAL MEDICINE

## 2022-08-18 PROCEDURE — 99999 PR PBB SHADOW E&M-EST. PATIENT-LVL IV: ICD-10-PCS | Mod: PBBFAC,,, | Performed by: INTERNAL MEDICINE

## 2022-08-18 PROCEDURE — 1159F MED LIST DOCD IN RCRD: CPT | Mod: CPTII,S$GLB,, | Performed by: INTERNAL MEDICINE

## 2022-08-18 PROCEDURE — 3008F PR BODY MASS INDEX (BMI) DOCUMENTED: ICD-10-PCS | Mod: CPTII,S$GLB,, | Performed by: INTERNAL MEDICINE

## 2022-08-18 PROCEDURE — 3078F DIAST BP <80 MM HG: CPT | Mod: CPTII,S$GLB,, | Performed by: INTERNAL MEDICINE

## 2022-08-18 RX ORDER — PANTOPRAZOLE SODIUM 40 MG/1
40 TABLET, DELAYED RELEASE ORAL DAILY
Qty: 180 TABLET | Refills: 3
Start: 2022-08-18 | End: 2023-08-24 | Stop reason: SDUPTHER

## 2022-08-18 RX ORDER — GABAPENTIN 100 MG/1
100 CAPSULE ORAL DAILY
Qty: 30 CAPSULE | Refills: 11 | Status: SHIPPED | OUTPATIENT
Start: 2022-08-18 | End: 2023-01-19

## 2022-08-18 RX ORDER — ESCITALOPRAM OXALATE 10 MG/1
10 TABLET ORAL DAILY
Qty: 90 TABLET | Refills: 3 | Status: SHIPPED | OUTPATIENT
Start: 2022-08-18 | End: 2023-06-22 | Stop reason: DRUGHIGH

## 2022-08-18 RX ORDER — TIZANIDINE 2 MG/1
2 TABLET ORAL EVERY 8 HOURS
Qty: 30 TABLET | Refills: 0 | Status: SHIPPED | OUTPATIENT
Start: 2022-08-18 | End: 2022-09-22 | Stop reason: SDUPTHER

## 2022-08-18 RX ORDER — ASPIRIN 325 MG
50000 TABLET, DELAYED RELEASE (ENTERIC COATED) ORAL WEEKLY
Qty: 12 CAPSULE | Refills: 3 | Status: SHIPPED | OUTPATIENT
Start: 2022-08-18 | End: 2023-07-20

## 2022-08-18 NOTE — PROGRESS NOTES
Subjective:       Patient ID: Brianna Cantu is a 61 y.o. female.    Chief Complaint: Establish Care    HPI       Mrs. Cantu is a 60 yo female who presents to establish care.     She has been having neck and back pain. Just recently established with pain management and started on PT. Having an MRI done next week. Started on gabapentin, flexeril and mobic.     Also has Anxiety, not currently on medications but has been doing therapy.     Has VIVIAN with recent sleep study showing severe obstruction. Working on getting her mask through sleep clinic.     Has IBS, tried fiber in the past. Takes imodium.     Health Maintenance:  Colon Cancer Screening: Negative cologaurd in 2021. Rectal stricture inable to traverse in colonoscopy of 2018.    Mammogram: April 2022 was normal. Due 2023  HIV: negative 2021  Hep C: negative 2021   Lipids: Order today   Pap Smear: Done March 2021 and was normal.   Vaccines: Tdap 8/2021, Flu due in 9/2022       Review of Systems   Constitutional: Negative for activity change, appetite change and chills.   HENT: Negative for ear pain, sinus pressure/congestion and sneezing.    Respiratory: Negative for cough and shortness of breath.    Cardiovascular: Negative for chest pain, palpitations and leg swelling.   Gastrointestinal: Negative for abdominal distention, abdominal pain, constipation, diarrhea, nausea and vomiting.   Genitourinary: Negative for dysuria and hematuria.   Musculoskeletal: Negative for arthralgias, back pain and myalgias.   Neurological: Negative for dizziness and headaches.   Psychiatric/Behavioral: Negative for agitation. The patient is not nervous/anxious.            Past Medical History:   Diagnosis Date    Allergic rhinitis 4/27/2014    Anxiety 4/27/2014    Asthma     Hx of cervical cancer 4/27/2014    Obesity 4/27/2014    Vitamin D deficiency 4/27/2014     Past Surgical History:   Procedure Laterality Date    COLONOSCOPY      COLONOSCOPY N/A 9/24/2018     Procedure: COLONOSCOPY;  Surgeon: Montrell Gan MD;  Location: [facility] ([address] [place] [address]);  Service: Endoscopy;  Laterality: N/A;    ESOPHAGOGASTRODUODENOSCOPY      ESOPHAGOGASTRODUODENOSCOPY N/A 9/24/2018    Procedure: EGD (ESOPHAGOGASTRODUODENOSCOPY);  Surgeon: Montrell Gan MD;  Location: [facility] ([address] [place] [address]);  Service: Endoscopy;  Laterality: N/A;    TOTAL ABDOMINAL HYSTERECTOMY        Patient Active Problem List   Diagnosis    Allergic rhinitis    Anxiety    Obesity    Vitamin D deficiency    Hx of cervical cancer    Laryngopharyngeal reflux    Bilateral leg numbness    Bronchitis    Constipation    Benign essential hypertension    Other depressive disorder    Tobacco use disorder    Mild intermittent asthma without complication    Stenosis colon    Light cigarette smoker (1-9 cigs/day)    Leg edema    Diarrhea    Cough    Wheezing    Lesion of hard palate    JOSE ANGEL (generalized anxiety disorder)    Hypersomnolence        Objective:      Physical Exam  Constitutional:       Appearance: Normal appearance.   HENT:      Head: Normocephalic.      Right Ear: Tympanic membrane normal.      Left Ear: Tympanic membrane normal.      Nose: Nose normal.      Mouth/Throat:      Mouth: Mucous membranes are dry.   Cardiovascular:      Rate and Rhythm: Normal rate and regular rhythm.      Pulses: Normal pulses.      Heart sounds: Normal heart sounds.   Pulmonary:      Effort: Pulmonary effort is normal.      Breath sounds: Normal breath sounds.   Abdominal:      General: Abdomen is flat. Bowel sounds are normal.      Palpations: Abdomen is soft.   Musculoskeletal:         General: Normal range of motion.      Cervical back: Normal range of motion and neck supple.   Skin:     General: Skin is warm and dry.   Neurological:      General: No focal deficit present.      Mental Status: She is alert and oriented to person, place, and time.   Psychiatric:         Mood and Affect: Mood normal.         Assessment:        Problem List Items Addressed This Visit        Psychiatric    JOSE ANGEL (generalized anxiety disorder)       Cardiac/Vascular    Benign essential hypertension      Other Visit Diagnoses     Cervical pain (neck)    -  Primary    Chest pain, unspecified type        Risk factors for CAD are age, race, tobacco. EKG w/o ST changes.  Reschedule stress test. Trial of PPI.    Relevant Medications    pantoprazole (PROTONIX) 40 MG tablet    Irritable bowel syndrome with diarrhea        Relevant Orders    Ambulatory referral/consult to Gastroenterology          Plan:         Brianna was seen today for establish care.    Diagnoses and all orders for this visit:    Cervical pain (neck)  Switch to tizanadine, did not tolerate previous muscle relaxers. Increase gabapentin.   Continue PT for back and will start PT for neck once complete.      Irritable bowel syndrome with diarrhea  -still having diarrhea.   -Ambulatory referral/consult to Gastroenterology; Future    JOSE ANGEL (generalized anxiety disorder)  -will start lexapro today and follow up in 3 months to assess progress.     Benign essential hypertension  Well controlled on amlodipine-valsartan             Rossana Mcclendon MD   Internal Medicine   Primary Care

## 2022-08-18 NOTE — TELEPHONE ENCOUNTER
Sonia requesting a return call in regards to needing to reschedule her EMG presently scheduled here at [facility] on September 22, 2022. I provided my return contact information.

## 2022-08-19 ENCOUNTER — HOSPITAL ENCOUNTER (OUTPATIENT)
Dept: RADIOLOGY | Facility: OTHER | Age: 62
Discharge: HOME | End: 2022-08-19
Attending: ANESTHESIOLOGY
Payer: COMMERCIAL

## 2022-08-19 DIAGNOSIS — M54.9 DORSALGIA, UNSPECIFIED: ICD-10-CM

## 2022-08-19 PROCEDURE — 72148 MRI LUMBAR SPINE W/O DYE: CPT | Mod: 26,,, | Performed by: RADIOLOGY

## 2022-08-19 PROCEDURE — 72148 MRI LUMBAR SPINE WITHOUT CONTRAST: ICD-10-PCS | Mod: 26,,, | Performed by: RADIOLOGY

## 2022-08-19 PROCEDURE — 72148 MRI LUMBAR SPINE W/O DYE: CPT | Mod: TC

## 2022-08-23 ENCOUNTER — TELEPHONE (OUTPATIENT)
Dept: NEUROLOGY | Facility: CLINIC | Age: 62
End: 2022-08-23
Payer: COMMERCIAL

## 2022-08-23 ENCOUNTER — CLINICAL SUPPORT (OUTPATIENT)
Dept: REHABILITATION | Facility: OTHER | Age: 62
End: 2022-08-23
Attending: ANESTHESIOLOGY
Payer: COMMERCIAL

## 2022-08-23 DIAGNOSIS — R29.898 IMPAIRED FLEXIBILITY OF LOWER EXTREMITY: ICD-10-CM

## 2022-08-23 DIAGNOSIS — R29.898 WEAKNESS OF BOTH LOWER EXTREMITIES: ICD-10-CM

## 2022-08-23 DIAGNOSIS — M51.36 DDD (DEGENERATIVE DISC DISEASE), LUMBAR: ICD-10-CM

## 2022-08-23 DIAGNOSIS — M54.41 CHRONIC RIGHT-SIDED LOW BACK PAIN WITH RIGHT-SIDED SCIATICA: ICD-10-CM

## 2022-08-23 DIAGNOSIS — G89.29 CHRONIC RIGHT-SIDED LOW BACK PAIN WITH RIGHT-SIDED SCIATICA: ICD-10-CM

## 2022-08-23 DIAGNOSIS — Z74.09 IMPAIRED FUNCTIONAL MOBILITY AND ACTIVITY TOLERANCE: ICD-10-CM

## 2022-08-23 DIAGNOSIS — G89.4 CHRONIC PAIN DISORDER: ICD-10-CM

## 2022-08-23 PROCEDURE — 97162 PT EVAL MOD COMPLEX 30 MIN: CPT | Mod: PN

## 2022-08-23 PROCEDURE — 97110 THERAPEUTIC EXERCISES: CPT | Mod: PN

## 2022-08-23 NOTE — PATIENT INSTRUCTIONS
Access Code: [email or web address]  URL: [email or web address]  Date: 08/23/2022  Prepared by: Adry Bridges    Exercises  Supine Bridge - 1 x daily - 2 sets - 10 reps - 3 hold  Supine Lower Trunk Rotation - 1 x daily - 1 sets - 20 reps - 3 hold  Supine Posterior Pelvic Tilt - 1 x daily - 20 reps - 5 hold  Supine Sciatic Nerve Glide - 1 x daily - 2 sets - 10 reps - 3 second hold  Seated Hamstring Stretch - 1 sets - 3 reps - 30 seconds hold  Supine Piriformis Stretch with Foot on Ground - 1 x daily - 1 sets - 3 reps - 30 hold  Supine Figure 4 Piriformis Stretch - 1 x daily - 1 sets - 3 reps - 30 seconds hold    Patient Education  Office Posture  Desk Accessories to Increase Comfort  Desk Discomfort: Troubleshooting Physical Symptoms

## 2022-08-23 NOTE — TELEPHONE ENCOUNTER
Patient called this morning to cancel the EMG Procedure scheduled for 8am today.  I called her on yesterday to reschedule an appointment from a future date because of the [employer] Conference.  She was willing to take the appointment that was available this morning but woke up feeling very sick, suffering with IBS. I rescheduled her for our next available which is October 25th at 10am. I did also place her on the wait list.

## 2022-08-23 NOTE — PLAN OF CARE
"[facility]  Physical Therapy Initial Evaluation    Name: Brianna Cantu  Clinic Number: [id number]    Therapy Diagnosis:   Encounter Diagnoses   Name Primary?    DDD (degenerative disc disease), lumbar     Chronic pain disorder     Chronic right-sided low back pain with right-sided sciatica     Weakness of both lower extremities     Impaired flexibility of lower extremity     Impaired functional mobility and activity tolerance      Physician: Sai Oneil MD    Physician Orders: PT Eval and Treat   Medical Diagnosis from Referral: DDD (degenerative disc disease), lumbar (M51.36); Chronic pain disorder (G89.4)  Evaluation Date: 8/23/2022  Authorization Period Expiration: 08/08/2023  Plan of Care Expiration: 11/18/2022  Visit # / Visits authorized: 1/ 1    Time In: 4:50 PM  Time Out: 5:35 pm  Total Billable Time: 15 minutes    Precautions: Standard    Subjective   Date of onset: 3 months with referral placed 8/8/22  History of current condition - Brianna reports: R low back pain that radiates all the way down to the toes.  No SILAS although patient does note increased stress, standing and working from home.  She reports leaning forward more while standing to work with lots of walking. Patient works at [employer] in [place].  Patient reports more pain and discomfort in the low back currently when lying down.  Prior to this, it was present standing, walking and sitting. Pain would go from the back, into the hip/glute area, and down the lateral side of the thigh and calf into the tips and tops of the toes.  Patient does have IBS which caused her to not attend her EMG study today.     Falls in the last 6 months: none  Tingling in R Low back and lateral calf    "today and yesterday are good days"    Patient denies dizziness, headaches, blurry vision, nausea, vomiting, impaired sensations in groin and saddle region and changes in bowel/bladder, or weight.           Medical " History:   Past Medical History:   Diagnosis Date    Allergic rhinitis 4/27/2014    Anxiety 4/27/2014    Asthma     Hx of cervical cancer 4/27/2014    Obesity 4/27/2014    Vitamin D deficiency 4/27/2014       Surgical History:   Brianna Cantu  has a past surgical history that includes Total abdominal hysterectomy; Esophagogastroduodenoscopy; Colonoscopy; Colonoscopy (N/A, 9/24/2018); and Esophagogastroduodenoscopy (N/A, 9/24/2018).    Medications:   Brianna has a current medication list which includes the following prescription(s): diphenhydramine hcl, albuterol, amlodipine-valsartan, azelastine, cholecalciferol (vitamin d3), cyclobenzaprine, escitalopram oxalate, fluticasone propionate, gabapentin, gabapentin, lidocaine viscous, loratadine, meloxicam, pantoprazole, tizanidine, and valacyclovir.    Allergies:   Review of patient's allergies indicates:  No Known Allergies     Imaging, MRI studies: Impression:     1. Lumbar degenerative changes contributing to mild-to-moderate neural foraminal narrowing from L3-L4 through L5-S1 as detailed above.    X-ray: FINDINGS:  Trace grade 1 anterolisthesis of L L4 on L5 with trace grade 1 retrolisthesis of L3 on L4.  The lumbar vertebral body heights and contours are within normal is without evidence for acute fracture.  Facet degenerative change lower lumbar levels.  Short-segment linear hyperdensity within the pelvic soft tissues only seen on the lateral view which may be extrinsic to the patient however indeterminate.  Further evaluation as warranted clinically.    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, walking and computer work (patient has not been to work since 8/2/2022)  Prior Level of Function: Pt independent with all ADLs, job responsibilities and participating in regular  "physical activity  Current Level of Function: Pt has difficulty with household chores, work activities including standing, walking, and stairs, reduced activity tolerance/performance including walking, dancing and ellipitcal    Pain:  Current 5/10, worst 6/10, best 4/10   Location: right Low back down into hip, lateral thigh and into toes   Description: Throbbing, tight, Tingling and Numb  Aggravating Factors: Sitting, Standing, Bending, Walking and Lifting  Easing Factors: ice, heating pad and topical creams, gabapentin    Pts goals: return to work, reduce pain and get back to normal.     Objective     Observation:   Posture: sits with slouched posture    Palpation:  TTP over greater trochanter and posterior aspect, PSIS and piriformis muscle belly on R with pain reproduction    Flexibility:   HS, quad, glute, and piriformis tightness noted on R more than L although both are affected.     Range of Motion:     Thoracolumbar AROM Pain/Dysfunction with Movement   Flexion 25% limited HS tightness   Extension 25% limited Increased LBP on R   Right side bending To knee joint    Left side bending To knee joint    R rotation 25% limited    L rotation 25% limited With pain on R     MMT/Strength:      Right Left Pain/Dysfunction with Movement   Hip Flexion 4/5 4/5    Hip Extension 3/5 3/5    Hip IR 3+/5 4-/5    Hip ER 4-/5 4/5    Hip Adduction 3-/5 4-/5    Hip Abduction 3+/5 4-/5          Special Tests:  Double Knee to Chest: no change  Prone on Elbows: no change (although patient reports to "seeing stars")  Bridge test: unable to lift legs due to pain and instability    Other:   Gait Analysis:Without AD Deviations: Antalgic gait    5x sit<>stand: 20 seconds    Normal:   60-69: 11.4 seconds  70-79: 12.6 seconds  80-89: 12.7 seconds    30 seconds sit<>stand: 7 reps    Normal:   Age Male Female   60-64 17 15   65-69 16 15   70-74 15 14   75-79 14 13   80-84 13 12   85-89 11 11   90-94 9 9      Sensation: reduced sensation " along toes/top of foot - numbness on R        CMS Impairment/Limitation/Restriction for FOTO Lumbar Spine Survey    Therapist reviewed FOTO scores for Brianna Cantu on 8/23/2022.   FOTO documents entered into [employer] - see Media section.    Limitation Score: 47%  Category: Mobility    Current : CK = at least 40% but < 60% impaired, limited or restricted  Goal: CJ = at least 20% but < 40% impaired, limited or restricted           TREATMENT   Treatment Time In: 5:15 pm  Treatment Time Out: 5:30 pm  Total Treatment time separate from Evaluation: 15 minutes    Brianna received therapeutic exercises to develop strength, ROM, flexibility and posture for 15 minutes including:  HEP review, performance and education  Desk set up, anti-fatigue mat for work  Bridge  PPT  LTR  Sciatic nerve glide  Hamstring stretch  Hip ER and IR stretch in figure 4 positions      Home Exercises and Patient Education Provided    Education provided:   Patient was educated on initial evaluation findings and expectations as well as future PT services, procedures, and expectations for optimal compliance with therapy.   Desk set up  Anti-fatigue mat for work space  HEP      Written Home Exercises Provided: Yes, see patient instructions.     Assessment   Brianna is a 61 y.o. female referred to outpatient Physical Therapy with a medical diagnosis of DDD (degenerative disc disease), lumbar; Chronic pain disorder. Pt presents with decreased ROM, strength, flexibility, TTP with moderate to heavy palpation, and poor posture causing increased pain and decreased participation with work, recreational and household duties.      Pt prognosis is Fair.   Pt will benefit from skilled outpatient Physical Therapy to address the deficits stated above and in the chart below, provide pt/family education, and to maximize pt's level of independence to return to PLOF.     Plan of care discussed with patient: Yes  Pt's spiritual, cultural and educational needs  considered and patient is agreeable to the plan of care and goals as stated below:     Anticipated Barriers for therapy: hyperreactive with MMT and palpation    Medical Necessity is demonstrated by the following  History  Co-morbidities and personal factors that may impact the plan of care Co-morbidities:   anxiety, COPD/asthma and high BMI    Personal Factors:   age  coping style  character  attitudes     moderate   Examination  Body Structures and Functions, activity limitations and participation restrictions that may impact the plan of care Body Regions:   back  lower extremities  trunk    Body Systems:    ROM  strength  balance  gait  transfers  transitions  motor control  motor learning    Participation Restrictions:   difficulty with household chores, work activities including standing, walking, and stairs, reduced activity tolerance/performance including walking, dancing and ellipitcal    Activity limitations:   Learning and applying knowledge  no deficits    General Tasks and Commands  no deficits    Communication  no deficits    Mobility  lifting and carrying objects  walking  driving (bike, car, motorcycle)    Self care  no deficits    Domestic Life  shopping  cooking  doing house work (cleaning house, washing dishes, laundry)    Interactions/Relationships  no deficits    Life Areas  employment    Community and Social Life  recreation and leisure         moderate   Clinical Presentation evolving clinical presentation with changing clinical characteristics moderate   Decision Making/ Complexity Score: moderate     Goals:    In 6 weeks,   Goal Status   1. Patient will be independent with HEP to promote improved therapy outcomes.  STG    2. Patient will perform palloff press with good control to demonstrate improved core strength STG    3. Patient will improve B LE MMT to 4-/5 to demonstrate improved strength for functional tasks.  STG    4. Patient will improve lumbar AROM by 25% to improve functional  mobility.  STG        In 12 weeks,  Goal Status   5. Patient will be independent with progressed HEP to self manage symptoms.  LTG    6. Patient will improve B LE MMT to 4/5 to improve strength for functional tasks.  LTG    7. Patient will report walking for 30 minutes with <2/10 pain 5x/week to promote healthy lifestyle and regular physical exercise.  LTG    8. Patient will improve FOTO score to </= 38% limited to decrease perceived limitation with maintaining/changing body position.  LTG    9. Patient will improve 5x sit<>stand to 12 seconds to improve functional strength and promote mobility.   LTG     10. Patient will report 50% symptom reduction to improve activity tolerance and return to work.  LTG    11.  Patient will report proper desk set up and anti-fatigue mat to reduce anterior trunk lean and promote safe return to work LTG          Plan   Plan of care Certification: 8/23/2022 to 11/18/2022.    Outpatient Physical Therapy 2 times weekly for 24 visits to include the following interventions: Cervical/Lumbar Traction, Gait Training, Manual Therapy, Moist Heat/ Ice, Neuromuscular Re-ed, Patient Education, Therapeutic Activities, Therapeutic Exercise and dry needling.     Adry Bridges, PT

## 2022-08-25 ENCOUNTER — PATIENT MESSAGE (OUTPATIENT)
Dept: PAIN MEDICINE | Facility: CLINIC | Age: 62
End: 2022-08-25
Payer: COMMERCIAL

## 2022-08-30 ENCOUNTER — CLINICAL SUPPORT (OUTPATIENT)
Dept: REHABILITATION | Facility: OTHER | Age: 62
End: 2022-08-30
Payer: COMMERCIAL

## 2022-08-30 DIAGNOSIS — G89.29 CHRONIC RIGHT-SIDED LOW BACK PAIN WITH RIGHT-SIDED SCIATICA: Primary | ICD-10-CM

## 2022-08-30 DIAGNOSIS — R29.898 WEAKNESS OF BOTH LOWER EXTREMITIES: ICD-10-CM

## 2022-08-30 DIAGNOSIS — M54.41 CHRONIC RIGHT-SIDED LOW BACK PAIN WITH RIGHT-SIDED SCIATICA: Primary | ICD-10-CM

## 2022-08-30 DIAGNOSIS — Z74.09 IMPAIRED FUNCTIONAL MOBILITY AND ACTIVITY TOLERANCE: ICD-10-CM

## 2022-08-30 DIAGNOSIS — R29.898 IMPAIRED FLEXIBILITY OF LOWER EXTREMITY: ICD-10-CM

## 2022-08-30 PROCEDURE — 97110 THERAPEUTIC EXERCISES: CPT | Mod: PN,CQ

## 2022-08-30 NOTE — PROGRESS NOTES
[facility]   Physical Therapy Treatment Note     Name: Brianna Cantu  Clinic Number: [id number]    Therapy Diagnosis:   Encounter Diagnoses   Name Primary?    Chronic right-sided low back pain with right-sided sciatica Yes    Weakness of both lower extremities     Impaired flexibility of lower extremity     Impaired functional mobility and activity tolerance      Physician: Sai Oneil MD    Visit Date: 8/30/2022    Physician Orders: PT Eval and Treat   Medical Diagnosis from Referral: DDD (degenerative disc disease), lumbar (M51.36); Chronic pain disorder (G89.4)  Evaluation Date: 8/23/2022  Authorization Period Expiration: 08/08/2023  Plan of Care Expiration: 11/18/2022  Visit # / Visits authorized: 1 (+eval/20)     Precautions: Standard     Time In: 1:45pm  Time Out: 2:30pm   Total Billable Time: 45 minutes      SUBJECTIVE     Pt reports: She is experiencing some pain, has not gotten a chance to perform HEP due to underlying depression. She wants to get back to work.   She was not compliant with home exercise program.  Response to previous treatment: Mild soreness   Functional change: n/a     Pain: 5/10  Location: R low back radiating into hip and lower leg     OBJECTIVE     Objective Measures updated at progress report unless specified.       TREATMENT     Total Treatment time (time-based codes) separate from Evaluation: 60 minutes     Brianna received the treatments listed below:      Patient received therapeutic exercises for 45 minutes for improved strength and AROM including:  Bridges 2x10  PPT 2x10  LTR on PB 2 mins   +DKTC on PB 2 mins   Sciatic nerve glide x10  Hamstring stretch 3x10   Hip ER and IR stretch in figure 4 positions  +TrA activation with PB 2x10 5 sec hold   +SLR with TrA 2x10       PATIENT EDUCATION AND HOME EXERCISES     Home Exercises Provided and Patient Education Provided     Education provided:   PT educated pt on importance of compliance with  their HEP this visit.     Written Home Exercises Provided: Patient instructed to cont prior HEP. Exercises were reviewed and Brianna was able to demonstrate them prior to the end of the session.  Brianna demonstrated fair  understanding of the education provided. See EMR under Patient Instructions for exercises provided during therapy sessions    ASSESSMENT   Patient was able to actively participate in therapy today. She experienced an increase in pain with sciatic nerve glides and bridges, limiting reps. Patient experienced dizziness upon transferring supine<>sit. Continue to progress exercises as tolerated by patient to reduce pain.     Brianna Is progressing well towards her goals.   Pt prognosis is Fair.     Pt will continue to benefit from skilled outpatient physical therapy to address the deficits listed in the problem list box on initial evaluation, provide pt/family education and to maximize pt's level of independence in the home and community environment.     Pt's spiritual, cultural and educational needs considered and pt agreeable to plan of care and goals.     Anticipated barriers to physical therapy: None    Goals:      In 6 weeks,    Goal Status   Patient will be independent with HEP to promote improved therapy outcomes.  STG     2. Patient will perform palloff press with good control to demonstrate improved core strength STG     3. Patient will improve B LE MMT to 4-/5 to demonstrate improved strength for functional tasks.  STG     4. Patient will improve lumbar AROM by 25% to improve functional mobility.  STG           In 12 weeks,   Goal Status   5. Patient will be independent with progressed HEP to self manage symptoms.  LTG     6. Patient will improve B LE MMT to 4/5 to improve strength for functional tasks.  LTG     7. Patient will report walking for 30 minutes with <2/10 pain 5x/week to promote healthy lifestyle and regular physical exercise.  LTG     8. Patient will improve FOTO score to  </= 38% limited to decrease perceived limitation with maintaining/changing body position.  LTG     9. Patient will improve 5x sit<>stand to 12 seconds to improve functional strength and promote mobility.   LTG      10. Patient will report 50% symptom reduction to improve activity tolerance and return to work.  LTG     11.  Patient will report proper desk set up and anti-fatigue mat to reduce anterior trunk lean and promote safe return to work LTG          PLAN   Plan of care Certification: 8/23/2022 to 11/18/2022.     Outpatient Physical Therapy 2 times weekly for 24 visits to include the following interventions: Cervical/Lumbar Traction, Gait Training, Manual Therapy, Moist Heat/ Ice, Neuromuscular Re-ed, Patient Education, Therapeutic Activities, Therapeutic Exercise and dry needling.       Nomi Vanegas, PTA

## 2022-09-01 ENCOUNTER — TELEPHONE (OUTPATIENT)
Dept: INTERNAL MEDICINE | Facility: CLINIC | Age: 62
End: 2022-09-01
Payer: COMMERCIAL

## 2022-09-01 ENCOUNTER — CLINICAL SUPPORT (OUTPATIENT)
Dept: REHABILITATION | Facility: OTHER | Age: 62
End: 2022-09-01
Payer: COMMERCIAL

## 2022-09-01 DIAGNOSIS — Z74.09 IMPAIRED FUNCTIONAL MOBILITY AND ACTIVITY TOLERANCE: ICD-10-CM

## 2022-09-01 DIAGNOSIS — G89.29 CHRONIC RIGHT-SIDED LOW BACK PAIN WITH RIGHT-SIDED SCIATICA: Primary | ICD-10-CM

## 2022-09-01 DIAGNOSIS — R29.898 IMPAIRED FLEXIBILITY OF LOWER EXTREMITY: ICD-10-CM

## 2022-09-01 DIAGNOSIS — R29.898 WEAKNESS OF BOTH LOWER EXTREMITIES: ICD-10-CM

## 2022-09-01 DIAGNOSIS — M54.41 CHRONIC RIGHT-SIDED LOW BACK PAIN WITH RIGHT-SIDED SCIATICA: Primary | ICD-10-CM

## 2022-09-01 PROCEDURE — 97110 THERAPEUTIC EXERCISES: CPT | Mod: PN,CQ

## 2022-09-01 NOTE — PROGRESS NOTES
[facility]   Physical Therapy Treatment Note     Name: Brianna Cantu  Clinic Number: [id number]    Therapy Diagnosis:   Encounter Diagnoses   Name Primary?    Chronic right-sided low back pain with right-sided sciatica Yes    Weakness of both lower extremities     Impaired flexibility of lower extremity     Impaired functional mobility and activity tolerance        Physician: Sai Oneil MD    Visit Date: 9/1/2022    Physician Orders: PT Eval and Treat   Medical Diagnosis from Referral: DDD (degenerative disc disease), lumbar (M51.36); Chronic pain disorder (G89.4)  Evaluation Date: 8/23/2022  Authorization Period Expiration: 08/08/2023  Plan of Care Expiration: 11/18/2022  Visit # / Visits authorized: 1 (+eval/20)     Precautions: Standard     Time In: 1:51pm  Time Out: 2:30pm   Total Billable Time: 39 minutes      SUBJECTIVE     Pt reports: She is experiencing a slight decrease in back pain today, slight soreness after last visit which subsided the next day. She requests to keep treatment light today due to IBS.   She was not compliant with home exercise program.  Response to previous treatment: Mild soreness   Functional change: n/a     Pain: 3-4/10  Location: R low back radiating into hip and lower leg     OBJECTIVE     Objective Measures updated at progress report unless specified.       TREATMENT     Total Treatment time (time-based codes) separate from Evaluation: 60 minutes     Brianna received the treatments listed below:      Patient received therapeutic exercises for 39 minutes for improved strength and AROM including:  Bridges 2x10  PPT 2x10  LTR on PB 2 mins   DKTC on PB 2 mins   Sciatic nerve glide x10  Hamstring stretch 3x10   Hip ER and IR stretch in figure 4 positions  TrA activation with PB 2x10 5 sec hold   SLR with TrA 2x10   +Supine marches with TrA 2 mins  +L Sidelying open books 10x10 sec    PATIENT EDUCATION AND HOME EXERCISES     Home Exercises Provided  and Patient Education Provided     Education provided:   PT educated pt on importance of compliance with their HEP this visit.     Written Home Exercises Provided: Patient instructed to cont prior HEP. Exercises were reviewed and Brianna was able to demonstrate them prior to the end of the session.  Brianna demonstrated fair  understanding of the education provided. See EMR under Patient Instructions for exercises provided during therapy sessions    ASSESSMENT   Patient was able to actively participate in therapy today without increase in low back pain. She continued to have difficulty with bridges, limiting reps to 10. Added open book stretches today in left sidelying with good relief. Continue to progress exercises as tolerated by patient to reduce pain.     Brianna Is progressing well towards her goals.   Pt prognosis is Fair.     Pt will continue to benefit from skilled outpatient physical therapy to address the deficits listed in the problem list box on initial evaluation, provide pt/family education and to maximize pt's level of independence in the home and community environment.     Pt's spiritual, cultural and educational needs considered and pt agreeable to plan of care and goals.     Anticipated barriers to physical therapy: None    Goals:      In 6 weeks,    Goal Status   Patient will be independent with HEP to promote improved therapy outcomes.  STG     2. Patient will perform palloff press with good control to demonstrate improved core strength STG     3. Patient will improve B LE MMT to 4-/5 to demonstrate improved strength for functional tasks.  STG     4. Patient will improve lumbar AROM by 25% to improve functional mobility.  STG           In 12 weeks,   Goal Status   5. Patient will be independent with progressed HEP to self manage symptoms.  LTG     6. Patient will improve B LE MMT to 4/5 to improve strength for functional tasks.  LTG     7. Patient will report walking for 30 minutes with  <2/10 pain 5x/week to promote healthy lifestyle and regular physical exercise.  LTG     8. Patient will improve FOTO score to </= 38% limited to decrease perceived limitation with maintaining/changing body position.  LTG     9. Patient will improve 5x sit<>stand to 12 seconds to improve functional strength and promote mobility.   LTG      10. Patient will report 50% symptom reduction to improve activity tolerance and return to work.  LTG     11.  Patient will report proper desk set up and anti-fatigue mat to reduce anterior trunk lean and promote safe return to work LTG          PLAN   Plan of care Certification: 8/23/2022 to 11/18/2022.     Outpatient Physical Therapy 2 times weekly for 24 visits to include the following interventions: Cervical/Lumbar Traction, Gait Training, Manual Therapy, Moist Heat/ Ice, Neuromuscular Re-ed, Patient Education, Therapeutic Activities, Therapeutic Exercise and dry needling.       Nomi Vanegas, PTA

## 2022-09-01 NOTE — TELEPHONE ENCOUNTER
----- Message from Phi Sierra sent at 9/1/2022 10:54 AM CDT -----  Contact: [phone]  Pt needs a call back about getting some of her records for short term disability.

## 2022-09-01 NOTE — TELEPHONE ENCOUNTER
Confirmed sleep study for Sept 1st.   The patient was phoned to comfirm her appt and explain the expectations during the previsit. I left a VM.    Moriah GENTILE LPN  Clinical Care Coordinator  Internal Medicine  [facility]  [phone]

## 2022-09-02 ENCOUNTER — TELEPHONE (OUTPATIENT)
Dept: SLEEP MEDICINE | Facility: CLINIC | Age: 62
End: 2022-09-02
Payer: COMMERCIAL

## 2022-09-02 NOTE — TELEPHONE ENCOUNTER
Spoke to the pt and she just wanted to know when she needed to schedule her compliance follow-up with Dr. Tellez. Staff explained to her that she need an appt 31-90 days after using her machine. The pt is scheduled for 10/27/2022@11am

## 2022-09-02 NOTE — TELEPHONE ENCOUNTER
----- Message from Kiki Janessa sent at 9/2/2022 11:30 AM CDT -----  Contact: DOM VARNER [[id number]]  Type: Call Back      Who called: DOM VARNER [[id number]]      What is the request in detail: Patient is requesting a call back. Pt states that she was advised to schedule an follow up appointment for her CPAP machine. She would like to know if there is any appointments available sooner than 10/27. Please advise.       Can the clinic reply by [facility]? No      Would the patient rather a call back or a response via [facility]? Call back      Best call back number: [phone] (mobile)      Additional Information:

## 2022-09-12 ENCOUNTER — CLINICAL SUPPORT (OUTPATIENT)
Dept: REHABILITATION | Facility: OTHER | Age: 62
End: 2022-09-12
Payer: COMMERCIAL

## 2022-09-12 DIAGNOSIS — R29.898 IMPAIRED FLEXIBILITY OF LOWER EXTREMITY: ICD-10-CM

## 2022-09-12 DIAGNOSIS — R29.898 WEAKNESS OF BOTH LOWER EXTREMITIES: ICD-10-CM

## 2022-09-12 DIAGNOSIS — M54.41 CHRONIC RIGHT-SIDED LOW BACK PAIN WITH RIGHT-SIDED SCIATICA: Primary | ICD-10-CM

## 2022-09-12 DIAGNOSIS — Z74.09 IMPAIRED FUNCTIONAL MOBILITY AND ACTIVITY TOLERANCE: ICD-10-CM

## 2022-09-12 DIAGNOSIS — G89.29 CHRONIC RIGHT-SIDED LOW BACK PAIN WITH RIGHT-SIDED SCIATICA: Primary | ICD-10-CM

## 2022-09-12 PROCEDURE — 97110 THERAPEUTIC EXERCISES: CPT | Mod: PN

## 2022-09-12 NOTE — PROGRESS NOTES
[facility]   Physical Therapy Treatment Note     Name: Brianna Gascashua  Clinic Number: [id number]    Therapy Diagnosis:   Encounter Diagnoses   Name Primary?    Chronic right-sided low back pain with right-sided sciatica Yes    Weakness of both lower extremities     Impaired flexibility of lower extremity     Impaired functional mobility and activity tolerance      Physician: Sai Oneil MD    Visit Date: 9/12/2022    Physician Orders: PT Eval and Treat   Medical Diagnosis from Referral: DDD (degenerative disc disease), lumbar (M51.36); Chronic pain disorder (G89.4)  Evaluation Date: 8/23/2022  Authorization Period Expiration: 08/08/2023  Plan of Care Expiration: 11/18/2022  Visit # / Visits authorized: 3 (+eval/20)     Precautions: Standard     Time In: 11:00 am  Time Out: 11:45 am  Total Billable Time: 45 minutes      SUBJECTIVE     Pt reports: She is feeling pretty good today. She has some stiffness in the mornings but it gets better the more she moves and does her exercises.   She was not compliant with home exercise program.  Response to previous treatment: Mild soreness   Functional change: n/a     Pain: 3-4/10  Location: R low back radiating into hip and lower leg     OBJECTIVE     Objective Measures updated at progress report unless specified.       TREATMENT     Total Treatment time (time-based codes) separate from Evaluation:     Brianna received the treatments listed below:      Patient received therapeutic exercises for 40 minutes for improved strength and AROM including:    Bridges 2x10  PPT 2x10  LTR on PB 2 mins   DKTC on PB 2 mins   Sciatic nerve glide x10 B   Hamstring stretch 3x10    Hip ER and IR stretch in figure 4 positions  TrA activation with PB 2x10 5 sec hold   BKFO x OTB x 10 B  Supine marches with TrA 2 mins  L Sidelying open books 10x10 sec  +Standing marching w/ lat pull down OTB 2x10    Not performed:  SLR with TrA 2x10     PATIENT EDUCATION AND HOME  EXERCISES     Home Exercises Provided and Patient Education Provided     Education provided:   PT educated pt on importance of compliance with their HEP this visit.     Written Home Exercises Provided: Patient instructed to cont prior HEP. Exercises were reviewed and Brianna was able to demonstrate them prior to the end of the session.  Brianna demonstrated fair  understanding of the education provided. See EMR under Patient Instructions for exercises provided during therapy sessions    ASSESSMENT   Pt returned to therapy with increased stiffness as she reported she had not be able to perform her HEP over the last few days. Continuation of lumbar and core stabilization exercises provided this visit. Educated pt on importance of completeing HEP on days she does not have therapy in an effort to maximize gains made during therapy. Added standing marching pull downs this visit for more functional strengthening with pt reporting appropriate muscular fatigue. Frequent verbal cues required for pt to avoid holding breath during exercises. Will continue to progress exercises per pts tolerance.     Brianna Is progressing well towards her goals.   Pt prognosis is Fair.     Pt will continue to benefit from skilled outpatient physical therapy to address the deficits listed in the problem list box on initial evaluation, provide pt/family education and to maximize pt's level of independence in the home and community environment.     Pt's spiritual, cultural and educational needs considered and pt agreeable to plan of care and goals.     Anticipated barriers to physical therapy: None    Goals:      In 6 weeks,    Goal Status   Patient will be independent with HEP to promote improved therapy outcomes.  STG  (progressing, not met)     2. Patient will perform palloff press with good control to demonstrate improved core strength STG  (progressing, not met)     3. Patient will improve B LE MMT to 4-/5 to demonstrate improved  strength for functional tasks.  STG  (progressing, not met)     4. Patient will improve lumbar AROM by 25% to improve functional mobility.  STG  (progressing, not met)           In 12 weeks,   Goal Status   5. Patient will be independent with progressed HEP to self manage symptoms.  LTG  (progressing, not met)     6. Patient will improve B LE MMT to 4/5 to improve strength for functional tasks.  LTG  (progressing, not met)     7. Patient will report walking for 30 minutes with <2/10 pain 5x/week to promote healthy lifestyle and regular physical exercise.  LTG  (progressing, not met)     8. Patient will improve FOTO score to </= 38% limited to decrease perceived limitation with maintaining/changing body position.  LTG  (progressing, not met)     9. Patient will improve 5x sit<>stand to 12 seconds to improve functional strength and promote mobility.   LTG   (progressing, not met)     10. Patient will report 50% symptom reduction to improve activity tolerance and return to work.  LTG  (progressing, not met)     11.  Patient will report proper desk set up and anti-fatigue mat to reduce anterior trunk lean and promote safe return to work LTG  (progressing, not met)          PLAN   Plan of care Certification: 8/23/2022 to 11/18/2022.     Outpatient Physical Therapy 2 times weekly for 24 visits to include the following interventions: Cervical/Lumbar Traction, Gait Training, Manual Therapy, Moist Heat/ Ice, Neuromuscular Re-ed, Patient Education, Therapeutic Activities, Therapeutic Exercise and dry needling.       Sarmad Bowers, PT

## 2022-09-13 NOTE — PROGRESS NOTES
[facility]   Physical Therapy Treatment Note     Name: Brianna Cantu  Clinic Number: [id number]    Therapy Diagnosis:   Encounter Diagnoses   Name Primary?    Chronic right-sided low back pain with right-sided sciatica Yes    Weakness of both lower extremities     Impaired flexibility of lower extremity     Impaired functional mobility and activity tolerance        Physician: Sai Oneil MD    Visit Date: 9/14/2022    Physician Orders: PT Eval and Treat   Medical Diagnosis from Referral: DDD (degenerative disc disease), lumbar (M51.36); Chronic pain disorder (G89.4)  Evaluation Date: 8/23/2022  Authorization Period Expiration: 08/08/2023  Plan of Care Expiration: 11/18/2022  Visit # / Visits authorized: 4/20 (+eval)  Re-assessment: due 9/23/2022  FOTO: 9/14/2022 (2/3)      Precautions: Standard     Time In: 12:22 pm (late arrival)  Time Out: 1:00 pm  Total Billable Time: 38 minutes      SUBJECTIVE     Pt reports: she is doing really good today. Notes some soreness but no pain today.  She is very happy about that and would like to keep it that way.    She was not compliant with home exercise program.  Response to previous treatment: Mild soreness   Functional change: n/a     Pain: 0/10  Location: R low back radiating into hip and lower leg     OBJECTIVE     Objective Measures updated at progress report unless specified.     CMS Impairment/Limitation/Restriction for FOTO Lumbar Spine Survey    Therapist reviewed FOTO scores for Brianna Cantu on 9/14/2022.   FOTO documents entered into [employer] - see Media section.    Limitation Score: 43%  Category: Other    Current : CK = at least 40% but < 60% impaired, limited or restricted  Goal: CJ = at least 20% but < 40% impaired, limited or restricted            TREATMENT     Total Treatment time (time-based codes) separate from Evaluation:     Brianna received the treatments listed below:      Patient received therapeutic exercises for  38 minutes for improved strength and AROM including:  Recumbent bike level 1.5 x6 minutes  Bridges with GTB 2x10  Figure 4 bridge x10 B  Bridge with knees bent on red PB x10  Bridge with knees straight on red PB x10  LTR on red PB 2 mins   DKTC on red PB 2 mins   Sciatic nerve glide x10 B   Hamstring stretch 3x 30 seconds each  Hip ER and IR stretch in figure 4 positions 2x 30 seconds each      Not performed to:  SLR with TrA 2x10   PPT 2x10  TrA activation with PB 2x10 5 sec hold   BKFO x OTB x 10 B  Supine marches with TrA 2 mins  L Sidelying open books 10x10 sec  +Standing marching w/ lat pull down OTB 2x10    PATIENT EDUCATION AND HOME EXERCISES     Home Exercises Provided and Patient Education Provided     Education provided:   PT educated pt on importance of compliance with their HEP this visit.     Written Home Exercises Provided: Patient instructed to cont prior HEP. Exercises were reviewed and Brianna was able to demonstrate them prior to the end of the session.  Brianna demonstrated fair  understanding of the education provided. See EMR under Patient Instructions for exercises provided during therapy sessions    ASSESSMENT     Patient tolerated treatment well without increased pain.  Attempted to add 2nd set for sciatic nerve glide but patient noted more discomfort after the first few reps.  Patient had difficulty with progressed bridge series today due to fatigue and weakness but was able to complete.  Continue with strengthening and stretching to improve mobility and functional activities.     Brianna Is progressing well towards her goals.   Pt prognosis is Fair.     Pt will continue to benefit from skilled outpatient physical therapy to address the deficits listed in the problem list box on initial evaluation, provide pt/family education and to maximize pt's level of independence in the home and community environment.     Pt's spiritual, cultural and educational needs considered and pt agreeable  to plan of care and goals.     Anticipated barriers to physical therapy: None    Goals:      In 6 weeks,    Goal Status   Patient will be independent with HEP to promote improved therapy outcomes.  STG  (progressing, not met)     2. Patient will perform palloff press with good control to demonstrate improved core strength STG  (progressing, not met)     3. Patient will improve B LE MMT to 4-/5 to demonstrate improved strength for functional tasks.  STG  (progressing, not met)     4. Patient will improve lumbar AROM by 25% to improve functional mobility.  STG  (progressing, not met)           In 12 weeks,   Goal Status   5. Patient will be independent with progressed HEP to self manage symptoms.  LTG  (progressing, not met)     6. Patient will improve B LE MMT to 4/5 to improve strength for functional tasks.  LTG  (progressing, not met)     7. Patient will report walking for 30 minutes with <2/10 pain 5x/week to promote healthy lifestyle and regular physical exercise.  LTG  (progressing, not met)     8. Patient will improve FOTO score to </= 38% limited to decrease perceived limitation with maintaining/changing body position.  LTG  (progressing, not met)     9. Patient will improve 5x sit<>stand to 12 seconds to improve functional strength and promote mobility.   LTG   (progressing, not met)     10. Patient will report 50% symptom reduction to improve activity tolerance and return to work.  LTG  (progressing, not met)     11.  Patient will report proper desk set up and anti-fatigue mat to reduce anterior trunk lean and promote safe return to work LTG  (progressing, not met)          PLAN   Plan of care Certification: 8/23/2022 to 11/18/2022.     Outpatient Physical Therapy 2 times weekly for 24 visits to include the following interventions: Cervical/Lumbar Traction, Gait Training, Manual Therapy, Moist Heat/ Ice, Neuromuscular Re-ed, Patient Education, Therapeutic Activities, Therapeutic Exercise and dry needling.        Adry Bridges, PT

## 2022-09-14 ENCOUNTER — CLINICAL SUPPORT (OUTPATIENT)
Dept: REHABILITATION | Facility: OTHER | Age: 62
End: 2022-09-14
Payer: COMMERCIAL

## 2022-09-14 DIAGNOSIS — R29.898 IMPAIRED FLEXIBILITY OF LOWER EXTREMITY: ICD-10-CM

## 2022-09-14 DIAGNOSIS — Z74.09 IMPAIRED FUNCTIONAL MOBILITY AND ACTIVITY TOLERANCE: ICD-10-CM

## 2022-09-14 DIAGNOSIS — R29.898 WEAKNESS OF BOTH LOWER EXTREMITIES: ICD-10-CM

## 2022-09-14 DIAGNOSIS — M54.41 CHRONIC RIGHT-SIDED LOW BACK PAIN WITH RIGHT-SIDED SCIATICA: Primary | ICD-10-CM

## 2022-09-14 DIAGNOSIS — G89.29 CHRONIC RIGHT-SIDED LOW BACK PAIN WITH RIGHT-SIDED SCIATICA: Primary | ICD-10-CM

## 2022-09-14 PROCEDURE — 97110 THERAPEUTIC EXERCISES: CPT | Mod: PN

## 2022-09-21 ENCOUNTER — CLINICAL SUPPORT (OUTPATIENT)
Dept: REHABILITATION | Facility: OTHER | Age: 62
End: 2022-09-21
Payer: COMMERCIAL

## 2022-09-21 ENCOUNTER — TELEPHONE (OUTPATIENT)
Dept: PAIN MEDICINE | Facility: CLINIC | Age: 62
End: 2022-09-21
Payer: COMMERCIAL

## 2022-09-21 DIAGNOSIS — Z74.09 IMPAIRED FUNCTIONAL MOBILITY AND ACTIVITY TOLERANCE: ICD-10-CM

## 2022-09-21 DIAGNOSIS — R29.898 WEAKNESS OF BOTH LOWER EXTREMITIES: ICD-10-CM

## 2022-09-21 DIAGNOSIS — G89.29 CHRONIC RIGHT-SIDED LOW BACK PAIN WITH RIGHT-SIDED SCIATICA: Primary | ICD-10-CM

## 2022-09-21 DIAGNOSIS — M54.41 CHRONIC RIGHT-SIDED LOW BACK PAIN WITH RIGHT-SIDED SCIATICA: Primary | ICD-10-CM

## 2022-09-21 DIAGNOSIS — R29.898 IMPAIRED FLEXIBILITY OF LOWER EXTREMITY: ICD-10-CM

## 2022-09-21 PROCEDURE — 97110 THERAPEUTIC EXERCISES: CPT | Mod: PN

## 2022-09-21 NOTE — PROGRESS NOTES
[facility]   Physical Therapy Treatment Note     Name: Brianna Cantu  Clinic Number: [id number]    Therapy Diagnosis:   Encounter Diagnoses   Name Primary?    Chronic right-sided low back pain with right-sided sciatica Yes    Weakness of both lower extremities     Impaired flexibility of lower extremity     Impaired functional mobility and activity tolerance      Physician: Sai Oneil MD    Visit Date: 9/21/2022    Physician Orders: PT Eval and Treat   Medical Diagnosis from Referral: DDD (degenerative disc disease), lumbar (M51.36); Chronic pain disorder (G89.4)  Evaluation Date: 8/23/2022  Authorization Period Expiration: 08/08/2023  Plan of Care Expiration: 11/18/2022  Visit # / Visits authorized: 5/20 (+eval)    Precautions: Standard     Time In: 12:15 pm  Time Out: 1:00 pm  Total Billable Time: 45 minutes      SUBJECTIVE     Pt reports: no pain in the back, no numbness in the toes.  Reports she had some numbness in the big toe the other day but it went away quickly.  Notes she is dizzy, has increased fatiuge and anxiety that she would like to take care of.  She is hoping today will be her last day.   She was not compliant with home exercise program.  Response to previous treatment: Mild soreness   Functional change: n/a     Pain: 0/10  Location: R low back radiating into hip and lower leg     OBJECTIVE     Objective Measures updated at progress report unless specified.      Range of Motion:      Thoracolumbar AROM Pain/Dysfunction with Movement   Flexion 25% limited HS tightness   Extension 25% limited    Right side bending To knee joint     Left side bending To knee joint     R rotation 25% limited     L rotation 25% limited       MMT/Strength:       Right Left Pain/Dysfunction with Movement   Hip Flexion 4/5 4/5     Hip Extension 4-/5 4-/5     Hip IR 4-/5 4-/5     Hip ER 4-/5 4/5     Hip Adduction 4/5 4/5     Hip Abduction 4/5 4/5           Special Tests:  Bridge test:  instability noted B     Other:   Gait Analysis:Without AD Deviations: Antalgic gait     5x sit<>stand: 12 seconds     Normal:   60-69: 11.4 seconds  70-79: 12.6 seconds  80-89: 12.7 seconds     30 seconds sit<>stand: 12 reps     Normal:   Age Male Female   60-64 17 15   65-69 16 15   70-74 15 14   75-79 14 13   80-84 13 12   85-89 11 11   90-94 9 9          CMS Impairment/Limitation/Restriction for FOTO Lumbar Spine Survey    Therapist reviewed FOTO scores for Brianna Cantu on 9/21/2022.   FOTO documents entered into [employer] - see Media section.    Limitation Score: 17%  Category: Other    Current : CI = at least 1% but < 20% impaired, limited or restricted  Goal: CJ = at least 20% but < 40% impaired, limited or restricted  Discharge: CI = at least 1% but < 20% impaired, limited or restricted          TREATMENT     Total Treatment time (time-based codes) separate from Evaluation:     Brianna received the treatments listed below:      Patient received therapeutic exercises for 45 minutes for improved strength and AROM including:    Re-assessment  Palloff press with GTB x10  Side steps with OTB around knees x40 ft each  Clamshells with OTB x20 each  SL abd x20 each  SL reverse clamshells with OTB x10 each  Prone EOM hip extension x10 B    PATIENT EDUCATION AND HOME EXERCISES     Home Exercises Provided and Patient Education Provided     Education provided:   PT educated pt on importance of compliance with their HEP this visit.     Written Home Exercises Provided: Patient instructed to cont prior HEP. Exercises were reviewed and Brianna was able to demonstrate them prior to the end of the session.  Brianna demonstrated fair  understanding of the education provided. See EMR under Patient Instructions for exercises provided during therapy sessions    ASSESSMENT     Since beginning PT, pt has been seen 6 times since initial evaluation on 08/23/2022. Overall, she has made good, steady progress with her PT  treatments and has worked hard towards all of her PT goals as evidenced by subjective and objective improvements. Since attending PT she has reached most of her PT goals. She requests to be d/c today as she is asymptomatic and able to do all household, work, and functional activities.  She is discharged with an updated HEP and was instructed to contact us with any other questions or concerns. Pt agreeable to d/c.     Brianna Is progressing well towards her goals.   Pt prognosis is Fair.     Pt will continue to benefit from skilled outpatient physical therapy to address the deficits listed in the problem list box on initial evaluation, provide pt/family education and to maximize pt's level of independence in the home and community environment.     Pt's spiritual, cultural and educational needs considered and pt agreeable to plan of care and goals.     Anticipated barriers to physical therapy: None    Goals:      In 6 weeks,    Goal Status   Patient will be independent with HEP to promote improved therapy outcomes.  STG  MET     2. Patient will perform palloff press with good control to demonstrate improved core strength STG  MET      3. Patient will improve B LE MMT to 4-/5 to demonstrate improved strength for functional tasks.  STG  MET     4. Patient will improve lumbar AROM by 25% to improve functional mobility.  STG  Unmet           In 12 weeks,   Goal Status   5. Patient will be independent with progressed HEP to self manage symptoms.  LTG  MET     6. Patient will improve B LE MMT to 4/5 to improve strength for functional tasks.  LTG  Unmet     7. Patient will report walking for 30 minutes with <2/10 pain 5x/week to promote healthy lifestyle and regular physical exercise.  LTG  Unmet     8. Patient will improve FOTO score to </= 38% limited to decrease perceived limitation with maintaining/changing body position.  LTG  MET     9. Patient will improve 5x sit<>stand to 12 seconds to improve functional strength  and promote mobility.   LTG   MET     10. Patient will report 50% symptom reduction to improve activity tolerance and return to work.  LTG  MET     11.  Patient will report proper desk set up and anti-fatigue mat to reduce anterior trunk lean and promote safe return to work LTG  MET          PLAN   Patient is discharged at this time per request as she is asymptomatic. Educated on obtaining new referral for future therapy needs.       Adry Bridges, PT

## 2022-09-21 NOTE — PATIENT INSTRUCTIONS
Access Code: [email or web address]  URL: [email or web address]  Date: 09/21/2022  Prepared by: Adry Bridges    Exercises  Supine Bridge - 1 x daily - 2 sets - 10 reps - 3 hold  Supine Lower Trunk Rotation - 1 x daily - 1 sets - 20 reps - 3 hold  Supine Posterior Pelvic Tilt - 1 x daily - 20 reps - 5 hold  Supine Sciatic Nerve Glide - 1 x daily - 2 sets - 10 reps - 3 second hold  Seated Hamstring Stretch - 1 sets - 3 reps - 30 seconds hold  Supine Piriformis Stretch with Foot on Ground - 1 x daily - 1 sets - 3 reps - 30 hold  Supine Figure 4 Piriformis Stretch - 1 x daily - 1 sets - 3 reps - 30 seconds hold  Standing Anti-Rotation Press with Anchored Resistance - 1 x daily - 2 sets - 10 reps - 3-5 seconds hold  Sidelying Hip Abduction - 1 x daily - 10 reps - 2 sets  Clamshell with Resistance - 1 x daily - 10 reps - 2 sets - 3 hold  Standing Hip Extension - 1 x daily - 10 reps - 3 sets  Prone Hip Extension on Table - 1 x daily - 2 sets - 10 reps  Figure 4 Bridge - 1 x daily - 1 sets - 10 reps  Side Stepping with Resistance at Ankles - 1 x daily - 15 reps - 2 sets  Supine Double Knee to Chest - 1 x daily - 1 sets - 3 reps - 30 hold  Sidelying Reverse Clamshell with Resistance - 1 x daily - 10 reps - 2 sets - 3 hold  Supine Hip Adduction Isometric with Ball - 1 x daily - 1 sets - 20 reps - 5 hold  Hooklying Single Leg Bent Knee Fallouts with Resistance - 1 x daily - 10 reps - 1 sets - 3 hold  Supine March - 1 x daily - 10 reps - 3 sets - 5 second hold    Patient Education  Office Posture  Desk Accessories to Increase Comfort  Desk Discomfort: Troubleshooting Physical Symptoms
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
## 2022-09-21 NOTE — TELEPHONE ENCOUNTER
This message is for patient in regards to his/her appointment 09/22/22 at 10:20a   With FAHEEM Dickerson.      For the safety of all patients and staff members. We are requesting during this visit that all patients and visitors to wear required face mask at all times here at [facility].     If you have any questions or concerns please contact [phone]       Staff left pt a voicemail reminding of their appointment

## 2022-09-22 ENCOUNTER — PATIENT MESSAGE (OUTPATIENT)
Dept: PAIN MEDICINE | Facility: CLINIC | Age: 62
End: 2022-09-22
Payer: COMMERCIAL

## 2022-09-22 ENCOUNTER — OFFICE VISIT (OUTPATIENT)
Dept: PAIN MEDICINE | Facility: CLINIC | Age: 62
End: 2022-09-22
Payer: COMMERCIAL

## 2022-09-22 VITALS
SYSTOLIC BLOOD PRESSURE: 136 MMHG | BODY MASS INDEX: 38.25 KG/M2 | TEMPERATURE: 98 F | RESPIRATION RATE: 18 BRPM | WEIGHT: 238 LBS | HEIGHT: 66 IN | HEART RATE: 77 BPM | OXYGEN SATURATION: 100 % | DIASTOLIC BLOOD PRESSURE: 85 MMHG

## 2022-09-22 DIAGNOSIS — M51.36 DDD (DEGENERATIVE DISC DISEASE), LUMBAR: Primary | ICD-10-CM

## 2022-09-22 DIAGNOSIS — M54.9 DORSALGIA, UNSPECIFIED: ICD-10-CM

## 2022-09-22 PROCEDURE — 99214 PR OFFICE/OUTPT VISIT, EST, LEVL IV, 30-39 MIN: ICD-10-PCS | Mod: S$GLB,,, | Performed by: NURSE PRACTITIONER

## 2022-09-22 PROCEDURE — 4010F ACE/ARB THERAPY RXD/TAKEN: CPT | Mod: CPTII,S$GLB,, | Performed by: NURSE PRACTITIONER

## 2022-09-22 PROCEDURE — 3079F PR MOST RECENT DIASTOLIC BLOOD PRESSURE 80-89 MM HG: ICD-10-PCS | Mod: CPTII,S$GLB,, | Performed by: NURSE PRACTITIONER

## 2022-09-22 PROCEDURE — 1159F PR MEDICATION LIST DOCUMENTED IN MEDICAL RECORD: ICD-10-PCS | Mod: CPTII,S$GLB,, | Performed by: NURSE PRACTITIONER

## 2022-09-22 PROCEDURE — 99999 PR PBB SHADOW E&M-EST. PATIENT-LVL IV: ICD-10-PCS | Mod: PBBFAC,,, | Performed by: NURSE PRACTITIONER

## 2022-09-22 PROCEDURE — 3075F SYST BP GE 130 - 139MM HG: CPT | Mod: CPTII,S$GLB,, | Performed by: NURSE PRACTITIONER

## 2022-09-22 PROCEDURE — 3008F PR BODY MASS INDEX (BMI) DOCUMENTED: ICD-10-PCS | Mod: CPTII,S$GLB,, | Performed by: NURSE PRACTITIONER

## 2022-09-22 PROCEDURE — 3008F BODY MASS INDEX DOCD: CPT | Mod: CPTII,S$GLB,, | Performed by: NURSE PRACTITIONER

## 2022-09-22 PROCEDURE — 99999 PR PBB SHADOW E&M-EST. PATIENT-LVL IV: CPT | Mod: PBBFAC,,, | Performed by: NURSE PRACTITIONER

## 2022-09-22 PROCEDURE — 3075F PR MOST RECENT SYSTOLIC BLOOD PRESS GE 130-139MM HG: ICD-10-PCS | Mod: CPTII,S$GLB,, | Performed by: NURSE PRACTITIONER

## 2022-09-22 PROCEDURE — 1159F MED LIST DOCD IN RCRD: CPT | Mod: CPTII,S$GLB,, | Performed by: NURSE PRACTITIONER

## 2022-09-22 PROCEDURE — 99214 OFFICE O/P EST MOD 30 MIN: CPT | Mod: S$GLB,,, | Performed by: NURSE PRACTITIONER

## 2022-09-22 PROCEDURE — 3044F PR MOST RECENT HEMOGLOBIN A1C LEVEL <7.0%: ICD-10-PCS | Mod: CPTII,S$GLB,, | Performed by: NURSE PRACTITIONER

## 2022-09-22 PROCEDURE — 4010F PR ACE/ARB THEARPY RXD/TAKEN: ICD-10-PCS | Mod: CPTII,S$GLB,, | Performed by: NURSE PRACTITIONER

## 2022-09-22 PROCEDURE — 3079F DIAST BP 80-89 MM HG: CPT | Mod: CPTII,S$GLB,, | Performed by: NURSE PRACTITIONER

## 2022-09-22 PROCEDURE — 3044F HG A1C LEVEL LT 7.0%: CPT | Mod: CPTII,S$GLB,, | Performed by: NURSE PRACTITIONER

## 2022-09-22 RX ORDER — TIZANIDINE 2 MG/1
2 TABLET ORAL NIGHTLY PRN
Qty: 90 TABLET | Refills: 5 | Status: SHIPPED | OUTPATIENT
Start: 2022-09-22 | End: 2022-10-22

## 2022-09-22 RX ORDER — MELOXICAM 15 MG/1
15 TABLET ORAL DAILY
Qty: 30 TABLET | Refills: 5 | Status: SHIPPED | OUTPATIENT
Start: 2022-09-22 | End: 2022-10-22

## 2022-09-22 NOTE — PROGRESS NOTES
Chronic Pain -Established FU  Referring Physician: No ref. provider found    Chief Complaint: Lower back and leg pain     SUBJECTIVE:    Interval History 2022:  Mrs Cantu presents for follow up of lower back pain and radicular pain in a Right L5 radicular pattern. She states most days she has no pain and PT has been beneficial but does reproduce some pain on days she attends. She is overall improved and ready to return to work. She currently is taking Neurontin at 300mg now in conjunction with zanaflex 2mg and Mobic 15mg qd. She denies SE of current medication regimen.     Initial HPI:  Brianna Cantu presents to the clinic for the evaluation of lower back pain. The pain started about a months ago following no incident and symptoms have been unchanged.The pain is located in the right lower back area area and radiates to the right leg.  The pain is described as aching, sharp and shooting and is rated as 7/10. The pain is rated with a score of  4/10 on the BEST day and a score of 10/10 on the WORST day.  Symptoms interfere with daily activity and sleeping. The pain is exacerbated by Standing, Laying and Walking.  The pain is mitigated by sitting and medications.  Patient denies night fever/night sweats, urinary incontinence, bowel incontinence, significant weight loss, significant motor weakness and loss of sensations. She was started on Gabapentin and IM injection of steroid and possible NSAID was given.     Physical Therapy/Home Exercise: no      Pain Disability Index Review:  Last 3 PDI Scores 2022   Pain Disability Index (PDI) 8 49       Pain Medications:    Gabapentin 300 mg at night.  Ibuprofen as needed  Tylenol       report:  Not applicable    Pain Procedures: None    Imagin2022    MRI LUMBAR SPINE WITHOUT CONTRAST     CLINICAL HISTORY:  Low back pain, symptoms persist with > 6wks conservative treatment; Dorsalgia, unspecified     TECHNIQUE:  Multiplanar, multisequence MR  images were acquired from the thoracolumbar junction to the sacrum without the administration of contrast.     COMPARISON:  Radiograph 07/27/2022.     FINDINGS:  Lumbar spine alignment demonstrates mild grade 1 anterolisthesis of L4 on L5.  No spondylolysis.  Vertebral body heights are well maintained without evidence for fracture.  No marrow signal abnormality to suggest an infiltrative process.     There is degenerative disc desiccation throughout the visualized thoracolumbar spine with mild associated height loss at L4-L5.  No endplate edema.     Distal spinal cord demonstrates normal contour and signal intensity.  Cauda equina appears normal without findings to suggest arachnoiditis.  Conus medullaris terminates at L1.     Bilateral cortical renal cysts.  SI joints are symmetric.  Paraspinal musculature demonstrates normal bulk.     T12-L1: No spinal canal stenosis or neural foraminal narrowing.     L1-L2: No spinal canal stenosis or neural foraminal narrowing.     L2-L3: No spinal canal stenosis or neural foraminal narrowing.     L3-L4: Left subarticular/foraminal zone broad-based disc bulge.  Bilateral facet arthropathy and bilateral ligamentum flavum buckling.  Findings contribute to mild-to-moderate left neural foraminal narrowing.  No spinal canal stenosis.     L4-L5: Mild grade 1 anterolisthesis.  Bilateral facet arthropathy and bilateral ligamentum flavum buckling.  Findings contribute to mild right neural foraminal narrowing.     L5-S1: Circumferential disc bulge encroaches into the bilateral foraminal zones.  Mild bilateral facet arthropathy.  Findings contribute to moderate right and mild-to-moderate left neural foraminal narrowing.  No spinal canal stenosis.     Impression:     1. Lumbar degenerative changes contributing to mild-to-moderate neural foraminal narrowing from L3-L4 through L5-S1 as detailed above.      X-Ray Lumbar Spine 2 Or 3 Views  Order: [id number]  Status: Final result    Visible to  patient: Yes (seen)    Next appt: 08/09/2022 at 12:30 PM in [facility] ([facility], [facility])    Dx: Acute back pain with sciatica, right    0 Result Notes    Details    Reading Physician Reading Date Result Priority   Olayinka GENTILE DO [place]  [phone]  [phone] 7/27/2022 Routine     Narrative & Impression  EXAMINATION:  XR LUMBAR SPINE 2 OR 3 VIEWS     CLINICAL HISTORY:  Lumbago with sciatica, right side     TECHNIQUE:  AP and lateral views of the lumbar spine     COMPARISON:  None     FINDINGS:  Trace grade 1 anterolisthesis of L L4 on L5 with trace grade 1 retrolisthesis of L3 on L4.  The lumbar vertebral body heights and contours are within normal is without evidence for acute fracture.  Facet degenerative change lower lumbar levels.  Short-segment linear hyperdensity within the pelvic soft tissues only seen on the lateral view which may be extrinsic to the patient however indeterminate.  Further evaluation as warranted clinically.     Impression:     Please see above        Electronically signed by: Olayinka Jacob DO  Date:                                            07/27/2022  Time:                                           15:45         Past Medical History:   Diagnosis Date    Allergic rhinitis 4/27/2014    Anxiety 4/27/2014    Asthma     Hx of cervical cancer 4/27/2014    Obesity 4/27/2014    Vitamin D deficiency 4/27/2014     Past Surgical History:   Procedure Laterality Date    COLONOSCOPY      COLONOSCOPY N/A 9/24/2018    Procedure: COLONOSCOPY;  Surgeon: Montrell Gan MD;  Location: [address];  Service: Endoscopy;  Laterality: N/A;    ESOPHAGOGASTRODUODENOSCOPY      ESOPHAGOGASTRODUODENOSCOPY N/A 9/24/2018    Procedure: EGD (ESOPHAGOGASTRODUODENOSCOPY);  Surgeon: Montrell Gan MD;  Location: [address]);  Service: Endoscopy;  Laterality: N/A;    TOTAL ABDOMINAL HYSTERECTOMY       Social History     Socioeconomic History    Marital status:    Tobacco Use     Smoking status: Every Day     Packs/day: 0.50     Types: Cigarettes    Smokeless tobacco: Never   Substance and Sexual Activity    Alcohol use: Yes     Alcohol/week: 0.0 standard drinks     Comment: occasional    Drug use: No    Sexual activity: Not Currently     Family History   Problem Relation Age of Onset    Hypertension Mother     Allergies Mother     Hypertension Sister     Allergies Sister     Allergies Sister     Heart disease Neg Hx        Review of patient's allergies indicates:  No Known Allergies    Current Outpatient Medications   Medication Sig    (Magic mouthwash) 1:1:1 diphenhydramine(Benadryl) 12.5mg/5ml liq, aluminum & magnesium hydroxide-simethicone (Maalox), LIDOcaine viscous 2% Swish and spit 10 mLs every 4 (four) hours as needed (mouth ulcer). for mouth sores    albuterol (PROVENTIL/VENTOLIN HFA) 90 mcg/actuation inhaler Inhale 2 puffs into the lungs every 6 (six) hours as needed for Wheezing.    amlodipine-valsartan (EXFORGE) 5-160 mg per tablet Take 1 tablet by mouth once daily.    azelastine (ASTELIN) 137 mcg (0.1 %) nasal spray 2 sprays (274 mcg total) by Nasal route 2 (two) times daily as needed for Rhinitis. Donot snort (because it tastes bad)    cholecalciferol, vitamin D3, 1,250 mcg (50,000 unit) capsule Take 1 capsule (50,000 Units total) by mouth once a week.    EScitalopram oxalate (LEXAPRO) 10 MG tablet Take 1 tablet (10 mg total) by mouth once daily.    fluticasone propionate (FLONASE) 50 mcg/actuation nasal spray 1 spray (50 mcg total) by Each Nostril route daily as needed for Allergies.    gabapentin (NEURONTIN) 100 MG capsule Take 1 capsule (100 mg total) by mouth once daily at 6am.    LIDOCAINE VISCOUS 2 % solution Take by mouth.    loratadine (CLARITIN) 10 mg tablet Take 10 mg by mouth.    meloxicam (MOBIC) 15 MG tablet Take 1 tablet (15 mg total) by mouth once daily.    pantoprazole (PROTONIX) 40 MG tablet Take 1 tablet (40 mg total) by mouth once daily.    tiZANidine  (ZANAFLEX) 2 MG tablet Take 1 tablet (2 mg total) by mouth nightly as needed.    valACYclovir (VALTREX) 1000 MG tablet Take 2 pills po bid x 2 days prn first signs of outbreak     No current facility-administered medications for this visit.       REVIEW OF SYSTEMS:    GENERAL:  No weight loss, malaise or fevers.  HEENT:  Negative for frequent or significant headaches.  NECK:  Negative for lumps, goiter, pain and significant neck swelling.  RESPIRATORY:  Negative for cough, wheezing or shortness of breath.  CARDIOVASCULAR:  Negative for chest pain, leg swelling or palpitations.  GI:  Negative for abdominal discomfort, blood in stools or black stools or change in bowel habits.  MUSCULOSKELETAL:  See HPI.  SKIN:  Negative for lesions, rash, and itching.  PSYCH:  Negative for sleep disturbance, mood disorder and recent psychosocial stressors.  HEMATOLOGY/LYMPHOLOGY:  Negative for prolonged bleeding, bruising easily or swollen nodes.  NEURO:   No history of headaches, syncope, paralysis, seizures or tremors.  All other reviewed and negative other than HPI.    OBJECTIVE:    GEN:  Well developed, well nourished.  No acute distress.   HEENT:  No trauma.  Mucous membranes moist.  Nares patent bilaterally.  PSYCH: Normal affect. Thought content appropriate.  CHEST:  Breathing symmetric.  No audible wheezing.  ABD: Soft, non-distended.  SKIN:  Warm, pink, dry.  No rash on exposed areas.    EXT:  No cyanosis, clubbing, or edema.  No color change or changes in nail or hair growth.  NEURO/MUSCULOSKELETAL:  Fully alert, oriented, and appropriate. Speech normal lawson. No cranial nerve deficits.   Gait: Normal .  No focal motor deficits.     ASSESSMENT: 62 y.o. year old female with chronic lower back pain with radiation to right leg consistent with lumbar radiculopathy.       1. DDD (degenerative disc disease), lumbar        2. Dorsalgia, unspecified                PLAN:   - Prior records reviewed  - Imaging reviewed with Pt   -  She has improved with medication regimen and PT. Ready to return to work and RTW note completed for her.   - Advised to continue with PT as it has been beneficial  - Continue Mobic, Neuriontin and zanaflex  - I have stressed the importance of physical activity and a home exercise plan to help with pain and improve health.  - Counseled patient regarding the importance of activity modification and physical therapy.  - RTC PRN   The above plan and management options were discussed at length with patient. Patient is in agreement with the above and verbalized understanding. It will be communicated with the referring physician via electronic record, fax, or mail.    French Figueroa  09/22/2022    I spent a total of 30 minutes on the day of the visit.  This includes face to face time and non-face to face time preparing to see the patient by reviewing previous labs/imaging, obtaining and/or reviewing separately obtained history, documenting clinical information in the electronic or other health record, independently interpreting results and communicating results to the patient/family/caregiver.

## 2022-09-26 ENCOUNTER — TELEPHONE (OUTPATIENT)
Dept: PAIN MEDICINE | Facility: CLINIC | Age: 62
End: 2022-09-26
Payer: OTHER GOVERNMENT

## 2022-09-26 NOTE — TELEPHONE ENCOUNTER
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 Pt tahnk staff for reaching out to her .  "

## 2022-09-26 NOTE — TELEPHONE ENCOUNTER
----- Message from Kiki Janessa sent at 9/26/2022 10:51 AM CDT -----  Contact: DOM VARNER [[id number]]  Type: Call Back      Who called: DOM VARNER [[id number]]      What is the request in detail: Patient is requesting a call back from Luis Alberto Cardona MA in regards to her return to work letter. Please advise.        Can the clinic reply by [facility]? No      Would the patient rather a call back or a response via [facility]? Call back       Best call back number: [phone] (mobile)      Additional Information:

## 2022-10-17 ENCOUNTER — TELEPHONE (OUTPATIENT)
Dept: NEUROLOGY | Facility: CLINIC | Age: 62
End: 2022-10-17
Payer: OTHER GOVERNMENT

## 2022-10-27 ENCOUNTER — OFFICE VISIT (OUTPATIENT)
Dept: SLEEP MEDICINE | Facility: CLINIC | Age: 62
End: 2022-10-27
Payer: OTHER GOVERNMENT

## 2022-10-27 VITALS
HEART RATE: 90 BPM | SYSTOLIC BLOOD PRESSURE: 135 MMHG | BODY MASS INDEX: 38.62 KG/M2 | WEIGHT: 240.31 LBS | HEIGHT: 66 IN | DIASTOLIC BLOOD PRESSURE: 91 MMHG

## 2022-10-27 DIAGNOSIS — G47.33 SEVERE OBSTRUCTIVE SLEEP APNEA: ICD-10-CM

## 2022-10-27 DIAGNOSIS — G47.10 HYPERSOMNOLENCE: Primary | ICD-10-CM

## 2022-10-27 DIAGNOSIS — R35.1 NOCTURIA: ICD-10-CM

## 2022-10-27 PROCEDURE — 99214 PR OFFICE/OUTPT VISIT, EST, LEVL IV, 30-39 MIN: ICD-10-PCS | Mod: S$PBB,,, | Performed by: INTERNAL MEDICINE

## 2022-10-27 PROCEDURE — 99999 PR PBB SHADOW E&M-EST. PATIENT-LVL III: CPT | Mod: PBBFAC,,, | Performed by: INTERNAL MEDICINE

## 2022-10-27 PROCEDURE — 99213 OFFICE O/P EST LOW 20 MIN: CPT | Mod: PBBFAC | Performed by: INTERNAL MEDICINE

## 2022-10-27 PROCEDURE — 99214 OFFICE O/P EST MOD 30 MIN: CPT | Mod: S$PBB,,, | Performed by: INTERNAL MEDICINE

## 2022-10-27 PROCEDURE — 99999 PR PBB SHADOW E&M-EST. PATIENT-LVL III: ICD-10-PCS | Mod: PBBFAC,,, | Performed by: INTERNAL MEDICINE

## 2022-10-27 NOTE — PROGRESS NOTES
ESTABLISHED PATIENT VISIT    Brianna Cantu  is a pleasant 62 y.o. female  with PMH significant for AR, HTN, Vit D def,  who presented mid 2022 for evaluation of snoring, sleepiness, frequent nocturia.    Here today for CPAP follow-up    PLAN last visit:   -recommend sleep testing   -discussed trial therapy if VIVAIN present and the patient is  open to a trial of CPAP therapy  -driving precautions were discussed with the patient      Since last visit:   HST 8/4/22: AHI 51, RDI 63  Did very well the first night  Since then she has been having trouble.  Wearing about 4 hrs a night    PAP history   Problems    Mask FFM   Pressure    DME HME   Machine age 2022   Download 10.26.22: 54/58 x 5h 33min, 5-12 (10.1/11.9/12), Leak 6/31/68, AHI 3.7       SLEEP SCHEDULE   Environment    Bed Time 11p - 1A (MN-3A)   Sleep Latency Not long   Arousals Very frequent 2' urination   Nocturia 3-8   Back to sleep Not long   Wake time 8A-8:45A (9A)   Naps 1-2   Work        Past Medical History:   Diagnosis Date    Allergic rhinitis 4/27/2014    Anxiety 4/27/2014    Asthma     Hx of cervical cancer 4/27/2014    Obesity 4/27/2014    Vitamin D deficiency 4/27/2014     Patient Active Problem List   Diagnosis    Allergic rhinitis    Anxiety    Obesity    Vitamin D deficiency    Hx of cervical cancer    Laryngopharyngeal reflux    Bilateral leg numbness    Bronchitis    Constipation    Benign essential hypertension    Other depressive disorder    Tobacco use disorder    Mild intermittent asthma without complication    Stenosis colon    Light cigarette smoker (1-9 cigs/day)    Leg edema    Diarrhea    Cough    Wheezing    Lesion of hard palate    JOSE ANGEL (generalized anxiety disorder)    Hypersomnolence       Current Outpatient Medications:     (Magic mouthwash) 1:1:1 diphenhydramine(Benadryl) 12.5mg/5ml liq, aluminum & magnesium hydroxide-simethicone (Maalox), LIDOcaine viscous 2%, Swish and spit 10 mLs every 4 (four) hours as needed (mouth  "ulcer). for mouth sores, Disp: 450 mL, Rfl: 0    albuterol (PROVENTIL/VENTOLIN HFA) 90 mcg/actuation inhaler, Inhale 2 puffs into the lungs every 6 (six) hours as needed for Wheezing., Disp: 18 g, Rfl: 11    amlodipine-valsartan (EXFORGE) 5-160 mg per tablet, Take 1 tablet by mouth once daily., Disp: 90 tablet, Rfl: 3    azelastine (ASTELIN) 137 mcg (0.1 %) nasal spray, 2 sprays (274 mcg total) by Nasal route 2 (two) times daily as needed for Rhinitis. Donot snort (because it tastes bad), Disp: 30 mL, Rfl: 11    cholecalciferol, vitamin D3, 1,250 mcg (50,000 unit) capsule, Take 1 capsule (50,000 Units total) by mouth once a week., Disp: 12 capsule, Rfl: 3    EScitalopram oxalate (LEXAPRO) 10 MG tablet, Take 1 tablet (10 mg total) by mouth once daily., Disp: 90 tablet, Rfl: 3    fluticasone propionate (FLONASE) 50 mcg/actuation nasal spray, 1 spray (50 mcg total) by Each Nostril route daily as needed for Allergies., Disp: 16 g, Rfl: 0    gabapentin (NEURONTIN) 100 MG capsule, Take 1 capsule (100 mg total) by mouth once daily at 6am., Disp: 30 capsule, Rfl: 11    LIDOCAINE VISCOUS 2 % solution, Take by mouth., Disp: , Rfl:     loratadine (CLARITIN) 10 mg tablet, Take 10 mg by mouth., Disp: , Rfl:     pantoprazole (PROTONIX) 40 MG tablet, Take 1 tablet (40 mg total) by mouth once daily., Disp: 180 tablet, Rfl: 3    valACYclovir (VALTREX) 1000 MG tablet, Take 2 pills po bid x 2 days prn first signs of outbreak, Disp: 30 tablet, Rfl: 3       Vitals:    10/27/22 1103   BP: (!) 135/91   BP Location: Left arm   Patient Position: Sitting   BP Method: Medium (Automatic)   Pulse: 90   Weight: 109 kg (240 lb 4.8 oz)   Height: 5' 6" (1.676 m)       Physical Exam:    GEN:   Well-appearing  Psych:  Appropriate affect, demonstrates insight  SKIN:  No rash on the face or bridge of the nose      LABS:   No results found for: HGB, CO2      RECORDS REVIEWED PREVIOUSLY:    No prior sleep testing.    ASSESSMENT    PROBLEM DESCRIPTION/ Sx " on Presentation Interval Hx STATUS   VIVIAN   + loud snoring, occasional snoring arousals, her  was scared one night that she wasn't breathing in her sleep, + gasping arousal once in the past  HEENT: MP1, + oropharynx shallow in A-P dimension   Reports decent usage  Still waking frequently  Good efficacy Partially controlled   Sleepiness   + sleepiness when inactive   denies sleepiness when driving   ESS 14/24 on intake Still quite sleepy persists   Insomnia   Waking frequently due to nocturia, back to sleep fairly quickly  Prior hypnotics:        Current hypnotics:    Waking up a little less frequently improved   Nocturia   x 3-8 per sleep period, no frequency during the day Less often, now about 3 times improved   Other issues:     PLAN     -trial of a nasal mask  -*will proceed with a CPAP titration due to  residual sleepiness and residual sleep disruptionon auto-CPAP despite adequate usage  -the patient is using and benefiting from PAP therapy      RTC          The patient was given open opportunity to ask questions and/or express concerns about treatment plan.   All questions/concerns were discussed.     Two patient identifiers used prior to evaluation.

## 2022-11-02 ENCOUNTER — TELEPHONE (OUTPATIENT)
Dept: SLEEP MEDICINE | Facility: OTHER | Age: 62
End: 2022-11-02
Payer: OTHER GOVERNMENT

## 2022-11-15 ENCOUNTER — TELEPHONE (OUTPATIENT)
Dept: INTERNAL MEDICINE | Facility: CLINIC | Age: 62
End: 2022-11-15
Payer: OTHER GOVERNMENT

## 2022-11-15 NOTE — TELEPHONE ENCOUNTER
----- Message from Cristina Saldivar sent at 11/15/2022 10:32 AM CST -----  Contact: [phone] Patient  Pt is calling in regards to her appt on 11/18. Pt would like to reschedule due to a death in her family. Next availability for Dr Mcclendon is not until 03/2023. Pt would like to be seen before that please. Please call and advise.

## 2022-11-21 ENCOUNTER — TELEPHONE (OUTPATIENT)
Dept: SLEEP MEDICINE | Facility: OTHER | Age: 62
End: 2022-11-21
Payer: OTHER GOVERNMENT

## 2022-12-03 ENCOUNTER — HOSPITAL ENCOUNTER (OUTPATIENT)
Dept: SLEEP MEDICINE | Facility: OTHER | Age: 62
Discharge: HOME | End: 2022-12-03
Attending: INTERNAL MEDICINE
Payer: OTHER GOVERNMENT

## 2022-12-03 DIAGNOSIS — G47.10 HYPERSOMNOLENCE: ICD-10-CM

## 2022-12-03 DIAGNOSIS — G47.33 SEVERE OBSTRUCTIVE SLEEP APNEA: ICD-10-CM

## 2022-12-03 DIAGNOSIS — R35.1 NOCTURIA: ICD-10-CM

## 2022-12-03 PROCEDURE — 95811 POLYSOM 6/>YRS CPAP 4/> PARM: CPT

## 2022-12-04 NOTE — PROGRESS NOTES
A Cpap titration study was preformed on 62 year old Brianna Cantu on the night of 12/03/2022. The procedure was explained to the patient. Education was giving which included the entire sleep study set up process and why the tech would need to enter the room. All questions were asked and answered prior to the start of the study. The mask used was a F&P Simplus FFm size medium per patient's request and past use. The patient was offered a nasal mask to use for the titration but refused.   Disposable equipment was used where applicable. An end of the night instruction sheet was giving to the patient prior to leaving the lab. The patient's response to cpap at the end of the night was that it felt different but was okay.

## 2022-12-20 ENCOUNTER — PATIENT MESSAGE (OUTPATIENT)
Dept: SLEEP MEDICINE | Facility: CLINIC | Age: 62
End: 2022-12-20

## 2022-12-20 PROCEDURE — 95811 POLYSOM 6/>YRS CPAP 4/> PARM: CPT | Mod: 26,,, | Performed by: INTERNAL MEDICINE

## 2022-12-20 PROCEDURE — 95811 PR POLYSOMNOGRAPHY W/CPAP: ICD-10-PCS | Mod: 26,,, | Performed by: INTERNAL MEDICINE

## 2022-12-20 NOTE — PROCEDURES
"    [facility]    Titration Interpretation Report    Patient Name:  DOM VARNER  MRN#:  [id number]  :  1960  Study Date:  12/3/2022  Referring Provider:  ALVARO REGAN MD    The patient is a 62 year old Female who is 5' 6" and weighs 240.0 lbs.  Her BMI equals 39.0.  A full night PAP titration was performed.    Polysomnogram Data  A full night polysomnogram recorded the standard physiologic parameters including EEG, EOG, EMG, EKG, nasal and oral airflow.  Respiratory parameters of chest and abdominal movements were recorded with [place]-Crystal motion transducers.  Oxygen saturation was recorded by pulse oximetry.    Titration Summary  The patient was titrated at pressures ranging from 5* cm/H20 with supplemental oxygen at - up to 16* cm/H20 with supplemental oxygen at -.  The last pressure used in the study was 16* cm/H20 with supplemental oxygen at -.    Sleep Architecture  The total recording time of the polysomnogram was 490.0 minutes.  The total sleep time was 463.5 minutes.  The patient spent 3.7% of total sleep time in Stage N1, 49.1% in Stage N2, 9.7% in Stages N3, and 37.5% in REM.  Sleep latency was 2.9 minutes.  REM latency was 59.0 minutes.  Sleep Efficiency was 94.6%.  Total wake time was 27.0 minutes for a total wake percentage of 4.8%.  Wake after Sleep Onset was 24.0 minutes.    Respiratory Summary  The polysomnogram revealed a presence of - obstructive, 2 central, and - mixed apneas resulting in Total Apnea index of 0.3 events per hour.  There were 32 hypopneas resulting in Total Hypopnea index of 4.1 events per hour.  The combined Apnea/Hypopnea index was 4.4 events per hour.  There were a total of 1 RERA events resulting in a Respiratory Disturbance Index (RDI) of 4.5 events per hour.     Mean oxygen saturation was 95.5%.  The lowest oxygen saturation during sleep was 89.0%.  Time spent ?88% oxygen saturation was - minutes (-).    Limb Movement Activity  There were " "33 limb movements recorded.  Of this total, 33 were classified as PLMs.  Of the PLMs, - were associated with arousals.  The Limb Movement index was 4.3 per hour while the PLM index was 4.3 per hour and PLM with arousals index was - per hour.    Cardiac: single lead EKG revealed normal sinus rhythm    CPAP titration:  Mask used in the study: F&P Simplus FFm size medium per patient's request (patient refused trial of a nasal mask)  Consolidated sleep was first seen at `10 cwp.  CPAP = 10 cwp was only partially effective in lateral REM sleep.  CPAP = 13 cwp was largely effective in lateral, but insufficient for supine sleep.  CPAP = 15 cwp was largely effective in supine REM.    Oxygenation:  At therapeutic levels of PAP therapy, there was no baseline hypoxemia.    Impression:  -obstructive sleep apnea     Recommendations:    -auto-CPAP with CPAP min = 15 cwp  and CPAP max =  17 cwp is recommended  -if the patient has trouble tolerating CPAP, pressures as low as  10cwp may be of benefit  -the patient has follow up with Sleep Medicine        Chet Regan MD    (This Sleep Study was interpreted by a Board Certified Sleep Specialist who conducted an epoch-by-epoch review of the entire raw data recording.)  (The indication for this sleep study was reviewed and deemed appropriate by AASM Practice Parameters or other reasons by a Board Certified Sleep Specialist.)      [facility]    Titration Report    Patient Name: DOM VARNER Study Date: 12/3/2022   YOB: 1960 MRN #: [id number]   Age: 62 year TOYIN #: -   Sex: Female Referring Provider: CHET REGAN MD   Height: 5' 6" Recording Tech: Genny Preston RRT RPSGT   Weight: 240.0 lbs Scoring Tech: Kishan Cunningham RRT RPSGT   BMI: 39.0 Interpreting Physician: -   ESS: - Neck Circumference: -     Study Overview    Lights Off: 09:35:06 PM  Count Index   Lights On: 05:45:06 AM Awakenings: 18 2.3   Time in Bed: 490.0 min. Arousals: 38 4.9   Total " Sleep Time: 463.5 min. Apneas & Hypopneas: 34 4.4    Sleep Efficiency: 94.6% Limb Movements: 33 4.3   Sleep Latency: 2.9 min. Snores: - -   Wake After Sleep Onset: 24.0 min. Desaturations: 60 7.8    REM Latency from Sleep Onset: 59.0 min. Minimum SpO2 TST: 89.0%      Sleep Architecture   % of Time in Bed  Stages Time (mins) % Sleep Time   Wake 27.0    Stage N1 17.0 3.7%   Stage N2 227.5 49.1%   Stage N3 45.0 9.7%   .0 37.5%         Arousal Summary     NREM REM Sleep Index   Respiratory Arousals 8 1 9 1.2   PLM Arousals - - - -   Isolated Limb Movement Arousals - - - -   Spontaneous Arousals 25 4 29 3.8   Total 33 5 38 4.9       Limb Movement Summary     Count Index   Isolated Limb Movements - -   Periodic Limb Movements (PLMs) 33 4.3   Total Limb Movements 33 4.3       Respiratory Summary     By Sleep Stage By Body Position Total    NREM REM Supine Non-Supine    Time (min) 289.5 174.0 298.0 165.5 463.5           Obstructive Apnea - - - - -   Mixed Apnea - - - - -   Central Apnea 1 1 2 - 2   Total Apneas 1 1 2 - 2   Total Apnea Index 0.2 0.3 0.4 - 0.3           Total Hypopnea 28 4 26 6 32   Total Hypopnea Index 5.8 1.4 5.2 2.2 4.1           Apnea & Hypopnea 29 5 28 6 34   Apnea & Hypopnea Index 6.0 1.7 5.6 2.2 4.4           RERAs 1 - - 1 1   RERA Index 0.2 - - 0.4 0.1           RDI 6.2 1.7 5.6 2.5 4.5     Scoring Criteria: Hypopneas scored at Choose an item.% desaturation criteria.    Respiratory Event Durations     Apnea Hypopnea    NREM REM NREM REM   Average (seconds) 12.9 11.2 18.2 16.0   Maximum (seconds) 12.9 11.2 27.9 18.5       Oxygen Saturation Summary     Wake NREM REM TST Total   Average SpO2 96.2% 95.2% 96.0% 95.5% 95.5%   Minimum SpO2 90.0% 89.0% 91.0% 89.0% 89.0%   Maximum SpO2 99.0% 100.0% 99.0% 100.0% 100.0%     Oxygen Saturation Distribution    Range (%) Time in range (min) Time in range (%)    90.0 - 100.0 481.7 99.5%   80.0 - 90.0 1.1 0.2%   70.0 - 80.0 - -   60.0 - 70.0 - -   50.0 - 60.0 -  -   0.0 - 50.0 - -   Time Spent ?88% SpO2    Range (%) Time in range (min) Time in range (%)   0.0 - 88.0 - -          Count Index   Desaturations 60 7.8      Cardiac Summary     Wake NREM REM Sleep Total   Average Pulse Rate (BPM) 78.7 75.3 75.3 75.3 75.4   Minimum Pulse Rate (BPM) 63.0 58.0 61.0 58.0 58.0   Maximum Pulse Rate (BPM) 95.0 94.0 92.0 94.0 95.0     Pulse Rate Distribution    Range (bpm) Time in range (min) Time in range (%)   0.0 - 40.0 - -   40.0 - 60.0 0.1 0.0%   60.0 - 80.0 395.6 81.7%   80.0 - 100.0 88.3 18.3%   100.0 - 120.0 - -   120.0 - 140.0 - -   140.0 - 200.0 - -     EtCO2 Summary    Stage Min (mmHg) Average (mmHg) Max (mmHg)   Wake - - -   NREM(1+2+3) - - -   REM - - -     Range (mmHg) Time in range (min) Time in range (%)   20.0 - 40.0 - -   40.0 - 50.0 - -   50.0 - 55.0 - -   55.0 - 100.0 - -   Excluded data <20.0 & >65.0 490.5 100.0%     TcCO2 Summary    Stage Min (mmHg) Average (mmHg) Max (mmHg)   Wake - - -   NREM(1+2+3) - - -   REM - - -     Range (mmHg) Time in range (min) Time in range (%)   20.0 - 40.0 - -   40.0 - 50.0 - -   50.0 - 55.0 - -   55.0 - 100.0 - -   Excluded data <20.0 & >65.0 490.5 100.0%     Comments    -    Titration Summary    PAP Device PAP Level O2 Level Time (min) TST (min) NREM (min) REM (min) Wake (min) Sleep Eff% OA# CA# MA# Hyp# AHI RERA RDI Min SpO2 SpO2 ?88% (min) Ar. Index   CPAP 5 - 40.5 33.5 33.5 0.0 7.0 82.7% - - - 3 5.4 1 7.2 93.0  0.0 5.4   CPAP 7 - 20.0 20.0 20.0 0.0 0.0 100.0% - - - - - - - 93.0  0.0 3.0   CPAP 10 - 29.5 28.5 8.0 20.5 1.0 96.6% - - - 2 4.2 - 4.2 91.0  0.0 4.2   CPAP 11 - 32.5 29.0 29.0 0.0 3.5 89.2% - - - 3 6.2 - 6.2 90.0  0.0 6.2   CPAP 12 - 16.5 16.5 16.5 0.0 0.0 100.0% - - - 1 3.6 - 3.6 91.0  0.0 -   CPAP 13 - 101.5 92.5 53.0 39.5 9.0 91.1% - - - 10 6.5 - 6.5 90.0  0.0 9.1   CPAP 14 - 17.0 17.0 17.0 0.0 0.0 100.0% - - - 5 17.6 - 17.6 90.0  0.0 10.6   CPAP 15 - 178.0 174.0 90.0 84.0 4.0 97.8% - 1 - 8 3.1 - 3.1 89.0  0.0 3.4   CPAP  16 - 55.0 52.5 22.5 30.0 2.5 95.5% - 1 - - 1.1 - 1.1 92.0  0.0 2.3

## 2022-12-30 ENCOUNTER — PROCEDURE VISIT (OUTPATIENT)
Dept: NEUROLOGY | Facility: CLINIC | Age: 62
End: 2022-12-30
Payer: OTHER GOVERNMENT

## 2022-12-30 DIAGNOSIS — R20.0 BILATERAL LEG NUMBNESS: ICD-10-CM

## 2022-12-30 PROCEDURE — 95886 MUSC TEST DONE W/N TEST COMP: CPT | Mod: PBBFAC | Performed by: PSYCHIATRY & NEUROLOGY

## 2022-12-30 PROCEDURE — 95910 PR NERVE CONDUCTION STUDY; 7-8 STUDIES: ICD-10-PCS | Mod: 26,S$PBB,, | Performed by: PSYCHIATRY & NEUROLOGY

## 2022-12-30 PROCEDURE — 95910 NRV CNDJ TEST 7-8 STUDIES: CPT | Mod: PBBFAC | Performed by: PSYCHIATRY & NEUROLOGY

## 2022-12-30 PROCEDURE — 95910 NRV CNDJ TEST 7-8 STUDIES: CPT | Mod: 26,S$PBB,, | Performed by: PSYCHIATRY & NEUROLOGY

## 2022-12-30 PROCEDURE — 95886 PR EMG COMPLETE, W/ NERVE CONDUCTION STUDIES, 5+ MUSCLES: ICD-10-PCS | Mod: 26,S$PBB,, | Performed by: PSYCHIATRY & NEUROLOGY

## 2022-12-30 PROCEDURE — 95886 MUSC TEST DONE W/N TEST COMP: CPT | Mod: 26,S$PBB,, | Performed by: PSYCHIATRY & NEUROLOGY

## 2023-01-03 ENCOUNTER — TELEPHONE (OUTPATIENT)
Dept: INTERNAL MEDICINE | Facility: CLINIC | Age: 63
End: 2023-01-03
Payer: OTHER GOVERNMENT

## 2023-01-03 NOTE — PROGRESS NOTES
Nerve conduction studies are consistent with affects from chronic pinched nerve in your lower back or spine. I feel the next best course of action would be to see our back and spine providers for evaluation and treatment. I have placed a referral for you in our system.     Respectfully,  Sean Darby

## 2023-01-19 ENCOUNTER — OFFICE VISIT (OUTPATIENT)
Dept: INTERNAL MEDICINE | Facility: CLINIC | Age: 63
End: 2023-01-19
Payer: OTHER GOVERNMENT

## 2023-01-19 VITALS
DIASTOLIC BLOOD PRESSURE: 89 MMHG | OXYGEN SATURATION: 98 % | SYSTOLIC BLOOD PRESSURE: 130 MMHG | BODY MASS INDEX: 39.54 KG/M2 | HEART RATE: 90 BPM | WEIGHT: 246.06 LBS | HEIGHT: 66 IN

## 2023-01-19 DIAGNOSIS — F41.1 GENERALIZED ANXIETY DISORDER: ICD-10-CM

## 2023-01-19 DIAGNOSIS — K58.0 IRRITABLE BOWEL SYNDROME WITH DIARRHEA: ICD-10-CM

## 2023-01-19 DIAGNOSIS — M54.16 LUMBAR RADICULOPATHY: Primary | ICD-10-CM

## 2023-01-19 PROCEDURE — 99214 OFFICE O/P EST MOD 30 MIN: CPT | Mod: PBBFAC | Performed by: INTERNAL MEDICINE

## 2023-01-19 PROCEDURE — 99213 OFFICE O/P EST LOW 20 MIN: CPT | Mod: S$PBB,,, | Performed by: INTERNAL MEDICINE

## 2023-01-19 PROCEDURE — 99213 PR OFFICE/OUTPT VISIT, EST, LEVL III, 20-29 MIN: ICD-10-PCS | Mod: S$PBB,,, | Performed by: INTERNAL MEDICINE

## 2023-01-19 PROCEDURE — 99999 PR PBB SHADOW E&M-EST. PATIENT-LVL IV: CPT | Mod: PBBFAC,,, | Performed by: INTERNAL MEDICINE

## 2023-01-19 PROCEDURE — 99999 PR PBB SHADOW E&M-EST. PATIENT-LVL IV: ICD-10-PCS | Mod: PBBFAC,,, | Performed by: INTERNAL MEDICINE

## 2023-01-19 RX ORDER — IBUPROFEN 800 MG/1
800 TABLET ORAL
COMMUNITY
Start: 2022-11-09 | End: 2023-01-19

## 2023-01-19 RX ORDER — GABAPENTIN 300 MG/1
300 CAPSULE ORAL NIGHTLY
Qty: 90 CAPSULE | Refills: 11
Start: 2023-01-19 | End: 2023-08-24

## 2023-01-19 RX ORDER — OXYBUTYNIN CHLORIDE 10 MG/1
10 TABLET, EXTENDED RELEASE ORAL
COMMUNITY
Start: 2022-11-09

## 2023-01-19 RX ORDER — ATORVASTATIN CALCIUM 40 MG/1
20 TABLET, FILM COATED ORAL NIGHTLY
COMMUNITY
Start: 2022-11-09 | End: 2023-08-24 | Stop reason: SDUPTHER

## 2023-01-19 RX ORDER — FLUTICASONE PROPIONATE AND SALMETEROL 100; 50 UG/1; UG/1
1 POWDER RESPIRATORY (INHALATION) 2 TIMES DAILY
COMMUNITY
Start: 2022-11-09

## 2023-01-19 RX ORDER — GABAPENTIN 300 MG/1
300 CAPSULE ORAL 3 TIMES DAILY
Qty: 90 CAPSULE | Refills: 11
Start: 2023-01-19 | End: 2023-01-19

## 2023-01-19 RX ORDER — GABAPENTIN 100 MG/1
100 CAPSULE ORAL DAILY
Qty: 30 CAPSULE | Refills: 11
Start: 2023-01-19 | End: 2023-08-24

## 2023-01-19 RX ORDER — MELOXICAM 15 MG/1
15 TABLET ORAL
COMMUNITY
Start: 2022-11-09

## 2023-01-19 RX ORDER — VALSARTAN 160 MG/1
1 TABLET ORAL DAILY
COMMUNITY
Start: 2022-11-09 | End: 2023-01-19

## 2023-01-19 RX ORDER — AMLODIPINE BESYLATE 10 MG/1
0.5 TABLET ORAL DAILY
COMMUNITY
Start: 2022-11-09 | End: 2023-01-19

## 2023-01-19 NOTE — PROGRESS NOTES
Subjective:       Patient ID: Brianna Cantu is a 62 y.o. female.    Chief Complaint: Follow-up    HPI    Mrs. Cantu is a 62 yo female who presents for follow up      [facility]. Her PCP there said everything looked good except cholesterol and was started on Lipitor.     Dizziness 3 times a week feels dizzy with spinning sensation and feels like she might pass out.     Also having abdominal cramping and diarhea. During last visit I placed referral to GI but was unable to make appt.     During our last visit we started lexapro for her anxiety. Doing well on 10 mg daily.     PMHX:   neck and back pain.: has been doing PT and following with pain management. on gabapentin, flexeril and mobic.      VIVIAN with recent sleep study showing severe obstruction. Got her CPAP but only wearing    for 4 hours but just got machine yesterday     Has IBS, tried fiber in the past. Takes imodium.      HTN: well controlled on amlodipine-valsartain     HLD: on lipitor.     Current smoker: has tried patches but did not work.       Health Maintenance:  Colon Cancer Screening: Negative cologaurd in 2021. Rectal stricture inable to traverse in colonoscopy of 2018.    Mammogram: April 2022 was normal. Due 2023  [facility] lung [facility] was normal.   HIV: negative 2021  Hep C: negative 2021   Lipids: Order today   Pap Smear: Done April 2022 and was normal. Due in April 2023   Vaccines: up to date with vaccines.        Review of Systems   Constitutional:  Negative for activity change, appetite change and chills.   HENT:  Negative for ear pain, sinus pressure/congestion and sneezing.    Respiratory:  Negative for cough and shortness of breath.    Cardiovascular:  Negative for chest pain, palpitations and leg swelling.   Gastrointestinal:  Negative for abdominal distention, abdominal pain, constipation, diarrhea, nausea and vomiting.   Genitourinary:  Negative for dysuria and hematuria.   Musculoskeletal:   Negative for arthralgias, back pain and myalgias.   Neurological:  Negative for dizziness and headaches.   Psychiatric/Behavioral:  Negative for agitation. The patient is not nervous/anxious.          Past Medical History:   Diagnosis Date    Allergic rhinitis 4/27/2014    Anxiety 4/27/2014    Asthma     Hx of cervical cancer 4/27/2014    Obesity 4/27/2014    Vitamin D deficiency 4/27/2014     Past Surgical History:   Procedure Laterality Date    COLONOSCOPY      COLONOSCOPY N/A 9/24/2018    Procedure: COLONOSCOPY;  Surgeon: Montrell Gan MD;  Location: [facility] ([address] [place] [address]);  Service: Endoscopy;  Laterality: N/A;    ESOPHAGOGASTRODUODENOSCOPY      ESOPHAGOGASTRODUODENOSCOPY N/A 9/24/2018    Procedure: EGD (ESOPHAGOGASTRODUODENOSCOPY);  Surgeon: Montrell Gan MD;  Location: [facility] ([address] [place] [address]);  Service: Endoscopy;  Laterality: N/A;    TOTAL ABDOMINAL HYSTERECTOMY        Patient Active Problem List   Diagnosis    Allergic rhinitis    Anxiety    Obesity    Vitamin D deficiency    Hx of cervical cancer    Laryngopharyngeal reflux    Bilateral leg numbness    Bronchitis    Constipation    Benign essential hypertension    Other depressive disorder    Tobacco use disorder    Mild intermittent asthma without complication    Stenosis colon    Light cigarette smoker (1-9 cigs/day)    Leg edema    Diarrhea    Cough    Wheezing    Lesion of hard palate    JOSE ANGEL (generalized anxiety disorder)    Hypersomnolence        Objective:      Physical Exam  Constitutional:       Appearance: Normal appearance.   HENT:      Head: Normocephalic.      Right Ear: Tympanic membrane normal.      Left Ear: Tympanic membrane normal.      Nose: Nose normal.   Cardiovascular:      Rate and Rhythm: Normal rate and regular rhythm.      Pulses: Normal pulses.      Heart sounds: Normal heart sounds.   Pulmonary:      Effort: Pulmonary effort is normal.      Breath sounds: Normal breath sounds.   Abdominal:      General: Abdomen is flat. Bowel  sounds are normal.      Palpations: Abdomen is soft.   Musculoskeletal:         General: Normal range of motion.      Cervical back: Normal range of motion and neck supple.   Skin:     General: Skin is warm and dry.   Neurological:      General: No focal deficit present.      Mental Status: She is alert and oriented to person, place, and time.   Psychiatric:         Mood and Affect: Mood normal.       Assessment:       Problem List Items Addressed This Visit    None  Visit Diagnoses       Lumbar radiculopathy    -  Primary    Relevant Orders    Ambulatory referral/consult to Back & Spine Clinic    Irritable bowel syndrome with diarrhea        Relevant Orders    Ambulatory referral/consult to Gastroenterology    Generalized anxiety disorder                Plan:         Brianna was seen today for follow-up.    Diagnoses and all orders for this visit:    Lumbar radiculopathy  -     Ambulatory referral/consult to Back & Spine Clinic; Future  Continue gabapentin 300 mg nightly and 100 in the day   Irritable bowel syndrome with diarrhea  -     Ambulatory referral/consult to Gastroenterology; Future    Generalized anxiety disorder  Doing well on lexapro.             Rossana Mcclendon MD   Internal Medicine   Primary Care

## 2023-01-20 ENCOUNTER — TELEPHONE (OUTPATIENT)
Dept: INTERNAL MEDICINE | Facility: CLINIC | Age: 63
End: 2023-01-20
Payer: OTHER GOVERNMENT

## 2023-01-20 ENCOUNTER — PATIENT MESSAGE (OUTPATIENT)
Dept: INTERNAL MEDICINE | Facility: CLINIC | Age: 63
End: 2023-01-20
Payer: OTHER GOVERNMENT

## 2023-01-20 NOTE — TELEPHONE ENCOUNTER
----- Message from Johanna Hung sent at 1/20/2023  2:36 PM CST -----   Name of Who is Calling:     What is the request in detail: patient request call back in reference to EMG results Please contact to further discuss and advise      Can the clinic reply by [facility]:     [facility]  [phone]

## 2023-01-24 ENCOUNTER — HOSPITAL ENCOUNTER (OUTPATIENT)
Dept: RADIOLOGY | Facility: OTHER | Age: 63
Discharge: HOME | End: 2023-01-24
Attending: NURSE PRACTITIONER
Payer: OTHER GOVERNMENT

## 2023-01-24 ENCOUNTER — OFFICE VISIT (OUTPATIENT)
Dept: SPINE | Facility: CLINIC | Age: 63
End: 2023-01-24
Payer: OTHER GOVERNMENT

## 2023-01-24 VITALS
DIASTOLIC BLOOD PRESSURE: 71 MMHG | BODY MASS INDEX: 39.71 KG/M2 | OXYGEN SATURATION: 100 % | SYSTOLIC BLOOD PRESSURE: 148 MMHG | WEIGHT: 246.06 LBS | TEMPERATURE: 99 F | RESPIRATION RATE: 16 BRPM | HEART RATE: 87 BPM

## 2023-01-24 DIAGNOSIS — M47.26 OSTEOARTHRITIS OF SPINE WITH RADICULOPATHY, LUMBAR REGION: Primary | ICD-10-CM

## 2023-01-24 DIAGNOSIS — M47.812 SPONDYLOSIS OF CERVICAL REGION WITHOUT MYELOPATHY OR RADICULOPATHY: ICD-10-CM

## 2023-01-24 DIAGNOSIS — M54.16 LUMBAR RADICULOPATHY: ICD-10-CM

## 2023-01-24 DIAGNOSIS — M54.2 CERVICALGIA: ICD-10-CM

## 2023-01-24 DIAGNOSIS — M51.36 DDD (DEGENERATIVE DISC DISEASE), LUMBAR: ICD-10-CM

## 2023-01-24 PROCEDURE — 99204 OFFICE O/P NEW MOD 45 MIN: CPT | Mod: S$PBB,,, | Performed by: NURSE PRACTITIONER

## 2023-01-24 PROCEDURE — 72052 XR CERVICAL SPINE 5 VIEW WITH FLEX AND EXT: ICD-10-PCS | Mod: 26,,, | Performed by: RADIOLOGY

## 2023-01-24 PROCEDURE — 72052 X-RAY EXAM NECK SPINE 6/>VWS: CPT | Mod: TC,FY

## 2023-01-24 PROCEDURE — 99999 PR PBB SHADOW E&M-EST. PATIENT-LVL V: CPT | Mod: PBBFAC,,, | Performed by: NURSE PRACTITIONER

## 2023-01-24 PROCEDURE — 99999 PR PBB SHADOW E&M-EST. PATIENT-LVL V: ICD-10-PCS | Mod: PBBFAC,,, | Performed by: NURSE PRACTITIONER

## 2023-01-24 PROCEDURE — 99215 OFFICE O/P EST HI 40 MIN: CPT | Mod: PBBFAC | Performed by: NURSE PRACTITIONER

## 2023-01-24 PROCEDURE — 72052 X-RAY EXAM NECK SPINE 6/>VWS: CPT | Mod: 26,,, | Performed by: RADIOLOGY

## 2023-01-24 PROCEDURE — 99204 PR OFFICE/OUTPT VISIT, NEW, LEVL IV, 45-59 MIN: ICD-10-PCS | Mod: S$PBB,,, | Performed by: NURSE PRACTITIONER

## 2023-01-24 NOTE — PROGRESS NOTES
Subjective:      Patient ID: Brianna Cantu is a 62 y.o. female.    Chief Complaint: No chief complaint on file.    HPI Ms. Cantu is a 62 year old female here for evaluation of low back and leg pain.     C/o chronic low back and right leg pain for years  Pain radiates down the right leg to the foot with associated parasthesia.   Tried PT in the past with good benefit  No injections and not interested at this time  Takes gabapentin, flexeril, and mobic with mild relief    Also notes left neck pain.     EMG/NCS    Chronic denervation in the right L5 and S1 myotomes suggestive of radiculopathy at those levels.     Lumbar MRI 2022    FINDINGS:  Lumbar spine alignment demonstrates mild grade 1 anterolisthesis of L4 on L5.  No spondylolysis.  Vertebral body heights are well maintained without evidence for fracture.  No marrow signal abnormality to suggest an infiltrative process.     There is degenerative disc desiccation throughout the visualized thoracolumbar spine with mild associated height loss at L4-L5.  No endplate edema.     Distal spinal cord demonstrates normal contour and signal intensity.  Cauda equina appears normal without findings to suggest arachnoiditis.  Conus medullaris terminates at L1.     Bilateral cortical renal cysts.  SI joints are symmetric.  Paraspinal musculature demonstrates normal bulk.     T12-L1: No spinal canal stenosis or neural foraminal narrowing.     L1-L2: No spinal canal stenosis or neural foraminal narrowing.     L2-L3: No spinal canal stenosis or neural foraminal narrowing.     L3-L4: Left subarticular/foraminal zone broad-based disc bulge.  Bilateral facet arthropathy and bilateral ligamentum flavum buckling.  Findings contribute to mild-to-moderate left neural foraminal narrowing.  No spinal canal stenosis.     L4-L5: Mild grade 1 anterolisthesis.  Bilateral facet arthropathy and bilateral ligamentum flavum buckling.  Findings contribute to mild right neural foraminal  narrowing.     L5-S1: Circumferential disc bulge encroaches into the bilateral foraminal zones.  Mild bilateral facet arthropathy.  Findings contribute to moderate right and mild-to-moderate left neural foraminal narrowing.  No spinal canal stenosis.     Impression:     1. Lumbar degenerative changes contributing to mild-to-moderate neural foraminal narrowing from L3-L4 through L5-S1 as detailed above.    Lumbar xray 2022    FINDINGS:  Trace grade 1 anterolisthesis of L L4 on L5 with trace grade 1 retrolisthesis of L3 on L4.  The lumbar vertebral body heights and contours are within normal is without evidence for acute fracture.  Facet degenerative change lower lumbar levels.  Short-segment linear hyperdensity within the pelvic soft tissues only seen on the lateral view which may be extrinsic to the patient however indeterminate.  Further evaluation as warranted clinically.     Impression:     Please see above  Past Medical History:   Diagnosis Date    Allergic rhinitis 4/27/2014    Anxiety 4/27/2014    Asthma     Hx of cervical cancer 4/27/2014    Obesity 4/27/2014    Vitamin D deficiency 4/27/2014       Past Surgical History:   Procedure Laterality Date    COLONOSCOPY      COLONOSCOPY N/A 9/24/2018    Procedure: COLONOSCOPY;  Surgeon: Montrell Gan MD;  Location: [address]);  Service: Endoscopy;  Laterality: N/A;    ESOPHAGOGASTRODUODENOSCOPY      ESOPHAGOGASTRODUODENOSCOPY N/A 9/24/2018    Procedure: EGD (ESOPHAGOGASTRODUODENOSCOPY);  Surgeon: Montrell Gan MD;  Location: [address]);  Service: Endoscopy;  Laterality: N/A;    TOTAL ABDOMINAL HYSTERECTOMY         Family History   Problem Relation Age of Onset    Hypertension Mother     Allergies Mother     Hypertension Sister     Allergies Sister     Allergies Sister     Heart disease Neg Hx        Social History     Socioeconomic History    Marital status:    Tobacco Use    Smoking status: Every Day     Packs/day: 0.50     Types:  Cigarettes    Smokeless tobacco: Never   Substance and Sexual Activity    Alcohol use: Yes     Alcohol/week: 0.0 standard drinks     Comment: occasional    Drug use: No    Sexual activity: Not Currently       Current Outpatient Medications   Medication Sig Dispense Refill    (Magic mouthwash) 1:1:1 diphenhydramine(Benadryl) 12.5mg/5ml liq, aluminum & magnesium hydroxide-simethicone (Maalox), LIDOcaine viscous 2% Swish and spit 10 mLs every 4 (four) hours as needed (mouth ulcer). for mouth sores 450 mL 0    albuterol (PROVENTIL/VENTOLIN HFA) 90 mcg/actuation inhaler Inhale 2 puffs into the lungs every 6 (six) hours as needed for Wheezing. 18 g 11    amlodipine-valsartan (EXFORGE) 5-160 mg per tablet Take 1 tablet by mouth once daily. 90 tablet 3    atorvastatin (LIPITOR) 40 MG tablet 20 mg.      azelastine (ASTELIN) 137 mcg (0.1 %) nasal spray 2 sprays (274 mcg total) by Nasal route 2 (two) times daily as needed for Rhinitis. Donot snort (because it tastes bad) 30 mL 11    cholecalciferol, vitamin D3, 1,250 mcg (50,000 unit) capsule Take 1 capsule (50,000 Units total) by mouth once a week. 12 capsule 3    EScitalopram oxalate (LEXAPRO) 10 MG tablet Take 1 tablet (10 mg total) by mouth once daily. 90 tablet 3    fluticasone propionate (FLONASE) 50 mcg/actuation nasal spray 1 spray (50 mcg total) by Each Nostril route daily as needed for Allergies. 16 g 0    fluticasone-salmeterol diskus inhaler 100-50 mcg Inhale 1 puff into the lungs 2 (two) times daily.      gabapentin (NEURONTIN) 100 MG capsule Take 1 capsule (100 mg total) by mouth once daily. 30 capsule 11    gabapentin (NEURONTIN) 300 MG capsule Take 1 capsule (300 mg total) by mouth every evening. 90 capsule 11    LIDOCAINE VISCOUS 2 % solution Take by mouth.      loratadine (CLARITIN) 10 mg tablet Take 10 mg by mouth.      meloxicam (MOBIC) 15 MG tablet 15 mg.      oxybutynin (DITROPAN-XL) 10 MG 24 hr tablet 10 mg.      pantoprazole (PROTONIX) 40 MG tablet Take  1 tablet (40 mg total) by mouth once daily. 180 tablet 3    valACYclovir (VALTREX) 1000 MG tablet Take 2 pills po bid x 2 days prn first signs of outbreak 30 tablet 3     No current facility-administered medications for this visit.       Review of patient's allergies indicates:  No Known Allergies      Review of Systems   Constitutional: Negative for fever and weight loss.   Cardiovascular:  Negative for chest pain.   Respiratory:  Negative for shortness of breath.    Musculoskeletal:  Positive for back pain (right leg).   Gastrointestinal:  Negative for abdominal pain, bowel incontinence, nausea and vomiting.   Genitourinary:  Negative for bladder incontinence.   Neurological:  Positive for numbness and paresthesias.       Objective:        General: Brianna is well-developed, well-nourished, appears stated age, in no acute distress, alert and oriented to time, place and person.     General    Vitals reviewed.  Constitutional: She is oriented to person, place, and time. She appears well-developed and well-nourished.   HENT:   Head: Atraumatic.   Nose: Nose normal.   Eyes: Conjunctivae are normal.   Cardiovascular:  Normal rate.            Pulmonary/Chest: Effort normal.   Neurological: She is alert and oriented to person, place, and time.   Psychiatric: She has a normal mood and affect. Her behavior is normal. Judgment and thought content normal.     General Musculoskeletal Exam   Gait: normal     Back (L-Spine & T-Spine) / Neck (C-Spine) Exam     Back (L-Spine & T-Spine) Range of Motion   Extension:  20   Flexion:  80   Lateral bend right:  20   Lateral bend left:  20     Spinal Sensation   Right Side Sensation  L-Spine Level: normal  S-Spine Level: normal  Left Side Sensation  L-Spine Level: normal  S-Spine Level: normal    Other   She has no scoliosis .  Spinal Kyphosis:  Absent      Muscle Strength   Right Upper Extremity   Biceps: 5/5   Deltoid:  5/5  Triceps:  5/5  Left Upper Extremity  Biceps: 5/5    Deltoid:  5/5  Triceps:  5/5  Right Lower Extremity   Hip Flexion: 5/5   Quadriceps:  5/5   Ankle Dorsiflexion:  5/5   Anterior tibial:  5/5   EHL:  5/5  Left Lower Extremity   Hip Flexion: 5/5   Quadriceps:  5/5   Ankle Dorsiflexion:  5/5   Anterior tibial:  5/5   EHL:  5/5    Reflexes     Left Side  Biceps:  2+  Brachioradialis:  2+  Achilles:  2+  Ankle Clonus:  absent    Right Side   Biceps:  2+  Brachioradialis:  2+  Achilles:  2+  Ankle Clonus:  absent    Vascular Exam     Right Pulses        Carotid:                  2+    Left Pulses        Carotid:                  2+            Assessment:       1. Osteoarthritis of spine with radiculopathy, lumbar region    2. Lumbar radiculopathy    3. DDD (degenerative disc disease), lumbar    4. Cervicalgia    5. Spondylosis of cervical region without myelopathy or radiculopathy           Plan:          Prior imaging and records reviewed today  We discussed neck and back pain and the nature of neck and back pain.  We discussed that it is not one thing that causes the pain but an accumulation of multiple things that we do.    Order cervical xray  Referral for physical therapy for neck and back pain and good HEP.   Followup with pain clinic if no improvement once PT is complete.   We discussed posture sitting and the importance of trying to sit better.    We discussed the benefits of therapy and exercise and continuing to move.     More than 50% of the total time  of 45 minutes was spent face to face in counseling on diagnosis and treatment options. I also counseled patient  on common and most usual side effect of prescribed medications.  I reviewed Primary care , and other specialty's notes to better coordinate patient's care. All questions were answered, and patient voiced understanding.      Follow-up: Follow up with pain clinic. If there are any questions prior to this, the patient was instructed to contact the office.

## 2023-02-02 ENCOUNTER — OFFICE VISIT (OUTPATIENT)
Dept: GASTROENTEROLOGY | Facility: CLINIC | Age: 63
End: 2023-02-02
Payer: OTHER GOVERNMENT

## 2023-02-02 VITALS
HEART RATE: 83 BPM | DIASTOLIC BLOOD PRESSURE: 97 MMHG | SYSTOLIC BLOOD PRESSURE: 146 MMHG | WEIGHT: 247.38 LBS | HEIGHT: 67 IN | BODY MASS INDEX: 38.83 KG/M2

## 2023-02-02 DIAGNOSIS — R15.9 INCONTINENCE OF FECES, UNSPECIFIED FECAL INCONTINENCE TYPE: Primary | ICD-10-CM

## 2023-02-02 DIAGNOSIS — K58.0 IRRITABLE BOWEL SYNDROME WITH DIARRHEA: ICD-10-CM

## 2023-02-02 PROCEDURE — 99204 OFFICE O/P NEW MOD 45 MIN: CPT | Mod: S$PBB,,, | Performed by: STUDENT IN AN ORGANIZED HEALTH CARE EDUCATION/TRAINING PROGRAM

## 2023-02-02 PROCEDURE — 99204 PR OFFICE/OUTPT VISIT, NEW, LEVL IV, 45-59 MIN: ICD-10-PCS | Mod: S$PBB,,, | Performed by: STUDENT IN AN ORGANIZED HEALTH CARE EDUCATION/TRAINING PROGRAM

## 2023-02-02 PROCEDURE — 99999 PR PBB SHADOW E&M-EST. PATIENT-LVL V: CPT | Mod: PBBFAC,,, | Performed by: STUDENT IN AN ORGANIZED HEALTH CARE EDUCATION/TRAINING PROGRAM

## 2023-02-02 PROCEDURE — 99215 OFFICE O/P EST HI 40 MIN: CPT | Mod: PBBFAC | Performed by: STUDENT IN AN ORGANIZED HEALTH CARE EDUCATION/TRAINING PROGRAM

## 2023-02-02 PROCEDURE — 99999 PR PBB SHADOW E&M-EST. PATIENT-LVL V: ICD-10-PCS | Mod: PBBFAC,,, | Performed by: STUDENT IN AN ORGANIZED HEALTH CARE EDUCATION/TRAINING PROGRAM

## 2023-02-02 NOTE — PROGRESS NOTES
[facility] follow up Note    Reason for Consult:  The primary encounter diagnosis was Incontinence of feces, unspecified fecal incontinence type. A diagnosis of Irritable bowel syndrome with diarrhea was also pertinent to this visit.    PCP:   Rossana Mcclendon   [address] / [place] [address]    Referring MD:  Rossana Mcclendon Md  [address] [place] [address]    HPI:  This is a 62 y.o. female here for evaluation of diarrhea. She was last seen by GI NP in clinic in 2019. She has a history of Cervical cancer in 1993 s/p total abdominal hysterectomy, external beam radiation therapy as well as catheter interstitial radiation therapy. She was seen by CRS in 2018 for possible rectal stricture as GI was unable to traverse this area with the scope. Follow up barium enema did not show a stricture but did show lead piping. Biopsies showed a small area of adenomatous change but otherwise normal. FIT testing subsequently and cologuard testing 2021 was negative. Last seen by CRS in 2019. Recommend to be on fiber and bentyl. She was planned for ARM but not done. Tried to refer for PT but was too expensive. She was lost to follow up around COVID. She states she was having more issues with depression during this time. She continues to have issues with diarrhea and incontinence. A good day for her is around 3 BM which are always loose. Bad days she has over 10 per day and has incomplete evacuation. Episodes of incontinence to both solid and liquid stool. Also has intermittent abdominal cramping which can be severe. Felt like bentyl in the past did not help and these antispasmodics gave her dry eyes.     Denied NSAID use. No Fh of CRC, gastric cancer, celiac or IBD.   Negative TTG IgA in past. Recommended to have stool testing for CRC Screening due to high risk with colonoscopy. Negative cologaurd in 2021.     Also followed with urogyn for stress and urge incontinence. On  Myrbetriq          ROS:  Constitutional: No fevers, chills, No weight loss  ENT: No allergies  CV: No chest pain  Pulm: No cough, No shortness of breath  Ophtho: No vision changes  GI: see HPI  Derm: No rash  Heme: No lymphadenopathy, No bruising  MSK: No arthritis  : No dysuria, No hematuria  Endo: No hot or cold intolerance  Neuro: No syncope, No seizure  Psych: No anxiety, No depression    Medical History:  has a past medical history of Allergic rhinitis (4/27/2014), Anxiety (4/27/2014), Asthma, cervical cancer (4/27/2014), Obesity (4/27/2014), and Vitamin D deficiency (4/27/2014).    Surgical History:  has a past surgical history that includes Total abdominal hysterectomy; Esophagogastroduodenoscopy; Colonoscopy; Colonoscopy (N/A, 9/24/2018); and Esophagogastroduodenoscopy (N/A, 9/24/2018).    Family History: family history includes Allergies in her mother, sister, and sister; Hypertension in her mother and sister..     Social History:  reports that she has been smoking cigarettes. She has been smoking an average of .5 packs per day. She has never used smokeless tobacco. She reports current alcohol use. She reports that she does not use drugs.    Review of patient's allergies indicates:  No Known Allergies    Current Outpatient Rx   Medication Sig Dispense Refill    (Magic mouthwash) 1:1:1 diphenhydramine(Benadryl) 12.5mg/5ml liq, aluminum & magnesium hydroxide-simethicone (Maalox), LIDOcaine viscous 2% Swish and spit 10 mLs every 4 (four) hours as needed (mouth ulcer). for mouth sores 450 mL 0    albuterol (PROVENTIL/VENTOLIN HFA) 90 mcg/actuation inhaler Inhale 2 puffs into the lungs every 6 (six) hours as needed for Wheezing. 18 g 11    amlodipine-valsartan (EXFORGE) 5-160 mg per tablet Take 1 tablet by mouth once daily. 90 tablet 3    atorvastatin (LIPITOR) 40 MG tablet 20 mg.      azelastine (ASTELIN) 137 mcg (0.1 %) nasal spray 2 sprays (274 mcg total) by Nasal route 2 (two) times daily as needed for  "Rhinitis. Donot snort (because it tastes bad) 30 mL 11    cholecalciferol, vitamin D3, 1,250 mcg (50,000 unit) capsule Take 1 capsule (50,000 Units total) by mouth once a week. 12 capsule 3    ECHINACEA 1X ORAL Take by mouth.      EScitalopram oxalate (LEXAPRO) 10 MG tablet Take 1 tablet (10 mg total) by mouth once daily. 90 tablet 3    fluticasone propionate (FLONASE) 50 mcg/actuation nasal spray 1 spray (50 mcg total) by Each Nostril route daily as needed for Allergies. 16 g 0    fluticasone-salmeterol diskus inhaler 100-50 mcg Inhale 1 puff into the lungs 2 (two) times daily.      gabapentin (NEURONTIN) 100 MG capsule Take 1 capsule (100 mg total) by mouth once daily. 30 capsule 11    gabapentin (NEURONTIN) 300 MG capsule Take 1 capsule (300 mg total) by mouth every evening. 90 capsule 11    LIDOCAINE VISCOUS 2 % solution Take by mouth.      loratadine (CLARITIN) 10 mg tablet Take 10 mg by mouth.      meloxicam (MOBIC) 15 MG tablet 15 mg.      oxybutynin (DITROPAN-XL) 10 MG 24 hr tablet 10 mg.      pantoprazole (PROTONIX) 40 MG tablet Take 1 tablet (40 mg total) by mouth once daily. 180 tablet 3    valACYclovir (VALTREX) 1000 MG tablet Take 2 pills po bid x 2 days prn first signs of outbreak 30 tablet 3    rifAXIMin (XIFAXAN) 550 mg Tab Take 1 tablet (550 mg total) by mouth 3 (three) times daily. for 14 days 42 tablet 0       Objective Findings:    Vital Signs:  BP (!) 146/97   Pulse 83   Ht 5' 7" (1.702 m)   Wt 112.2 kg (247 lb 5.7 oz)   BMI 38.74 kg/m²   Body mass index is 38.74 kg/m².    Physical Exam:  General Appearance: Well appearing in no acute distress  Head:   Normocephalic, without obvious abnormality  Eyes:    No scleral icterus, EOMI  ENT: Neck supple, Lips, mucosa, and tongue normal    Lungs: CTA bilaterally in anterior and posterior fields, no wheezes, no crackles.  Heart:  Regular rate and rhythm, S1, S2 normal, no murmurs heard  Abdomen: Soft, non tender, non distended with positive bowel " sounds in all four quadrants. No hepatosplenomegaly, ascites, or mass  Extremities: 2+ pulses, no clubbing, cyanosis or edema  Skin: No rash  Neurologic: no focal deficits       Labs:  Lab Results   Component Value Date    WBC 9.93 04/26/2022    HGB 13.9 04/26/2022    HCT 42.0 04/26/2022     04/26/2022    CHOL 145 04/26/2022    TRIG 91 04/26/2022    HDL 54 04/26/2022    ALT 15 04/26/2022    AST 20 04/26/2022     04/26/2022    K 3.7 04/26/2022     04/26/2022    CREATININE 1.1 04/26/2022    BUN 13 04/26/2022    CO2 23 04/26/2022    TSH 2.681 04/26/2022    HGBA1C 5.4 04/26/2022       Imaging:  Reviewed     Barium Enema 2018   IMPRESSION:      1.   Normal barium enema with appendiceal reflux.    Endoscopy:      Cscope 2018   Findings:        The perianal and digital rectal examinations were normal.        A benign-appearing, intrinsic severe stenosis was found in the        proximal rectum and was non-traversed. Biopsies were taken with a        cold forceps for histology. Estimated blood loss was minimal.   Impression:           - Stricture in the proximal rectum, biopsied.                         Unable to traverse.     EGD 2018   Findings:        The examined esophagus was normal.        The Z-line was regular and was found 37 cm from the incisors.        A small hiatal hernia was present.        The exam of the stomach was otherwise normal.        Biopsies were taken with a cold forceps in the gastric body and in        the gastric antrum for Helicobacter pylori testing. Estimated blood        loss was minimal.        The examined duodenum was normal. Biopsies were taken with a cold        forceps for histology. Estimated blood loss was minimal    FINAL PATHOLOGIC DIAGNOSIS 1. BIOPSIES OF DUODENUM: NO SIGNIFICANT HISTOLOGIC ALTERATION NO EVIDENCE OF CELIAC DISEASE 2. GASTRIC BIOPSIES: MODERATE NONSPECIFIC CHRONIC INFLAMMATION NO HELICOBACTER HAVE BEEN IDENTIFIED WITH A SPECIAL STAIN THE POSITIVE  AND NEGATIVE CONTROLS STAINED APPROPRIATELY 3. RECTAL BIOPSIES: LARGELY INNOCUOUS COLONIC MUCOSA WITH A SMALL AREA OF ADENOMATOUS GLANDS THE POSSIBILITY OF AN UN BIOPSIED MORE OMINOUS AREA CANNOT BE EXCLUDED     Assessment:    Chronic Diarrhea   Fecal Incontinence   Abdominal bloating and cramping     Patient with years of chronic issues with loose stools and fecal incontinence. Also with intermittent abdominal cramping and bloating. Unable to tolerate bentyl/antispasmodics due to side effects. She is compliant with fiber and we discussed increasing this dose. Will try a course of xifaxin due to diarrhea and bloating to see if this helps improve stool form and improve bloating. Will also order ARM and refer back to CRS. Other treatment options would consider low dose Elavil or Viberzi     Recommendations:    - ARM   - Trial of Xifaxin   - Continue fiber, increase dose discussed   - Cannot tolerate antispasmodics   - Refer back to CRS   - Further plan based on patient response and result of ARM     Follow up in about 3 months (around 5/2/2023).      Order summary:  Orders Placed This Encounter    Ambulatory referral/consult to Colorectal Surgery    rifAXIMin (XIFAXAN) 550 mg Tab    Case Request Endoscopy: MANOMETRY, ANORECTAL       Thank you so much for allowing me to participate in the care of Brianna Goodwin MD  Gastroenterology   [facility]

## 2023-02-03 ENCOUNTER — PATIENT MESSAGE (OUTPATIENT)
Dept: GASTROENTEROLOGY | Facility: CLINIC | Age: 63
End: 2023-02-03
Payer: OTHER GOVERNMENT

## 2023-02-03 ENCOUNTER — PATIENT MESSAGE (OUTPATIENT)
Dept: BEHAVIORAL HEALTH | Facility: CLINIC | Age: 63
End: 2023-02-03
Payer: OTHER GOVERNMENT

## 2023-02-03 DIAGNOSIS — K58.0 IRRITABLE BOWEL SYNDROME WITH DIARRHEA: Primary | ICD-10-CM

## 2023-03-24 ENCOUNTER — CLINICAL SUPPORT (OUTPATIENT)
Dept: REHABILITATION | Facility: OTHER | Age: 63
End: 2023-03-24
Payer: OTHER GOVERNMENT

## 2023-03-24 ENCOUNTER — PATIENT MESSAGE (OUTPATIENT)
Dept: REHABILITATION | Facility: OTHER | Age: 63
End: 2023-03-24

## 2023-03-24 DIAGNOSIS — M47.26 OSTEOARTHRITIS OF SPINE WITH RADICULOPATHY, LUMBAR REGION: ICD-10-CM

## 2023-03-24 DIAGNOSIS — M62.89 PELVIC FLOOR DYSFUNCTION: ICD-10-CM

## 2023-03-24 DIAGNOSIS — M51.36 DDD (DEGENERATIVE DISC DISEASE), LUMBAR: ICD-10-CM

## 2023-03-24 DIAGNOSIS — M54.2 CERVICALGIA: ICD-10-CM

## 2023-03-24 PROCEDURE — 97110 THERAPEUTIC EXERCISES: CPT | Mod: PN

## 2023-03-24 PROCEDURE — 97161 PT EVAL LOW COMPLEX 20 MIN: CPT | Mod: PN

## 2023-03-24 NOTE — PROGRESS NOTES
[facility]  Physical Therapy Initial Evaluation    Name: Brianna Cantu  Clinic Number: [id number]    Therapy Diagnosis:   Encounter Diagnoses   Name Primary?    Osteoarthritis of spine with radiculopathy, lumbar region     DDD (degenerative disc disease), lumbar     Cervicalgia     Pelvic floor dysfunction      Physician: Seema Steinberg NP    Physician Orders: Physical Therapy Evaluate and Treat  Medical Diagnosis from Referral: low back pain with lower extremity radiculopathy  Evaluation Date: 3/24/23  Authorization Period Expiration: 1/24/24  Plan of Care Expiration: 3/24/2023 to 6/24/23  Visit # / Visits authorized: 1/1 (pending additional authorization following initial evaluation)   FOTO: 0/3  on 3/24/23      Time In: 205p  Time Out: 305p  Total Billable Time: 60 minutes    Precautions: standard    Subjective     History of current condition - Brianna reports: chronic right low back pain with right lower extremity radiculopathy    Patient reports incontinence x 5 years - frequency - voiding every 30 mins    Abdominal and groin cramping with coughing/sneezing    Left ankle fx 2017 - wore boot which may have caused right side pain     Medical History:   Past Medical History:   Diagnosis Date    Allergic rhinitis 4/27/2014    Anxiety 4/27/2014    Asthma     Hx of cervical cancer 4/27/2014    Obesity 4/27/2014    Vitamin D deficiency 4/27/2014       Surgical History:   Brianna Cantu  has a past surgical history that includes Total abdominal hysterectomy; Esophagogastroduodenoscopy; Colonoscopy; Colonoscopy (N/A, 9/24/2018); and Esophagogastroduodenoscopy (N/A, 9/24/2018).    Medications:   Brianna has a current medication list which includes the following prescription(s): diphenhydramine hcl, albuterol, amlodipine-valsartan, atorvastatin, azelastine, cholecalciferol (vitamin d3), echinacea, escitalopram oxalate, fluticasone propionate, fluticasone-salmeterol 100-50  mcg/dose, gabapentin, gabapentin, lidocaine viscous, loratadine, meloxicam, oxybutynin, pantoprazole, and valacyclovir.    Allergies:   Review of patient's allergies indicates:  No Known Allergies     Imaging: Lumbar degenerative changes contributing to mild-to-moderate neural foraminal narrowing from L3-L4 through L5-S1 as detailed above.     Lumbar xray 2022     FINDINGS:  Trace grade 1 anterolisthesis of L L4 on L5 with trace grade 1 retrolisthesis of L3 on L4.  The lumbar vertebral body heights and contours are within normal is without evidence for acute fracture.  Facet degenerative change lower lumbar levels.  Short-segment linear hyperdensity within the pelvic soft tissues only seen on the lateral view which may be extrinsic to the patient however indeterminate.  Further evaluation as warranted clinically.    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    Objective     WNL=within normal limits  WFL=within functional limits  NT=not tested  *=pain    Posture: forward head / rounded shoulders  Palpation: tenderness to palpation at bilateral greater trochanter, posterior hip (right > left)  Sensation: NT  Deep tendon reflexes: NT    Lumbar Active range of motion  Pain/dysfunction with movement:   Flexion 75 Right lower extremity radiculopathy   Extension 50    Right side bending 40    Left side bending 50    Right rotation 25     Left rotation 50 Right lateral calf          Right LE   Left LE      Iliopsoas:  L2 3/5 Iliopsoas: L2 4/5    Quadriceps:  L3 - femoral nerve 3/5 Quadriceps: L3 - femoral nerve 4/5    Hip adduction:  L3 - obterator 3/5 Hip adduction: L3 - obterator 4/5    Hamstrings:  S2 3/5 Hamstrings: S2 3/5    Ankle DF/EV:  L4 4/5 Ankle DF/EV: L4 4/5    GT Ext:  L5 NT GT Ext L5 NT    Hip Abduction:  L5-S1 - Superior gluteal nerve 3/5  Hip Abduction: L5-S1 - Superior gluteal nerve 2/5    Hip extension:  L5-S2 - Inferior gluteal nerve 2/5 Hip extension: L5-S2 - Inferior gluteal nerve 3/5    Ankle PF:   S1 - tibial nerve NT Ankle PF S1 - tibial nerve NT         Slump R: + for right toe numbness and lateral knee pain  Slump L: negative    SLR R: + for right lateral calf and SIJ pain  SLR L: + for right hip pain    + thrust bilateral, + tenderness to palpation at right > left SIJ     + femoral nerve tension sign bilaterally (left side causes right SIJ pain)          CMS Impairment/Limitation/Restriction for FOTO Survey    Therapist reviewed FOTO scores for Brianna Cantu on 3/24/2023.   FOTO documents entered into [employer] - see Media section.    Limitation Score: 72%  Predicted Goal: 60%    Category: Mobility     TREATMENT     Treatment Time In: 245p  Treatment Time Out: 300p  Total Treatment time separate from Evaluation: 15 minutes    Therapeutic Exercises were provided for 15 minutes to improve strength and AROM including:    Prone on elbows x 3mins  Prone hip extension x20  Prone femoral nerve glide x10  Bridges x10  Seated sciatic nerve glide x10    Home Exercises and Patient Education Provided:    Education provided:   - Findings; prognosis and plan of care (POC)  - Home exercise program (HEP)  - Modality options  - Therapist contact information    Written Home Exercises Provided: Yes  Exercises were reviewed and Brianna was able to demonstrate them prior to the end of the session.  Brianna demonstrated good understanding  of the education provided.       Assessment     Brianna is a 62 y.o. female referred to outpatient Physical Therapy with a medical diagnosis of low back pain with right lower extremity radiculopathy. Pt presents to PT with pain, decreased lumbar spine ROM, decreased strength and flexibility, poor posture, and functional deficits with activities. These deficits are negatively impacting this patient's ability to complete their work duties and activities of daily living.     Pt prognosis is Good.   Pt will benefit from skilled outpatient Physical Therapy to address the deficits stated above and in the chart below, provide pt/family education, and to maximize pt's level of independence.     Plan of care discussed with patient: Yes  Pt's spiritual, cultural and educational needs considered and pt agreeable to plan of care and goals as stated below:     Anticipated Barriers for therapy: None      Medical Necessity is demonstrated by the following  History  Co-morbidities and personal factors that may impact the plan of care Co-morbidities:   difficulty sleeping, high BMI, and history of cancer    Personal Factors:   coping style     low   Examination  Body Structures and Functions, activity limitations and participation restrictions that may impact the plan of care Body Regions:   neck  back  lower extremities    Body Systems:    strength  gait  transfers  transitions  motor control    Participation Restrictions:   Walking    Activity limitations:   Learning and applying knowledge  no deficits    General Tasks and Commands  No Deficits    Communication  No Deficits    Mobility  lifting and carrying objects  walking    Self care  toileting  looking after one's health    Domestic Life  No Deficits    Interactions/Relationships  No Deficits    Life Areas  No Deficits    Community and Social Life  No Deficits         low   Clinical Presentation stable and uncomplicated low   Decision Making/ Complexity Score: low      GOALS:  Short Term Goals (4 Weeks):  1. Patient will be compliant with home exercise program to supplement therapy in promoting functional mobility. (progressing, not met)    2. Patient will perform bed mobility with good control to demonstrate improved core strength. (progressing, not met)    3. Patient will report no pain during thoracolumbar active range of motion to promote functional mobility.  (progressing, not met)    4. Patient will improve impaired lower extremity manual muscle tests  to >/=4/5 to improve strength for functional tasks. (progressing, not met)        Long Term Goals (6 Weeks):   1. Patient will improve FOTO score to </= 60% limited to decrease perceived limitation with maintaining/changing body position. (progressing, not met)    2. Patient will perform supine to sit and sit to stand transfers with good control to demonstrate improved core strength.  (progressing, not met)    3. Patient will improve impaired lower extremity manual muscle tests to >/=4+/5 to improve strength for functional tasks.  (progressing, not met)    4. Patient will tolerate standing/walking for 30 minutes with no increase in low back pain to return to PLOF.  (progressing, not met)          Plan     Plan of care Certification: 3/24/2023 to 6/24/23    Outpatient Physical Therapy 2 times weekly for 12 weeks to include the following interventions: Therapeutic Exercises, Manual Therapeutic Technique, Neuromuscular Re Education, Therapeutic Activities. Modalities, Kinesiotape prn, and Functional Dry Needling as needed.    Patient would benefit from pelvic floor physical therapy for some bladder dysfunction that is currently causing functional limitations.    Emeka Garrison, PT,  DPT, OCS

## 2023-03-28 ENCOUNTER — CLINICAL SUPPORT (OUTPATIENT)
Dept: REHABILITATION | Facility: OTHER | Age: 63
End: 2023-03-28
Payer: OTHER GOVERNMENT

## 2023-03-28 DIAGNOSIS — M62.89 PELVIC FLOOR DYSFUNCTION: Primary | ICD-10-CM

## 2023-03-28 PROCEDURE — 97110 THERAPEUTIC EXERCISES: CPT | Mod: PN,CQ

## 2023-03-28 NOTE — PROGRESS NOTES
[facility]  Physical Therapy Initial Evaluation    Name: Brianna Cantu  Clinic Number: [id number]    Therapy Diagnosis:   No diagnosis found.    Physician: Seema Steinberg NP    Physician Orders: Physical Therapy Evaluate and Treat  Medical Diagnosis from Referral: low back pain with lower extremity radiculopathy  Evaluation Date: 3/24/23  Authorization Period Expiration: 1/24/24  Plan of Care Expiration: 3/28/2023 to 6/24/23  Visit # / Visits authorized: 1/1 (pending additional authorization following initial evaluation)   FOTO: 0/3  on 3/24/23      Time In: 205p  Time Out: 305p  Total Billable Time: 60 minutes    Precautions: standard    Subjective     History of current condition - Brianna reports: chronic right low back pain with right lower extremity radiculopathy    Patient reports incontinence x 5 years - frequency - voiding every 30 mins    Abdominal and groin cramping with coughing/sneezing    Left ankle fx 2017 - wore boot which may have caused right side pain     Medical History:   Past Medical History:   Diagnosis Date    Allergic rhinitis 4/27/2014    Anxiety 4/27/2014    Asthma     Hx of cervical cancer 4/27/2014    Obesity 4/27/2014    Vitamin D deficiency 4/27/2014       Surgical History:   rBianna Cantu  has a past surgical history that includes Total abdominal hysterectomy; Esophagogastroduodenoscopy; Colonoscopy; Colonoscopy (N/A, 9/24/2018); and Esophagogastroduodenoscopy (N/A, 9/24/2018).    Medications:   Brianna has a current medication list which includes the following prescription(s): diphenhydramine hcl, albuterol, amlodipine-valsartan, atorvastatin, azelastine, cholecalciferol (vitamin d3), echinacea, escitalopram oxalate, fluticasone propionate, fluticasone-salmeterol 100-50 mcg/dose, gabapentin, gabapentin, lidocaine viscous, loratadine, meloxicam, oxybutynin, pantoprazole, and valacyclovir.    Allergies:   Review of patient's allergies  indicates:  No Known Allergies     Imaging: Lumbar degenerative changes contributing to mild-to-moderate neural foraminal narrowing from L3-L4 through L5-S1 as detailed above.     Lumbar xray 2022     FINDINGS:  Trace grade 1 anterolisthesis of L L4 on L5 with trace grade 1 retrolisthesis of L3 on L4.  The lumbar vertebral body heights and contours are within normal is without evidence for acute fracture.  Facet degenerative change lower lumbar levels.  Short-segment linear hyperdensity within the pelvic soft tissues only seen on the lateral view which may be extrinsic to the patient however indeterminate.  Further evaluation as warranted clinically.    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    Objective     WNL=within normal limits  WFL=within functional limits  NT=not tested  *=pain    Posture: forward head / rounded shoulders  Palpation: tenderness to palpation at bilateral greater trochanter, posterior hip (right > left)  Sensation: NT  Deep tendon reflexes: NT    Lumbar Active range of motion  Pain/dysfunction with movement:   Flexion 75 Right lower extremity radiculopathy   Extension 50    Right side bending 40    Left side bending 50    Right rotation 25    Left rotation 50 Right lateral calf          Right LE   Left LE      Iliopsoas:  L2 3/5 Iliopsoas: L2 4/5    Quadriceps:  L3 - femoral nerve 3/5 Quadriceps: L3 -  femoral nerve 4/5    Hip adduction:  L3 - obterator 3/5 Hip adduction: L3 - obterator 4/5    Hamstrings:  S2 3/5 Hamstrings: S2 3/5    Ankle DF/EV:  L4 4/5 Ankle DF/EV: L4 4/5    GT Ext:  L5 NT GT Ext L5 NT    Hip Abduction:  L5-S1 - Superior gluteal nerve 3/5  Hip Abduction: L5-S1 - Superior gluteal nerve 2/5    Hip extension:  L5-S2 - Inferior gluteal nerve 2/5 Hip extension: L5-S2 - Inferior gluteal nerve 3/5    Ankle PF:   S1 - tibial nerve NT Ankle PF S1 - tibial nerve NT         Slump R: + for right toe numbness and lateral knee pain  Slump L: negative    SLR R: + for right lateral calf and SIJ pain  SLR L: + for right hip pain    + thrust bilateral, + tenderness to palpation at right > left SIJ     + femoral nerve tension sign bilaterally (left side causes right SIJ pain)          CMS Impairment/Limitation/Restriction for FOTO Survey    Therapist reviewed FOTO scores for Brianna Cantu on 3/28/2023.   FOTO documents entered into [employer] - see Media section.    Limitation Score: 72%  Predicted Goal: 60%    Category: Mobility     TREATMENT     Treatment Time In: 245p  Treatment Time Out: 300p  Total Treatment time separate from Evaluation: 15 minutes    Therapeutic Exercises were provided for 15 minutes to improve strength and AROM including:    Prone on elbows x 3mins  Prone hip extension x20  Prone femoral nerve glide x10  Bridges x10  Seated sciatic nerve glide x10    Home Exercises and Patient Education Provided:    Education provided:   - Findings; prognosis and plan of care (POC)  - Home exercise program (HEP)  - Modality options  - Therapist contact information    Written Home Exercises Provided: Yes  Exercises were reviewed and Brianna was able to demonstrate them prior to the end of the session.  Brianna demonstrated good understanding of the education provided.       Assessment     Brianna is a 62 y.o. female referred to outpatient Physical Therapy with a medical diagnosis of low back pain with  right lower extremity radiculopathy. Pt presents to PT with pain, decreased lumbar spine ROM, decreased strength and flexibility, poor posture, and functional deficits with activities. These deficits are negatively impacting this patient's ability to complete their work duties and activities of daily living.     Pt prognosis is Good.   Pt will benefit from skilled outpatient Physical Therapy to address the deficits stated above and in the chart below, provide pt/family education, and to maximize pt's level of independence.     Plan of care discussed with patient: Yes  Pt's spiritual, cultural and educational needs considered and pt agreeable to plan of care and goals as stated below:     Anticipated Barriers for therapy: None      Medical Necessity is demonstrated by the following  History  Co-morbidities and personal factors that may impact the plan of care Co-morbidities:   difficulty sleeping, high BMI, and history of cancer    Personal Factors:   coping style     low   Examination  Body Structures and Functions, activity limitations and participation restrictions that may impact the plan of care Body Regions:   neck  back  lower extremities    Body Systems:    strength  gait  transfers  transitions  motor control    Participation Restrictions:   Walking    Activity limitations:   Learning and applying knowledge  no deficits    General Tasks and Commands  No Deficits    Communication  No Deficits    Mobility  lifting and carrying objects  walking    Self care  toileting  looking after one's health    Domestic Life  No Deficits    Interactions/Relationships  No Deficits    Life Areas  No Deficits    Community and Social Life  No Deficits         low   Clinical Presentation stable and uncomplicated low   Decision Making/ Complexity Score: low     GOALS:  Short Term Goals (4 Weeks):  1. Patient will be compliant with home exercise program to supplement therapy in promoting functional mobility. (progressing, not met)     2. Patient will perform bed mobility with good control to demonstrate improved core strength. (progressing, not met)    3. Patient will report no pain during thoracolumbar active range of motion to promote functional mobility.  (progressing, not met)    4. Patient will improve impaired lower extremity manual muscle tests  to >/=4/5 to improve strength for functional tasks. (progressing, not met)        Long Term Goals (6 Weeks):   1. Patient will improve FOTO score to </= 60% limited to decrease perceived limitation with maintaining/changing body position. (progressing, not met)    2. Patient will perform supine to sit and sit to stand transfers with good control to demonstrate improved core strength.  (progressing, not met)    3. Patient will improve impaired lower extremity manual muscle tests to >/=4+/5 to improve strength for functional tasks.  (progressing, not met)    4. Patient will tolerate standing/walking for 30 minutes with no increase in low back pain to return to PLOF.  (progressing, not met)          Plan     Plan of care Certification: 3/28/2023 to 6/24/23    Outpatient Physical Therapy 2 times weekly for 12 weeks to include the following interventions: Therapeutic Exercises, Manual Therapeutic Technique, Neuromuscular Re Education, Therapeutic Activities. Modalities, Kinesiotape prn, and Functional Dry Needling as needed.    Radha Wiggins, PT,  DPT, OCS

## 2023-03-28 NOTE — PROGRESS NOTES
[facility]  Physical Therapy Treatment Note    Name: Brianna Cantu  Clinic Number: [id number]    Therapy Diagnosis:   Encounter Diagnosis   Name Primary?    Pelvic floor dysfunction Yes     Physician: Seema Steinberg NP    Physician Orders: Physical Therapy Evaluate and Treat  Medical Diagnosis from Referral: low back pain with lower extremity radiculopathy  Evaluation Date: 3/24/23  Authorization Period Expiration: 1/24/24  Plan of Care Expiration: 3/28/2023 to 6/24/23  Visit # / Visits authorized: 1/20 (pending additional authorization following initial evaluation)   FOTO: 0/3  on 3/24/23      Time In: 11:00am  Time Out: 11:45am  Total Billable Time: 45 minutes    Precautions: standard    Subjective   Patient reports: The back feels better today compared to how it felt at IE. Patient continues to have difficulty with house chores due to the back.       Pain: 4/10   Location: right lateral gastroc and 1st 2 toes on R  Description: Burning, Throbbing, and Tingling  Aggravating Factors: Rolling over in bed, sit to stand transfer  Easing Factors: Exercise    Pts goals: Pt would like to return to walking for exercise with no increase in low back pain and right lower extremity pain.    Objective     CMS Impairment/Limitation/Restriction for FOTO Survey    Therapist reviewed FOTO scores for Brianna Cantu on 3/28/2023.   FOTO documents entered into [employer] - see Media section.    Limitation Score: 72%  Predicted Goal: 60%    Category: Mobility     TREATMENT     Therapeutic Exercises were provided for 45 minutes to improve strength and AROM including:                 Prone on elbows x 3mins  Prone hip extension x20  Prone femoral nerve glide x20   Bridges 2x10  Seated sciatic nerve glide 2x10  +BKFO RTB 2x10   +LTR LE's on RPB 3 mins  +DKTC LE's on RPB 3 mins     Home Exercises and Patient Education Provided:    Education provided:   - Importance of HEP performance to reduce pain.      Written Home Exercises Provided: Yes  Exercises were reviewed and Brianna was able to demonstrate them prior to the end of the session.  Brianna demonstrated good understanding of the education provided.       Assessment   Patient presented for first follow up visit following IE. Patient tolerated treatment well today, she experiencing some cramping in L LE following exercises but cramping subsided with rest. Continued with emphasis on core stabilization and nerve gliding exercises. Plan to progress these exercises as tolerated per patient.    Pt prognosis is Good.   Pt will benefit from skilled outpatient Physical Therapy to address the deficits stated above and in the chart below, provide pt/family education, and to maximize pt's level of independence.     Plan of care discussed with patient: Yes  Pt's spiritual, cultural and educational needs considered and pt agreeable to plan of care and goals as stated below:     Anticipated Barriers for therapy: None    GOALS:  Short Term Goals (4 Weeks):  1. Patient will be compliant with home exercise program to supplement therapy in promoting functional mobility. (progressing, not met)    2. Patient will perform bed mobility with good control to demonstrate improved core strength. (progressing, not met)    3. Patient will report no pain during thoracolumbar active range of motion to promote functional mobility.  (progressing, not met)    4. Patient will improve impaired lower extremity manual muscle tests  to >/=4/5 to improve strength for functional tasks. (progressing, not met)        Long Term Goals (6 Weeks):   1. Patient will improve FOTO score to </= 60% limited to decrease perceived limitation with maintaining/changing body position. (progressing, not met)    2. Patient will perform supine to sit and sit to stand transfers with good control to demonstrate improved core strength.  (progressing, not met)    3. Patient will improve impaired lower extremity  manual muscle tests to >/=4+/5 to improve strength for functional tasks.  (progressing, not met)    4. Patient will tolerate standing/walking for 30 minutes with no increase in low back pain to return to PLOF.  (progressing, not met)          Plan     Plan of care Certification: 3/28/2023 to 6/24/23    Outpatient Physical Therapy 2 times weekly for 12 weeks to include the following interventions: Therapeutic Exercises, Manual Therapeutic Technique, Neuromuscular Re Education, Therapeutic Activities. Modalities, Kinesiotape prn, and Functional Dry Needling as needed.    Nomi Vanegas, PTA

## 2023-03-30 ENCOUNTER — CLINICAL SUPPORT (OUTPATIENT)
Dept: REHABILITATION | Facility: OTHER | Age: 63
End: 2023-03-30
Payer: OTHER GOVERNMENT

## 2023-03-30 DIAGNOSIS — M62.89 PELVIC FLOOR DYSFUNCTION: Primary | ICD-10-CM

## 2023-03-30 PROCEDURE — 97110 THERAPEUTIC EXERCISES: CPT | Mod: PN,CQ

## 2023-03-30 NOTE — PROGRESS NOTES
"  [facility]  Physical Therapy Treatment Note    Name: Brianna Cantu  Clinic Number: [id number]    Therapy Diagnosis:   Encounter Diagnosis   Name Primary?    Pelvic floor dysfunction Yes     Physician: Seema Steinberg NP    Physician Orders: Physical Therapy Evaluate and Treat  Medical Diagnosis from Referral: low back pain with lower extremity radiculopathy  Evaluation Date: 3/24/23  Authorization Period Expiration: 1/24/24  Plan of Care Expiration: 3/30/2023 to 6/24/23  Visit # / Visits authorized: 3/20   FOTO: 0/3  on 3/24/23      Time In: 0900  Time Out: 0955  Total Billable Time: 55 minutes    Precautions: standard    Subjective   Patient reports: she is doing well. States she has been trying to perform her HEP as directed. She still has the N&T in her R foot/toes however it has improved slightly.       Pain: 4/10   Location: right lateral gastroc and 1st 2 toes on R  Description: Burning, Throbbing, and Tingling  Aggravating Factors: Rolling over in bed, sit to stand transfer  Easing Factors: Exercise    Pts goals: Pt would like to return to walking for exercise with no increase in low back pain and right lower extremity pain.    Objective     CMS Impairment/Limitation/Restriction for FOTO Survey    Therapist reviewed FOTO scores for Brianna Cantu on 3/30/2023.   FOTO documents entered into [employer] - see Media section.    Limitation Score: 72%  Predicted Goal: 60%    Category: Mobility     TREATMENT     Therapeutic Exercises were provided for 55 minutes to improve strength and AROM including:                 Prone on elbows x 3mins  Prone hip extension x20  Prone femoral nerve glide x20   Bridges 2x10  Seated sciatic nerve glide 2x10  +BKFO RTB 2x10   LTR LE's on RPB 3 mins  DKTC LE's on RPB 3 mins   +PPT 3" hold 20x  +Standing back extension, 10x10"  +Piriformis stretching 20" x 3  +Pallof press red band    Home Exercises and Patient Education Provided:    Education " provided:   - Importance of HEP performance to reduce pain.     Written Home Exercises Provided: Patient was instructed to cont previous HEP  Exercises were reviewed and Brianna was able to demonstrate them prior to the end of the session.  Brianna demonstrated good understanding of the education provided.       Assessment   Pt tolerated exercise fair with mild complaints of increased pain. Pt demonstrated moderate muscle guarding with trunk/back rotational exercises. Muscle weakness in paraspinals/core musculature were notable during exercises. Pt presents with extension bias for pain/symptom relief.       Pt prognosis is Good.   Pt will benefit from skilled outpatient Physical Therapy to address the deficits stated above and in the chart below, provide pt/family education, and to maximize pt's level of independence.     Plan of care discussed with patient: Yes  Pt's spiritual, cultural and educational needs considered and pt agreeable to plan of care and goals as stated below:     Anticipated Barriers for therapy: None    GOALS:  Short Term Goals (4 Weeks):  1. Patient will be compliant with home exercise program to supplement therapy in promoting functional mobility. (progressing, not met)    2. Patient will perform bed mobility with good control to demonstrate improved core strength. (progressing, not met)    3. Patient will report no pain during thoracolumbar active range of motion to promote functional mobility.  (progressing, not met)    4. Patient will improve impaired lower extremity manual muscle tests  to >/=4/5 to improve strength for functional tasks. (progressing, not met)        Long Term Goals (6 Weeks):   1. Patient will improve FOTO score to </= 60% limited to decrease perceived limitation with maintaining/changing body position. (progressing, not met)    2. Patient will perform supine to sit and sit to stand transfers with good control to demonstrate improved core strength.  (progressing, not  met)    3. Patient will improve impaired lower extremity manual muscle tests to >/=4+/5 to improve strength for functional tasks.  (progressing, not met)    4. Patient will tolerate standing/walking for 30 minutes with no increase in low back pain to return to PLOF.  (progressing, not met)          Plan     Plan of care Certification: 3/30/2023 to 6/24/23    Focus on core/trunk strengthening/stabilization     Outpatient Physical Therapy 2 times weekly for 12 weeks to include the following interventions: Therapeutic Exercises, Manual Therapeutic Technique, Neuromuscular Re Education, Therapeutic Activities. Modalities, Kinesiotape prn, and Functional Dry Needling as needed.    Xavier Seymour, PTA

## 2023-04-04 ENCOUNTER — CLINICAL SUPPORT (OUTPATIENT)
Dept: REHABILITATION | Facility: OTHER | Age: 63
End: 2023-04-04
Payer: OTHER GOVERNMENT

## 2023-04-04 DIAGNOSIS — M62.89 PELVIC FLOOR DYSFUNCTION: Primary | ICD-10-CM

## 2023-04-04 PROCEDURE — 97110 THERAPEUTIC EXERCISES: CPT | Mod: PN

## 2023-04-04 NOTE — PROGRESS NOTES
"  [facility]  Physical Therapy Treatment Note    Name: Brianna Cantu  Clinic Number: [id number]    Therapy Diagnosis:   Encounter Diagnosis   Name Primary?    Pelvic floor dysfunction Yes     Physician: Seema Steinberg NP    Physician Orders: Physical Therapy Evaluate and Treat  Medical Diagnosis from Referral: low back pain with lower extremity radiculopathy  Evaluation Date: 3/24/23  Authorization Period Expiration: 1/24/24  Plan of Care Expiration: 4/4/2023 to 6/24/23  Visit # / Visits authorized: 3/20   FOTO: 0/3  on 3/24/23      Time In: 1100a  Time Out: 1155a  Total Billable Time: 55 minutes    Precautions: standard    Subjective   Patient reports: she is doing well. States she has been trying to perform her HEP as directed. She still has the N&T in her R foot/toes however it has improved slightly.       Pain: 4/10   Location: right lateral gastroc and 1st 2 toes on R  Description: Burning, Throbbing, and Tingling  Aggravating Factors: Rolling over in bed, sit to stand transfer  Easing Factors: Exercise    Pts goals: Pt would like to return to walking for exercise with no increase in low back pain and right lower extremity pain.    Objective     CMS Impairment/Limitation/Restriction for FOTO Survey    Therapist reviewed FOTO scores for Brianna Cantu on 4/4/2023.   FOTO documents entered into [employer] - see Media section.    Limitation Score: 72%  Predicted Goal: 60%    Category: Mobility     TREATMENT     Therapeutic Exercises were provided for 55 minutes to improve strength and AROM including:                 Prone on elbows x 3mins  Prone hip extension x20  Prone femoral nerve glide x20   TA activation x20  Bridges 2x10  SLR x20  Sit to stand 2x10  Lacrosse ball myofascial release x3mins  Supine sciatic nerve glide on bolster x20 bilateral   Seated sciatic nerve glide 2x10  +BKFO RTB 2x10   LTR LE's on RPB 3 mins  DKTC LE's on RPB 3 mins   +PPT 3" hold 20x  +Standing " "back extension, 10x10"  +Piriformis stretching 20" x 3  +Pallof press red band    Home Exercises and Patient Education Provided:    Education provided:   - Importance of HEP performance to reduce pain.     Written Home Exercises Provided: Patient was instructed to cont previous HEP  Exercises were reviewed and Brianna was able to demonstrate them prior to the end of the session.  Brianna demonstrated good understanding of the education provided.       Assessment   Pt tolerated exercise well with no complaints of increased pain. Patient continues to have posterior chain and core weakness, but she performed all therapeutic exercise without complaint of pain. She reported relief after therapeutic exercise today.      Pt prognosis is Good.   Pt will benefit from skilled outpatient Physical Therapy to address the deficits stated above and in the chart below, provide pt/family education, and to maximize pt's level of independence.     Plan of care discussed with patient: Yes  Pt's spiritual, cultural and educational needs considered and pt agreeable to plan of care and goals as stated below:     Anticipated Barriers for therapy: None    GOALS:  Short Term Goals (4 Weeks):  1. Patient will be compliant with home exercise program to supplement therapy in promoting functional mobility. (progressing, not met)    2. Patient will perform bed mobility with good control to demonstrate improved core strength. (progressing, not met)    3. Patient will report no pain during thoracolumbar active range of motion to promote functional mobility.  (progressing, not met)    4. Patient will improve impaired lower extremity manual muscle tests  to >/=4/5 to improve strength for functional tasks. (progressing, not met)        Long Term Goals (6 Weeks):   1. Patient will improve FOTO score to </= 60% limited to decrease perceived limitation with maintaining/changing body position. (progressing, not met)    2. Patient will perform supine " to sit and sit to stand transfers with good control to demonstrate improved core strength.  (progressing, not met)    3. Patient will improve impaired lower extremity manual muscle tests to >/=4+/5 to improve strength for functional tasks.  (progressing, not met)    4. Patient will tolerate standing/walking for 30 minutes with no increase in low back pain to return to PLOF.  (progressing, not met)          Plan     Plan of care Certification: 4/4/2023 to 6/24/23    Focus on core/trunk strengthening/stabilization     Outpatient Physical Therapy 2 times weekly for 12 weeks to include the following interventions: Therapeutic Exercises, Manual Therapeutic Technique, Neuromuscular Re Education, Therapeutic Activities. Modalities, Kinesiotape prn, and Functional Dry Needling as needed.    Emeka Garrison, PT

## 2023-04-07 ENCOUNTER — CLINICAL SUPPORT (OUTPATIENT)
Dept: REHABILITATION | Facility: OTHER | Age: 63
End: 2023-04-07
Payer: OTHER GOVERNMENT

## 2023-04-07 DIAGNOSIS — M62.89 PELVIC FLOOR DYSFUNCTION: Primary | ICD-10-CM

## 2023-04-07 PROCEDURE — 97110 THERAPEUTIC EXERCISES: CPT | Mod: PN,CQ

## 2023-04-07 NOTE — PROGRESS NOTES
"  [facility]  Physical Therapy Treatment Note    Name: Brianna Cantu  Clinic Number: [id number]    Therapy Diagnosis:   Encounter Diagnosis   Name Primary?    Pelvic floor dysfunction Yes     Physician: Seema Steinberg NP    Physician Orders: Physical Therapy Evaluate and Treat  Medical Diagnosis from Referral: low back pain with lower extremity radiculopathy  Evaluation Date: 3/24/23  Authorization Period Expiration: 1/24/24  Plan of Care Expiration: 4/7/2023 to 6/24/23  Visit # / Visits authorized: 5/20   FOTO: 0/3  on 3/24/23      Time In: 1100  Time Out: 1200  Total Billable Time: 30 minutes    Precautions: standard    Subjective   Patient reports: she is doing well. States she is not having nearly as much N&T in her feet. Feels like PT is helping.       Pain: 3/10   Location: right lateral gastroc and 1st 2 toes on R  Description: Burning, Throbbing, and Tingling  Aggravating Factors: Rolling over in bed, sit to stand transfer  Easing Factors: Exercise    Pts goals: Pt would like to return to walking for exercise with no increase in low back pain and right lower extremity pain.    Objective     CMS Impairment/Limitation/Restriction for FOTO Survey    Therapist reviewed FOTO scores for Brianna Cantu on 4/7/2023.   FOTO documents entered into [employer] - see Media section.    Limitation Score: 72%  Predicted Goal: 60%    Category: Mobility     TREATMENT     Therapeutic Exercises were provided for 30 minutes to improve strength and AROM including:                 Prone on elbows x 3mins  Prone hip extension x20  Prone femoral nerve glide x20   TA activation x20  Bridges 2x10  SLR x20  Sit to stand 2x10  Lacrosse ball myofascial release x3 mins  Supine sciatic nerve glide on bolster x20 bilateral   Seated sciatic nerve glide 2x10  +BKFO RTB 2x10   LTR LE's on RPB 3 mins  DKTC LE's on RPB 3 mins   PPT 3" hold 20x  +Standing back extension, 10x10"  +Piriformis stretching 20" x " 3  Pallof press red band 20x    Home Exercises and Patient Education Provided:    Education provided:   - Importance of HEP performance to reduce pain.     Written Home Exercises Provided: Patient was instructed to cont previous HEP  Exercises were reviewed and Brianna was able to demonstrate them prior to the end of the session.  Brianna demonstrated good understanding of the education provided.       Assessment   Pt reports decreased radicular symptoms in LE based off of subjective reports. Continued core/stabilization exercises with emphasis on back/trunk extension as bias is noted. Will progress,resistance levels as tolerated.            Pt prognosis is Good.   Pt will benefit from skilled outpatient Physical Therapy to address the deficits stated above and in the chart below, provide pt/family education, and to maximize pt's level of independence.     Plan of care discussed with patient: Yes  Pt's spiritual, cultural and educational needs considered and pt agreeable to plan of care and goals as stated below:     Anticipated Barriers for therapy: None    GOALS:  Short Term Goals (4 Weeks):  1. Patient will be compliant with home exercise program to supplement therapy in promoting functional mobility. (progressing, not met)    2. Patient will perform bed mobility with good control to demonstrate improved core strength. (progressing, not met)    3. Patient will report no pain during thoracolumbar active range of motion to promote functional mobility.  (progressing, not met)    4. Patient will improve impaired lower extremity manual muscle tests  to >/=4/5 to improve strength for functional tasks. (progressing, not met)        Long Term Goals (6 Weeks):   1. Patient will improve FOTO score to </= 60% limited to decrease perceived limitation with maintaining/changing body position. (progressing, not met)    2. Patient will perform supine to sit and sit to stand transfers with good control to demonstrate improved  core strength.  (progressing, not met)    3. Patient will improve impaired lower extremity manual muscle tests to >/=4+/5 to improve strength for functional tasks.  (progressing, not met)    4. Patient will tolerate standing/walking for 30 minutes with no increase in low back pain to return to PLOF.  (progressing, not met)          Plan     Plan of care Certification: 4/7/2023 to 6/24/23    Focus on core/trunk strengthening/stabilization     Outpatient Physical Therapy 2 times weekly for 12 weeks to include the following interventions: Therapeutic Exercises, Manual Therapeutic Technique, Neuromuscular Re Education, Therapeutic Activities. Modalities, Kinesiotape prn, and Functional Dry Needling as needed.    Xavier Seymour, PTA

## 2023-04-11 ENCOUNTER — CLINICAL SUPPORT (OUTPATIENT)
Dept: REHABILITATION | Facility: OTHER | Age: 63
End: 2023-04-11
Payer: OTHER GOVERNMENT

## 2023-04-11 DIAGNOSIS — M62.89 PELVIC FLOOR DYSFUNCTION: Primary | ICD-10-CM

## 2023-04-11 PROCEDURE — 97110 THERAPEUTIC EXERCISES: CPT | Mod: PN

## 2023-04-11 NOTE — PROGRESS NOTES
[facility]  Physical Therapy Treatment Note    Name: Brianna Cantu  Clinic Number: [id number]    Therapy Diagnosis:   Encounter Diagnosis   Name Primary?    Pelvic floor dysfunction Yes     Physician: Seema Steinberg NP    Physician Orders: Physical Therapy Evaluate and Treat  Medical Diagnosis from Referral: low back pain with lower extremity radiculopathy  Evaluation Date: 3/24/23  Authorization Period Expiration: 1/24/24  Plan of Care Expiration: 4/11/2023 to 6/24/23  Visit # / Visits authorized: 5/20   FOTO: 2/3  on 4/11/23      Time In: 1105  Time Out: 1200  Total Billable Time: 55 minutes    Precautions: standard    Subjective   Patient reports: right lower extremity N&T have returned, but it isnt into the right foot. Today it is down to right gastroc.      Pain: 3/10   Location: right low back, buttocks, posterior knee, and gastroc  Description: Burning, Throbbing, and Tingling  Aggravating Factors: Rolling over in bed, sit to stand transfer  Easing Factors: Exercise    Pts goals: Pt would like to return to walking for exercise with no increase in low back pain and right lower extremity pain.    Objective     CMS Impairment/Limitation/Restriction for FOTO Survey    Therapist reviewed FOTO scores for Brianna Cantu on 4/11/2023.   FOTO documents entered into [employer] - see Media section.    Limitation Score: 72%  Predicted Goal: 60%    Category: Mobility     TREATMENT     Therapeutic Exercises were provided for 55 minutes to improve strength and AROM including:        TM ambulation 10 minutes at 2.5 mph           Prone on elbows x 3mins  Prone hip extension x20  Prone femoral nerve glide x20   TA activation with hip flexion in hook lying x20  Bridges 2x10  SLR with pilates ring 2x10  Sit to stand 2x10  Lacrosse ball myofascial release x3 mins  Supine sciatic nerve glide on bolster x20 bilateral   Seated sciatic nerve glide 2x10  +BKFO RTB 2x10   LTR LE's on RPB 3  "mins  DKTC LE's on RPB 3 mins   PPT 3" hold 20x  +Standing back extension, 10x10"  +Piriformis stretching 20" x 3  Pallof press red band 20x    Home Exercises and Patient Education Provided:    Education provided:   - Importance of HEP performance to reduce pain.     Written Home Exercises Provided: Patient was instructed to cont previous HEP  Exercises were reviewed and Brianna was able to demonstrate them prior to the end of the session.  Brianna demonstrated good understanding of the education provided.       Assessment     Pt tolerated therapeutic interventions well with no complaint of increased pain. Patient reported relief after physical therapy treatment today. Continued core/stabilization exercises with emphasis on back/trunk extension as bias is noted. Will progress,resistance levels as tolerated.            Pt prognosis is Good.   Pt will benefit from skilled outpatient Physical Therapy to address the deficits stated above and in the chart below, provide pt/family education, and to maximize pt's level of independence.     Plan of care discussed with patient: Yes  Pt's spiritual, cultural and educational needs considered and pt agreeable to plan of care and goals as stated below:     Anticipated Barriers for therapy: None    GOALS:  Short Term Goals (4 Weeks):  1. Patient will be compliant with home exercise program to supplement therapy in promoting functional mobility. (progressing, not met)    2. Patient will perform bed mobility with good control to demonstrate improved core strength. (progressing, not met)    3. Patient will report no pain during thoracolumbar active range of motion to promote functional mobility.  (progressing, not met)    4. Patient will improve impaired lower extremity manual muscle tests  to >/=4/5 to improve strength for functional tasks. (progressing, not met)        Long Term Goals (6 Weeks):   1. Patient will improve FOTO score to </= 60% limited to decrease perceived " limitation with maintaining/changing body position. (progressing, not met)    2. Patient will perform supine to sit and sit to stand transfers with good control to demonstrate improved core strength.  (progressing, not met)    3. Patient will improve impaired lower extremity manual muscle tests to >/=4+/5 to improve strength for functional tasks.  (progressing, not met)    4. Patient will tolerate standing/walking for 30 minutes with no increase in low back pain to return to PLOF.  (progressing, not met)          Plan     Plan of care Certification: 4/11/2023 to 6/24/23    Focus on core/trunk strengthening/stabilization     Outpatient Physical Therapy 2 times weekly for 12 weeks to include the following interventions: Therapeutic Exercises, Manual Therapeutic Technique, Neuromuscular Re Education, Therapeutic Activities. Modalities, Kinesiotape prn, and Functional Dry Needling as needed.    Emeka Garrison, PT

## 2023-04-13 ENCOUNTER — CLINICAL SUPPORT (OUTPATIENT)
Dept: REHABILITATION | Facility: OTHER | Age: 63
End: 2023-04-13
Payer: OTHER GOVERNMENT

## 2023-04-13 DIAGNOSIS — M62.89 PELVIC FLOOR DYSFUNCTION: Primary | ICD-10-CM

## 2023-04-13 PROCEDURE — 97112 NEUROMUSCULAR REEDUCATION: CPT | Mod: PN

## 2023-04-13 PROCEDURE — 97110 THERAPEUTIC EXERCISES: CPT | Mod: PN

## 2023-04-13 PROCEDURE — 97530 THERAPEUTIC ACTIVITIES: CPT | Mod: PN

## 2023-04-13 NOTE — PROGRESS NOTES
"  [facility]  Physical Therapy Treatment Note    Name: Brianna Cantu  Clinic Number: [id number]    Therapy Diagnosis:   Encounter Diagnosis   Name Primary?    Pelvic floor dysfunction Yes       Physician: Seema Steinberg NP    Physician Orders: Physical Therapy Evaluate and Treat  Medical Diagnosis from Referral: low back pain with lower extremity radiculopathy  Evaluation Date: 3/24/23  Authorization Period Expiration: 1/24/24  Plan of Care Expiration: 4/13/2023 to 6/24/23  Visit # / Visits authorized: 7/20   FOTO: 2/3  on 4/11/23      Time In: 11:05  Time Out: 12:05   Total Billable Time: 60 minutes    Precautions: standard    Subjective   Patient reports tingling sensation/achy pain in R gastrocnemius. Patient reports having no low back pain.       Pain: 4/10   Location: right low back, buttocks, posterior knee, and gastroc  Description: Burning, Throbbing, and Tingling  Aggravating Factors: Rolling over in bed, sit to stand transfer  Easing Factors: Exercise    Pts goals: Pt would like to return to walking for exercise with no increase in low back pain and right lower extremity pain.    Objective     CMS Impairment/Limitation/Restriction for FOTO Survey    Therapist reviewed FOTO scores for Brianna Cantu on 4/13/2023.   FOTO documents entered into [employer] - see Media section.    Limitation Score: 72%  Predicted Goal: 60%    Category: Mobility     TREATMENT     Therapeutic Exercises were provided for 15 minutes to improve strength and AROM including:  Prone on elbows x 3mins  SLR with pilates ring 2x10  +BKFO RTB 2x10   LTR LE's on RPB 3 mins  DKTC LE's on RPB 3 mins   PPT 3" hold 20x  +Piriformis stretching 20" x 3    Neuromuscular re education for 15 minutes for improved balance and proprioception including:   Prone femoral nerve glide x20   TA activation with hip flexion in hook lying x20  Lacrosse ball myofascial release x3 mins  Supine sciatic nerve glide on bolster " "x20 bilateral   Seated sciatic nerve glide 2x10    Therapeutic Activities were provided for 30 minutes to improve tolerance to functional activities including:      TM ambulation 10 minutes at 2.5 mph- pt 1st time on TM decrease to 0.8     Prone hip extension x20        Sit to stand 2x10  Pallof press red band 20x  +Standing back extension, 10x10"  Bridges 2x10                Home Exercises and Patient Education Provided:    Education provided:   - Importance of HEP performance to reduce pain.     Written Home Exercises Provided: Patient was instructed to cont previous HEP  Exercises were reviewed and Brianna was able to demonstrate them prior to the end of the session.  Brianna demonstrated good understanding of the education provided.       Assessment   In today's session patient was able tolerate treatment fairly. After myofascial release, pt c/o radicular symptoms into posterior R lower extremity. Patient's symptoms subsided after promoting standing lumbar extension. Patient reported relief after physical therapy treatment today. Will continue core/stabilization exercises with emphasis on trunk extension.    Pt prognosis is Good.   Pt will benefit from skilled outpatient Physical Therapy to address the deficits stated above and in the chart below, provide pt/family education, and to maximize pt's level of independence.     Plan of care discussed with patient: Yes  Pt's spiritual, cultural and educational needs considered and pt agreeable to plan of care and goals as stated below:     Anticipated Barriers for therapy: None    GOALS:  Short Term Goals (4 Weeks):  1. Patient will be compliant with home exercise program to supplement therapy in promoting functional mobility. (progressing, not met)    2. Patient will perform bed mobility with good control to demonstrate improved core strength. (progressing, not met)    3. Patient will report no pain during thoracolumbar active range of motion to promote " functional mobility.  (progressing, not met)    4. Patient will improve impaired lower extremity manual muscle tests  to >/=4/5 to improve strength for functional tasks. (progressing, not met)        Long Term Goals (6 Weeks):   1. Patient will improve FOTO score to </= 60% limited to decrease perceived limitation with maintaining/changing body position. (progressing, not met)    2. Patient will perform supine to sit and sit to stand transfers with good control to demonstrate improved core strength.  (progressing, not met)    3. Patient will improve impaired lower extremity manual muscle tests to >/=4+/5 to improve strength for functional tasks.  (progressing, not met)    4. Patient will tolerate standing/walking for 30 minutes with no increase in low back pain to return to PLOF.  (progressing, not met)          Plan     Plan of care Certification: 4/13/2023 to 6/24/23    Focus on core/trunk strengthening/stabilization     Outpatient Physical Therapy 2 times weekly for 12 weeks to include the following interventions: Therapeutic Exercises, Manual Therapeutic Technique, Neuromuscular Re Education, Therapeutic Activities. Modalities, Kinesiotape prn, and Functional Dry Needling as needed.    Pt was co-treated by JEANNA Potter under the direct supervision of a Licensed Physical Therapist   Radha Wiggins, PT

## 2023-04-14 ENCOUNTER — CLINICAL SUPPORT (OUTPATIENT)
Dept: REHABILITATION | Facility: OTHER | Age: 63
End: 2023-04-14
Payer: OTHER GOVERNMENT

## 2023-04-14 ENCOUNTER — DOCUMENTATION ONLY (OUTPATIENT)
Dept: REHABILITATION | Facility: OTHER | Age: 63
End: 2023-04-14

## 2023-04-14 DIAGNOSIS — M62.89 PELVIC FLOOR DYSFUNCTION: Primary | ICD-10-CM

## 2023-04-14 PROCEDURE — 97530 THERAPEUTIC ACTIVITIES: CPT | Mod: PN | Performed by: PHYSICAL THERAPIST

## 2023-04-14 PROCEDURE — 97164 PT RE-EVAL EST PLAN CARE: CPT | Mod: PN | Performed by: PHYSICAL THERAPIST

## 2023-04-14 NOTE — PROGRESS NOTES
Please approve increasing physical therapy frequency from 2x/week to 3x/week for the week of 4/10/23 to accommodate pelvic floor re-evaluation.     Pt presents to physical therapy re-evaluation with complaints of urinary urgency, urinary/fecal incontinence. She demonstrates abdominal wall tension/tenderness, scar tissue mobility restrictions, pelvic floor tension and tenderness (R), and coordination deficits with bearing down. Signs and symptoms are consistent with pelvic floor dysfunction, particularly increased tension and irritation that lead to urgency and leakage. Unilateral nature of tension/tenderness points to orthopedic , which aligns with pt's back and hip pain on the right; Continued orthopedic treatment in conjunction with pelvic floor treatment should be helpful for this. Pt also demonstrates paradoxical pelvic floor contraction (dyssynergia) with bearing down, which likely contributes to difficulty with bowel movements, despite frequently loose stools. Pt's pelvic floor dysfunction likely impacts pt's original reason for physical therapy, and treatment of it will help lower back pain, ability to perform ADLs/functional mobility/community mobility, and improve pt's quality of life.        Mee Manuel, PT, DPT

## 2023-04-14 NOTE — PROGRESS NOTES
[facility]  Physical Therapy Treatment Note    Name: Brianna Gascashua  Clinic Number: [id number]    Therapy Diagnosis:   Encounter Diagnosis   Name Primary?    Pelvic floor dysfunction Yes       Referring Provider: Seema Steinberg NP    Referring Provider Orders: Physical Therapy Evaluate and Treat  Medical Diagnosis from Referral: low back pain with lower extremity radiculopathy  Evaluation Date: 3/24/23  Authorization Period Expiration: 1/24/24  Plan of Care Expiration: 4/14/2023 to 6/24/23  Visit # / Visits authorized: 8/20   FOTO: 2/3  on 4/11/23      Time In: 10:10  Time Out: 11:05  Total Billable Time: 53 minutes    Precautions: standard    Subjective     Pain: 2/10   Location: right low back, buttocks, posterior knee, and gastroc  Description: Burning, Throbbing, and Tingling  Aggravating Factors: Rolling over in bed, sit to stand transfer  Easing Factors: Exercise    Pts goals: Pt would like to return to walking for exercise with no increase in low back pain and right lower extremity pain.    Date of onset: many years ago  History of current condition: Brianna reports long history of urinary urgency, urinary incontinence, and fecal incontinence. She restricts community mobility due to  fear of incontinence. She notes intermittent abdominal cramping with coughing, sneezing.    OB/GYN HISTORY - no pregnancies, full hysterectomy due to cervical cancer    BLADDER HISTORY  Frequency of urination:   Day: every 2 hours           Night: 3-8x/night  Difficulty initiating urine stream: Sometimes  Complete emptying: Yes  Post-void dribbling: Yes  Urinary Urgency: Yes  Bladder leakage: Yes - with rushing to the toilet, coughing, sneezing, lifting  Frequency of incidents: daily    BOWEL HISTORY  Frequency of bowel movements: once a day  Difficulty initiating BM: Yes  Quality/Shape of BM: [place] Stool Chart 5-7  Incomplete Emptying? Yes  Fiber Supplements or Laxative Use? Yes -  benefiber  Colon leakage: Yes  Frequency of incidents: every other week or so     SEXUAL/PELVIC PAIN HISTORY  Sexually active? No   Pain with vaginal exams, intercourse or tampon use? No  Pain with wearing certain clothes, sitting on certain surfaces? No  Feeling of pelvic heaviness? No    Fluid intake: coffee (1 cup/day), water, sports drinks    Objective     ABDOMINAL WALL ASSESSMENT  Palpation: tender with deep palpation of lower belly  Pelvic Girdle Stability: Pt demonstrates moderately impaired ability to stabilize pelvis with Active Straight Leg Raise Test.   Scarring: midline vertical scar for hysterectomy, decreased mobility inferiorly  Diastasis Recti: present, 1-inch width, present with supine curl-up task    BREATHING MECHANICS ASSESSMENT   Thorax Assessment During Quiet Respiration: Decreased excursion of abdominal wall   Thorax Assessment During Deep Respiration: Decreased excursion of abdominal wall     VAGINAL PELVIC FLOOR EXAM - external assessment  Introitus: WNL  Skin condition: WNL  Scarring: none   Voluntary contraction: visible lift  Voluntary relaxation: visible drop  Bearing down: bulge with paradoxical pelvic floor contraction     VAGINAL PELVIC FLOOR EXAM - internal assessment  Pain: tenderness to palpation of right sphincter urethrovaginalis, levator ani, coccygeus, obturator internus, piriformis  Sensation: able to localized pressure appropriately   Vaginal vault: WNL   Muscle Bulk: normal tone, slightly increased on R  Muscle Power: 2-3/5  Muscle Endurance: <8 seconds    Fast Contractions in 10 seconds: 5     Quality of contraction: slow rise and decreased hold   Specificity: WNL   Coordination: tends to hold breath during PFM contration   Prolapse: grade 2, anterior    TREATMENT     Re-evaluation to assess changes in strength, function mobility, range of motion, and core control, as well as determine progress towards goals and updates to plan of care for 30 minutes    Therapeutic  activities to improve functional performance for 23 minutes -  [x] Instruction on urge suppression techniques  [x] Education on techniques to reduce post-void dribble  [x] Instruction on the Knack for reduction of stress urinary incontinence  [x] Education on bowel movement optimization - positioning, toilet stool, breath coordination, pelvic floor relaxation, abdominal wall bracing  [x] HEP building/HEP review      Home Exercises and Patient Education Provided:    Education provided:   - Importance of HEP performance to reduce pain.   pt prognosis, PT plan of care, pelvic floor anatomy & function, relationship between TrA & PFM, relationship between hips & PFM, relationship between pelvic floor dysfunction & lower back pain, urge suppression, etiology of urinary urgency, etiology of stress urinary incontinence, the knack for management of stress urinary incontinence, etiology of constipation, conservative management of constipation (water, fiber, exercise), proper body mechanics for bowel movement, bowel movement consistency goals, and abdominal wall anatomy    Written Home Exercises Provided: Patient was instructed to cont previous HEP  Exercises were reviewed and Brianna was able to demonstrate them prior to the end of the session.  Brianna demonstrated good understanding of the education provided.       Assessment     Pt presents to physical therapy re-evaluation with complaints of urinary urgency, urinary/fecal incontinence. She demonstrates abdominal wall tension/tenderness, scar tissue mobility restrictions, pelvic floor tension and tenderness (R), and coordination deficits with bearing down. Signs and symptoms are consistent with pelvic floor dysfunction, particularly increased tension and irritation that lead to urgency and leakage. Unilateral nature of tension/tenderness points to orthopedic , which aligns with pt's back and hip pain on the right; Continued orthopedic treatment in conjunction  with pelvic floor treatment should be helpful for this. Pt also demonstrates paradoxical pelvic floor contraction (dyssynergia) with bearing down, which likely contributes to difficulty with bowel movements, despite frequently loose stools. Pt's pelvic floor dysfunction likely impacts pt's original reason for physical therapy, and treatment of it will help lower back pain, ability to perform ADLs/functional mobility/community mobility, and improve pt's quality of life.    Pt prognosis is Good.   Pt will benefit from skilled outpatient physical therapy to address the deficits stated above and in the chart below, provide pt/family education, and to maximize pt's level of independence.     Plan of care discussed with patient: Yes  Pt's spiritual, cultural and educational needs considered and pt agreeable to plan of care and goals as stated below:     Anticipated Barriers for therapy: None    GOALS:  Short Term Goals (4 Weeks):  1. Patient will be compliant with home exercise program to supplement therapy in promoting functional mobility. (progressing, not met)    2. Patient will perform bed mobility with good control to demonstrate improved core strength. (progressing, not met)    3. Patient will report no pain during thoracolumbar active range of motion to promote functional mobility.  (progressing, not met)    4. Patient will improve impaired lower extremity manual muscle tests  to >/=4/5 to improve strength for functional tasks. (progressing, not met)    5. Pt to demonstrate the knack independently to reduce incidence of stress urinary incontinence (established)  6. Pt to report 50% improvement in urine/fecal leakage (established)  7. Pt to verbalize urge suppression strategies for improved control over urgency and urge urinary incontinence (established)     Long Term Goals (6 Weeks):   1. Patient will improve FOTO score to </= 60% limited to decrease perceived limitation with maintaining/changing body position.  (progressing, not met)    2. Patient will perform supine to sit and sit to stand transfers with good control to demonstrate improved core strength.  (progressing, not met)    3. Patient will improve impaired lower extremity manual muscle tests to >/=4+/5 to improve strength for functional tasks.  (progressing, not met)    4. Patient will tolerate standing/walking for 30 minutes with no increase in low back pain to return to PLOF.  (progressing, not met)    5. Pt to deny urinary/fecal incontinence to demonstrate improved pelvic floor coordination (established)  6. Pt to report nocturia of no more than once times per night for improved sleep quality (established)  7. Pt to report urinary frequency of no more than every 2 hours to demonstrate improved bladder control (established)        Plan     Plan of care Certification: 4/14/2023 to 6/24/23    Focus on core/trunk strengthening/stabilization     Outpatient Physical Therapy 2 times weekly for 12 weeks to include the following interventions: Therapeutic Exercises, Manual Therapeutic Technique, Neuromuscular Re Education, Therapeutic Activities. Modalities, Kinesiotape prn, and Functional Dry Needling as needed.      Mee Manuel, PT

## 2023-04-14 NOTE — PATIENT INSTRUCTIONS
Bowel Movement Body Mechanics  1. Sit on the toilet comfortably with legs and buttocks relaxed.  2. Put your feet on a step stool or squatty potty (~8 inches tall).  3. Lean forward while keeping your back straight and rest your elbows on your knees.  4. Keep your knees apart and place your hands on your belly  5. Inhale and make your belly big  6. Make the belly hard as you exhale like you are blowing out birthday candles while you gently bear down.   *Press the hands over the lower belly to prevent it from pushing out with bearing down  *Do not strain, do not hold your breath.         Reducing incontinence with the Knack  The Knack is a strong and well-timed contraction of the pelvic floor muscles performed immediately before and during an increase in downward pressure on the pelvic floor.     Bladder leaks can be controlled using this exercise, which helps to close the urine tube more effectively. When the pelvic floor muscles are working as they should, they contract automatically before and during an increase in pressure from within the abdomen, such as during a cough or sneeze. When the muscles are not working appropriately, this action is not automatic, but it can improve with practice.    The Knack can be used with events or activities that increase downward pressure upon your pelvic floor such as:    Coughing  Sneezing  Lifting  Blowing your nose  Rising into standing from sitting  Stepping down heavily    When you feel like you're about to cough/sneeze/lift...  Lift and squeeze the muscles in and around all three pelvic openings (urethra, vagina, and anus) immediately before  Maintain this pelvic floor muscle contraction as cough/sneeze/lift  Afterwards, relax your pelvic floor muscles back to normal resting level    If the pelvic floor is not very strong or has been working hard all day (lots of lifting, excessive coughing/sneezing while sick), then you may still experience some leakage. Just do your  best, and it should improve as the pelvic floor gets stronger.    Simply remember to lift and squeeze with every cough, lift or sneeze!        Urge Suppression  1) Stop what you're doing and put both feet on the ground  2) Perform 5-10 quick flicks of the pelvic floor, focusing on full relaxation between reps   - Imagine closing and opening the doors to the vagina  3) Take a deep breath and relax your pelvic floor   - Focusing on relaxing the jaw or belly might work better  4) Assess if the urge was real   - How long has it been since your last void?   - Does the urgency go away or not?  5) Proceed to the bathroom, if needed    *If applicable, gently squeeze the pelvic floor while coming to stand to help manage increased urgency with the transfer  *You may need to repeat the steps if urgency arises again on the way to the bathroom  *Try to suppress urgency when it's of a medium intensity as it's harder to suppress when urgency is severe  *Avoid performing a long squeeze of the pelvic floor to hold back urine, as it will likely not be effective  *Identify your triggers (putting key into the front door, seeing the toilet, arranging clothing for voiding)

## 2023-04-19 ENCOUNTER — CLINICAL SUPPORT (OUTPATIENT)
Dept: REHABILITATION | Facility: OTHER | Age: 63
End: 2023-04-19
Payer: OTHER GOVERNMENT

## 2023-04-19 DIAGNOSIS — M62.89 PELVIC FLOOR DYSFUNCTION: Primary | ICD-10-CM

## 2023-04-19 PROCEDURE — 97112 NEUROMUSCULAR REEDUCATION: CPT | Mod: PN,CQ

## 2023-04-19 PROCEDURE — 97110 THERAPEUTIC EXERCISES: CPT | Mod: PN,CQ

## 2023-04-19 PROCEDURE — 97530 THERAPEUTIC ACTIVITIES: CPT | Mod: PN,CQ

## 2023-04-19 NOTE — PROGRESS NOTES
"  [facility]  Physical Therapy Treatment Note    Name: Brianna Cantu  Clinic Number: [id number]    Therapy Diagnosis:   Encounter Diagnosis   Name Primary?    Pelvic floor dysfunction Yes         Physician: Seema Steinberg NP    Physician Orders: Physical Therapy Evaluate and Treat  Medical Diagnosis from Referral: low back pain with lower extremity radiculopathy  Evaluation Date: 3/24/23  Authorization Period Expiration: 1/24/24  Plan of Care Expiration: 4/19/2023 to 6/24/23  Visit # / Visits authorized: 8/20   FOTO: 2/3  on 4/11/23      Time In:  1100  Time Out: 1200  Total Billable Time: 60 minutes    Precautions: standard    Subjective   Patient reports feeling much better in her low back. States she still has some N&T in her R lower leg but its not as bad as it once was.       Pain: 4/10   Location: right low back, buttocks, posterior knee, and gastroc  Description: Burning, Throbbing, and Tingling  Aggravating Factors: Rolling over in bed, sit to stand transfer  Easing Factors: Exercise    Pts goals: Pt would like to return to walking for exercise with no increase in low back pain and right lower extremity pain.    Objective     CMS Impairment/Limitation/Restriction for FOTO Survey    Therapist reviewed FOTO scores for Brianna Cantu on 4/19/2023.   FOTO documents entered into [employer] - see Media section.    Limitation Score: 72%  Predicted Goal: 60%    Category: Mobility     TREATMENT     Therapeutic Exercises were provided for 29 minutes to improve strength and AROM including:    Prone on elbows x 3mins  SLR with pilates ring 2x10  +BKFO RTB 2x10   LTR LE's on RPB 3 mins  DKTC LE's on RPB 3 mins   PPT 3" hold 20x  +Piriformis stretching 20" x 3    Neuromuscular re education for 8 minutes for improved balance and proprioception including:   Prone femoral nerve glide x20   TA activation with hip flexion in hook lying x20  Lacrosse ball myofascial release x3 mins  Supine " "sciatic nerve glide on bolster x20 bilateral   Seated sciatic nerve glide 20x R only    Therapeutic Activities were provided for 23 minutes to improve tolerance to functional activities including:      Backwards walking on TM ambulation 6 min 0.8 mph  Prone hip extension x20        Sit to stand 2x10  Pallof press red band 20x  +Standing back extension, 10x10"  Bridges 2x10        Home Exercises and Patient Education Provided:    Education provided:   - Importance of HEP performance to reduce pain.     Written Home Exercises Provided: Patient was instructed to cont previous HEP  Exercises were reviewed and Brianna was able to demonstrate them prior to the end of the session.  Brianna demonstrated good understanding of the education provided.       Assessment   Brianna tolerated today's session well this visit. Continued with core/stabilization and glute strength with emphasis on postural re-education/upright posture with TM training/walking. No increase in radicular symptoms were noted this visit  beyond stated levels. Pt presents with extension bias for pain/symptom relief.     Pt prognosis is Good.   Pt will benefit from skilled outpatient Physical Therapy to address the deficits stated above and in the chart below, provide pt/family education, and to maximize pt's level of independence.     Plan of care discussed with patient: Yes  Pt's spiritual, cultural and educational needs considered and pt agreeable to plan of care and goals as stated below:     Anticipated Barriers for therapy: None    GOALS:  Short Term Goals (4 Weeks):  1. Patient will be compliant with home exercise program to supplement therapy in promoting functional mobility. (progressing, not met)    2. Patient will perform bed mobility with good control to demonstrate improved core strength. (progressing, not met)    3. Patient will report no pain during thoracolumbar active range of motion to promote functional mobility.  (progressing, not " met)    4. Patient will improve impaired lower extremity manual muscle tests  to >/=4/5 to improve strength for functional tasks. (progressing, not met)        Long Term Goals (6 Weeks):   1. Patient will improve FOTO score to </= 60% limited to decrease perceived limitation with maintaining/changing body position. (progressing, not met)    2. Patient will perform supine to sit and sit to stand transfers with good control to demonstrate improved core strength.  (progressing, not met)    3. Patient will improve impaired lower extremity manual muscle tests to >/=4+/5 to improve strength for functional tasks.  (progressing, not met)    4. Patient will tolerate standing/walking for 30 minutes with no increase in low back pain to return to PLOF.  (progressing, not met)          Plan     Plan of care Certification: 4/19/2023 to 6/24/23    Focus on core/trunk strengthening/stabilization     Outpatient Physical Therapy 2 times weekly for 12 weeks to include the following interventions: Therapeutic Exercises, Manual Therapeutic Technique, Neuromuscular Re Education, Therapeutic Activities. Modalities, Kinesiotape prn, and Functional Dry Needling as needed.      Xavier Seymour, PTA

## 2023-04-24 ENCOUNTER — TELEPHONE (OUTPATIENT)
Dept: INTERNAL MEDICINE | Facility: CLINIC | Age: 63
End: 2023-04-24

## 2023-04-24 ENCOUNTER — TELEPHONE (OUTPATIENT)
Dept: INTERNAL MEDICINE | Facility: CLINIC | Age: 63
End: 2023-04-24
Payer: OTHER GOVERNMENT

## 2023-04-24 DIAGNOSIS — M54.16 LUMBAR RADICULOPATHY: Primary | ICD-10-CM

## 2023-04-24 NOTE — TELEPHONE ENCOUNTER
Call pt pt stated that she been having pains in her stomach for the last 4 months pt stated when she laugh cough the pain get worst pt also stated that the pain runs up her legs  to her rib cage pt stated that her last  Put a referral in for neurology and she will like  to do the same thing for her Neurology is pend.

## 2023-04-24 NOTE — TELEPHONE ENCOUNTER
----- Message from Phi Sierra sent at 4/24/2023 11:35 AM CDT -----  Contact: [phone]  Pt said she needs a call back about getting a referral for neurology. Please call pt back.

## 2023-04-24 NOTE — TELEPHONE ENCOUNTER
----- Message from Phi Sierra sent at 4/24/2023 11:35 AM CDT -----  Contact: [phone]  Pt said she needs a call back about getting a referral for neurology. Please call pt back.

## 2023-04-25 ENCOUNTER — CLINICAL SUPPORT (OUTPATIENT)
Dept: REHABILITATION | Facility: OTHER | Age: 63
End: 2023-04-25
Payer: OTHER GOVERNMENT

## 2023-04-25 DIAGNOSIS — M62.89 PELVIC FLOOR DYSFUNCTION: Primary | ICD-10-CM

## 2023-04-25 PROCEDURE — 97530 THERAPEUTIC ACTIVITIES: CPT | Mod: PN

## 2023-04-25 PROCEDURE — 97112 NEUROMUSCULAR REEDUCATION: CPT | Mod: PN

## 2023-04-25 PROCEDURE — 97110 THERAPEUTIC EXERCISES: CPT | Mod: PN

## 2023-04-25 NOTE — PROGRESS NOTES
"  [facility]  Physical Therapy Treatment Note    Name: Brianna Cantu  Clinic Number: [id number]    Therapy Diagnosis:   Encounter Diagnosis   Name Primary?    Pelvic floor dysfunction Yes         Physician: Seema Steinberg NP    Physician Orders: Physical Therapy Evaluate and Treat  Medical Diagnosis from Referral: low back pain with lower extremity radiculopathy  Evaluation Date: 3/24/23  Authorization Period Expiration: 1/24/24  Plan of Care Expiration: 4/25/2023 to 6/24/23  Visit # / Visits authorized: 8/20   FOTO: 2/3  on 4/11/23      Time In:  1100  Time Out: 1200  Total Billable Time: 60 minutes    Precautions: standard    Subjective   Pt reports that she is having less low back pain and radicular symptoms. She admits that she is depressed, but she is glad that she came today.       Pain: 4/10   Location: right low back, buttocks, posterior knee, and gastroc  Description: Burning, Throbbing, and Tingling  Aggravating Factors: Rolling over in bed, sit to stand transfer  Easing Factors: Exercise    Pts goals: Pt would like to return to walking for exercise with no increase in low back pain and right lower extremity pain.    Objective     CMS Impairment/Limitation/Restriction for FOTO Survey    Therapist reviewed FOTO scores for Brianna Cantu on 4/25/2023.   FOTO documents entered into [email or web address] - see Media section.    Limitation Score: 72%  Predicted Goal: 60%    Category: Mobility     TREATMENT     Therapeutic Exercises were provided for 29 minutes to improve strength and AROM including:    Prone on elbows x 3mins  SLR with pilates ring 2x10  BKFO RTB 2x10   LTR LE's on RPB 3 mins  DKTC LE's on RPB 3 mins   PPT 3" hold 20x  +Piriformis stretching 20" x 3    Neuromuscular re education for 8 minutes for improved balance and proprioception including:   Prone femoral nerve glide x20   TA activation with marches in hook lying x20  Lacrosse ball myofascial release x3 mins  Supine " "sciatic nerve glide on bolster x20 bilateral   Seated sciatic nerve glide 20x R only    Therapeutic Activities were provided for 23 minutes to improve tolerance to functional activities including:      Backwards walking on TM ambulation 6 min 0.8 mph  Prone hip extension x20        Sit to stand 2x10  Pallof press red band 20x  Standing back extension, 10x10"  Bridges 2x10        Home Exercises and Patient Education Provided:    Education provided:   - Importance of HEP performance to reduce pain.     Written Home Exercises Provided: Patient was instructed to cont previous HEP  Exercises were reviewed and Brianna was able to demonstrate them prior to the end of the session.  Brianna demonstrated good understanding of the education provided.       Assessment   Pt tolerated tx session well today. Pt requires frequent rest breaks but she is motivated to participate in PT tx. Continue PT POC with emphasis on decreasing pt's radicular symptoms as she is nearing her baseline.     Pt prognosis is Good.   Pt will benefit from skilled outpatient Physical Therapy to address the deficits stated above and in the chart below, provide pt/family education, and to maximize pt's level of independence.     Plan of care discussed with patient: Yes  Pt's spiritual, cultural and educational needs considered and pt agreeable to plan of care and goals as stated below:     Anticipated Barriers for therapy: None    GOALS:  Short Term Goals (4 Weeks):  1. Patient will be compliant with home exercise program to supplement therapy in promoting functional mobility. (progressing, not met)    2. Patient will perform bed mobility with good control to demonstrate improved core strength. (progressing, not met)    3. Patient will report no pain during thoracolumbar active range of motion to promote functional mobility.  (progressing, not met)    4. Patient will improve impaired lower extremity manual muscle tests  to >/=4/5 to improve strength for " functional tasks. (progressing, not met)        Long Term Goals (6 Weeks):   1. Patient will improve FOTO score to </= 60% limited to decrease perceived limitation with maintaining/changing body position. (progressing, not met)    2. Patient will perform supine to sit and sit to stand transfers with good control to demonstrate improved core strength.  (progressing, not met)    3. Patient will improve impaired lower extremity manual muscle tests to >/=4+/5 to improve strength for functional tasks.  (progressing, not met)    4. Patient will tolerate standing/walking for 30 minutes with no increase in low back pain to return to PLOF.  (progressing, not met)          Plan     Plan of care Certification: 4/25/2023 to 6/24/23    Focus on core/trunk strengthening/stabilization     Outpatient Physical Therapy 2 times weekly for 12 weeks to include the following interventions: Therapeutic Exercises, Manual Therapeutic Technique, Neuromuscular Re Education, Therapeutic Activities. Modalities, Kinesiotape prn, and Functional Dry Needling as needed.      Radha Wiggins, PT

## 2023-04-26 ENCOUNTER — CLINICAL SUPPORT (OUTPATIENT)
Dept: REHABILITATION | Facility: OTHER | Age: 63
End: 2023-04-26
Payer: OTHER GOVERNMENT

## 2023-04-26 DIAGNOSIS — M62.89 PELVIC FLOOR DYSFUNCTION: Primary | ICD-10-CM

## 2023-04-26 NOTE — PROGRESS NOTES
[facility]  Physical Therapy Updated Plan of Care    Name: Brianna Cantu  Clinic Number: [id number]    Therapy Diagnosis:   Encounter Diagnosis   Name Primary?    Pelvic floor dysfunction Yes       Physician: Seema Steinberg NP    Physician Orders: Physical Therapy Evaluate and Treat  Medical Diagnosis from Referral: low back pain with lower extremity radiculopathy  Evaluation Date: 3/24/23  Authorization Period Expiration: 1/24/24  Plan of Care Expiration: 4/26/2023 to 6/24/23  Visit # / Visits authorized: 8/20   FOTO: 2/3  on 4/11/23      Time In:  1100  Time Out: 1200  Total Billable Time: 60 minutes    Precautions: standard    Subjective   Pt reports that her symptoms are longer down into her foot. She states that she now only has back pain into her foot. Pt states that she is doing well and nearing her baseline.      Pain: 4/10   Location: right low back, buttocks, posterior knee, and gastroc  Description: Burning, Throbbing, and Tingling  Aggravating Factors: Rolling over in bed, sit to stand transfer  Easing Factors: Exercise    Pts goals: Pt would like to return to walking for exercise with no increase in low back pain and right lower extremity pain.    Objective     Posture: forward head / rounded shoulders  Palpation: tenderness to palpation at bilateral greater trochanter, posterior hip (right > left)  Sensation: NT  Deep tendon reflexes: NT     Lumbar Active range of motion  Pain/dysfunction with movement:   Flexion 75 No sx's   Extension 50     Right side bending 40     Left side bending 50     Right rotation 25     Left rotation 50             Right LE     Left LE        Iliopsoas:  L2 4/5 Iliopsoas: L2 4/5    Quadriceps:  L3 - femoral nerve 4/5 Quadriceps: L3 - femoral nerve 4/5    Hip adduction:  L3 - obterator 4/5 Hip adduction: L3 - obterator 4/5    Hamstrings:  S2 4/5 Hamstrings: S2 3/5    Ankle DF/EV:  L4 4/5 Ankle DF/EV: L4 4/5    GT Ext:  L5 NT GT Ext L5  "NT    Hip Abduction:  L5-S1 - Superior gluteal nerve 3/5  Hip Abduction: L5-S1 - Superior gluteal nerve 2/5    Hip extension:  L5-S2 - Inferior gluteal nerve 2/5 Hip extension: L5-S2 - Inferior gluteal nerve 3/5    Ankle PF:   S1 - tibial nerve NT Ankle PF S1 - tibial nerve NT            Slump R: negative   Slump L: negative     SLR R: + for right lateral calf and SIJ pain  SLR L: + for right hip pain       CMS Impairment/Limitation/Restriction for FOTO Survey     Therapist reviewed FOTO scores for Brianna Cantu on 3/24/2023.   FOTO documents entered into [employer] - see Media section.     Limitation Score: 72%  Predicted Goal: 60%     Category: Mobility      TREATMENT     Therapeutic Exercises were provided for 0 minutes to improve strength and AROM including:  Piriformis stretching 20" x 3    Neuromuscular re education for 30 minutes for improved balance and proprioception including:   Prone femoral nerve glide x20   TA activation with marches in hook lying x20  Lacrosse ball myofascial release x3 mins  Supine sciatic nerve glide on bolster x20 bilateral   Seated sciatic nerve glide 20x R only  Prone on elbows x 3mins  SLR with pilates ring 2x10  BKFO RTB 2x10   LTR LE's on RPB 3 mins  DKTC LE's on RPB 3 mins   PPT 3" hold 20x  +Updated POC    Therapeutic Activities were provided for 30 minutes to improve tolerance to functional activities including:      Backwards walking on TM ambulation 6 min 0.8 mph  Prone hip extension x20        Sit to stand 2x10  Pallof press red band 20x  Standing back extension, 10x10"  Bridges 2x10        Home Exercises and Patient Education Provided:    Education provided:   - Importance of HEP performance to reduce pain.     Written Home Exercises Provided: Patient was instructed to cont previous HEP  Exercises were reviewed and Brianna was able to demonstrate them prior to the end of the session.  Brianna demonstrated good understanding of the education provided.       Assessment "   Pt presents to physical therapy treatment with decreased active range of motion, decreased strength, poor posture, and increased pain due to lumbar spine impairments. These factors are negatively impacting the patient's ability to complete their activities of daily living and work duties. Pt is progressing towards their short and long term goals as evidenced by decreased pain, improved strength and range of motion, and improved FOTO score. These goals remain appropriate for the plan of care. Pt would benefit from continued skilled physical therapy treatment to address these deficits so that they may return to their prior level of function with improved quality of life.      Pt prognosis is Good.   Pt will benefit from skilled outpatient Physical Therapy to address the deficits stated above and in the chart below, provide pt/family education, and to maximize pt's level of independence.     Plan of care discussed with patient: Yes  Pt's spiritual, cultural and educational needs considered and pt agreeable to plan of care and goals as stated below:     Anticipated Barriers for therapy: None    GOALS:  Short Term Goals (4 Weeks):  1. Patient will be compliant with home exercise program to supplement therapy in promoting functional mobility. (met)    2. Patient will perform bed mobility with good control to demonstrate improved core strength. ( met)    3. Patient will report no pain during thoracolumbar active range of motion to promote functional mobility.  (met)    4. Patient will improve impaired lower extremity manual muscle tests  to >/=4/5 to improve strength for functional tasks. (met)        Long Term Goals (6 Weeks):   1. Patient will improve FOTO score to </= 60% limited to decrease perceived limitation with maintaining/changing body position. (progressing, not met)    2. Patient will perform supine to sit and sit to stand transfers with good control to demonstrate improved core strength.  (progressing, not  met)    3. Patient will improve impaired lower extremity manual muscle tests to >/=4+/5 to improve strength for functional tasks.  (progressing, not met)    4. Patient will tolerate standing/walking for 30 minutes with no increase in low back pain to return to PLOF.  (progressing, not met)          Plan     Plan of care Certification: 4/26/2023 to 6/26/23    Focus on core/trunk strengthening/stabilization     Outpatient Physical Therapy 2 times weekly for 12 weeks to include the following interventions: Therapeutic Exercises, Manual Therapeutic Technique, Neuromuscular Re Education, Therapeutic Activities. Modalities, Kinesiotape prn, and Functional Dry Needling as needed.      Radha Wiggins, PT

## 2023-04-26 NOTE — PLAN OF CARE
[facility]  Physical Therapy Updated Plan of Care    Name: Brianna Cantu  Clinic Number: [id number]    Therapy Diagnosis:   Encounter Diagnosis   Name Primary?    Pelvic floor dysfunction Yes       Physician: Seema Steinberg NP    Physician Orders: Physical Therapy Evaluate and Treat  Medical Diagnosis from Referral: low back pain with lower extremity radiculopathy  Evaluation Date: 3/24/23  Authorization Period Expiration: 1/24/24  Plan of Care Expiration: 4/26/2023 to 6/24/23  Visit # / Visits authorized: 8/20   FOTO: 2/3  on 4/11/23      Time In:  1100  Time Out: 1200  Total Billable Time: 60 minutes    Precautions: standard    Subjective   Pt reports that her symptoms are longer down into her foot. She states that she now only has back pain into her foot. Pt states that she is doing well and nearing her baseline.      Pain: 4/10   Location: right low back, buttocks, posterior knee, and gastroc  Description: Burning, Throbbing, and Tingling  Aggravating Factors: Rolling over in bed, sit to stand transfer  Easing Factors: Exercise    Pts goals: Pt would like to return to walking for exercise with no increase in low back pain and right lower extremity pain.    Objective     Posture: forward head / rounded shoulders  Palpation: tenderness to palpation at bilateral greater trochanter, posterior hip (right > left)  Sensation: NT  Deep tendon reflexes: NT     Lumbar Active range of motion  Pain/dysfunction with movement:   Flexion 75 No sx's   Extension 50     Right side bending 40     Left side bending 50     Right rotation 25     Left rotation 50             Right LE     Left LE        Iliopsoas:  L2 4/5 Iliopsoas: L2 4/5    Quadriceps:  L3 - femoral nerve 4/5 Quadriceps: L3 - femoral nerve 4/5    Hip adduction:  L3 - obterator 4/5 Hip adduction: L3 - obterator 4/5    Hamstrings:  S2 4/5 Hamstrings: S2 3/5    Ankle DF/EV:  L4 4/5 Ankle DF/EV: L4 4/5    GT Ext:  L5 NT GT Ext L5  "NT    Hip Abduction:  L5-S1 - Superior gluteal nerve 3/5  Hip Abduction: L5-S1 - Superior gluteal nerve 2/5    Hip extension:  L5-S2 - Inferior gluteal nerve 2/5 Hip extension: L5-S2 - Inferior gluteal nerve 3/5    Ankle PF:   S1 - tibial nerve NT Ankle PF S1 - tibial nerve NT            Slump R: negative   Slump L: negative     SLR R: + for right lateral calf and SIJ pain  SLR L: + for right hip pain       CMS Impairment/Limitation/Restriction for FOTO Survey     Therapist reviewed FOTO scores for Brianna Cantu on 3/24/2023.   FOTO documents entered into [employer] - see Media section.     Limitation Score: 72%  Predicted Goal: 60%     Category: Mobility      TREATMENT     Therapeutic Exercises were provided for 0 minutes to improve strength and AROM including:  Piriformis stretching 20" x 3    Neuromuscular re education for 30 minutes for improved balance and proprioception including:   Prone femoral nerve glide x20   TA activation with marches in hook lying x20  Lacrosse ball myofascial release x3 mins  Supine sciatic nerve glide on bolster x20 bilateral   Seated sciatic nerve glide 20x R only  Prone on elbows x 3mins  SLR with pilates ring 2x10  BKFO RTB 2x10   LTR LE's on RPB 3 mins  DKTC LE's on RPB 3 mins   PPT 3" hold 20x  +Updated POC    Therapeutic Activities were provided for 30 minutes to improve tolerance to functional activities including:      Backwards walking on TM ambulation 6 min 0.8 mph  Prone hip extension x20        Sit to stand 2x10  Pallof press red band 20x  Standing back extension, 10x10"  Bridges 2x10        Home Exercises and Patient Education Provided:    Education provided:   - Importance of HEP performance to reduce pain.     Written Home Exercises Provided: Patient was instructed to cont previous HEP  Exercises were reviewed and Brianna was able to demonstrate them prior to the end of the session.  Brianna demonstrated good understanding of the education provided.       Assessment "   Pt presents to physical therapy treatment with decreased active range of motion, decreased strength, poor posture, and increased pain due to lumbar spine impairments. These factors are negatively impacting the patient's ability to complete their activities of daily living and work duties. Pt is progressing towards their short and long term goals as evidenced by decreased pain, improved strength and range of motion, and improved FOTO score. These goals remain appropriate for the plan of care. Pt would benefit from continued skilled physical therapy treatment to address these deficits so that they may return to their prior level of function with improved quality of life.      Pt prognosis is Good.   Pt will benefit from skilled outpatient Physical Therapy to address the deficits stated above and in the chart below, provide pt/family education, and to maximize pt's level of independence.     Plan of care discussed with patient: Yes  Pt's spiritual, cultural and educational needs considered and pt agreeable to plan of care and goals as stated below:     Anticipated Barriers for therapy: None    GOALS:  Short Term Goals (4 Weeks):  1. Patient will be compliant with home exercise program to supplement therapy in promoting functional mobility. (met)    2. Patient will perform bed mobility with good control to demonstrate improved core strength. ( met)    3. Patient will report no pain during thoracolumbar active range of motion to promote functional mobility.  (met)    4. Patient will improve impaired lower extremity manual muscle tests  to >/=4/5 to improve strength for functional tasks. (met)        Long Term Goals (6 Weeks):   1. Patient will improve FOTO score to </= 60% limited to decrease perceived limitation with maintaining/changing body position. (progressing, not met)    2. Patient will perform supine to sit and sit to stand transfers with good control to demonstrate improved core strength.  (progressing, not  met)    3. Patient will improve impaired lower extremity manual muscle tests to >/=4+/5 to improve strength for functional tasks.  (progressing, not met)    4. Patient will tolerate standing/walking for 30 minutes with no increase in low back pain to return to PLOF.  (progressing, not met)          Plan     Plan of care Certification: 4/26/2023 to 6/26/23    Focus on core/trunk strengthening/stabilization     Outpatient Physical Therapy 2 times weekly for 12 weeks to include the following interventions: Therapeutic Exercises, Manual Therapeutic Technique, Neuromuscular Re Education, Therapeutic Activities. Modalities, Kinesiotape prn, and Functional Dry Needling as needed.      Radha Wiggins, PT

## 2023-04-27 ENCOUNTER — TELEPHONE (OUTPATIENT)
Dept: NEUROLOGY | Facility: CLINIC | Age: 63
End: 2023-04-27
Payer: OTHER GOVERNMENT

## 2023-04-27 ENCOUNTER — TELEPHONE (OUTPATIENT)
Dept: INTERNAL MEDICINE | Facility: CLINIC | Age: 63
End: 2023-04-27
Payer: OTHER GOVERNMENT

## 2023-04-27 NOTE — TELEPHONE ENCOUNTER
"Called pt to schedule appt with Neuro, provided pt phone number to dept.  Pt also stated she is very upset about being scheduled incorrectly on todays date with Back and spine. She arrived to appt and was told that the provider she was seeing is not able to see her today because that is not her specialty. Pt stated "Acacia" was who scheduled the appt for her. Pt stated this is her life, her health and she doesn't appreciate being toss around to different doctors that will not be able to help her. Advise pt I will rely the message to her PCP  "

## 2023-04-27 NOTE — TELEPHONE ENCOUNTER
----- Message from Jamie Gaines sent at 4/27/2023  2:58 PM CDT -----  Contact: Patient  Type:  Patient Call          Who Called: patient         Does the patient know what this is regarding?: Requesting a call back to have a appt scheduled ; please advise           Would the patient rather a call back or a response via [facility]? Call           Best Call Back Number:[phone]             Additional Information:

## 2023-04-27 NOTE — TELEPHONE ENCOUNTER
----- Message from Rossana Mcclendon MD sent at 4/27/2023  2:42 PM CDT -----  Regarding: RE: referral  Contact: [phone]  I placed referral already. Can we help her schedule? Thanks   ----- Message -----  From: Pedro Arevalo MA  Sent: 4/27/2023   2:41 PM CDT  To: Rossana Mcclendon MD  Subject: FW: referral                                     Pt requesting referral to neuro for lumbar radiculopathy.  ----- Message -----  From: Lucy Knott  Sent: 4/27/2023   2:11 PM CDT  To: [facility] Staff  Subject: referral                                         Type: Patient Requesting Referral    Does the patient already have the specialty appointment scheduled?:No  IReferral to What Specialty:Neurology  Reason for Referral: Lumbar radiculopathy     Would the patient rather a call back or a response via [facility]? Call   Best Call Back Number:[phone]  Additional Information:

## 2023-04-27 NOTE — TELEPHONE ENCOUNTER
"Pt stated she called the scheduling dept to schedule her appointment for Back and spine with diagnosis for "lumbar radiculopathy". Pt arrived and was told she couldn't be seen. That the wasn't their specialty   Pt now has a referral for Neuro with the same diagnosis   "Lumbar radiculopathy"  Back and spine is recommending pt sees someone in gastro for "lumbar radiculopathy"   That is all the information that was given by the patient. Pt wants to make sure she is being sent to correct department for her diagnosis.  Patient is requesting a call from her PCP.  "

## 2023-04-28 ENCOUNTER — TELEPHONE (OUTPATIENT)
Dept: NEUROLOGY | Facility: CLINIC | Age: 63
End: 2023-04-28
Payer: OTHER GOVERNMENT

## 2023-04-28 NOTE — TELEPHONE ENCOUNTER
----- Message from Leora Figueroa sent at 4/28/2023  3:17 PM CDT -----  Regarding: Appt Access  Contact: pt [phone]  Pt calling for the 3rd time to reschedule f/u. Pls call

## 2023-05-03 ENCOUNTER — CLINICAL SUPPORT (OUTPATIENT)
Dept: REHABILITATION | Facility: OTHER | Age: 63
End: 2023-05-03
Payer: OTHER GOVERNMENT

## 2023-05-03 DIAGNOSIS — M62.89 PELVIC FLOOR DYSFUNCTION: Primary | ICD-10-CM

## 2023-05-03 PROCEDURE — 97112 NEUROMUSCULAR REEDUCATION: CPT | Mod: PN

## 2023-05-03 PROCEDURE — 97530 THERAPEUTIC ACTIVITIES: CPT | Mod: PN

## 2023-05-03 NOTE — PROGRESS NOTES
[facility]  Physical Therapy Treatment Note    Name: Brianna Cantu  Clinic Number: [id number]    Therapy Diagnosis:   Encounter Diagnosis   Name Primary?    Pelvic floor dysfunction Yes       Physician: Seema Steinberg NP    Physician Orders: Physical Therapy Evaluate and Treat  Medical Diagnosis from Referral: low back pain with lower extremity radiculopathy  Evaluation Date: 3/24/23  Authorization Period Expiration: 1/24/24  Plan of Care Expiration: 5/3/2023 to 6/24/23  Visit # / Visits authorized: 11/20   FOTO: 2/3  on 4/11/23      Time In:  1100  Time Out: 1200  Total Billable Time: 60 minutes    Precautions: standard    Subjective   Pt states that she is doing well today. Pt reports that she would like a print out of her home exercise program.      Pain: 4/10   Location: right low back, buttocks, posterior knee, and gastroc  Description: Burning, Throbbing, and Tingling  Aggravating Factors: Rolling over in bed, sit to stand transfer  Easing Factors: Exercise    Pts goals: Pt would like to return to walking for exercise with no increase in low back pain and right lower extremity pain.    Objective     Posture: forward head / rounded shoulders  Palpation: tenderness to palpation at bilateral greater trochanter, posterior hip (right > left)  Sensation: NT  Deep tendon reflexes: NT     Lumbar Active range of motion  Pain/dysfunction with movement:   Flexion 75 No sx's   Extension 50     Right side bending 40     Left side bending 50     Right rotation 25     Left rotation 50             Right LE     Left LE        Iliopsoas:  L2 4/5 Iliopsoas: L2 4/5    Quadriceps:  L3 - femoral nerve 4/5 Quadriceps: L3 - femoral nerve 4/5    Hip adduction:  L3 - obterator 4/5 Hip adduction: L3 - obterator 4/5    Hamstrings:  S2 4/5 Hamstrings: S2 3/5    Ankle DF/EV:  L4 4/5 Ankle DF/EV: L4 4/5    GT Ext:  L5 NT GT Ext L5 NT    Hip Abduction:  L5-S1 - Superior gluteal nerve 3/5  Hip  "Abduction: L5-S1 - Superior gluteal nerve 2/5    Hip extension:  L5-S2 - Inferior gluteal nerve 2/5 Hip extension: L5-S2 - Inferior gluteal nerve 3/5    Ankle PF:   S1 - tibial nerve NT Ankle PF S1 - tibial nerve NT            Slump R: negative   Slump L: negative     SLR R: + for right lateral calf and SIJ pain  SLR L: + for right hip pain       CMS Impairment/Limitation/Restriction for FOTO Survey     Therapist reviewed FOTO scores for Brianna Cantu on 3/24/2023.   FOTO documents entered into [employer] - see Media section.     Limitation Score: 72%  Predicted Goal: 60%     Category: Mobility      TREATMENT     Therapeutic Exercises were provided for 0 minutes to improve strength and AROM including:  Piriformis stretching 20" x 3    Neuromuscular re education for 30 minutes for improved balance and proprioception including:   Prone femoral nerve glide x20   TA activation with marches in hook lying x20  Lacrosse ball myofascial release x3 mins  Supine sciatic nerve glide on bolster x20 bilateral   Seated sciatic nerve glide 20x R only  Prone on elbows x 3mins  SLR with pilates ring 2x10  BKFO RTB 2x10   LTR LE's on RPB 3 mins  DKTC LE's on RPB 3 mins   PPT 3" hold 20x      Therapeutic Activities were provided for 30 minutes to improve tolerance to functional activities including:      Backwards walking on TM ambulation 6 min 0.8 mph  Prone hip extension x20        Sit to stand 2x10  Pallof press red band 20x  Standing back extension, 10x10"  Bridges 2x10        Home Exercises and Patient Education Provided:    Education provided:   - Importance of HEP performance to reduce pain.     Written Home Exercises Provided: Patient was instructed to cont previous HEP  Exercises were reviewed and Brianna was able to demonstrate them prior to the end of the session.  Brianna demonstrated good understanding of the education provided.       Assessment   Discharge patient from orthopedic physical therapy today. Pt to continue " with pelvic floor therapy. Patient has partially/fully met their short and long term goals. PT educated pt on progression of home exercise program and pt demonstrated understanding. All questions/concerns were answered/addressed by PT.      Pt prognosis is Good.   Pt will benefit from skilled outpatient Physical Therapy to address the deficits stated above and in the chart below, provide pt/family education, and to maximize pt's level of independence.     Plan of care discussed with patient: Yes  Pt's spiritual, cultural and educational needs considered and pt agreeable to plan of care and goals as stated below:     Anticipated Barriers for therapy: None    GOALS:  Short Term Goals (4 Weeks):  1. Patient will be compliant with home exercise program to supplement therapy in promoting functional mobility. (met)    2. Patient will perform bed mobility with good control to demonstrate improved core strength. ( met)    3. Patient will report no pain during thoracolumbar active range of motion to promote functional mobility.  (met)    4. Patient will improve impaired lower extremity manual muscle tests  to >/=4/5 to improve strength for functional tasks. (met)        Long Term Goals (6 Weeks):   1. Patient will improve FOTO score to </= 60% limited to decrease perceived limitation with maintaining/changing body position. (met)    2. Patient will perform supine to sit and sit to stand transfers with good control to demonstrate improved core strength.  ( met)    3. Patient will improve impaired lower extremity manual muscle tests to >/=4+/5 to improve strength for functional tasks.  (met)    4. Patient will tolerate standing/walking for 30 minutes with no increase in low back pain to return to PLOF.  (met)          Plan     Plan of care Certification: 5/3/2023 to 6/26/23    Discharge patient from orthopedic physical therapy today. Pt to continue with pelvic floor therapy.         Radha Wiggins, PT

## 2023-05-04 DIAGNOSIS — I10 BENIGN ESSENTIAL HYPERTENSION: ICD-10-CM

## 2023-05-05 NOTE — PROGRESS NOTES
[facility]  Physical Therapy Treatment Note    Name: Brianna Cantu  Clinic Number: [id number]    Therapy Diagnosis:   Encounter Diagnosis   Name Primary?    Pelvic floor dysfunction Yes       Physician: Seema Steinberg NP    Physician Orders: Physical Therapy Evaluate and Treat  Medical Diagnosis from Referral: low back pain with lower extremity radiculopathy  Evaluation Date: 3/24/23  Authorization Period Expiration: 12/31/23  Plan of Care Expiration: 6/24/23  Visit # / Visits authorized: 11/20   FOTO: 2/3  on 4/11/23      Time In:  13:50  Time Out: 15:00  Total Billable Time: 68 minutes    Precautions: standard    Subjective     Brianna reports reduced nocturia. She had short-term improvements in urinary and bowel symptoms after last session with me, but everything returned. Her back and leg are feeling OK, still sore sometimes. She is having trouble doing exercises at home due to depression. She forgot to perform the knack or to get a toilet stool.    Pain: 4/10   Location: right low back, buttocks, posterior knee    Objective     No objective measurements obtained today     TREATMENT     Therapeutic activities to improve functional performance for 45 minutes -  [x] Assessment of abdominal wall - no tenderness, mild increased tension  [x] Assessment of pelvic floor - no tenderness or increased tension with external palpation of R levator ani and coccygeus  [x] Instruction on urge suppression techniques  [x] Education on techniques to reduce post-void dribble  [x] Guided body scanning meditation - performed every few exercises to ensure appropriate pelvic floor relaxation  [x] Instruction on the Knack for reduction of stress urinary incontinence - Pt able to return-demonstrate after re-instruction  [x] Instruction on log-roll technique to reduce strain on back and abdominal wall with transfers - pt able to demonstrate correctly with verbal and tactile cueing  [x] Education on  bowel movement optimization - positioning, toilet stool, breath coordination  [x] Seated figure-4 stretch + diaphragmatic breathing and pelvic drop (R&L), 1 minute  [x] PFM squeeze + sit to stand on exhalation, 2x8  [x] PFM squeeze + 16# box lift on exhalation, x10  [x] HEP building/HEP review    Neuromuscular re-education activities to develop Coordination and Control for 23 minutes -  [x] Supine PFM squeeze on exhale, x5 - verbal and tactile cues to TrA provided for co-contraction  [x] PFM squeeze + hooklying hip adduction isometric with ball, x20  [x] PFM squeeze + bridging with belt around knees, 2x12 - increased back pain at the end of the second set  [x] Seated PFM squeeze on exhale, x8      Home Exercises and Patient Education Provided:    Education provided: pt prognosis, PT plan of care, pelvic floor anatomy & function, relationship between TrA & PFM, relationship between hips & PFM, urge suppression, etiology of urinary urgency, etiology of stress urinary incontinence, the knack for management of stress urinary incontinence, conservative management of constipation (water, fiber, exercise), proper body mechanics for bowel movement, log-roll transfer technique, and abdominal wall anatomy  - Importance of HEP performance to reduce pain.     Written Home Exercises Provided: Patient was instructed to cont previous HEP  Exercises were reviewed and Brianna was able to demonstrate them prior to the end of the session.  Brianna demonstrated good understanding of the education provided.       Assessment     Pt tolerated treatment session well, with good understanding of education provided and improving pelvic floor coordination within session. Assessment of the abdominal wall and pelvic floor today revealed no tension like was present during the original assessment, so treatment focused on pelvic floor coordination and bowel/bladder habit training today. Pt requires moderate verbal/manual cues to synch breath,  TrA contraction, PFM contraction, and movement during exercises, but she improved with practice. Pt's functional mobility and ability to perform ADLs still limited by pelvic floor dysfunction. She requires skilled therapy for continued patient education and coordination retraining.     Pt prognosis is Good.   Pt will benefit from skilled outpatient Physical Therapy to address the deficits stated above and in the chart below, provide pt/family education, and to maximize pt's level of independence.     Plan of care discussed with patient: Yes  Pt's spiritual, cultural and educational needs considered and pt agreeable to plan of care and goals as stated below:   Anticipated Barriers for therapy: depression      Short Term Goals (4 Weeks):  1. Patient will be compliant with home exercise program to supplement therapy in promoting functional mobility. (met)    2. Patient will perform bed mobility with good control to demonstrate improved core strength. ( met)    3. Patient will report no pain during thoracolumbar active range of motion to promote functional mobility.  (met)    4. Patient will improve impaired lower extremity manual muscle tests  to >/=4/5 to improve strength for functional tasks. (met)    5. Pt to demonstrate the knack independently to reduce incidence of stress urinary incontinence (not met)  6. Pt to report 50% improvement in urine/fecal leakage (not met)  7. Pt to verbalize urge suppression strategies for improved control over urgency and urge urinary incontinence (not met)      Long Term Goals (6 Weeks):   1. Patient will improve FOTO score to </= 60% limited to decrease perceived limitation with maintaining/changing body position. (met)    2. Patient will perform supine to sit and sit to stand transfers with good control to demonstrate improved core strength.  ( met)    3. Patient will improve impaired lower extremity manual muscle tests to >/=4+/5 to improve strength for functional tasks.  (met)     4. Patient will tolerate standing/walking for 30 minutes with no increase in low back pain to return to PLOF.  (met)    5. Pt to deny urinary/fecal incontinence to demonstrate improved pelvic floor coordination (not met)  6. Pt to report nocturia of no more than once times per night for improved sleep quality (not met)  7. Pt to report urinary frequency of no more than every 2 hours to demonstrate improved bladder control (not met)       Plan     Plan of care Certification: 5/10/2023 to 6/26/23    Continue per Plan of Care, focusing on pelvic floor dysfunction as she has completed treatment with orthopedic physical therapy      Mee Manuel, PT

## 2023-05-07 ENCOUNTER — PATIENT MESSAGE (OUTPATIENT)
Dept: NEUROLOGY | Facility: CLINIC | Age: 63
End: 2023-05-07
Payer: OTHER GOVERNMENT

## 2023-05-08 ENCOUNTER — OFFICE VISIT (OUTPATIENT)
Dept: OPTOMETRY | Facility: CLINIC | Age: 63
End: 2023-05-08
Payer: OTHER GOVERNMENT

## 2023-05-08 ENCOUNTER — TELEPHONE (OUTPATIENT)
Dept: INTERNAL MEDICINE | Facility: CLINIC | Age: 63
End: 2023-05-08
Payer: OTHER GOVERNMENT

## 2023-05-08 DIAGNOSIS — Z12.31 SCREENING MAMMOGRAM FOR HIGH-RISK PATIENT: Primary | ICD-10-CM

## 2023-05-08 DIAGNOSIS — H04.123 DRY EYE SYNDROME, BILATERAL: ICD-10-CM

## 2023-05-08 DIAGNOSIS — H43.393 VITREOUS FLOATERS OF BOTH EYES: Primary | ICD-10-CM

## 2023-05-08 DIAGNOSIS — H52.7 REFRACTIVE ERROR: ICD-10-CM

## 2023-05-08 DIAGNOSIS — H25.13 NUCLEAR SCLEROSIS OF BOTH EYES: ICD-10-CM

## 2023-05-08 PROCEDURE — 99999 PR PBB SHADOW E&M-EST. PATIENT-LVL III: CPT | Mod: PBBFAC,,, | Performed by: OPTOMETRIST

## 2023-05-08 PROCEDURE — 92004 PR EYE EXAM, NEW PATIENT,COMPREHESV: ICD-10-PCS | Mod: S$PBB,,, | Performed by: OPTOMETRIST

## 2023-05-08 PROCEDURE — 92015 PR REFRACTION: ICD-10-PCS | Mod: ,,, | Performed by: OPTOMETRIST

## 2023-05-08 PROCEDURE — 99213 OFFICE O/P EST LOW 20 MIN: CPT | Mod: PBBFAC,PO | Performed by: OPTOMETRIST

## 2023-05-08 PROCEDURE — 92004 COMPRE OPH EXAM NEW PT 1/>: CPT | Mod: S$PBB,,, | Performed by: OPTOMETRIST

## 2023-05-08 PROCEDURE — 99999 PR PBB SHADOW E&M-EST. PATIENT-LVL III: ICD-10-PCS | Mod: PBBFAC,,, | Performed by: OPTOMETRIST

## 2023-05-08 PROCEDURE — 92015 DETERMINE REFRACTIVE STATE: CPT | Mod: ,,, | Performed by: OPTOMETRIST

## 2023-05-08 NOTE — TELEPHONE ENCOUNTER
----- Message from Cuate Lei sent at 5/8/2023 10:39 AM CDT -----  Caller is requesting to schedule their annual screening mammogram appointment. Order is not listed in Epic.  Please enter order and contact patient to schedule.

## 2023-05-08 NOTE — PROGRESS NOTES
Subjective:       Patient ID: Brianna Cantu is a 62 y.o. female      Chief Complaint   Patient presents with    Concerns About Ocular Health     History of Present Illness  Dls: 2 yrs     61 y/o female presents today with c/o floaters ou off/on   Pt c/o red ou. Pt states no changes in vision.   Pt has pal's rarely wears them.     + ou tearing  + ou itching  + ou  burning  No pain  +  ha's  + ou floaters  No flashes    Eye meds  Systane OU QD        Assessment/Plan:     1. Vitreous floaters of both eyes  Discussed causes of flashes and floaters with the patient and described the warnings of a possible retinal detachment. Advised patient to call if there is an increase in flashes or floaters.    2. Nuclear sclerosis of both eyes  Educated pt on presence of cataracts and effects on vision. No surgery at this time. Recheck in one year, sooner PRN.    3. Dry eye syndrome, bilateral  Pt sleeps with CPAP. Recommend kaci qhs. Can use  AT throughout day PRN.    4. Refractive error  Educated patient on refractive error and discussed lens options. Dispensed updated spectacle Rx. Educated about adaptation period to new specs.    Eyeglass Final Rx       Eyeglass Final Rx         Sphere Cylinder Axis Add    Right -0.25 +0.50 015 +2.75    Left -0.50 +0.75 175 +2.75      Expiration Date: 5/8/2024                      Follow up in about 1 year (around 5/8/2024).

## 2023-05-10 ENCOUNTER — CLINICAL SUPPORT (OUTPATIENT)
Dept: REHABILITATION | Facility: OTHER | Age: 63
End: 2023-05-10
Payer: OTHER GOVERNMENT

## 2023-05-10 DIAGNOSIS — M62.89 PELVIC FLOOR DYSFUNCTION: Primary | ICD-10-CM

## 2023-05-10 PROCEDURE — 97112 NEUROMUSCULAR REEDUCATION: CPT | Mod: PN | Performed by: PHYSICAL THERAPIST

## 2023-05-10 PROCEDURE — 97530 THERAPEUTIC ACTIVITIES: CPT | Mod: PN | Performed by: PHYSICAL THERAPIST

## 2023-05-10 NOTE — PATIENT INSTRUCTIONS
Sit to stand + kegel  - Inhale to prepare, relax the belly and pelvic floor  - As you exhale, gently squeeze the pelvic floor  - Hold the pelvic floor in as you lift out of the chair (use arms if needed)  *Don't hold your breath  *Do this when you get up from the toilet and getting up from a chair

## 2023-05-11 ENCOUNTER — TELEPHONE (OUTPATIENT)
Dept: SMOKING CESSATION | Facility: CLINIC | Age: 63
End: 2023-05-11
Payer: OTHER GOVERNMENT

## 2023-05-24 ENCOUNTER — TELEPHONE (OUTPATIENT)
Dept: INTERNAL MEDICINE | Facility: CLINIC | Age: 63
End: 2023-05-24
Payer: OTHER GOVERNMENT

## 2023-05-24 DIAGNOSIS — R07.9 CHEST PAIN, UNSPECIFIED TYPE: Primary | ICD-10-CM

## 2023-05-25 NOTE — TELEPHONE ENCOUNTER
Called and spoke with pt, states she has been having these symptoms for over the years. Off and on chest pain states she can not tell if its her acid reflux. states she has stress test done in the past and all were negative. Pt is requesting a test to detect if she has clogged arteries

## 2023-05-29 ENCOUNTER — HOSPITAL ENCOUNTER (OUTPATIENT)
Dept: RADIOLOGY | Facility: OTHER | Age: 63
Discharge: HOME | End: 2023-05-29
Attending: INTERNAL MEDICINE
Payer: OTHER GOVERNMENT

## 2023-05-29 DIAGNOSIS — Z12.31 SCREENING MAMMOGRAM FOR HIGH-RISK PATIENT: ICD-10-CM

## 2023-05-29 PROCEDURE — 77063 MAMMO DIGITAL SCREENING BILAT WITH TOMO: ICD-10-PCS | Mod: 26,,, | Performed by: RADIOLOGY

## 2023-05-29 PROCEDURE — 77063 BREAST TOMOSYNTHESIS BI: CPT | Mod: 26,,, | Performed by: RADIOLOGY

## 2023-05-29 PROCEDURE — 77067 SCR MAMMO BI INCL CAD: CPT | Mod: 26,,, | Performed by: RADIOLOGY

## 2023-05-29 PROCEDURE — 77067 MAMMO DIGITAL SCREENING BILAT WITH TOMO: ICD-10-PCS | Mod: 26,,, | Performed by: RADIOLOGY

## 2023-05-29 PROCEDURE — 77067 SCR MAMMO BI INCL CAD: CPT | Mod: TC

## 2023-05-31 ENCOUNTER — CLINICAL SUPPORT (OUTPATIENT)
Dept: SMOKING CESSATION | Facility: CLINIC | Age: 63
End: 2023-05-31
Payer: COMMERCIAL

## 2023-05-31 DIAGNOSIS — F17.200 NICOTINE DEPENDENCE, UNCOMPLICATED: Primary | ICD-10-CM

## 2023-05-31 PROCEDURE — 99999 PR PBB SHADOW E&M-EST. PATIENT-LVL I: CPT | Mod: PBBFAC,,,

## 2023-05-31 PROCEDURE — 99407 BEHAV CHNG SMOKING > 10 MIN: CPT | Mod: S$GLB,,, | Performed by: GENERAL PRACTICE

## 2023-05-31 PROCEDURE — 99999 PR PBB SHADOW E&M-EST. PATIENT-LVL I: ICD-10-PCS | Mod: PBBFAC,,,

## 2023-05-31 PROCEDURE — 99407 PR TOBACCO USE CESSATION INTENSIVE >10 MINUTES: ICD-10-PCS | Mod: S$GLB,,, | Performed by: GENERAL PRACTICE

## 2023-05-31 NOTE — PROGRESS NOTES
Spoke with patient today in regard to smoking cessation progress for 12 month follow up. She states she is not tobacco free. Patient did not want to schedule an appointment to return to the program at this time. Informed patient of benefit period, future follow ups and contact information if any further help is needed. Will resolve smart form for 3/6/12 month follow up for Quit # 1.

## 2023-06-08 ENCOUNTER — PATIENT MESSAGE (OUTPATIENT)
Dept: INTERNAL MEDICINE | Facility: CLINIC | Age: 63
End: 2023-06-08
Payer: OTHER GOVERNMENT

## 2023-06-22 ENCOUNTER — OFFICE VISIT (OUTPATIENT)
Dept: CARDIOLOGY | Facility: CLINIC | Age: 63
End: 2023-06-22
Payer: OTHER GOVERNMENT

## 2023-06-22 VITALS
WEIGHT: 245.88 LBS | OXYGEN SATURATION: 98 % | BODY MASS INDEX: 38.51 KG/M2 | HEART RATE: 87 BPM | DIASTOLIC BLOOD PRESSURE: 76 MMHG | SYSTOLIC BLOOD PRESSURE: 122 MMHG

## 2023-06-22 DIAGNOSIS — E66.01 SEVERE OBESITY: ICD-10-CM

## 2023-06-22 DIAGNOSIS — R07.9 CHEST PAIN, UNSPECIFIED TYPE: ICD-10-CM

## 2023-06-22 DIAGNOSIS — E78.00 HYPERCHOLESTEROLEMIA: ICD-10-CM

## 2023-06-22 DIAGNOSIS — I10 PRIMARY HYPERTENSION: Primary | ICD-10-CM

## 2023-06-22 PROCEDURE — 93010 EKG 12-LEAD: ICD-10-PCS | Mod: ,,, | Performed by: INTERNAL MEDICINE

## 2023-06-22 PROCEDURE — 99999 PR PBB SHADOW E&M-EST. PATIENT-LVL IV: CPT | Mod: PBBFAC,,, | Performed by: INTERNAL MEDICINE

## 2023-06-22 PROCEDURE — 99999 PR PBB SHADOW E&M-EST. PATIENT-LVL IV: ICD-10-PCS | Mod: PBBFAC,,, | Performed by: INTERNAL MEDICINE

## 2023-06-22 PROCEDURE — 99204 PR OFFICE/OUTPT VISIT, NEW, LEVL IV, 45-59 MIN: ICD-10-PCS | Mod: 25,S$PBB,, | Performed by: INTERNAL MEDICINE

## 2023-06-22 PROCEDURE — 93010 ELECTROCARDIOGRAM REPORT: CPT | Mod: ,,, | Performed by: INTERNAL MEDICINE

## 2023-06-22 PROCEDURE — 99214 OFFICE O/P EST MOD 30 MIN: CPT | Mod: PBBFAC | Performed by: INTERNAL MEDICINE

## 2023-06-22 PROCEDURE — 99204 OFFICE O/P NEW MOD 45 MIN: CPT | Mod: 25,S$PBB,, | Performed by: INTERNAL MEDICINE

## 2023-06-22 PROCEDURE — 93005 ELECTROCARDIOGRAM TRACING: CPT

## 2023-06-22 RX ORDER — ESCITALOPRAM OXALATE 20 MG/1
20 TABLET ORAL DAILY
COMMUNITY
Start: 2023-05-16 | End: 2023-08-24 | Stop reason: SDUPTHER

## 2023-06-29 NOTE — TELEPHONE ENCOUNTER
----- Message from Cate Drake sent at 7/19/2019  3:33 PM CDT -----  Contact: DOM VARNER [[id number]]  Name of Who is Calling:DOM VARNER [[id number]]      What is the request in detail: Pt requesting results of recent EKG. Contact at your earliest convenience.      Can the clinic reply by [facility]:     [facility] [phone]       Ivermectin Counseling:  Patient instructed to take medication on an empty stomach with a full glass of water.  Patient informed of potential adverse effects including but not limited to nausea, diarrhea, dizziness, itching, and swelling of the extremities or lymph nodes.  The patient verbalized understanding of the proper use and possible adverse effects of ivermectin.  All of the patient's questions and concerns were addressed.

## 2023-07-25 NOTE — PROGRESS NOTES
[facility]  Operative Report  2023     Belem Mueller Patient Status:  Hospital Outpatient Surgery    2/10/1970 MRN [id number]   Location [address] [address] Attending Manan Bates MD   Hosp Day # 0 PCP Dick Altamirano MD     Indication: Ulysses Gamble is a 48year old female with radiculitis    Preoperative Diagnosis:  Lumbar radiculitis [M54.16]    Postoperative Diagnosis: Same as above. Procedure performed: TRANSFORAMINAL LUMBAR EPIDURAL STEROID INJECTION right L3/4 and L4/5 with local     Anesthesia: Local .    EBL: Less than 1 ml. Procedure Description:  After reviewing the patient's history and performing a focused physical examination, the diagnosis was confirmed and contraindications such as infection and coagulopathy were ruled out. Following review of potential side effects and complications, including but not necessarily limited to infection, allergic reaction, local tissue breakdown, nerve injury, and paresis, the patient indicated they understood and agreed to proceed. After obtaining the informed consent, the patient was brought to the procedure room and monitored. In the prone position, following sterile prep and drape of the lumbar region,  the  L3 neural foramen was identified under fluoroscopy. The skin and subcutaneous tissue was anesthetized via 25-gauge 1.5\" needle with approximately 2 cc of 1% lidocaine. A 22-gauge 5\" Quincke spinal needle was introduced toward the inferior aspect of the junction between the transverse process and pedicle of the  L3 level atraumatically under fluoroscopic guidance. The needle was advanced into the anterior epidural space at this level. The needle position was confirmed under AP and lateral fluoroscopic view. Following negative aspiration for CSF and blood, approximately 1 cc of Omnipaque 240 was injected. An excellent contrast spread along the epidural space and the nerve root was obtained.   At this point, 1cc [facility]    Chief Complaint  Chief Complaint   Patient presents with    Chest Pain       HPI:    Presents for evaluation of chest discomfort.  Has been present for months if not years.  Prior workup in 2019 included a negative stress test.  Describes a left inframammary discomfort.  Progresses to tightness with some dyspnea.  Not related to activities or positions.  Last a few minutes.  No specific aggravating or alleviating factors have been noted.  No associated nausea, vomiting, or diaphoresis.  Not very active.  But with what she does, no exertional dyspnea or chest discomfort.    Medications  Current Outpatient Medications   Medication Sig Dispense Refill    (Magic mouthwash) 1:1:1 diphenhydramine(Benadryl) 12.5mg/5ml liq, aluminum & magnesium hydroxide-simethicone (Maalox), LIDOcaine viscous 2% Swish and spit 10 mLs every 4 (four) hours as needed (mouth ulcer). for mouth sores 450 mL 0    albuterol (PROVENTIL/VENTOLIN HFA) 90 mcg/actuation inhaler Inhale 2 puffs into the lungs every 6 (six) hours as needed for Wheezing. 18 g 11    amlodipine-valsartan (EXFORGE) 5-160 mg per tablet Take 1 tablet by mouth once daily. 90 tablet 3    atorvastatin (LIPITOR) 40 MG tablet Take 20 mg by mouth every evening.      azelastine (ASTELIN) 137 mcg (0.1 %) nasal spray 2 sprays (274 mcg total) by Nasal route 2 (two) times daily as needed for Rhinitis. Donot snort (because it tastes bad) 30 mL 11    cholecalciferol, vitamin D3, 1,250 mcg (50,000 unit) capsule Take 1 capsule (50,000 Units total) by mouth once a week. 12 capsule 3    ECHINACEA 1X ORAL Take by mouth.      EScitalopram oxalate (LEXAPRO) 20 MG tablet Take 20 mg by mouth Daily.      fluticasone propionate (FLONASE) 50 mcg/actuation nasal spray 1 spray (50 mcg total) by Each Nostril route daily as needed for Allergies. 16 g 0    fluticasone-salmeterol diskus inhaler 100-50 mcg Inhale 1 puff into the lungs 2 (two) times daily.      gabapentin  (NEURONTIN) 100 MG capsule Take 1 capsule (100 mg total) by mouth once daily. 30 capsule 11    gabapentin (NEURONTIN) 300 MG capsule Take 1 capsule (300 mg total) by mouth every evening. 90 capsule 11    LIDOCAINE VISCOUS 2 % solution Take by mouth.      loratadine (CLARITIN) 10 mg tablet Take 10 mg by mouth.      meloxicam (MOBIC) 15 MG tablet 15 mg.      oxybutynin (DITROPAN-XL) 10 MG 24 hr tablet 10 mg.      pantoprazole (PROTONIX) 40 MG tablet Take 1 tablet (40 mg total) by mouth once daily. 180 tablet 3    valACYclovir (VALTREX) 1000 MG tablet Take 2 pills po bid x 2 days prn first signs of outbreak 30 tablet 3     No current facility-administered medications for this visit.        History  Past Medical History:   Diagnosis Date    Allergic rhinitis 4/27/2014    Anxiety 4/27/2014    Asthma     Hx of cervical cancer 4/27/2014    Obesity 4/27/2014    Vitamin D deficiency 4/27/2014     Past Surgical History:   Procedure Laterality Date    COLONOSCOPY      COLONOSCOPY N/A 9/24/2018    Procedure: COLONOSCOPY;  Surgeon: Montrell Gan MD;  Location: [address]);  Service: Endoscopy;  Laterality: N/A;    ESOPHAGOGASTRODUODENOSCOPY      ESOPHAGOGASTRODUODENOSCOPY N/A 9/24/2018    Procedure: EGD (ESOPHAGOGASTRODUODENOSCOPY);  Surgeon: Montrell Gan MD;  Location: [address]);  Service: Endoscopy;  Laterality: N/A;    TOTAL ABDOMINAL HYSTERECTOMY       Social History     Socioeconomic History    Marital status:    Tobacco Use    Smoking status: Every Day     Packs/day: 0.50     Types: Cigarettes    Smokeless tobacco: Never   Substance and Sexual Activity    Alcohol use: Yes     Alcohol/week: 0.0 standard drinks     Comment: occasional    Drug use: No    Sexual activity: Not Currently     Family History   Problem Relation Age of Onset    Hypertension Mother     Allergies Mother     No Known Problems Father     Hypertension Sister     Allergies Sister     Hypertension Sister     Allergies  of normal saline with 5 mg of dexamethasone was injected without complication. The needle was withdrawn with stylet in situ after being flushed with 1 cc PF lidocaine. The  L4 neural foramen was also identified under fluoroscopy. The skin and subcutaneous tissue was anesthetized via 25-gauge 1.5\" needle with approximately 2 cc of 1% lidocaine. A 22-gauge 5\" Quincke spinal needle was introduced toward the inferior aspect of the junction between the transverse process and pedicle of the L4 level atraumatically under fluoroscopic guidance. The needle was advanced into the anterior epidural space at this level. The needle position was confirmed under AP and lateral fluoroscopic view. Following negative aspiration for CSF and blood, approximately 1 cc of Omnipaque 240 was injected. An excellent contrast spread along the epidural space and the nerve root was obtained. At this point, 1cc of normal saline with 5 mg of dexamethasone was injected without complication. The needle was withdrawn with stylet in situ after being flushed with 1 cc PF lidocaine. .  The patient tolerated procedure very well. The patient was observed until discharge criteria met. Discharge instructions were given and patient was released to a responsible adult. Complications: None. Follow up: The patient was followed in the pain clinic as needed basis.         Marvella Holter, MD Sister     No Known Problems Brother     Hypertension Maternal Aunt     No Known Problems Maternal Uncle     No Known Problems Paternal Aunt     No Known Problems Paternal Uncle     No Known Problems Maternal Grandmother     No Known Problems Maternal Grandfather     No Known Problems Paternal Grandmother     No Known Problems Paternal Grandfather     No Known Problems Other     Heart disease Neg Hx         Allergies  Review of patient's allergies indicates:  No Known Allergies    Review of Systems   Review of Systems   Constitutional: Negative for malaise/fatigue, weight gain and weight loss.   Eyes:  Negative for visual disturbance.   Cardiovascular:  Positive for chest pain and leg swelling. Negative for claudication, cyanosis, dyspnea on exertion, irregular heartbeat, near-syncope, orthopnea, palpitations, paroxysmal nocturnal dyspnea and syncope.   Respiratory:  Negative for cough, hemoptysis, shortness of breath, sleep disturbances due to breathing and wheezing.    Hematologic/Lymphatic: Negative for bleeding problem. Does not bruise/bleed easily.   Skin:  Negative for poor wound healing.   Musculoskeletal:  Negative for muscle cramps and myalgias.   Gastrointestinal:  Negative for abdominal pain, anorexia, diarrhea, heartburn, hematemesis, hematochezia, melena, nausea and vomiting.   Genitourinary:  Negative for hematuria and nocturia.   Neurological:  Negative for excessive daytime sleepiness, dizziness, focal weakness, light-headedness and weakness.     Physical Exam  Vitals:    06/22/23 1022   BP: 122/76   Pulse: 87     Wt Readings from Last 1 Encounters:   06/22/23 111.5 kg (245 lb 14.4 oz)     Physical Exam  Vitals and nursing note reviewed.   Constitutional:       General: She is not in acute distress.     Appearance: She is obese. She is not toxic-appearing or diaphoretic.   HENT:      Head: Normocephalic and atraumatic.      Mouth/Throat:      Lips: Pink.      Mouth: Mucous membranes are moist.    Eyes:      General: No scleral icterus.     Conjunctiva/sclera: Conjunctivae normal.   Neck:      Thyroid: No thyromegaly.      Vascular: No carotid bruit, hepatojugular reflux or JVD.      Trachea: Trachea normal.   Cardiovascular:      Rate and Rhythm: Normal rate and regular rhythm. No extrasystoles are present.     Chest Wall: PMI is not displaced.      Pulses:           Carotid pulses are 2+ on the right side and 2+ on the left side.       Radial pulses are 2+ on the right side and 2+ on the left side.        Dorsalis pedis pulses are 2+ on the right side and 2+ on the left side.        Posterior tibial pulses are 2+ on the right side and 2+ on the left side.      Heart sounds: S1 normal and S2 normal. No murmur heard.    No friction rub. No S3 or S4 sounds.   Pulmonary:      Effort: Pulmonary effort is normal. No accessory muscle usage or respiratory distress.      Breath sounds: Normal breath sounds and air entry. No decreased breath sounds, wheezing, rhonchi or rales.   Abdominal:      General: Bowel sounds are normal. There is no distension or abdominal bruit.      Palpations: Abdomen is soft. There is no hepatomegaly, splenomegaly or pulsatile mass.      Tenderness: There is no abdominal tenderness.   Musculoskeletal:         General: No tenderness or deformity.      Right lower leg: No edema.      Left lower leg: No edema.   Skin:     General: Skin is warm and dry.      Capillary Refill: Capillary refill takes less than 2 seconds.      Coloration: Skin is not cyanotic or pale.      Nails: There is no clubbing.   Neurological:      General: No focal deficit present.      Mental Status: She is alert and oriented to person, place, and time.   Psychiatric:         Attention and Perception: Attention normal.         Mood and Affect: Mood normal.         Speech: Speech normal.         Behavior: Behavior normal. Behavior is cooperative.       Labs  No visits with results within 6 Month(s) from this visit.    Latest known visit with results is:   Hospital Outpatient Visit on 07/08/2022   Component Date Value Ref Range Status    Physician Comment 07/08/2022    Final                    Value:Spirometry is normal. Spirometry remains unimproved following bronchodilator. Lung volume determination is normal. DLCO is mildly decreased. However, patient difficulty with testing makes DLCO interpreation less reliable.  Â   Notes:  Â   The failure to demonstrate improvement in spirometry does not preclude a clinical response to a trial of bronchodilators. No recent hemoglobin value available. DLCO interpretation assumes a normal hemoglobin value. The normal DLCO/VA ratio suggests   patient difficulty with testing rather than true impairment in DLCO.      Post FVC 07/08/2022 2.59  2.09 - 3.66 L Final    Post FEV5 07/08/2022 1.91  1.07 - 2.78 L Final    Post FEV1 07/08/2022 2.23  1.63 - 2.85 L Final    Post FEV1 FVC 07/08/2022 86.42  67.45 - 89.47 % Final    Post FEF 25 75 07/08/2022 3.23  0.86 - 3.71 L/s Final    Post PEF 07/08/2022 5.29  3.73 - 8.00 L/s Final    Post  07/08/2022 6.63  sec Final    Pre DLCO 07/08/2022 17.44 (L)  18.11 - 29.57 ml/(min*mmHg) Final    DLCOVA PRE 07/08/2022 5.14  3.14 - 5.90 ml/(min*mmHg*L) Final    VA PRE 07/08/2022 3.39 (L)  5.12 - 5.12 L Final    IVC PRE 07/08/2022 2.22  2.09 - 3.66 L Final    Pre TLC 07/08/2022 4.37  4.29 - 6.26 L Final    VC PRE 07/08/2022 2.57  2.09 - 3.66 L Final    PRE IC 07/08/2022 2.06  -36387.74 - 94090.26 L Final    Pre FRC PL 07/08/2022 2.31  1.99 - 3.64 L Final    Pre ERV 07/08/2022 0.51  -13176.19 - 88920.81 L Final    Pre RV 07/08/2022 1.81  1.43 - 2.59 L Final    RVTLC PRE 07/08/2022 41.27  30.11 - 49.29 % Final    Raw PRE 07/08/2022 3.96 (H)  3.06 - 3.06 cmH2O*s/L Final    sGaw PRE 07/08/2022 0.09 (L)  0.10 - 0.10 1/(cmH2O*s) Final    Pre VTG 07/08/2022 2.39  L Final    Pre FVC 07/08/2022 2.57  2.09 - 3.66 L Final    PRE FEV5 07/08/2022 1.77  1.07 - 2.78 L  Final    Pre FEV1 07/08/2022 2.10  1.63 - 2.85 L Final    Pre FEV1 FVC 07/08/2022 81.81  67.45 - 89.47 % Final    Pre FEF 25 75 07/08/2022 2.47  0.86 - 3.71 L/s Final    Pre PEF 07/08/2022 4.75  3.73 - 8.00 L/s Final    Pre  07/08/2022 6.07  sec Final    FVC Ref 07/08/2022 2.85   Final    FVC LLN 07/08/2022 2.09   Final    FVC Pre Ref 07/08/2022 90.0  % Final    FEV05 REF 07/08/2022 1.93   Final    FEV05 LLN 07/08/2022 1.07   Final    PRE FEV05 REF 07/08/2022 92.1  % Final    FEV1 Ref 07/08/2022 2.25   Final    FEV1 LLN 07/08/2022 1.63   Final    FEV1 Pre Ref 07/08/2022 93.4  % Final    FEV1 FVC Ref 07/08/2022 79   Final    FEV1 FVC LLN 07/08/2022 67   Final    FEV1 FVC Pre Ref 07/08/2022 103.2  % Final    FEF 25 75 Ref 07/08/2022 2.02   Final    FEF 25 75 LLN 07/08/2022 0.86   Final    FEF 25 75 Pre Ref 07/08/2022 122.1  % Final    PEF Ref 07/08/2022 5.87   Final    PEF LLN 07/08/2022 3.73   Final    PEF Pre Ref 07/08/2022 81.1  % Final    FVC Chg 07/08/2022 0.7  % Final    FEV1 Chg 07/08/2022 6.3  % Final    PJO063 Chg 07/08/2022 9.3  % Final    FVC VOL CHG 07/08/2022 0.02   Final    FEV1 VOL CHG 07/08/2022 0.13   Final    TLC Ref 07/08/2022 5.27   Final    TLC LLN 07/08/2022 4.29   Final    TLC ULN 07/08/2022 6.26   Final    TLC Pre Ref 07/08/2022 82.9  % Final    VC Ref 07/08/2022 2.85   Final    VC LLN 07/08/2022 2.09   Final    VC ULN 07/08/2022 3.66   Final    VC Pre Ref 07/08/2022 90.0  % Final    REF IC 07/08/2022 2.26   Final    LLN IC 07/08/2022 -61113.74   Final    ULN IC 07/08/2022 26543.26   Final    PRE REF IC 07/08/2022 91.2  % Final    FRCpleth Ref 07/08/2022 2.82   Final    FRCpleth LLN 07/08/2022 1.99   Final    FRC PLETH ULN 07/08/2022 3.64   Final    FRCpleth PreRef 07/08/2022 82.1  % Final    ERV Ref 07/08/2022 0.81   Final    ERV LLN 07/08/2022 -58792.19   Final    ERV ULN 07/08/2022 41552.81   Final    ERV Pre Ref 07/08/2022 62.8  % Final    RV Ref 07/08/2022 2.01   Final    RV LLN  07/08/2022 1.43   Final    RV ULN 07/08/2022 2.59   Final    RV Pre Ref 07/08/2022 89.8  % Final    RVTLC Ref 07/08/2022 40   Final    RVTLC LLN 07/08/2022 30   Final    RV TLC ULN 07/08/2022 49   Final    RVTLC Pre Ref 07/08/2022 104.0  % Final    Raw Ref 07/08/2022 3.06   Final    Raw Pre Ref 07/08/2022 129.6  % Final    sGaw Ref 07/08/2022 0.10   Final    sGaw Pre Ref 07/08/2022 90.9  % Final    DLCO Single Breath Ref 07/08/2022 23.84   Final    DLCO Single Breath LLN 07/08/2022 18.11   Final    DLCO SINGLEBREATH ULN 07/08/2022 29.57   Final    DLCO Single Breath Pre Ref 07/08/2022 73.1  % Final    DLCOc Single Breath Ref 07/08/2022 23.84   Final    DLCOc Single Breath LLN 07/08/2022 18.11   Final    DLCOC SINGLEBREATH ULN 07/08/2022 29.57   Final    DLCOVA Ref 07/08/2022 4.52   Final    DLCOVA LLN 07/08/2022 3.14   Final    DLCOVA ULN 07/08/2022 5.90   Final    DLCOVA Pre Ref 07/08/2022 113.8  % Final    DLCOc SBVA Ref 07/08/2022 4.52   Final    DLCOc SBVA LLN 07/08/2022 3.14   Final    DLCOCSBVA ULN 07/08/2022 5.90   Final    VA Single Breath Ref 07/08/2022 5.12   Final    VA Single Breath LLN 07/08/2022 5.12   Final    VA SINGLEBREATH ULN 07/08/2022 5.12   Final    VA Single Breath Pre Ref 07/08/2022 66.2  % Final    IVC Single Breath Ref 07/08/2022 2.85   Final    IVC Single Breath LLN 07/08/2022 2.09   Final    IVC SINGLEBREATH ULN 07/08/2022 3.66   Final    IVC Single Breath Pre Ref 07/08/2022 77.9  % Final       Imaging  Mammo Digital Screening Bilat w/ Quang    Result Date: 6/8/2023  Result: Mammo Digital Screening Bilat w/ Quang History: Patient is 62 y.o. and is seen for a screening mammogram. Films Compared: Compared to: 04/28/2022 Mammo Digital Screening Bilat w/ Quang, 07/24/2020 Mammo Digital Screening Bilat w/ Quang, and 05/21/2019 Mammo Digital Screening Bilat w/ Quang Findings:  This procedure was performed using tomosynthesis. Computer-aided detection was utilized in the interpretation of this  examination. The breasts are almost entirely fatty. There is no evidence of suspicious masses, microcalcifications or architectural distortion.      No mammographic evidence of malignancy. BI-RADS Category 1: Negative Recommendation: Routine screening mammogram in 1 year is recommended. Your estimated lifetime risk of breast cancer (to age 85) based on Tyrer-Cuzick risk assessment model is 6.15 %.  According to the American Cancer Society, patients with a lifetime breast cancer risk of 20% or higher might benefit from supplemental screening tests. ??       Assessment  1. Chest pain, unspecified type  - Ambulatory referral/consult to Cardiology  - IN OFFICE EKG 12-LEAD (to Muse)  - Stress Echo Which stress agent will be used? Treadmill Exercise; Color Flow Doppler? No; Future    2. Primary hypertension  Controlled    3. Hypercholesterolemia  Controlled    4. Severe obesity  Unchanged      Plan and Discussion    Workup as above with further planning based on those results.    The 10-year ASCVD risk score (Aundrea JARRELL, et al., 2019) is: 9.9%    Values used to calculate the score:      Age: 62 years      Sex: Female      Is Non- : Yes      Diabetic: No      Tobacco smoker: Yes      Systolic Blood Pressure: 122 mmHg      Is BP treated: Yes      HDL Cholesterol: 54 mg/dL      Total Cholesterol: 145 mg/dL     Follow Up  Follow up for test results.      Rom Sadler MD

## 2023-08-04 ENCOUNTER — TELEPHONE (OUTPATIENT)
Dept: INTERNAL MEDICINE | Facility: CLINIC | Age: 63
End: 2023-08-04
Payer: OTHER GOVERNMENT

## 2023-08-04 DIAGNOSIS — Z13.220 SCREENING FOR LIPID DISORDERS: ICD-10-CM

## 2023-08-04 DIAGNOSIS — Z13.1 SCREENING FOR DIABETES MELLITUS: ICD-10-CM

## 2023-08-04 DIAGNOSIS — I10 BENIGN ESSENTIAL HYPERTENSION: Primary | ICD-10-CM

## 2023-08-04 NOTE — TELEPHONE ENCOUNTER
----- Message from Kaela Walden sent at 8/4/2023 11:24 AM CDT -----  Contact: [phone]  type: Lab    Caller is requesting to schedule their Lab appointment prior to annual appointment.  Order is not listed in EPIC.  Please enter order and contact patient to schedule.        Preferred Date and Time of Labs: 8/21/2023 8 am     Date of Annual Physical Appointment: 8/24/2023 follow up appt     Where would they like the lab performed?[facility]     Would the patient rather a call back or a response via [facility]? Call back     B

## 2023-08-10 ENCOUNTER — HOSPITAL ENCOUNTER (OUTPATIENT)
Dept: CARDIOLOGY | Facility: OTHER | Age: 63
Discharge: HOME | End: 2023-08-10
Attending: INTERNAL MEDICINE
Payer: OTHER GOVERNMENT

## 2023-08-10 VITALS
DIASTOLIC BLOOD PRESSURE: 86 MMHG | WEIGHT: 245 LBS | HEART RATE: 74 BPM | HEIGHT: 67 IN | BODY MASS INDEX: 38.45 KG/M2 | SYSTOLIC BLOOD PRESSURE: 141 MMHG

## 2023-08-10 DIAGNOSIS — R07.9 CHEST PAIN, UNSPECIFIED TYPE: ICD-10-CM

## 2023-08-10 LAB
BSA FOR ECHO PROCEDURE: 2.29 M2
CV ECHO LV RWT: 0.39 CM
CV STRESS BASE HR: 74 BPM
DIASTOLIC BLOOD PRESSURE: 86 MMHG
ECHO LV POSTERIOR WALL: 0.84 CM (ref 0.6–1.1)
FRACTIONAL SHORTENING: 29 % (ref 28–44)
INTERVENTRICULAR SEPTUM: 0.98 CM (ref 0.6–1.1)
LEFT ATRIUM SIZE: 2.93 CM
LEFT INTERNAL DIMENSION IN SYSTOLE: 3.09 CM (ref 2.1–4)
LEFT VENTRICLE DIASTOLIC VOLUME INDEX: 38.72 ML/M2
LEFT VENTRICLE DIASTOLIC VOLUME: 85.18 ML
LEFT VENTRICLE MASS INDEX: 58 G/M2
LEFT VENTRICLE SYSTOLIC VOLUME INDEX: 17.1 ML/M2
LEFT VENTRICLE SYSTOLIC VOLUME: 37.61 ML
LEFT VENTRICULAR INTERNAL DIMENSION IN DIASTOLE: 4.35 CM (ref 3.5–6)
LEFT VENTRICULAR MASS: 127.54 G
OHS CV CPX 85 PERCENT MAX PREDICTED HEART RATE MALE: 129
OHS CV CPX ESTIMATED METS: 7
OHS CV CPX MAX PREDICTED HEART RATE: 151
OHS CV CPX PATIENT IS FEMALE: 1
OHS CV CPX PATIENT IS MALE: 0
OHS CV CPX PEAK DIASTOLIC BLOOD PRESSURE: 100 MMHG
OHS CV CPX PEAK HEAR RATE: 141 BPM
OHS CV CPX PEAK RATE PRESSURE PRODUCT: NORMAL
OHS CV CPX PEAK SYSTOLIC BLOOD PRESSURE: 216 MMHG
OHS CV CPX PERCENT MAX PREDICTED HEART RATE ACHIEVED: 93
OHS CV CPX RATE PRESSURE PRODUCT PRESENTING: NORMAL
SYSTOLIC BLOOD PRESSURE: 141 MMHG
Z-SCORE OF LEFT VENTRICULAR DIMENSION IN END DIASTOLE: -5.46
Z-SCORE OF LEFT VENTRICULAR DIMENSION IN END SYSTOLE: -3.08

## 2023-08-10 PROCEDURE — 93351 STRESS TTE COMPLETE: CPT | Mod: 26,,, | Performed by: INTERNAL MEDICINE

## 2023-08-10 PROCEDURE — 93351 STRESS TTE COMPLETE: CPT

## 2023-08-10 PROCEDURE — 93351 STRESS ECHO (CUPID ONLY): ICD-10-PCS | Mod: 26,,, | Performed by: INTERNAL MEDICINE

## 2023-08-17 ENCOUNTER — LAB VISIT (OUTPATIENT)
Dept: LAB | Facility: HOSPITAL | Age: 63
End: 2023-08-17
Payer: OTHER GOVERNMENT

## 2023-08-17 DIAGNOSIS — Z13.220 SCREENING FOR LIPID DISORDERS: ICD-10-CM

## 2023-08-17 DIAGNOSIS — I10 BENIGN ESSENTIAL HYPERTENSION: ICD-10-CM

## 2023-08-17 DIAGNOSIS — Z13.1 SCREENING FOR DIABETES MELLITUS: ICD-10-CM

## 2023-08-17 LAB
ALBUMIN SERPL BCP-MCNC: 3.4 G/DL (ref 3.5–5.2)
ALP SERPL-CCNC: 72 U/L (ref 55–135)
ALT SERPL W/O P-5'-P-CCNC: 16 U/L (ref 10–44)
ANION GAP SERPL CALC-SCNC: 10 MMOL/L (ref 8–16)
AST SERPL-CCNC: 18 U/L (ref 10–40)
BASOPHILS # BLD AUTO: 0.03 K/UL (ref 0–0.2)
BASOPHILS NFR BLD: 0.3 % (ref 0–1.9)
BILIRUB SERPL-MCNC: 0.3 MG/DL (ref 0.1–1)
BUN SERPL-MCNC: 9 MG/DL (ref 8–23)
CALCIUM SERPL-MCNC: 8.8 MG/DL (ref 8.7–10.5)
CHLORIDE SERPL-SCNC: 108 MMOL/L (ref 95–110)
CHOLEST SERPL-MCNC: 147 MG/DL (ref 120–199)
CHOLEST/HDLC SERPL: 4.1 {RATIO} (ref 2–5)
CO2 SERPL-SCNC: 23 MMOL/L (ref 23–29)
CREAT SERPL-MCNC: 0.9 MG/DL (ref 0.5–1.4)
DIFFERENTIAL METHOD: ABNORMAL
EOSINOPHIL # BLD AUTO: 0.2 K/UL (ref 0–0.5)
EOSINOPHIL NFR BLD: 2.1 % (ref 0–8)
ERYTHROCYTE [DISTWIDTH] IN BLOOD BY AUTOMATED COUNT: 13.6 % (ref 11.5–14.5)
EST. GFR  (NO RACE VARIABLE): >60 ML/MIN/1.73 M^2
ESTIMATED AVG GLUCOSE: 108 MG/DL (ref 68–131)
GLUCOSE SERPL-MCNC: 82 MG/DL (ref 70–110)
HBA1C MFR BLD: 5.4 % (ref 4–5.6)
HCT VFR BLD AUTO: 40 % (ref 37–48.5)
HDLC SERPL-MCNC: 36 MG/DL (ref 40–75)
HDLC SERPL: 24.5 % (ref 20–50)
HGB BLD-MCNC: 13 G/DL (ref 12–16)
IMM GRANULOCYTES # BLD AUTO: 0.05 K/UL (ref 0–0.04)
IMM GRANULOCYTES NFR BLD AUTO: 0.4 % (ref 0–0.5)
LDLC SERPL CALC-MCNC: 84.6 MG/DL (ref 63–159)
LYMPHOCYTES # BLD AUTO: 2.8 K/UL (ref 1–4.8)
LYMPHOCYTES NFR BLD: 24.7 % (ref 18–48)
MCH RBC QN AUTO: 28.3 PG (ref 27–31)
MCHC RBC AUTO-ENTMCNC: 32.5 G/DL (ref 32–36)
MCV RBC AUTO: 87 FL (ref 82–98)
MONOCYTES # BLD AUTO: 0.9 K/UL (ref 0.3–1)
MONOCYTES NFR BLD: 7.6 % (ref 4–15)
NEUTROPHILS # BLD AUTO: 7.4 K/UL (ref 1.8–7.7)
NEUTROPHILS NFR BLD: 64.9 % (ref 38–73)
NONHDLC SERPL-MCNC: 111 MG/DL
NRBC BLD-RTO: 0 /100 WBC
PLATELET # BLD AUTO: 373 K/UL (ref 150–450)
PMV BLD AUTO: 9 FL (ref 9.2–12.9)
POTASSIUM SERPL-SCNC: 3.6 MMOL/L (ref 3.5–5.1)
PROT SERPL-MCNC: 7.2 G/DL (ref 6–8.4)
RBC # BLD AUTO: 4.6 M/UL (ref 4–5.4)
SODIUM SERPL-SCNC: 141 MMOL/L (ref 136–145)
TRIGL SERPL-MCNC: 132 MG/DL (ref 30–150)
TSH SERPL DL<=0.005 MIU/L-ACNC: 2.1 UIU/ML (ref 0.4–4)
WBC # BLD AUTO: 11.4 K/UL (ref 3.9–12.7)

## 2023-08-17 PROCEDURE — 84443 ASSAY THYROID STIM HORMONE: CPT | Performed by: INTERNAL MEDICINE

## 2023-08-17 PROCEDURE — 85025 COMPLETE CBC W/AUTO DIFF WBC: CPT | Performed by: INTERNAL MEDICINE

## 2023-08-17 PROCEDURE — 80061 LIPID PANEL: CPT | Performed by: INTERNAL MEDICINE

## 2023-08-17 PROCEDURE — 80053 COMPREHEN METABOLIC PANEL: CPT | Performed by: INTERNAL MEDICINE

## 2023-08-17 PROCEDURE — 36415 COLL VENOUS BLD VENIPUNCTURE: CPT | Performed by: INTERNAL MEDICINE

## 2023-08-17 PROCEDURE — 83036 HEMOGLOBIN GLYCOSYLATED A1C: CPT | Performed by: INTERNAL MEDICINE

## 2023-08-24 ENCOUNTER — OFFICE VISIT (OUTPATIENT)
Dept: INTERNAL MEDICINE | Facility: CLINIC | Age: 63
End: 2023-08-24
Payer: OTHER GOVERNMENT

## 2023-08-24 VITALS
DIASTOLIC BLOOD PRESSURE: 80 MMHG | HEART RATE: 72 BPM | SYSTOLIC BLOOD PRESSURE: 137 MMHG | WEIGHT: 240.06 LBS | HEIGHT: 66 IN | OXYGEN SATURATION: 97 % | BODY MASS INDEX: 38.58 KG/M2

## 2023-08-24 DIAGNOSIS — E78.2 MIXED HYPERLIPIDEMIA: ICD-10-CM

## 2023-08-24 DIAGNOSIS — K21.9 GASTROESOPHAGEAL REFLUX DISEASE WITHOUT ESOPHAGITIS: ICD-10-CM

## 2023-08-24 DIAGNOSIS — M54.16 LUMBAR RADICULOPATHY: ICD-10-CM

## 2023-08-24 DIAGNOSIS — F41.1 GAD (GENERALIZED ANXIETY DISORDER): Primary | ICD-10-CM

## 2023-08-24 DIAGNOSIS — I10 PRIMARY HYPERTENSION: ICD-10-CM

## 2023-08-24 PROCEDURE — 99214 OFFICE O/P EST MOD 30 MIN: CPT | Mod: S$PBB,,, | Performed by: INTERNAL MEDICINE

## 2023-08-24 PROCEDURE — 99214 OFFICE O/P EST MOD 30 MIN: CPT | Mod: PBBFAC | Performed by: INTERNAL MEDICINE

## 2023-08-24 PROCEDURE — 99214 PR OFFICE/OUTPT VISIT, EST, LEVL IV, 30-39 MIN: ICD-10-PCS | Mod: S$PBB,,, | Performed by: INTERNAL MEDICINE

## 2023-08-24 PROCEDURE — 99999 PR PBB SHADOW E&M-EST. PATIENT-LVL IV: CPT | Mod: PBBFAC,,, | Performed by: INTERNAL MEDICINE

## 2023-08-24 PROCEDURE — 99999 PR PBB SHADOW E&M-EST. PATIENT-LVL IV: ICD-10-PCS | Mod: PBBFAC,,, | Performed by: INTERNAL MEDICINE

## 2023-08-24 RX ORDER — AMLODIPINE AND VALSARTAN 5; 160 MG/1; MG/1
1 TABLET ORAL DAILY
Qty: 90 TABLET | Refills: 3 | Status: SHIPPED | OUTPATIENT
Start: 2023-08-24

## 2023-08-24 RX ORDER — ESCITALOPRAM OXALATE 20 MG/1
20 TABLET ORAL DAILY
Qty: 90 TABLET | Refills: 3 | Status: SHIPPED | OUTPATIENT
Start: 2023-08-24 | End: 2023-08-24 | Stop reason: SDUPTHER

## 2023-08-24 RX ORDER — PANTOPRAZOLE SODIUM 40 MG/1
40 TABLET, DELAYED RELEASE ORAL DAILY
Qty: 90 TABLET | Refills: 3 | Status: SHIPPED | OUTPATIENT
Start: 2023-08-24 | End: 2024-08-18

## 2023-08-24 RX ORDER — PANTOPRAZOLE SODIUM 40 MG/1
40 TABLET, DELAYED RELEASE ORAL DAILY
Qty: 90 TABLET | Refills: 3 | OUTPATIENT
Start: 2023-08-24 | End: 2023-08-24 | Stop reason: SDUPTHER

## 2023-08-24 RX ORDER — GABAPENTIN 100 MG/1
100 CAPSULE ORAL 3 TIMES DAILY
Qty: 270 CAPSULE | Refills: 3 | Status: SHIPPED | OUTPATIENT
Start: 2023-08-24 | End: 2024-08-18

## 2023-08-24 RX ORDER — ATORVASTATIN CALCIUM 40 MG/1
20 TABLET, FILM COATED ORAL NIGHTLY
Qty: 45 TABLET | Refills: 3 | Status: SHIPPED | OUTPATIENT
Start: 2023-08-24 | End: 2024-08-18

## 2023-08-24 RX ORDER — ESCITALOPRAM OXALATE 20 MG/1
20 TABLET ORAL DAILY
Qty: 90 TABLET | Refills: 3 | OUTPATIENT
Start: 2023-08-24 | End: 2023-08-24 | Stop reason: SDUPTHER

## 2023-08-24 RX ORDER — ESCITALOPRAM OXALATE 20 MG/1
20 TABLET ORAL DAILY
Qty: 90 TABLET | Refills: 3 | Status: SHIPPED | OUTPATIENT
Start: 2023-08-24 | End: 2024-08-18

## 2023-08-24 NOTE — PROGRESS NOTES
Subjective:       Patient ID: Brianna Cantu is a 62 y.o. female.    Chief Complaint: Follow-up      Presents for follow up.     Generalized anxiety has been on lexapro 20 mg for the last few months. Continues to have anxiety with episodes of worsening anxiety related to social situations.     neck and back pain.: has been doing PT and following with pain management. on gabapentin, flexeril and mobic.      VIVIAN with recent sleep study showing severe obstruction. Got her CPAP but only wearing    for 4 hours but just got machine yesterday      Has IBS, tried fiber in the past. Takes imodium.      HTN: well controlled on amlodipine-valsartain      HLD: on lipitor.      Current smoker: has tried patches but did not work.      [facility]      Health Maintenance:  Colon Cancer Screening: Negative cologaurd in 2021. Rectal stricture inable to traverse in colonoscopy of 2018.    Mammogram: April 2022 was normal. Due 2023  [facility].   HIV: negative 2021  Hep C: negative 2021   Lipids: Order today   Pap Smear: Done April 2022 and was normal. Due in April 2023   Vaccines: up to date with vaccines.           Follow-up  Pertinent negatives include no abdominal pain, arthralgias, chest pain, chills, coughing, headaches, myalgias, nausea or vomiting.     Review of Systems   Constitutional:  Negative for activity change, appetite change and chills.   HENT:  Negative for ear pain, sinus pressure/congestion and sneezing.    Respiratory:  Negative for cough and shortness of breath.    Cardiovascular:  Negative for chest pain, palpitations and leg swelling.   Gastrointestinal:  Negative for abdominal distention, abdominal pain, constipation, diarrhea, nausea and vomiting.   Genitourinary:  Negative for dysuria and hematuria.   Musculoskeletal:  Negative for arthralgias, back pain and myalgias.   Neurological:  Negative for dizziness and headaches.   Psychiatric/Behavioral:  Negative for agitation. The  patient is not nervous/anxious.            Past Medical History:   Diagnosis Date    Allergic rhinitis 4/27/2014    Anxiety 4/27/2014    Asthma     Hx of cervical cancer 4/27/2014    Obesity 4/27/2014    Vitamin D deficiency 4/27/2014     Past Surgical History:   Procedure Laterality Date    COLONOSCOPY      COLONOSCOPY N/A 9/24/2018    Procedure: COLONOSCOPY;  Surgeon: Montrell Gan MD;  Location: [address]);  Service: Endoscopy;  Laterality: N/A;    ESOPHAGOGASTRODUODENOSCOPY      ESOPHAGOGASTRODUODENOSCOPY N/A 9/24/2018    Procedure: EGD (ESOPHAGOGASTRODUODENOSCOPY);  Surgeon: Montrell Gan MD;  Location: [address]);  Service: Endoscopy;  Laterality: N/A;    TOTAL ABDOMINAL HYSTERECTOMY        Patient Active Problem List   Diagnosis    Allergic rhinitis    Anxiety    Obesity    Vitamin D deficiency    Hx of cervical cancer    Laryngopharyngeal reflux    Bilateral leg numbness    Bronchitis    Constipation    Benign essential hypertension    Other depressive disorder    Tobacco use disorder    Mild intermittent asthma without complication    Stenosis colon    Light cigarette smoker (1-9 cigs/day)    Leg edema    Diarrhea    Cough    Wheezing    Lesion of hard palate    JOSE ANGEL (generalized anxiety disorder)    Hypersomnolence    Pelvic floor dysfunction    Refractive error    Nuclear sclerosis of both eyes        Objective:      Physical Exam  Constitutional:       Appearance: Normal appearance.   HENT:      Head: Normocephalic.      Right Ear: Tympanic membrane normal.      Left Ear: Tympanic membrane normal.      Nose: Nose normal.   Cardiovascular:      Rate and Rhythm: Normal rate and regular rhythm.      Pulses: Normal pulses.      Heart sounds: Normal heart sounds.   Pulmonary:      Effort: Pulmonary effort is normal.      Breath sounds: Normal breath sounds.   Abdominal:      General: Abdomen is flat. Bowel sounds are normal.      Palpations: Abdomen is soft.   Musculoskeletal:          General: Normal range of motion.      Cervical back: Normal range of motion and neck supple.   Skin:     General: Skin is warm and dry.   Neurological:      General: No focal deficit present.      Mental Status: She is alert and oriented to person, place, and time.   Psychiatric:         Mood and Affect: Mood normal.         Assessment:       Problem List Items Addressed This Visit          Psychiatric    JOSE ANGEL (generalized anxiety disorder) - Primary    Relevant Orders    Ambulatory referral/consult to Behavioral Health     Other Visit Diagnoses       Gastroesophageal reflux disease without esophagitis        Risk factors for CAD are age, race, tobacco. EKG w/o ST changes.  Reschedule stress test. Trial of PPI.    Relevant Medications    pantoprazole (PROTONIX) 40 MG tablet    Lumbar radiculopathy        Relevant Orders    CANE FOR HOME USE    Primary hypertension        Mixed hyperlipidemia                Plan:         Brianna was seen today for follow-up.    Diagnoses and all orders for this visit:    JOSE ANGEL (generalized anxiety disorder)  -     Ambulatory referral/consult to Behavioral Health; Future  Continue lexapro and start CBT     Gastroesophageal reflux disease without esophagitis  Continue PPI     Lumbar radiculopathy  -     CANE FOR HOME USE    Primary hypertension  Well controlled on current meds         amlodipine-valsartan (EXFORGE) 5-160 mg per tablet; Take 1 tablet by mouth once daily.    Mixed hyperlipidemia  Well controlled on statin               Rossana Mcclendon MD   Internal Medicine   Primary Care

## 2024-02-22 ENCOUNTER — TELEPHONE (OUTPATIENT)
Dept: INTERNAL MEDICINE | Facility: CLINIC | Age: 64
End: 2024-02-22

## 2024-02-22 NOTE — TELEPHONE ENCOUNTER
----- Message from Andrea Figueroa sent at 2/22/2024  3:21 PM CST -----  Contact: self  [phone]  Pt requesting a call will like to know if she is a candidate for ozempic.    Please call and advise

## 2024-05-30 ENCOUNTER — HOSPITAL ENCOUNTER (OUTPATIENT)
Dept: RADIOLOGY | Facility: OTHER | Age: 64
Discharge: HOME | End: 2024-05-30
Attending: INTERNAL MEDICINE
Payer: OTHER GOVERNMENT

## 2024-05-30 DIAGNOSIS — Z12.31 ENCOUNTER FOR SCREENING MAMMOGRAM FOR BREAST CANCER: ICD-10-CM

## 2024-05-30 PROCEDURE — 77063 BREAST TOMOSYNTHESIS BI: CPT | Mod: 26,,, | Performed by: RADIOLOGY

## 2024-05-30 PROCEDURE — 77067 SCR MAMMO BI INCL CAD: CPT | Mod: 26,,, | Performed by: RADIOLOGY

## 2024-05-30 PROCEDURE — 77067 SCR MAMMO BI INCL CAD: CPT | Mod: TC

## 2024-06-06 ENCOUNTER — PATIENT MESSAGE (OUTPATIENT)
Dept: INTERNAL MEDICINE | Facility: CLINIC | Age: 64
End: 2024-06-06
Payer: OTHER GOVERNMENT

## 2024-06-10 ENCOUNTER — PATIENT MESSAGE (OUTPATIENT)
Dept: INTERNAL MEDICINE | Facility: CLINIC | Age: 64
End: 2024-06-10
Payer: OTHER GOVERNMENT

## 2024-06-13 ENCOUNTER — LAB VISIT (OUTPATIENT)
Dept: LAB | Facility: HOSPITAL | Age: 64
End: 2024-06-13
Attending: PSYCHIATRY & NEUROLOGY
Payer: OTHER GOVERNMENT

## 2024-06-13 ENCOUNTER — OFFICE VISIT (OUTPATIENT)
Dept: NEUROLOGY | Facility: CLINIC | Age: 64
End: 2024-06-13
Payer: OTHER GOVERNMENT

## 2024-06-13 VITALS
HEIGHT: 66 IN | WEIGHT: 247 LBS | DIASTOLIC BLOOD PRESSURE: 80 MMHG | BODY MASS INDEX: 39.7 KG/M2 | HEART RATE: 80 BPM | SYSTOLIC BLOOD PRESSURE: 130 MMHG

## 2024-06-13 DIAGNOSIS — R25.2 MUSCLE CRAMP: Primary | ICD-10-CM

## 2024-06-13 DIAGNOSIS — R25.2 MUSCLE CRAMP: ICD-10-CM

## 2024-06-13 DIAGNOSIS — R20.8 VIBRATION SENSORY LOSS: ICD-10-CM

## 2024-06-13 LAB
CA-I BLDV-SCNC: 1.23 MMOL/L (ref 1.06–1.42)
CK SERPL-CCNC: 323 U/L (ref 20–180)
FOLATE SERPL-MCNC: 13.2 NG/ML (ref 4–24)
PTH-INTACT SERPL-MCNC: 57.9 PG/ML (ref 9–77)

## 2024-06-13 PROCEDURE — 82330 ASSAY OF CALCIUM: CPT | Performed by: PSYCHIATRY & NEUROLOGY

## 2024-06-13 PROCEDURE — 99999 PR PBB SHADOW E&M-EST. PATIENT-LVL I: CPT | Mod: PBBFAC,,, | Performed by: PSYCHIATRY & NEUROLOGY

## 2024-06-13 PROCEDURE — 99211 OFF/OP EST MAY X REQ PHY/QHP: CPT | Mod: PBBFAC | Performed by: PSYCHIATRY & NEUROLOGY

## 2024-06-13 PROCEDURE — 36415 COLL VENOUS BLD VENIPUNCTURE: CPT | Performed by: PSYCHIATRY & NEUROLOGY

## 2024-06-13 PROCEDURE — 84207 ASSAY OF VITAMIN B-6: CPT | Performed by: PSYCHIATRY & NEUROLOGY

## 2024-06-13 PROCEDURE — 82550 ASSAY OF CK (CPK): CPT | Performed by: PSYCHIATRY & NEUROLOGY

## 2024-06-13 PROCEDURE — 84425 ASSAY OF VITAMIN B-1: CPT | Performed by: PSYCHIATRY & NEUROLOGY

## 2024-06-13 PROCEDURE — 83970 ASSAY OF PARATHORMONE: CPT | Performed by: PSYCHIATRY & NEUROLOGY

## 2024-06-13 PROCEDURE — 82085 ASSAY OF ALDOLASE: CPT | Performed by: PSYCHIATRY & NEUROLOGY

## 2024-06-13 PROCEDURE — 82607 VITAMIN B-12: CPT | Performed by: PSYCHIATRY & NEUROLOGY

## 2024-06-13 PROCEDURE — 82746 ASSAY OF FOLIC ACID SERUM: CPT | Performed by: PSYCHIATRY & NEUROLOGY

## 2024-06-13 PROCEDURE — 99205 OFFICE O/P NEW HI 60 MIN: CPT | Mod: S$PBB,,, | Performed by: PSYCHIATRY & NEUROLOGY

## 2024-06-13 NOTE — PROGRESS NOTES
MOVEMENT DISORDERS CLINIC NEW CONSULT NOTE    PCP/Referring Provider: Rossana Mcclendon MD  [address] [place] [address]  Date of Service: 6/13/2024    Chief Complaint: cramping    HPI: Brianna Cantu is a R HANDED 63 y.o. female with a medical issues significant for IBS, VIVIAN, JOSE ANGEL, Lspine dz, coming in for muscle spasms per PCP. She reports 10 years of full body muscle cramping off and of on focused mostly on abdomen. When this began it was only in her legs. Now full body since 4-5 years. These camping spells occur at rest or exercising. Her spells are abrupt and in the abdomen seconds to minutes. Painful 11/10 in pain scale. Spells can occur 1-3 times a day. They can occur at any time of day. The pain wakes her from sleep. Laying flat makes it worse.  She notes when she turns her head her neck muscles will pull and cramp.  Exacerbated by sugar intake  Better after she urinates    Tried Pedialyte and Liquid IV, lemon water, Gatorade    Lspine 2022 shows mod canal stenosis  EMG 2022    Also notes     Medication history:  She's tried several muscle relaxers- and failed them    Neuroleptic exposure:  none      Review of Systems:   Review of Systems   Constitutional:  Negative for fever.   HENT:  Negative for congestion.    Eyes:  Negative for double vision.   Respiratory:  Negative for cough and shortness of breath.    Cardiovascular:  Negative for chest pain and leg swelling.   Gastrointestinal:  Negative for nausea.   Genitourinary:  Negative for dysuria.   Musculoskeletal:  Negative for falls.   Skin:  Negative for rash.   Neurological:  Negative for tremors, speech change and headaches.   Psychiatric/Behavioral:  Positive for memory loss. Negative for depression.          Current Medications:  Outpatient Encounter Medications as of 6/13/2024   Medication Sig Dispense Refill    albuterol (PROVENTIL/VENTOLIN HFA) 90 mcg/actuation inhaler Inhale 2 puffs into the lungs every 6 (six) hours as needed  for Wheezing. 18 g 11    amlodipine-valsartan (EXFORGE) 5-160 mg per tablet Take 1 tablet by mouth once daily. 90 tablet 3    atorvastatin (LIPITOR) 40 MG tablet Take 0.5 tablets (20 mg total) by mouth every evening. 45 tablet 3    ECHINACEA 1X ORAL Take by mouth.      EScitalopram oxalate (LEXAPRO) 20 MG tablet Take 1 tablet (20 mg total) by mouth Daily. 90 tablet 3    fluticasone propionate (FLONASE) 50 mcg/actuation nasal spray 1 spray (50 mcg total) by Each Nostril route daily as needed for Allergies. 16 g 0    fluticasone-salmeterol diskus inhaler 100-50 mcg Inhale 1 puff into the lungs 2 (two) times daily.      gabapentin (NEURONTIN) 100 MG capsule Take 1 capsule (100 mg total) by mouth 3 (three) times daily. 270 capsule 3    LIDOCAINE VISCOUS 2 % solution Take by mouth.      loratadine (CLARITIN) 10 mg tablet Take 10 mg by mouth.      meloxicam (MOBIC) 15 MG tablet 15 mg.      oxybutynin (DITROPAN-XL) 10 MG 24 hr tablet 10 mg.      pantoprazole (PROTONIX) 40 MG tablet Take 1 tablet (40 mg total) by mouth once daily. 90 tablet 3    valACYclovir (VALTREX) 1000 MG tablet Take 2 pills po bid x 2 days prn first signs of outbreak 30 tablet 3     No facility-administered encounter medications on file as of 6/13/2024.       Past Medical History:  Patient Active Problem List   Diagnosis    Allergic rhinitis    Anxiety    Obesity    Vitamin D deficiency    Hx of cervical cancer    Laryngopharyngeal reflux    Bilateral leg numbness    Bronchitis    Constipation    Benign essential hypertension    Other depressive disorder    Tobacco use disorder    Mild intermittent asthma without complication    Stenosis colon    Light cigarette smoker (1-9 cigs/day)    Leg edema    Diarrhea    Cough    Wheezing    Lesion of hard palate    JOSE ANGEL (generalized anxiety disorder)    Hypersomnolence    Muscle cramp    Pelvic floor dysfunction    Refractive error    Nuclear sclerosis of both eyes    Vibration sensory loss       Past Surgical  History:  Past Surgical History:   Procedure Laterality Date    COLONOSCOPY      COLONOSCOPY N/A 9/24/2018    Procedure: COLONOSCOPY;  Surgeon: Montrell Gan MD;  Location: [address]);  Service: Endoscopy;  Laterality: N/A;    ESOPHAGOGASTRODUODENOSCOPY      ESOPHAGOGASTRODUODENOSCOPY N/A 9/24/2018    Procedure: EGD (ESOPHAGOGASTRODUODENOSCOPY);  Surgeon: Montrell Gan MD;  Location: [address]);  Service: Endoscopy;  Laterality: N/A;    TOTAL ABDOMINAL HYSTERECTOMY         Social:  Social History     Socioeconomic History    Marital status:    Tobacco Use    Smoking status: Every Day     Current packs/day: 0.50     Types: Cigarettes    Smokeless tobacco: Never   Substance and Sexual Activity    Alcohol use: Yes     Alcohol/week: 0.0 standard drinks of alcohol     Comment: occasional    Drug use: No    Sexual activity: Not Currently     Social Determinants of Health     Financial Resource Strain: High Risk (6/7/2024)    Overall Financial Resource Strain (CARDIA)     Difficulty of Paying Living Expenses: Hard   Food Insecurity: Food Insecurity Present (6/7/2024)    Hunger Vital Sign     Worried About Running Out of Food in the Last Year: Sometimes true     Ran Out of Food in the Last Year: Sometimes true   Physical Activity: Insufficiently Active (6/7/2024)    Exercise Vital Sign     Days of Exercise per Week: 1 day     Minutes of Exercise per Session: 30 min   Stress: Stress Concern Present (6/7/2024)    [employer] - Occupational Stress Questionnaire     Feeling of Stress : Very much   Housing Stability: High Risk (6/7/2024)    Housing Stability Vital Sign     Unable to Pay for Housing in the Last Year: Yes       Family History:  Family History   Problem Relation Name Age of Onset    Hypertension Mother      Allergies Mother      No Known Problems Father      Hypertension Sister      Allergies Sister      Hypertension Sister      Allergies Sister      No  Known Problems Brother      Hypertension Maternal Aunt      No Known Problems Maternal Uncle      No Known Problems Paternal Aunt      No Known Problems Paternal Uncle      No Known Problems Maternal Grandmother      No Known Problems Maternal Grandfather      No Known Problems Paternal Grandmother      No Known Problems Paternal Grandfather      No Known Problems Other      Heart disease Neg Hx         PHYSICAL:  There were no vitals taken for this visit.    General Medical Examination:  General: Good hygiene, appropriate appearance.  HEENT: Normocephalic, atraumatic.   Neck: Supple.   Chest: Unlabored breathing.   CV: Symmetric pulses.   Ext: No clubbing, cyanosis, or edema.     Mental Status:  Mood/Affect: Appropriate/congruent.  Level of consciousness: Awake, alert.  Orientation: Oriented to person, place, time and situation.  Language: No Dysarthria    Cranial nerves:  I: Not tested  II: PERRL, VFF to counting  III, IV, VI: EOMI with conjugate gaze and no nystagmus on end gaze  V: Facial sensation intact and symmetric over the bilateral V1-V3  VII: Facial muscle activation intact and symmetric over the bilateral upper and lower face  VIII: Hearing intact in the b/l ears and symmetrical to finger rub  IX, X, XII: TUP midline - no atrophy or fasiculations  X: SCMs and shoulder shrug full strength b/l and symmetric    Motor:   -UE: 5/5 deltoids; 5/5 biceps, triceps; 5/5 wrist flexors, extensors; 5/5 interosseous; 5/5   -LEs: 5/5 hip flexion, extension; 5/5 knee flexion, extension; 5/5 ankle flexion, extension    DTRs:  ? Biceps Triceps Brachioradialis Knee Ankle   Left 0    0   Right 0    0       ? Finger taps Finger flicks SHIN Heel taps   Left 0 00 0 0   Right 0 0 0 0     Neck tone: 0  ? Arm Leg   Left 0 0   Right 0 0     Sensation:   -Light touch: Intact and symmetric in the bilateral upper and lower extremities.  -Temp: Intact and symmetric in the bilateral upper and lower extremities.  -Vibration:  decreased to knee d/l    Proprioception intact    Coordination:   -Finger to nose: nl    Gait:  Mildly wide based  No shuffle    Romberg: neg        EMG   Summary   Nerve conduction study of bilateral lower extremities reveals normal antidromic sensory peak latencies, action potentials, and conduction velocities.   Motor peak latencies are normal, amplitudes are diminished in the right tibial nerve, and conduction velocities are normal. F-waves are normal.   Concentric needle examination of selected muscles in bilateral lower extremities revealed no evidence of acute denervation but did reveal evidence of chronic denervation in the right tibialis anterior, peroneus longus, and medial gastrocnemius.   Impression   This is an abnormal study. There is electrophysiologic evidence of:   1. Chronic denervation       Laboratory Data:  TSH  and CMP NL    Imagin  Lspine  Impression:     1. Lumbar degenerative changes contributing to mild-to-moderate neural foraminal narrowing from L3-L4 through L5-S1 as detailed above.    Assessment//Plan:   Problem List Items Addressed This Visit          Neuro    Vibration sensory loss    Current Assessment & Plan     B/L leg sensory loss to vibration  F/u neuropathy panel            Other    Muscle cramp - Primary    Current Assessment & Plan     10 years of bilateral full body muscle cramps of her abdomen especially  No overt electrolyte imbalance in chart  TSH WNL  EMG done  of legs  F/u CK, aldolase, MDS2 panel, PTH, Calcium             Relevant Orders    Movement Disorder, Autoimmune Eval, Serum    MRI Thoracic Spine Demyelinating W W/O Contrast    ALDOLASE    CK         Ursula Nelson MD, MS [facility]  Department of Neurology  Movement Disorders

## 2024-06-13 NOTE — ASSESSMENT & PLAN NOTE
10 years of bilateral full body muscle cramps of her abdomen especially  No overt electrolyte imbalance in chart  TSH WNL  EMG done 2022 of legs  F/u CK, aldolase, MDS2 panel, PTH, Calcium

## 2024-06-14 ENCOUNTER — LAB VISIT (OUTPATIENT)
Dept: LAB | Facility: HOSPITAL | Age: 64
End: 2024-06-14
Payer: OTHER GOVERNMENT

## 2024-06-14 ENCOUNTER — OFFICE VISIT (OUTPATIENT)
Dept: INTERNAL MEDICINE | Facility: CLINIC | Age: 64
End: 2024-06-14
Payer: OTHER GOVERNMENT

## 2024-06-14 ENCOUNTER — PATIENT MESSAGE (OUTPATIENT)
Dept: PSYCHIATRY | Facility: CLINIC | Age: 64
End: 2024-06-14
Payer: OTHER GOVERNMENT

## 2024-06-14 VITALS
BODY MASS INDEX: 39.29 KG/M2 | SYSTOLIC BLOOD PRESSURE: 132 MMHG | HEIGHT: 66 IN | WEIGHT: 244.5 LBS | DIASTOLIC BLOOD PRESSURE: 81 MMHG | OXYGEN SATURATION: 98 % | HEART RATE: 65 BPM

## 2024-06-14 DIAGNOSIS — M79.10 MYALGIA: ICD-10-CM

## 2024-06-14 DIAGNOSIS — I10 BENIGN ESSENTIAL HYPERTENSION: ICD-10-CM

## 2024-06-14 DIAGNOSIS — E78.2 MIXED HYPERLIPIDEMIA: ICD-10-CM

## 2024-06-14 DIAGNOSIS — F32.A DEPRESSION, UNSPECIFIED DEPRESSION TYPE: ICD-10-CM

## 2024-06-14 DIAGNOSIS — Z00.00 ANNUAL PHYSICAL EXAM: Primary | ICD-10-CM

## 2024-06-14 LAB
ALBUMIN SERPL BCP-MCNC: 3.8 G/DL (ref 3.5–5.2)
ALP SERPL-CCNC: 69 U/L (ref 55–135)
ALT SERPL W/O P-5'-P-CCNC: 18 U/L (ref 10–44)
ANION GAP SERPL CALC-SCNC: 9 MMOL/L (ref 8–16)
AST SERPL-CCNC: 19 U/L (ref 10–40)
BASOPHILS # BLD AUTO: 0.03 K/UL (ref 0–0.2)
BASOPHILS NFR BLD: 0.3 % (ref 0–1.9)
BILIRUB SERPL-MCNC: 0.3 MG/DL (ref 0.1–1)
BUN SERPL-MCNC: 14 MG/DL (ref 8–23)
CALCIUM SERPL-MCNC: 9.9 MG/DL (ref 8.7–10.5)
CHLORIDE SERPL-SCNC: 111 MMOL/L (ref 95–110)
CHOLEST SERPL-MCNC: 119 MG/DL (ref 120–199)
CHOLEST/HDLC SERPL: 2.6 {RATIO} (ref 2–5)
CO2 SERPL-SCNC: 24 MMOL/L (ref 23–29)
CREAT SERPL-MCNC: 1.1 MG/DL (ref 0.5–1.4)
CRP SERPL-MCNC: 6.3 MG/L (ref 0–8.2)
DIFFERENTIAL METHOD BLD: ABNORMAL
EOSINOPHIL # BLD AUTO: 0.2 K/UL (ref 0–0.5)
EOSINOPHIL NFR BLD: 2.1 % (ref 0–8)
ERYTHROCYTE [DISTWIDTH] IN BLOOD BY AUTOMATED COUNT: 14.2 % (ref 11.5–14.5)
ERYTHROCYTE [SEDIMENTATION RATE] IN BLOOD BY PHOTOMETRIC METHOD: 10 MM/HR (ref 0–36)
EST. GFR  (NO RACE VARIABLE): 56.5 ML/MIN/1.73 M^2
ESTIMATED AVG GLUCOSE: 108 MG/DL (ref 68–131)
GLUCOSE SERPL-MCNC: 93 MG/DL (ref 70–110)
HBA1C MFR BLD: 5.4 % (ref 4–5.6)
HCT VFR BLD AUTO: 42.6 % (ref 37–48.5)
HDLC SERPL-MCNC: 46 MG/DL (ref 40–75)
HDLC SERPL: 38.7 % (ref 20–50)
HGB BLD-MCNC: 13.7 G/DL (ref 12–16)
IMM GRANULOCYTES # BLD AUTO: 0.05 K/UL (ref 0–0.04)
IMM GRANULOCYTES NFR BLD AUTO: 0.5 % (ref 0–0.5)
LDLC SERPL CALC-MCNC: 54.8 MG/DL (ref 63–159)
LYMPHOCYTES # BLD AUTO: 2.8 K/UL (ref 1–4.8)
LYMPHOCYTES NFR BLD: 26.3 % (ref 18–48)
MAGNESIUM SERPL-MCNC: 2.2 MG/DL (ref 1.6–2.6)
MCH RBC QN AUTO: 29 PG (ref 27–31)
MCHC RBC AUTO-ENTMCNC: 32.2 G/DL (ref 32–36)
MCV RBC AUTO: 90 FL (ref 82–98)
MONOCYTES # BLD AUTO: 0.8 K/UL (ref 0.3–1)
MONOCYTES NFR BLD: 7.5 % (ref 4–15)
NEUTROPHILS # BLD AUTO: 6.7 K/UL (ref 1.8–7.7)
NEUTROPHILS NFR BLD: 63.3 % (ref 38–73)
NONHDLC SERPL-MCNC: 73 MG/DL
NRBC BLD-RTO: 0 /100 WBC
PLATELET # BLD AUTO: 384 K/UL (ref 150–450)
PMV BLD AUTO: 9.7 FL (ref 9.2–12.9)
POTASSIUM SERPL-SCNC: 4.2 MMOL/L (ref 3.5–5.1)
PROT SERPL-MCNC: 7.7 G/DL (ref 6–8.4)
RBC # BLD AUTO: 4.73 M/UL (ref 4–5.4)
SODIUM SERPL-SCNC: 144 MMOL/L (ref 136–145)
T4 FREE SERPL-MCNC: 0.98 NG/DL (ref 0.71–1.51)
TRIGL SERPL-MCNC: 91 MG/DL (ref 30–150)
TSH SERPL DL<=0.005 MIU/L-ACNC: 1.26 UIU/ML (ref 0.4–4)
VIT B12 SERPL-MCNC: 770 NG/L (ref 180–914)
WBC # BLD AUTO: 10.64 K/UL (ref 3.9–12.7)

## 2024-06-14 PROCEDURE — 83735 ASSAY OF MAGNESIUM: CPT | Performed by: INTERNAL MEDICINE

## 2024-06-14 PROCEDURE — 83036 HEMOGLOBIN GLYCOSYLATED A1C: CPT | Performed by: INTERNAL MEDICINE

## 2024-06-14 PROCEDURE — 80061 LIPID PANEL: CPT | Performed by: INTERNAL MEDICINE

## 2024-06-14 PROCEDURE — 99396 PREV VISIT EST AGE 40-64: CPT | Mod: S$PBB,,, | Performed by: INTERNAL MEDICINE

## 2024-06-14 PROCEDURE — 84439 ASSAY OF FREE THYROXINE: CPT | Performed by: INTERNAL MEDICINE

## 2024-06-14 PROCEDURE — 86140 C-REACTIVE PROTEIN: CPT | Performed by: INTERNAL MEDICINE

## 2024-06-14 PROCEDURE — 85652 RBC SED RATE AUTOMATED: CPT | Performed by: INTERNAL MEDICINE

## 2024-06-14 PROCEDURE — 85025 COMPLETE CBC W/AUTO DIFF WBC: CPT | Performed by: INTERNAL MEDICINE

## 2024-06-14 PROCEDURE — 80053 COMPREHEN METABOLIC PANEL: CPT | Performed by: INTERNAL MEDICINE

## 2024-06-14 PROCEDURE — 99999 PR PBB SHADOW E&M-EST. PATIENT-LVL IV: CPT | Mod: PBBFAC,,, | Performed by: INTERNAL MEDICINE

## 2024-06-14 PROCEDURE — 99214 OFFICE O/P EST MOD 30 MIN: CPT | Mod: PBBFAC | Performed by: INTERNAL MEDICINE

## 2024-06-14 PROCEDURE — 84443 ASSAY THYROID STIM HORMONE: CPT | Performed by: INTERNAL MEDICINE

## 2024-06-14 PROCEDURE — 36415 COLL VENOUS BLD VENIPUNCTURE: CPT | Performed by: INTERNAL MEDICINE

## 2024-06-14 NOTE — PROGRESS NOTES
Subjective:       Patient ID: Brianna Cantu is a 63 y.o. female.    Chief Complaint:  Annual exam    Presents for annual    Recently seen by neurologist for evaluation of muscle spasms that she has been having for the last 10 years but has been occurring more frequently recently.  These happen anytime of day and sometimes wake her from sleep.  She has labs done that included all delays B vitamins which was all unremarkable.  Her CK was very mildly elevated at 323.  She has ordered MRI thoracic spine demyelinating which she has not scheduled yet.    neck and back pain.: has been doing PT and following with pain management. on gabapentin, flexeril and mobic.      VIVIAN with recent sleep study showing severe obstruction. Got her CPAP but only wearing    for 4 hours but just got machine yesterday      Has IBS, tried fiber in the past. Takes imodium.      HTN: well controlled on amlodipine-valsartain      HLD: on lipitor.     JOSE ANGEL/ depression: on lexapro.      Current smoker: has tried patches but did not work.      [facility]      Health Maintenance:  Colon Cancer Screening: Negative cologaurd in 2021. Rectal stricture inable to traverse in colonoscopy of 2018.    Mammogram:  Normal May 20, 2024  [facility] was [facility].   HIV: negative 2021  Hep C: negative 2021   Lipids: Order today   Pap Smear: Done April 2022 and was normal. Due in April 2023   Vaccines: up to date with vaccines.           Follow-up  Pertinent negatives include no abdominal pain, arthralgias, chest pain, chills, coughing, headaches, myalgias, nausea or vomiting.     Review of Systems   Constitutional:  Negative for activity change, appetite change and chills.   HENT:  Negative for ear pain, sinus pressure/congestion and sneezing.    Respiratory:  Negative for cough and shortness of breath.    Cardiovascular:  Negative for chest pain, palpitations and leg swelling.   Gastrointestinal:  Negative for abdominal distention, abdominal  pain, constipation, diarrhea, nausea and vomiting.   Genitourinary:  Negative for dysuria and hematuria.   Musculoskeletal:  Negative for arthralgias, back pain and myalgias.   Neurological:  Negative for dizziness and headaches.   Psychiatric/Behavioral:  Negative for agitation. The patient is not nervous/anxious.            Past Medical History:   Diagnosis Date    Allergic rhinitis 4/27/2014    Anxiety 4/27/2014    Asthma     Hx of cervical cancer 4/27/2014    Obesity 4/27/2014    Vitamin D deficiency 4/27/2014     Past Surgical History:   Procedure Laterality Date    COLONOSCOPY      COLONOSCOPY N/A 9/24/2018    Procedure: COLONOSCOPY;  Surgeon: Montrell Gan MD;  Location: [address]);  Service: Endoscopy;  Laterality: N/A;    ESOPHAGOGASTRODUODENOSCOPY      ESOPHAGOGASTRODUODENOSCOPY N/A 9/24/2018    Procedure: EGD (ESOPHAGOGASTRODUODENOSCOPY);  Surgeon: Montrell Gan MD;  Location: [address]);  Service: Endoscopy;  Laterality: N/A;    TOTAL ABDOMINAL HYSTERECTOMY        Patient Active Problem List   Diagnosis    Allergic rhinitis    Anxiety    Obesity    Vitamin D deficiency    Hx of cervical cancer    Laryngopharyngeal reflux    Bilateral leg numbness    Bronchitis    Constipation    Benign essential hypertension    Other depressive disorder    Tobacco use disorder    Mild intermittent asthma without complication    Stenosis colon    Light cigarette smoker (1-9 cigs/day)    Leg edema    Diarrhea    Cough    Wheezing    Lesion of hard palate    JOSE ANGEL (generalized anxiety disorder)    Hypersomnolence    Muscle cramp    Pelvic floor dysfunction    Refractive error    Nuclear sclerosis of both eyes    Vibration sensory loss        Objective:      Physical Exam  Constitutional:       Appearance: Normal appearance.   HENT:      Head: Normocephalic.      Right Ear: Tympanic membrane normal.      Left Ear: Tympanic membrane normal.      Nose: Nose normal.   Cardiovascular:      Rate and  Rhythm: Normal rate and regular rhythm.      Pulses: Normal pulses.      Heart sounds: Normal heart sounds.   Pulmonary:      Effort: Pulmonary effort is normal.      Breath sounds: Normal breath sounds.   Abdominal:      General: Abdomen is flat. Bowel sounds are normal.      Palpations: Abdomen is soft.   Musculoskeletal:         General: Normal range of motion.      Cervical back: Normal range of motion and neck supple.   Skin:     General: Skin is warm and dry.   Neurological:      General: No focal deficit present.      Mental Status: She is alert and oriented to person, place, and time.   Psychiatric:         Mood and Affect: Mood normal.         Assessment:       Problem List Items Addressed This Visit          Cardiac/Vascular    Benign essential hypertension    Relevant Orders    Comprehensive Metabolic Panel (Completed)    CBC Auto Differential (Completed)    Hemoglobin A1C (Completed)    TSH (Completed)    T4, FREE (Completed)     Other Visit Diagnoses       Annual physical exam    -  Primary    Mixed hyperlipidemia        Relevant Orders    Lipid Panel (Completed)    Myalgia        Relevant Orders    C-REACTIVE PROTEIN (Completed)    Sedimentation rate (Completed)    Magnesium (Completed)    Depression, unspecified depression type        Relevant Orders    Ambulatory referral/consult to Primary Care Behavioral Health (Non-Opioids)    Ambulatory referral/consult to Behavioral Health              Plan:         Brianna was seen today for follow-up.    Diagnoses and all orders for this visit:    Annual physical exam  Colon Cancer Screening: Negative cologaurd in 2021. Rectal stricture inable to traverse in colonoscopy of 2018.    Mammogram:  Normal May 20, 2024  [facility].   HIV: negative 2021  Hep C: negative 2021   Lipids: Order today   Pap Smear: Done April 2022 and was normal. Due in April 2023   Vaccines: up to date with vaccines.    Annual wellness exam completed.     All  medications, histories, and concerns reviewed, reconciled, and addressed.     Appropriate Screenings per pt's sex and age have been reviewed and discussed with pt.       Benign essential hypertension  Well-controlled on current med    Mixed hyperlipidemia  -     Lipid Panel; Future  Continue statin and check lipids today    Myalgia  -     C-REACTIVE PROTEIN; Future  -     Sedimentation rate; Future  -     Magnesium; Future  Follow up MRI her Neurology.    Depression, unspecified depression type  -     Ambulatory referral/consult to Primary Care Behavioral Health (Non-Opioids); Future  -     Ambulatory referral/consult to Behavioral Health; Future  Continue Lexapro.  We will try to get her started on CBT                 Rossana Mcclendon MD   Internal Medicine   Primary Care

## 2024-06-15 LAB — ALDOLASE SERPL-CCNC: 6.2 U/L (ref 1.2–7.6)

## 2024-06-18 DIAGNOSIS — K21.9 GASTROESOPHAGEAL REFLUX DISEASE WITHOUT ESOPHAGITIS: ICD-10-CM

## 2024-06-18 DIAGNOSIS — J30.2 SEASONAL ALLERGIC RHINITIS, UNSPECIFIED TRIGGER: ICD-10-CM

## 2024-06-18 DIAGNOSIS — R05.9 COUGH: ICD-10-CM

## 2024-06-18 DIAGNOSIS — A60.00 GENITAL HERPES SIMPLEX, UNSPECIFIED SITE: ICD-10-CM

## 2024-06-18 LAB — VIT B1 BLD-MCNC: 87 UG/L (ref 38–122)

## 2024-06-18 RX ORDER — PANTOPRAZOLE SODIUM 40 MG/1
40 TABLET, DELAYED RELEASE ORAL DAILY
Qty: 90 TABLET | Refills: 3 | Status: SHIPPED | OUTPATIENT
Start: 2024-06-18

## 2024-06-18 RX ORDER — AMLODIPINE AND VALSARTAN 5; 160 MG/1; MG/1
1 TABLET ORAL DAILY
Qty: 90 TABLET | Refills: 3 | Status: SHIPPED | OUTPATIENT
Start: 2024-06-18

## 2024-06-18 RX ORDER — ESCITALOPRAM OXALATE 20 MG/1
20 TABLET ORAL DAILY
Qty: 90 TABLET | Refills: 3 | Status: SHIPPED | OUTPATIENT
Start: 2024-06-18

## 2024-06-18 NOTE — TELEPHONE ENCOUNTER
No care due was identified.  [facility] Embedded Care Due Messages. Reference number: [id number].   6/18/2024 1:03:23 PM CDT

## 2024-06-18 NOTE — TELEPHONE ENCOUNTER
No care due was identified.  [facility] Embedded Care Due Messages. Reference number: [id number].   6/18/2024 1:02:47 PM CDT

## 2024-06-18 NOTE — TELEPHONE ENCOUNTER
Refill Routing Note   Medication(s) are not appropriate for processing by [facility] for the following reason(s):        Outside of protocol    [facility] action(s):  Route  Approve        Medication Therapy Plan: GABAPENTIN-OP      Appointments  past 12m or future 3m with PCP    Date Provider   Last Visit   6/14/2024 Rossana Mcclendon MD   Next Visit   10/17/2024 Rossana Mcclendon MD   ED visits in past 90 days: 0        Note composed:3:33 PM 06/18/2024

## 2024-06-19 ENCOUNTER — PATIENT MESSAGE (OUTPATIENT)
Dept: BEHAVIORAL HEALTH | Facility: CLINIC | Age: 64
End: 2024-06-19
Payer: OTHER GOVERNMENT

## 2024-06-19 LAB — PYRIDOXAL SERPL-MCNC: 21 UG/L (ref 5–50)

## 2024-06-19 RX ORDER — GABAPENTIN 100 MG/1
100 CAPSULE ORAL 3 TIMES DAILY
Qty: 270 CAPSULE | Refills: 3 | Status: SHIPPED | OUTPATIENT
Start: 2024-06-19

## 2024-06-19 RX ORDER — ALBUTEROL SULFATE 90 UG/1
2 AEROSOL, METERED RESPIRATORY (INHALATION) EVERY 6 HOURS PRN
Qty: 18 G | Refills: 11 | Status: SHIPPED | OUTPATIENT
Start: 2024-06-19 | End: 2025-06-19

## 2024-06-19 RX ORDER — FLUTICASONE PROPIONATE 50 MCG
1 SPRAY, SUSPENSION (ML) NASAL DAILY PRN
Qty: 16 G | Refills: 0 | Status: SHIPPED | OUTPATIENT
Start: 2024-06-19

## 2024-06-20 ENCOUNTER — PATIENT MESSAGE (OUTPATIENT)
Dept: INTERNAL MEDICINE | Facility: CLINIC | Age: 64
End: 2024-06-20
Payer: OTHER GOVERNMENT

## 2024-06-20 DIAGNOSIS — Z12.11 COLON CANCER SCREENING: Primary | ICD-10-CM

## 2024-06-21 ENCOUNTER — PATIENT MESSAGE (OUTPATIENT)
Dept: BEHAVIORAL HEALTH | Facility: CLINIC | Age: 64
End: 2024-06-21
Payer: OTHER GOVERNMENT

## 2024-06-24 ENCOUNTER — TELEPHONE (OUTPATIENT)
Dept: DERMATOLOGY | Facility: CLINIC | Age: 64
End: 2024-06-24
Payer: OTHER GOVERNMENT

## 2024-06-24 ENCOUNTER — TELEPHONE (OUTPATIENT)
Dept: BEHAVIORAL HEALTH | Facility: CLINIC | Age: 64
End: 2024-06-24
Payer: OTHER GOVERNMENT

## 2024-06-24 RX ORDER — VALACYCLOVIR HYDROCHLORIDE 1 G/1
TABLET, FILM COATED ORAL
Qty: 30 TABLET | Refills: 3 | OUTPATIENT
Start: 2024-06-24

## 2024-06-24 NOTE — PROGRESS NOTES
Behavioral Health Community Health Worker  Initial Assessment  Completed by:  Ish De Jesus    Date:  6/24/2024    Patient Enrollment in Behavioral Health Program:  Patient verbalized understanding of Behavioral Health Integration services to include:  Patient understands that CHW, LCSW, PharmD and consulting Psychiatrist are members of the care team working collaboratively with his/her primary care provider: Yes  Patient understands that activation of their [facility] patient portal account is required for accessing the full scope of team services: Yes  Patient understands that some counseling sessions may occur via video: Yes  Clinic visits with the psychiatrist may be subject to a co-pay based on your insurance: Yes  Patient consents to enroll in BHI program: Yes    Assessments     Single Item Health Literacy Scale:       Promis 10:       Depression PHQ:      6/24/2024     1:34 PM 6/14/2024    10:28 AM 1/24/2023    10:01 AM 10/27/2022    11:04 AM 8/8/2022     1:15 PM 7/14/2022    11:46 AM 7/11/2022    10:21 AM   PHQ-9 Depression Patient Health Questionnaire   Over the last two weeks how often have you been bothered by little interest or pleasure in doing things 3 3 1 3 3  3   Over the last two weeks how often have you been bothered by feeling down, depressed or hopeless 3 3 1 3 3 1 3   Over the last two weeks how often have you been bothered by trouble falling or staying asleep, or sleeping too much 3   3   3   Over the last two weeks how often have you been bothered by feeling tired or having little energy 3   3   3   Over the last two weeks how often have you been bothered by a poor appetite or overeating 3   3   3   Over the last two weeks how often have you been bothered by feeling bad about yourself - or that you are a failure or have let yourself or your family down 3   3   3   Over the last two weeks how often have you been bothered by trouble concentrating on things, such as reading the newspaper or  watching television 3   3   3   Over the last two weeks how often have you been bothered by moving or speaking so slowly that other people could have noticed. 2   1   1   Over the last two weeks how often have you been bothered by thoughts that you would be better off dead, or of hurting yourself 1   1   1   If you checked off any problems, how difficult have these problems made it for you to do your work, take care of things at home or get along with other people? Extremely difficult      Very difficult   PHQ-9 Score 24   23   23          Generalized Anxiety Disorder 7-Item Scale:      6/24/2024     1:38 PM   GAD7   1. Feeling nervous, anxious, or on edge? 3   2. Not being able to stop or control worrying? 3   3. Worrying too much about different things? 3   4. Trouble relaxing? 3   5. Being so restless that it is hard to sit still? 3   6. Becoming easily annoyed or irritable? 3   7. Feeling afraid as if something awful might happen? 2   8. If you checked off any problems, how difficult have these problems made it for you to do your work, take care of things at home, or get along with other people? 3   JOSE ANGEL-7 Score 20       History     Social History     Socioeconomic History    Marital status:    Tobacco Use    Smoking status: Every Day     Current packs/day: 0.50     Types: Cigarettes    Smokeless tobacco: Never   Substance and Sexual Activity    Alcohol use: Yes     Alcohol/week: 0.0 standard drinks of alcohol     Comment: occasional    Drug use: No    Sexual activity: Not Currently     Social Determinants of Health     Financial Resource Strain: High Risk (6/7/2024)    Overall Financial Resource Strain (CARDIA)     Difficulty of Paying Living Expenses: Hard   Food Insecurity: Food Insecurity Present (6/7/2024)    Hunger Vital Sign     Worried About Running Out of Food in the Last Year: Sometimes true     Ran Out of Food in the Last Year: Sometimes true   Physical Activity: Insufficiently Active (6/7/2024)  "   Exercise Vital Sign     Days of Exercise per Week: 1 day     Minutes of Exercise per Session: 30 min   Stress: Stress Concern Present (6/7/2024)    [employer] - Occupational Stress Questionnaire     Feeling of Stress : Very much   Housing Stability: High Risk (6/7/2024)    Housing Stability Vital Sign     Unable to Pay for Housing in the Last Year: Yes       Call Summary   Patient was referred to the BHI (Non-opioid) program by Primary Care Provider, Dr. Mcclendon CHW contacted Brianna DEV Cantu who reports depression that limits [his/her] activities of daily living (ADLs).   Patient scored "24" on the PHQ9 and "20" on the JOSE ANGEL 7. Based on these scores patient is eligible for the Behavioral health Integration (Non-opioid) Program. CHW completed the intake and scheduled an appointment for patient with Dr Geraldine Vidales, LPC,PhD on 7/10/24.  Patient given emergent information due to reports of having SI/Hi thoughts in the past two weeks with no plan to harm self. Patient is not SI/HI at this time.    "

## 2024-06-24 NOTE — TELEPHONE ENCOUNTER
Spoke with patient in regard to setting up appointment for valtrex refill and scheduled her for 6/26/24 at 1:00 PM with me.    -PEPPER, KAMILLE

## 2024-06-26 ENCOUNTER — OFFICE VISIT (OUTPATIENT)
Dept: DERMATOLOGY | Facility: CLINIC | Age: 64
End: 2024-06-26
Payer: OTHER GOVERNMENT

## 2024-06-26 DIAGNOSIS — A60.00 GENITAL HERPES SIMPLEX, UNSPECIFIED SITE: Primary | ICD-10-CM

## 2024-06-26 PROCEDURE — 99214 OFFICE O/P EST MOD 30 MIN: CPT | Mod: S$PBB,,, | Performed by: PHYSICIAN ASSISTANT

## 2024-06-26 PROCEDURE — 99999 PR PBB SHADOW E&M-EST. PATIENT-LVL III: CPT | Mod: PBBFAC,,, | Performed by: PHYSICIAN ASSISTANT

## 2024-06-26 PROCEDURE — 99213 OFFICE O/P EST LOW 20 MIN: CPT | Mod: PBBFAC | Performed by: PHYSICIAN ASSISTANT

## 2024-06-26 PROCEDURE — G2211 COMPLEX E/M VISIT ADD ON: HCPCS | Mod: S$PBB,,, | Performed by: PHYSICIAN ASSISTANT

## 2024-06-26 RX ORDER — VALACYCLOVIR HYDROCHLORIDE 500 MG/1
TABLET, FILM COATED ORAL
Qty: 60 TABLET | Refills: 2 | Status: SHIPPED | OUTPATIENT
Start: 2024-06-26

## 2024-06-26 NOTE — PROGRESS NOTES
"  Subjective:      Patient ID:  Brianna Cantu is a 63 y.o. female who presents for medication management.    She is here for medication management for her genital herpes simplex. She does not currently have any active lesions. She reports that she can feel when her skin get "crawly"  that she is about to get an outbreak. She will get this feeling on her face, buttocks region, lips, and under her breasts.     She reports that it flares with stress. She reports about two actual outbreaks a year and having to take the Valtrex on about 6 different occasions over the past year to hanson off potential flares. She reports taking the Valtrex as prescribed on the bottle.        Review of Systems   Skin:  Negative for itching and rash.       Objective:   Physical Exam   Constitutional: She appears well-developed and well-nourished. No distress.   Neurological: She is alert and oriented to person, place, and time. She is not disoriented.   Psychiatric: She has a normal mood and affect.   Skin:   Areas Examined (abnormalities noted in diagram):   Head / Face Inspection Performed                 Diagram Legend     Erythematous scaling macule/papule c/w actinic keratosis       Vascular papule c/w angioma      Pigmented verrucoid papule/plaque c/w seborrheic keratosis      Yellow umbilicated papule c/w sebaceous hyperplasia      Irregularly shaped tan macule c/w lentigo     1-2 mm smooth white papules consistent with Milia      Movable subcutaneous cyst with punctum c/w epidermal inclusion cyst      Subcutaneous movable cyst c/w pilar cyst      Firm pink to brown papule c/w dermatofibroma      Pedunculated fleshy papule(s) c/w skin tag(s)      Evenly pigmented macule c/w junctional nevus     Mildly variegated pigmented, slightly irregular-bordered macule c/w mildly atypical nevus      Flesh colored to evenly pigmented papule c/w intradermal nevus       Pink pearly papule/plaque c/w basal cell carcinoma      Erythematous " hyperkeratotic cursted plaque c/w SCC      Surgical scar with no sign of skin cancer recurrence      Open and closed comedones      Inflammatory papules and pustules      Verrucoid papule consistent consistent with wart     Erythematous eczematous patches and plaques     Dystrophic onycholytic nail with subungual debris c/w onychomycosis     Umbilicated papule    Erythematous-base heme-crusted tan verrucoid plaque consistent with inflamed seborrheic keratosis     Erythematous Silvery Scaling Plaque c/w Psoriasis     See annotation      Assessment / Plan:        Genital herpes simplex, unspecified site  -     valACYclovir (VALTREX) 500 MG tablet; Take one tablet (500 mg) by mouth two times daily for three days as needed at the first sign of an outbreak.  Dispense: 60 tablet; Refill: 2    CMP  Sodium   Date Value Ref Range Status   06/14/2024 144 136 - 145 mmol/L Final     Potassium   Date Value Ref Range Status   06/14/2024 4.2 3.5 - 5.1 mmol/L Final     Chloride   Date Value Ref Range Status   06/14/2024 111 (H) 95 - 110 mmol/L Final     CO2   Date Value Ref Range Status   06/14/2024 24 23 - 29 mmol/L Final     Glucose   Date Value Ref Range Status   06/14/2024 93 70 - 110 mg/dL Final     BUN   Date Value Ref Range Status   06/14/2024 14 8 - 23 mg/dL Final     Creatinine   Date Value Ref Range Status   06/14/2024 1.1 0.5 - 1.4 mg/dL Final     Calcium   Date Value Ref Range Status   06/14/2024 9.9 8.7 - 10.5 mg/dL Final     Total Protein   Date Value Ref Range Status   06/14/2024 7.7 6.0 - 8.4 g/dL Final     Albumin   Date Value Ref Range Status   06/14/2024 3.8 3.5 - 5.2 g/dL Final     Total Bilirubin   Date Value Ref Range Status   06/14/2024 0.3 0.1 - 1.0 mg/dL Final     Comment:     For infants and newborns, interpretation of results should be based  on gestational age, weight and in agreement with clinical  observations.    Premature Infant recommended reference ranges:  Up to 24 hours.............<8.0 mg/dL  Up  to 48 hours............<12.0 mg/dL  3-5 days..................<15.0 mg/dL  6-29 days.................<15.0 mg/dL       Alkaline Phosphatase   Date Value Ref Range Status   06/14/2024 69 55 - 135 U/L Final     AST   Date Value Ref Range Status   06/14/2024 19 10 - 40 U/L Final     ALT   Date Value Ref Range Status   06/14/2024 18 10 - 44 U/L Final     Anion Gap   Date Value Ref Range Status   06/14/2024 9 8 - 16 mmol/L Final     eGFR   Date Value Ref Range Status   06/14/2024 56.5 (A) >60 mL/min/1.73 m^2 Final     No dose adjustment needed based on current eGFR and current guidelines.         Follow up in about 1 year (around 6/26/2025) for medication management.

## 2024-06-26 NOTE — PATIENT INSTRUCTIONS
Genital herpes simplex, unspecified site  -     valACYclovir (VALTREX) 500 MG tablet; Take one tablet (500 mg) by mouth two times daily for three days as needed at the first sign of an outbreak.  Dispense: 60 tablet; Refill: 2    Follow up in about 1 year (around 6/26/2025) for medication management.

## 2024-07-30 ENCOUNTER — PATIENT MESSAGE (OUTPATIENT)
Dept: PSYCHIATRY | Facility: CLINIC | Age: 64
End: 2024-07-30
Payer: OTHER GOVERNMENT

## 2024-07-31 ENCOUNTER — PATIENT MESSAGE (OUTPATIENT)
Dept: BEHAVIORAL HEALTH | Facility: CLINIC | Age: 64
End: 2024-07-31
Payer: OTHER GOVERNMENT

## 2024-08-01 ENCOUNTER — PATIENT MESSAGE (OUTPATIENT)
Dept: NEUROLOGY | Facility: CLINIC | Age: 64
End: 2024-08-01
Payer: OTHER GOVERNMENT

## 2024-08-01 ENCOUNTER — TELEPHONE (OUTPATIENT)
Dept: INTERNAL MEDICINE | Facility: CLINIC | Age: 64
End: 2024-08-01
Payer: OTHER GOVERNMENT

## 2024-08-01 ENCOUNTER — HOSPITAL ENCOUNTER (OUTPATIENT)
Dept: RADIOLOGY | Facility: OTHER | Age: 64
Discharge: HOME | End: 2024-08-01
Attending: PSYCHIATRY & NEUROLOGY
Payer: OTHER GOVERNMENT

## 2024-08-01 DIAGNOSIS — R25.2 MUSCLE CRAMP: ICD-10-CM

## 2024-08-01 PROCEDURE — 25500020 PHARM REV CODE 255: Performed by: PSYCHIATRY & NEUROLOGY

## 2024-08-01 PROCEDURE — A9585 GADOBUTROL INJECTION: HCPCS | Performed by: PSYCHIATRY & NEUROLOGY

## 2024-08-01 PROCEDURE — 72157 MRI CHEST SPINE W/O & W/DYE: CPT | Mod: 26,,, | Performed by: RADIOLOGY

## 2024-08-01 PROCEDURE — 72157 MRI CHEST SPINE W/O & W/DYE: CPT | Mod: TC

## 2024-08-01 RX ORDER — GADOBUTROL 604.72 MG/ML
10 INJECTION INTRAVENOUS
Status: COMPLETED | OUTPATIENT
Start: 2024-08-01 | End: 2024-08-01

## 2024-08-01 RX ADMIN — GADOBUTROL 10 ML: 604.72 INJECTION INTRAVENOUS at 08:08

## 2024-08-01 NOTE — TELEPHONE ENCOUNTER
----- Message from Tita Quiñones sent at 8/1/2024  1:30 PM CDT -----  Requesting an RX refill or new RX.    Is this a refill or new RX: Refill 1    RX name and strength (copy/paste from chart):  ibuprofen (ADVIL,MOTRIN) 800 MG tablet    Is this a 30 day or 90 day RX:     Pharmacy name and phone # (copy/paste from chart):  [facility] [place] - [id number]   Phone: [phone]  Fax: [phone]          The doctors have asked that we provide their patients with the following 2 reminders -- prescription refills can take up to 72 hours, and a friendly reminder that in the future you can use your [facility] account to request refills:

## 2024-08-22 ENCOUNTER — OFFICE VISIT (OUTPATIENT)
Dept: OPTOMETRY | Facility: CLINIC | Age: 64
End: 2024-08-22
Payer: OTHER GOVERNMENT

## 2024-08-22 DIAGNOSIS — H10.9 BACTERIAL CONJUNCTIVITIS: Primary | ICD-10-CM

## 2024-08-22 PROCEDURE — 99999 PR PBB SHADOW E&M-EST. PATIENT-LVL III: CPT | Mod: PBBFAC,,, | Performed by: OPTOMETRIST

## 2024-08-22 PROCEDURE — 92012 INTRM OPH EXAM EST PATIENT: CPT | Mod: S$PBB,,, | Performed by: OPTOMETRIST

## 2024-08-22 PROCEDURE — 99213 OFFICE O/P EST LOW 20 MIN: CPT | Mod: PBBFAC | Performed by: OPTOMETRIST

## 2024-08-22 RX ORDER — PREDNISOLONE ACETATE 10 MG/ML
1 SUSPENSION/ DROPS OPHTHALMIC 4 TIMES DAILY
Qty: 5 ML | Refills: 2 | Status: SHIPPED | OUTPATIENT
Start: 2024-08-22 | End: 2025-08-22

## 2024-08-22 NOTE — TELEPHONE ENCOUNTER
----- Message from Xochitl Atwood MA sent at 8/20/2024  4:42 PM CDT -----  Regarding: FW: Pt Advice  Contact: PO CROSS SR. [[id number]]    ----- Message -----  From: Roman Armendariz  Sent: 8/20/2024   4:17 PM CDT  To: [facility] Staff  Subject: Pt Advice                                        Name of Who is Calling: PO CROSS SR. [[id number]]      What is the request in detail: Would like to follow up on Cardio referral that was placed in system to see if it is something that is needed to be scheduled. Please advise      Can the clinic reply by [facility]: no      What Number to Call Back [facility]:  [phone]   Called patient to confirm her appointment with GOPI Barragan tomorrow at 1:30 pm she didn't answer left voice mail.

## 2024-08-27 NOTE — PROGRESS NOTES
HPI    Brianna Cantu is a 63 y.o. female who comes in due to redness.   Pt. States two weeks ago she was working in her garden and the next day   her eyes red.  Redness is slowly going down but at time pt feel FB sensation mainly in OD   and pressure.  Pt. Denies tearing, discharge, blurry vision, or pain.      Last edited by Tamar Carr on 8/22/2024  8:55 AM.            Assessment /Plan     For exam results, see Encounter Report.    Bacterial conjunctivitis    Other orders  -     prednisoLONE acetate (PRED FORTE) 1 % DrpS; Place 1 drop into both eyes 4 (four) times daily.  Dispense: 5 mL; Refill: 2      MONITOR. ED PT ON ALL EXAM FINDINGS  BACTERIAL CONJ OD; RX PF 1 % QID OD X 5-7 DAYS; CONTINUE WITH AT'S PRN   RTC 1 WEEK FOR F/U PRN

## 2024-09-10 ENCOUNTER — TELEPHONE (OUTPATIENT)
Dept: NEUROLOGY | Facility: CLINIC | Age: 64
End: 2024-09-10
Payer: OTHER GOVERNMENT

## 2024-09-10 NOTE — TELEPHONE ENCOUNTER
I spoke to the patient to let her know her appointment on 9/12 is canceled and we will give her a ronak back to reschedule.

## 2024-09-20 ENCOUNTER — TELEPHONE (OUTPATIENT)
Dept: NEUROLOGY | Facility: CLINIC | Age: 64
End: 2024-09-20
Payer: OTHER GOVERNMENT

## 2024-10-14 DIAGNOSIS — I10 BENIGN ESSENTIAL HYPERTENSION: Primary | ICD-10-CM

## 2024-10-15 ENCOUNTER — LAB VISIT (OUTPATIENT)
Dept: LAB | Facility: OTHER | Age: 64
End: 2024-10-15
Attending: INTERNAL MEDICINE
Payer: OTHER GOVERNMENT

## 2024-10-15 ENCOUNTER — TELEPHONE (OUTPATIENT)
Dept: INTERNAL MEDICINE | Facility: CLINIC | Age: 64
End: 2024-10-15
Payer: OTHER GOVERNMENT

## 2024-10-15 DIAGNOSIS — I10 BENIGN ESSENTIAL HYPERTENSION: ICD-10-CM

## 2024-10-15 LAB
ALBUMIN SERPL BCP-MCNC: 3.8 G/DL (ref 3.5–5.2)
ANION GAP SERPL CALC-SCNC: 11 MMOL/L (ref 8–16)
BUN SERPL-MCNC: 15 MG/DL (ref 8–23)
CALCIUM SERPL-MCNC: 9.6 MG/DL (ref 8.7–10.5)
CHLORIDE SERPL-SCNC: 107 MMOL/L (ref 95–110)
CO2 SERPL-SCNC: 22 MMOL/L (ref 23–29)
CREAT SERPL-MCNC: 1.1 MG/DL (ref 0.5–1.4)
EST. GFR  (NO RACE VARIABLE): 56 ML/MIN/1.73 M^2
GLUCOSE SERPL-MCNC: 88 MG/DL (ref 70–110)
PHOSPHATE SERPL-MCNC: 3.3 MG/DL (ref 2.7–4.5)
POTASSIUM SERPL-SCNC: 3.5 MMOL/L (ref 3.5–5.1)
SODIUM SERPL-SCNC: 140 MMOL/L (ref 136–145)

## 2024-10-15 PROCEDURE — 36415 COLL VENOUS BLD VENIPUNCTURE: CPT | Performed by: INTERNAL MEDICINE

## 2024-10-15 PROCEDURE — 80069 RENAL FUNCTION PANEL: CPT | Performed by: INTERNAL MEDICINE

## 2024-10-15 NOTE — TELEPHONE ENCOUNTER
----- Message from Rossana Mcclendon MD sent at 10/14/2024  9:43 PM CDT -----  Can you please help her schedule these labs for before her visit this week.  Thank

## 2024-10-17 ENCOUNTER — TELEPHONE (OUTPATIENT)
Dept: BEHAVIORAL HEALTH | Facility: CLINIC | Age: 64
End: 2024-10-17
Payer: OTHER GOVERNMENT

## 2024-10-17 ENCOUNTER — LAB VISIT (OUTPATIENT)
Dept: LAB | Facility: HOSPITAL | Age: 64
End: 2024-10-17
Payer: OTHER GOVERNMENT

## 2024-10-17 ENCOUNTER — OFFICE VISIT (OUTPATIENT)
Dept: INTERNAL MEDICINE | Facility: CLINIC | Age: 64
End: 2024-10-17
Payer: OTHER GOVERNMENT

## 2024-10-17 ENCOUNTER — PATIENT MESSAGE (OUTPATIENT)
Dept: BEHAVIORAL HEALTH | Facility: CLINIC | Age: 64
End: 2024-10-17
Payer: OTHER GOVERNMENT

## 2024-10-17 VITALS
HEART RATE: 87 BPM | SYSTOLIC BLOOD PRESSURE: 128 MMHG | WEIGHT: 241.19 LBS | BODY MASS INDEX: 38.76 KG/M2 | DIASTOLIC BLOOD PRESSURE: 85 MMHG | HEIGHT: 66 IN | OXYGEN SATURATION: 97 %

## 2024-10-17 DIAGNOSIS — F33.1 MODERATE EPISODE OF RECURRENT MAJOR DEPRESSIVE DISORDER: Primary | ICD-10-CM

## 2024-10-17 DIAGNOSIS — J40 BRONCHITIS: ICD-10-CM

## 2024-10-17 DIAGNOSIS — I10 BENIGN ESSENTIAL HYPERTENSION: ICD-10-CM

## 2024-10-17 DIAGNOSIS — Z23 NEED FOR VACCINATION: ICD-10-CM

## 2024-10-17 LAB
ALBUMIN SERPL BCP-MCNC: 3.6 G/DL (ref 3.5–5.2)
ANION GAP SERPL CALC-SCNC: 9 MMOL/L (ref 8–16)
BUN SERPL-MCNC: 10 MG/DL (ref 8–23)
CALCIUM SERPL-MCNC: 9.5 MG/DL (ref 8.7–10.5)
CHLORIDE SERPL-SCNC: 107 MMOL/L (ref 95–110)
CO2 SERPL-SCNC: 25 MMOL/L (ref 23–29)
CREAT SERPL-MCNC: 1.1 MG/DL (ref 0.5–1.4)
EST. GFR  (NO RACE VARIABLE): 56.1 ML/MIN/1.73 M^2
GLUCOSE SERPL-MCNC: 97 MG/DL (ref 70–110)
PHOSPHATE SERPL-MCNC: 3.6 MG/DL (ref 2.7–4.5)
POTASSIUM SERPL-SCNC: 3.7 MMOL/L (ref 3.5–5.1)
SODIUM SERPL-SCNC: 141 MMOL/L (ref 136–145)

## 2024-10-17 PROCEDURE — 99213 OFFICE O/P EST LOW 20 MIN: CPT | Mod: PBBFAC | Performed by: INTERNAL MEDICINE

## 2024-10-17 PROCEDURE — 99999PBSHW INFLUENZA - TRIVALENT - PF (ADULT): Mod: PBBFAC,,,

## 2024-10-17 PROCEDURE — 99214 OFFICE O/P EST MOD 30 MIN: CPT | Mod: S$PBB,,, | Performed by: INTERNAL MEDICINE

## 2024-10-17 PROCEDURE — 36415 COLL VENOUS BLD VENIPUNCTURE: CPT | Performed by: INTERNAL MEDICINE

## 2024-10-17 PROCEDURE — 99999PBSHW PR PBB SHADOW TECHNICAL ONLY FILED TO HB: Mod: PBBFAC,,,

## 2024-10-17 PROCEDURE — 99999 PR PBB SHADOW E&M-EST. PATIENT-LVL III: CPT | Mod: PBBFAC,,, | Performed by: INTERNAL MEDICINE

## 2024-10-17 PROCEDURE — 90656 IIV3 VACC NO PRSV 0.5 ML IM: CPT | Mod: PBBFAC

## 2024-10-17 PROCEDURE — 80069 RENAL FUNCTION PANEL: CPT | Performed by: INTERNAL MEDICINE

## 2024-10-17 RX ORDER — BUPROPION HYDROCHLORIDE 150 MG/1
150 TABLET, EXTENDED RELEASE ORAL 2 TIMES DAILY
Qty: 60 TABLET | Refills: 11 | Status: SHIPPED | OUTPATIENT
Start: 2024-10-17 | End: 2025-10-17

## 2024-10-17 RX ORDER — ALBUTEROL SULFATE 90 UG/1
2 INHALANT RESPIRATORY (INHALATION) EVERY 6 HOURS PRN
Qty: 18 G | Refills: 11 | Status: SHIPPED | OUTPATIENT
Start: 2024-10-17 | End: 2025-10-17

## 2024-10-17 RX ORDER — CYANOCOBALAMIN 1000 UG/ML
1000 INJECTION, SOLUTION INTRAMUSCULAR; SUBCUTANEOUS
Status: COMPLETED | OUTPATIENT
Start: 2024-10-17 | End: 2024-10-17

## 2024-10-17 RX ADMIN — CYANOCOBALAMIN 1000 MCG: 1000 INJECTION, SOLUTION INTRAMUSCULAR at 11:10

## 2024-10-17 NOTE — PROGRESS NOTES
Subjective:       Patient ID: Brianna Cantu is a 64 y.o. female.    Chief Complaint:  follow up     Presents for annual    Having worsening depression     neck and back pain.: has been doing PT and following with pain management. on gabapentin, flexeril and mobic.      VIVIAN with recent sleep study showing severe obstruction. Got her CPAP but only wearing    for 4 hours but just got machine yesterday      Has IBS, tried fiber in the past. Takes imodium.      HTN: well controlled on amlodipine-valsartain      HLD: on lipitor.     JOSE ANGEL/ depression: as above on lexapro.      Current smoker: has tried patches but did not work.      [facility]      Health Maintenance:  Colon Cancer Screening: Negative cologaurd in 2021. Rectal stricture inable to traverse in colonoscopy of 2018.    Mammogram:  Normal May 20, 2024  CT lung done at [facility] was normal early was normal.   HIV: negative 2021  Hep C: negative 2021   Lipids: Order today   Pap Smear: Done April 2022 and was normal. Due in April 2023   Vaccines: up to date with vaccines.           Follow-up  Pertinent negatives include no abdominal pain, arthralgias, chest pain, chills, coughing, headaches, myalgias, nausea or vomiting.     Review of Systems   Constitutional:  Negative for activity change, appetite change and chills.   HENT:  Negative for ear pain, sinus pressure/congestion and sneezing.    Respiratory:  Negative for cough and shortness of breath.    Cardiovascular:  Negative for chest pain, palpitations and leg swelling.   Gastrointestinal:  Negative for abdominal distention, abdominal pain, constipation, diarrhea, nausea and vomiting.   Genitourinary:  Negative for dysuria and hematuria.   Musculoskeletal:  Negative for arthralgias, back pain and myalgias.   Neurological:  Negative for dizziness and headaches.   Psychiatric/Behavioral:  Negative for agitation. The patient is not nervous/anxious.            Past Medical History:   Diagnosis Date     Allergic rhinitis 4/27/2014    Anxiety 4/27/2014    Asthma     Hx of cervical cancer 4/27/2014    Obesity 4/27/2014    Vitamin D deficiency 4/27/2014     Past Surgical History:   Procedure Laterality Date    COLONOSCOPY      COLONOSCOPY N/A 9/24/2018    Procedure: COLONOSCOPY;  Surgeon: Montrell Gan MD;  [facility]  Service: Endoscopy;  Laterality: N/A;    ESOPHAGOGASTRODUODENOSCOPY      ESOPHAGOGASTRODUODENOSCOPY N/A 9/24/2018    Procedure: EGD (ESOPHAGOGASTRODUODENOSCOPY);  Surgeon: Montrell Gan MD;  [facility]  Service: Endoscopy;  Laterality: N/A;    TOTAL ABDOMINAL HYSTERECTOMY        Patient Active Problem List   Diagnosis    Allergic rhinitis    Anxiety    Obesity    Vitamin D deficiency    Hx of cervical cancer    Laryngopharyngeal reflux    Bilateral leg numbness    Bronchitis    Constipation    Benign essential hypertension    Other depressive disorder    Tobacco use disorder    Mild intermittent asthma without complication    Stenosis colon    Light cigarette smoker (1-9 cigs/day)    Leg edema    Diarrhea    Cough    Wheezing    Lesion of hard palate    JOSE ANGEL (generalized anxiety disorder)    Hypersomnolence    Muscle cramp    Pelvic floor dysfunction    Refractive error    Nuclear sclerosis of both eyes    Vibration sensory loss        Objective:      Physical Exam  Constitutional:       Appearance: Normal appearance.   HENT:      Head: Normocephalic.      Right Ear: Tympanic membrane normal.      Left Ear: Tympanic membrane normal.      Nose: Nose normal.   Cardiovascular:      Rate and Rhythm: Normal rate and regular rhythm.      Pulses: Normal pulses.      Heart sounds: Normal heart sounds.   Pulmonary:      Effort: Pulmonary effort is normal.      Breath sounds: Normal breath sounds.   Abdominal:      General: Abdomen is flat. Bowel sounds are normal.      Palpations: Abdomen is soft.   Musculoskeletal:         General: Normal range of motion.       Cervical back: Normal range of motion and neck supple.   Skin:     General: Skin is warm and dry.   Neurological:      General: No focal deficit present.      Mental Status: She is alert and oriented to person, place, and time.   Psychiatric:         Mood and Affect: Mood normal.         Assessment:       Problem List Items Addressed This Visit    None          Plan:       Brianna was seen today for follow-up.    Diagnoses and all orders for this visit:    Moderate episode of recurrent major depressive disorder  -     Ambulatory referral/consult to Primary Care Behavioral Health (Non-Opioids); Future  -     buPROPion (WELLBUTRIN SR) 150 MG TBSR 12 hr tablet; Take 1 tablet (150 mg total) by mouth 2 (two) times daily. Start taking once daily for the first 3 days then increase to twice daily.  -     cyanocobalamin injection 1,000 mcg  Trial wellbutrin. Start PT   Follow up in 6 weeks to assess response     Bronchitis  Cough  -     albuterol (PROVENTIL/VENTOLIN HFA) 90 mcg/actuation inhaler; Inhale 2 puffs into the lungs every 6 (six) hours as needed for Wheezing.    Benign essential hypertension  -     RENAL FUNCTION PANEL; Future    Need for vaccination  -     Influenza - Trivalent - PF (ADULT)            Rossana Mcclendon MD   Internal Medicine   Primary Care

## 2024-10-17 NOTE — PROGRESS NOTES
Behavioral Health Community Health Worker  Initial Assessment  Completed by:  Ish De Jesus    Date:  10/17/2024    Patient Enrollment in Behavioral Health Program:  Patient verbalized understanding of Behavioral Health Integration services to include:  Patient understands that CHW, LCSW, PharmD and consulting Psychiatrist are members of the care team working collaboratively with his/her primary care provider: Yes  Patient understands that activation of their [facility] patient portal account is required for accessing the full scope of team services: Yes  Patient understands that some counseling sessions may occur via video: Yes  Clinic visits with the psychiatrist may be subject to a co-pay based on your insurance: Yes  Patient consents to enroll in BHI program: Yes    Assessments     Single Item Health Literacy Scale:  How often do you need to have someone help you read instructions, pamphlets or other written material from your doctor or pharmacy?: Never    Promis 10:  Promis 10 Responses  In general, would you say your health is: Fair  In general, would you say your quality of life is: Poor  In general, how would you rate your physical health?: Fair  In general, how would you rate your mental health, including your mood and your ability to think?: Poor  In general, how would you rate your satisfaction with your social activities and relationships?: Poor  In general, please rate how well you carry out your usual social activities and roles. (This includes activities at home, at work and in your community, and responsibilities as a parent, child, spouse, employee, friend, etc.): Poor  To what extent are you able to carry out your everyday physical activities such as walking, climbing stairs, carrying groceries, or moving a chair? : Completely  How often have you been bothered by emotional problems such as feeling anxious, depressed or irritable?: Always  In the past 7 days, how would you rate your fatigue on  average?: Severe  In the past 7 days, on a scale of 0 to 10 (where 0 is no pain and 10 is the worst pain imaginable) how would you rate your pain on average?: 8  Global Physical Health: 19  Global Mental health Score: 8    Depression PHQ:      10/17/2024     1:15 PM 6/24/2024     1:34 PM 6/14/2024    10:28 AM 1/24/2023    10:01 AM 10/27/2022    11:04 AM 8/8/2022     1:15 PM 7/14/2022    11:46 AM   PHQ-9 Depression Patient Health Questionnaire   Over the last two weeks how often have you been bothered by little interest or pleasure in doing things 3 3 3 1 3 3    Over the last two weeks how often have you been bothered by feeling down, depressed or hopeless 3 3 3 1 3 3 1   Over the last two weeks how often have you been bothered by trouble falling or staying asleep, or sleeping too much 3 3   3     Over the last two weeks how often have you been bothered by feeling tired or having little energy 3 3   3     Over the last two weeks how often have you been bothered by a poor appetite or overeating 3 3   3     Over the last two weeks how often have you been bothered by feeling bad about yourself - or that you are a failure or have let yourself or your family down 2 3   3     Over the last two weeks how often have you been bothered by trouble concentrating on things, such as reading the newspaper or watching television 3 3   3     Over the last two weeks how often have you been bothered by moving or speaking so slowly that other people could have noticed. 1 2   1     Over the last two weeks how often have you been bothered by thoughts that you would be better off dead, or of hurting yourself  1   1     If you checked off any problems, how difficult have these problems made it for you to do your work, take care of things at home or get along with other people?  Extremely difficult        PHQ-9 Score  24   23            Generalized Anxiety Disorder 7-Item Scale:      10/17/2024     1:17 PM   GAD7   1. Feeling nervous,  "anxious, or on edge? 2   2. Not being able to stop or control worrying? 2   3. Worrying too much about different things? 2   4. Trouble relaxing? 3   5. Being so restless that it is hard to sit still? 2   6. Becoming easily annoyed or irritable? 1   7. Feeling afraid as if something awful might happen? 1   8. If you checked off any problems, how difficult have these problems made it for you to do your work, take care of things at home, or get along with other people? 3   JOSE ANGEL-7 Score 13       History     Social History     Socioeconomic History    Marital status:    Tobacco Use    Smoking status: Every Day     Current packs/day: 0.50     Types: Cigarettes    Smokeless tobacco: Never   Substance and Sexual Activity    Alcohol use: Yes     Alcohol/week: 0.0 standard drinks of alcohol     Comment: occasional    Drug use: No    Sexual activity: Not Currently     Social Drivers of Health     Financial Resource Strain: High Risk (6/7/2024)    Overall Financial Resource Strain (CARDIA)     Difficulty of Paying Living Expenses: Hard   Food Insecurity: Food Insecurity Present (6/7/2024)    Hunger Vital Sign     Worried About Running Out of Food in the Last Year: Sometimes true     Ran Out of Food in the Last Year: Sometimes true   Physical Activity: Insufficiently Active (6/7/2024)    Exercise Vital Sign     Days of Exercise per Week: 1 day     Minutes of Exercise per Session: 30 min   Stress: Stress Concern Present (6/7/2024)    [employer] [place] [employer] - Occupational Stress Questionnaire     Feeling of Stress : Very much   Housing Stability: High Risk (6/7/2024)    Housing Stability Vital Sign     Unable to Pay for Housing in the Last Year: Yes       Call Summary   Patient was referred to the BHI (Non-opioid) program by Primary Care Provider, Dr. Mcclendon CHW contacted Brianna Cantu who reports depression that limits [his/her] activities of daily living (ADLs).   Patient scored "21" on the PHQ9 and " ""13" on the JOSE ANGEL 7. Based on these scores patient is eligible for the Behavioral health Integration (Non-opioid) Program. CHW completed the intake and scheduled an appointment for patient with  Pooja Richardson LCSW, on 11/18/24.          "

## 2024-10-31 ENCOUNTER — OFFICE VISIT (OUTPATIENT)
Dept: OPTOMETRY | Facility: CLINIC | Age: 64
End: 2024-10-31
Payer: OTHER GOVERNMENT

## 2024-10-31 DIAGNOSIS — H43.393 VITREOUS FLOATERS OF BOTH EYES: ICD-10-CM

## 2024-10-31 DIAGNOSIS — H52.203 MYOPIA OF BOTH EYES WITH ASTIGMATISM AND PRESBYOPIA: ICD-10-CM

## 2024-10-31 DIAGNOSIS — H25.13 NUCLEAR SCLEROSIS OF BOTH EYES: ICD-10-CM

## 2024-10-31 DIAGNOSIS — H04.123 DRY EYE SYNDROME, BILATERAL: Primary | ICD-10-CM

## 2024-10-31 DIAGNOSIS — H52.4 MYOPIA OF BOTH EYES WITH ASTIGMATISM AND PRESBYOPIA: ICD-10-CM

## 2024-10-31 DIAGNOSIS — H52.13 MYOPIA OF BOTH EYES WITH ASTIGMATISM AND PRESBYOPIA: ICD-10-CM

## 2024-10-31 PROCEDURE — 99213 OFFICE O/P EST LOW 20 MIN: CPT | Mod: PBBFAC | Performed by: OPTOMETRIST

## 2024-10-31 PROCEDURE — 92015 DETERMINE REFRACTIVE STATE: CPT | Mod: ,,, | Performed by: OPTOMETRIST

## 2024-10-31 PROCEDURE — 99999 PR PBB SHADOW E&M-EST. PATIENT-LVL III: CPT | Mod: PBBFAC,,, | Performed by: OPTOMETRIST

## 2024-10-31 PROCEDURE — 92014 COMPRE OPH EXAM EST PT 1/>: CPT | Mod: S$PBB,,, | Performed by: OPTOMETRIST

## 2024-11-04 ENCOUNTER — PATIENT MESSAGE (OUTPATIENT)
Dept: OPTOMETRY | Facility: CLINIC | Age: 64
End: 2024-11-04
Payer: OTHER GOVERNMENT

## 2024-11-12 NOTE — PROGRESS NOTES
DEIRDRE    MATIAS: 08/22/2024   Chief complaint (CC): 64 y.o. f presents to clinic for routine eye exam.   Pt states that she only wear eyeglasses while reading. Pt c/o blurry   vision at distance.   Glasses? +  H/o eye surgery, injections or laser: -  H/o eye injury: -  Known eye conditions?   Family h/o eye conditions? -  Eye gtts? OTC gtts for dry eyes      (-) Flashes (+)  Floaters (-) Mucous   (-)  Tearing (+) Itching (+) Burning   (-) Headaches (+) Eye Pain/discomfort (-) Irritation   (-)  Redness (-) Double vision (+) Blurry vision            Last edited by Lorie Osullivan MA on 10/31/2024 11:49 AM.            Assessment /Plan     For exam results, see Encounter Report.    Dry eye syndrome, bilateral    Nuclear sclerosis of both eyes    Vitreous floaters of both eyes    Myopia of both eyes with astigmatism and presbyopia      MONITOR. ED PT ON ALL EXAM FINDINGS  RX FINAL SPECS   OCULAR HEALTH STABLE OD, OS  CONTINUE WITH AT'S PRN   RTC 1 YR//PRN FOR REE/DFE

## 2024-11-18 ENCOUNTER — OFFICE VISIT (OUTPATIENT)
Dept: BEHAVIORAL HEALTH | Facility: CLINIC | Age: 64
End: 2024-11-18
Payer: OTHER GOVERNMENT

## 2024-11-18 DIAGNOSIS — F41.0 GENERALIZED ANXIETY DISORDER WITH PANIC ATTACKS: ICD-10-CM

## 2024-11-18 DIAGNOSIS — F33.1 MODERATE EPISODE OF RECURRENT MAJOR DEPRESSIVE DISORDER: ICD-10-CM

## 2024-11-18 DIAGNOSIS — F41.1 GENERALIZED ANXIETY DISORDER WITH PANIC ATTACKS: ICD-10-CM

## 2024-11-18 PROCEDURE — 99211 OFF/OP EST MAY X REQ PHY/QHP: CPT | Mod: PBBFAC

## 2024-11-18 PROCEDURE — 90791 PSYCH DIAGNOSTIC EVALUATION: CPT | Mod: ,,,

## 2024-11-18 PROCEDURE — 99999 PR PBB SHADOW E&M-EST. PATIENT-LVL I: CPT | Mod: PBBFAC,,,

## 2024-11-18 NOTE — PROGRESS NOTES
Primary Care Behavioral Health Integration: Initial  Diagnostic Interview - CPT 68236  Date:  11/18/2024  Referral Source:  Rossana Mcclendon MD  Length of Appointment: 60  Site: [facility]    Chief Complaint/Reason for Encounter:  Anxiety and Depression    History of Present Illness: Brianna MÉNDEZ Pepe, a 64 y.o. female referred by Rossana Mcclendon MD.  Patient was seen, examined and chart was reviewed. Met with patient. LCSW provided a description of the [facility] program, reviewed confidentialty and limits to confidentiality, and discussed safety planning in the event patient experiences suicidal or homicidal ideation.    Current Session:  Pt reported a history of depression and anxiety. Currently, pt is experiencing a depressed mood nearly every day and has no interest in normal activities. Pt reported she often spends most of the day in her room. Pt reported she has noticed appetite changes, increased fatigue, difficulty concentrating, and increased feelings of guilt. Pt also reported excessive and frequent worry, muscle tension, increased irritability, difficulty sleeping, and panic attacks. Pt experiences chronic IBS that may be influenced by anxiety. Pt lives with mother- complicated relationship. Pt currently feels she has no one she can rely on for support.     Pt reported she has experienced passive thoughts of suicide in the past. Pt reported she has never had any desire, intent, or plan to kill herself. Pt endorsed her [employer] as a reason she wants to stay alive. Pt denied current thoughts of suicide, homicide, or self-injurious behavior. Pt does not have access to a firearm.     Pt receives disability and reported she is doing fine financially. Pt has a positive relationship with her God-daughter and recently spent the day with her at the mall, which pt enjoyed and noticed was beneficial for her mood. Pt typically enjoys completing puzzles and watching TV. Pt views herself as a compassionate  and caring person and enjoys helping others. Pt is Islam.       Past Medical History:   Diagnosis Date    Allergic rhinitis 4/27/2014    Anxiety 4/27/2014    Asthma     Hx of cervical cancer 4/27/2014    Obesity 4/27/2014    Vitamin D deficiency 4/27/2014         Current Outpatient Medications:     albuterol (PROVENTIL/VENTOLIN HFA) 90 mcg/actuation inhaler, Inhale 2 puffs into the lungs every 6 (six) hours as needed for Wheezing., Disp: 18 g, Rfl: 11    amlodipine-valsartan (EXFORGE) 5-160 mg per tablet, Take 1 tablet by mouth once daily., Disp: 90 tablet, Rfl: 3    atorvastatin (LIPITOR) 40 MG tablet, Take 0.5 tablets (20 mg total) by mouth every evening., Disp: 45 tablet, Rfl: 3    buPROPion (WELLBUTRIN SR) 150 MG TBSR 12 hr tablet, Take 1 tablet (150 mg total) by mouth 2 (two) times daily. Start taking once daily for the first 3 days then increase to twice daily., Disp: 60 tablet, Rfl: 11    ECHINACEA 1X ORAL, Take by mouth., Disp: , Rfl:     EScitalopram oxalate (LEXAPRO) 20 MG tablet, Take 1 tablet (20 mg total) by mouth Daily., Disp: 90 tablet, Rfl: 3    fluticasone propionate (FLONASE) 50 mcg/actuation nasal spray, 1 spray (50 mcg total) by Each Nostril route daily as needed for Allergies., Disp: 16 g, Rfl: 0    fluticasone-salmeterol diskus inhaler 100-50 mcg, Inhale 1 puff into the lungs 2 (two) times daily., Disp: , Rfl:     gabapentin (NEURONTIN) 100 MG capsule, Take 1 capsule (100 mg total) by mouth 3 (three) times daily., Disp: 270 capsule, Rfl: 3    LIDOCAINE VISCOUS 2 % solution, Take by mouth., Disp: , Rfl:     loratadine (CLARITIN) 10 mg tablet, Take 10 mg by mouth., Disp: , Rfl:     meloxicam (MOBIC) 15 MG tablet, 15 mg., Disp: , Rfl:     oxybutynin (DITROPAN-XL) 10 MG 24 hr tablet, 10 mg., Disp: , Rfl:     pantoprazole (PROTONIX) 40 MG tablet, Take 1 tablet (40 mg total) by mouth once daily., Disp: 90 tablet, Rfl: 3    prednisoLONE acetate (PRED FORTE) 1 % DrpS, Place 1 drop into both eyes 4  (four) times daily. (Patient not taking: Reported on 10/31/2024), Disp: 5 mL, Rfl: 2    valACYclovir (VALTREX) 1000 MG tablet, Take 2 pills po bid x 2 days prn first signs of outbreak, Disp: 30 tablet, Rfl: 3    valACYclovir (VALTREX) 500 MG tablet, Take one tablet (500 mg) by mouth two times daily for three days as needed at the first sign of an outbreak., Disp: 60 tablet, Rfl: 2    Current symptoms:  Depression Symptoms: dysphoric mood, anhedonia, worthlessness/guilt, fatigue, difficulty concentrating, increased appetite, decreased appetite, and SI.  Anxiety Symptoms: excessive worrying, restlessness, muscle tension, and panic attacks, and irritability.   Sleep Difficulties: frequent night time awakening. Averaging 5 hours of sleep per night. IBS and anxiety impact sleep  Manic Symptoms:  denies.  Psychosis: denies .    Risk assessment:  Patient reports no suicidal ideation  Patient reports no homicidal ideation  Patient reports no self-injurious behavior  Patient reports no violent behavior    Patient advised to call [phone] or present the the nearest ED if they experience suicidal or homicidal ideation, plan or intent.      Psychiatric History:  Diagnosis:    Current Psychiatric Medication: Yes - Lexapro. Was not taking daily. Plans to begin taking medication daily. Prescribed Wellbutrin recently, but hasn't started it. Unsure about starting. They are not interested in medication changes.   Medication Trial History:  Medication Trials: Yes- took medication for depression in the past, cannot remember names   Outpatient Treatment: Yes - Saw therapist at [facility] for two sessions   Inpatient Treatment: No   Suicide Attempts: No   Access to Firearms: No   History of Trauma: Yes - family trauma   Family Psychiatric History: Cousin- unknown mental illness     Current and Past Substance Use:  Alcohol: Patient denies alcohol use.    Drugs: Current: recreational marijuana   Nicotine: Smoked .5 packs per day for 30  years   Caffeine:  1 cup of coffee per day     Mental Status Exam  General Appearance:  appears stated age, neatly dressed, well groomed   Speech: normal tone, normal rate, normal pitch, normal volume      Level of Cooperation: cooperative      Thought Processes: linear, logical, goal-directed   Mood: depressed      Thought Content: {relevant and appropriate   Affect: congruent and appropriate   Orientation: Oriented x4   Memory/Attention/Concentration: No gross cognitive deficits made evident during conversation   Judgment & Insight: good   Language  intact         11/11/2024    11:36 AM 8/26/2023     2:26 PM   Results of the PHQ8   Little interest or pleasure in doing things Nearly every day Nearly every day   Feeling down, depressed, or hopeless Nearly every day Nearly every day   Trouble falling or staying asleep, or sleeping too much Nearly every day Nearly every day   Feeling tired or having little energy Nearly every day Nearly every day   Poor appetite or overeating Nearly every day Nearly every day   Feeling bad about yourself - or that you are a failure or have let yourself or your family down Nearly every day Nearly every day   Trouble concentrating on things, such as reading the newspaper or watching television Nearly every day Nearly every day   Moving or speaking so slowly that other people could have noticed. Or the opposite - being so fidgety or restless that you have been moving around a lot more than usual Several days Nearly every day   Total Score  22 24           11/11/2024    11:34 AM 10/17/2024     1:17 PM 6/24/2024     1:38 PM   GAD7   1. Feeling nervous, anxious, or on edge? 3  2    2. Not being able to stop or control worrying? 3  2    3. Worrying too much about different things? 3  2    4. Trouble relaxing? 3  3    5. Being so restless that it is hard to sit still? 1  2    6. Becoming easily annoyed or irritable? 3  1    7. Feeling afraid as if something awful might happen? 3  1    8. If  you checked off any problems, how difficult have these problems made it for you to do your work, take care of things at home, or get along with other people? 3  3    JOSE ANGEL-7 Score 19  13        Information is confidential and restricted. Go to Review Flowsheets to unlock data.    Patient-reported       Impression: Initial appointment focused on gathering history, identifying treatment goals and developing a treatment plan.      Diagnosis:  1. Moderate episode of recurrent major depressive disorder  Ambulatory referral/consult to Primary Care Behavioral Health (Non-Opioids)      2. Generalized anxiety disorder with panic attacks            Treatment Goals and Plan:   Anxiety: reducing negative automatic thoughts, reducing physical symptoms of anxiety, and reducing time spent worrying (<30 minutes/day)  Depression: increasing energy, increasing interest in usual activities, increasing motivation, increasing self-reward for positive behaviors (one/day), increasing self-reward for positive thoughts (one/day), increasing social contacts (three/week), reducing excessive guilt, reducing fatigue, and reducing negative automatic thoughts    Future treatment will utilize CBT, Mindfulness Techniques, Motivational Interviewing, and Solution-focused Therapy.  Pt plans to read psychoeducation on depression.     Return to Clinic: 2 weeks

## 2024-12-06 ENCOUNTER — OFFICE VISIT (OUTPATIENT)
Dept: BEHAVIORAL HEALTH | Facility: CLINIC | Age: 64
End: 2024-12-06
Payer: OTHER GOVERNMENT

## 2024-12-06 DIAGNOSIS — F41.1 GENERALIZED ANXIETY DISORDER WITH PANIC ATTACKS: ICD-10-CM

## 2024-12-06 DIAGNOSIS — F33.1 MODERATE EPISODE OF RECURRENT MAJOR DEPRESSIVE DISORDER: ICD-10-CM

## 2024-12-06 DIAGNOSIS — F41.0 GENERALIZED ANXIETY DISORDER WITH PANIC ATTACKS: ICD-10-CM

## 2024-12-06 PROCEDURE — 90837 PSYTX W PT 60 MINUTES: CPT | Mod: ,,,

## 2024-12-06 NOTE — PROGRESS NOTES
"Primary Care Behavioral Health Integration: Follow-up  Date:  12/6/2024  Patient Name: Brianna Cantu  Referral Source:  Rossana Mcclendon MD  Type of Visit:  In person  Site:  [facility]    History of Present Illness:  Brianna Cantu, a 64 y.o.  female with history of Anxiety disorders; generalized anxiety disorder [F41.1] and Major Depressive Disorder, Recurrent, Moderate (F33.1) referred by Rossana Mcclendon MD.  Patient was seen, examined and chart was reviewed.    Met with patient.     Current session:   Pt reported no changes in symptoms of depression or anxiety. Pt discussed upsetting situation over dental appointment. Pt reported difficulty understanding medical and billing procedures. Pt reported feeling "crazy" and became very upset following her appointment. Pt became tearful and agitated recounting her experience. LCSW provided support and walking pt through a deep breathing exercise to help her calm down. Pt also discussed anxiety over driving and her living situation.     Pt reported attention/concentration difficulties. Stated she has a lot of difficulty comprehending information. Reported some difficulty accessing short-term memory. During session, pt occasionally had difficulty with word finding. Pt reported interest in ADHD testing.     LCSW provided information on the cognitive triangle and interconnectedness of thoughts, feelings, and behaviors. LCSW provided information on concept of behavioral activation. LCSW and pt discussed benefit of getting out of bed and increasing activity level. Pt reported she spent time with her brother and sister-in-law last week and was able to enjoy herself. Pt plans to begin thinking of behavioral goals.     Pt denied any current thoughts of suicide. Next session, check in regarding medication. Will consult with Dr. Dumas regarding pt's poorly controlled depression and attention/memory issues. Will work to set SMART goals at next appointment. "           12/3/2024    12:05 PM 11/11/2024    11:36 AM 8/26/2023     2:26 PM   Results of the PHQ8   Little interest or pleasure in doing things Nearly every day Nearly every day Nearly every day   Feeling down, depressed, or hopeless Nearly every day Nearly every day Nearly every day   Trouble falling or staying asleep, or sleeping too much Nearly every day Nearly every day Nearly every day   Feeling tired or having little energy Nearly every day Nearly every day Nearly every day   Poor appetite or overeating More than half the days Nearly every day Nearly every day   Feeling bad about yourself - or that you are a failure or have let yourself or your family down Nearly every day Nearly every day Nearly every day   Trouble concentrating on things, such as reading the newspaper or watching television Nearly every day Nearly every day Nearly every day   Moving or speaking so slowly that other people could have noticed. Or the opposite - being so fidgety or restless that you have been moving around a lot more than usual Several days Several days Nearly every day   Total Score  21 22 24           12/3/2024    12:03 PM 11/11/2024    11:34 AM 10/17/2024     1:17 PM   GAD7   1. Feeling nervous, anxious, or on edge? 3  3  2   2. Not being able to stop or control worrying? 3  3  2   3. Worrying too much about different things? 3  3  2   4. Trouble relaxing? 3  3  3   5. Being so restless that it is hard to sit still? 1  1  2   6. Becoming easily annoyed or irritable? 2  3  1   7. Feeling afraid as if something awful might happen? 2  3  1   8. If you checked off any problems, how difficult have these problems made it for you to do your work, take care of things at home, or get along with other people? 2  3  3   JOSE ANGEL-7 Score 17  19  13       Patient-reported       Mental Status Exam  General Appearance:  unremarkable, age appropriate     Speech: normal tone, normal rate, normal pitch, normal volume         Level of Cooperation:  cooperative        Thought Processes: normal and logical      Mood: steady        Thought Content: normal, no suicidality, no homicidality, delusions, or paranoia     Affect: congruent and appropriate    Orientation: Oriented x3     Memory: recent >  reported some difficulty with short-term memory, remote >  intact     Attention Span & Concentration: intact     Fund of General Knowledge: intact and appropriate to age and level of education   Abstract Reasoning: interpretation of similarities was concrete   Judgment & Insight: good       Language:  intact       Treatment plan:  Target symptoms: depression, anxiety   Why chosen therapy is appropriate versus another modality: relevant to diagnosis, patient responds to this modality, evidence based practice  Outcome monitoring methods: self-report, checklist/rating scale  Therapeutic intervention type: insight oriented psychotherapy, behavior modifying psychotherapy, supportive psychotherapy    Risk parameters:  Patient reports no suicidal ideation  Patient reports no homicidal ideation  Patient reports no self-injurious behavior  Patient reports no violent behavior    Verbal deficits: None    Patient's response to intervention:  The patient's response to intervention is accepting.    Progress toward goals and other mental status changes:  The patient's progress toward goals is limited.    Patient advised to call [phone] or present the the nearest ED if they experience suicidal or homicidal ideation, plan or intent.       Impression:  Pt meets diagnostic criteria for MDD and JOSE ANGEL. Currently, pt is experiencing a depressed mood nearly every day and has no interest in normal activities. Pt reported she often spends most of the day in her room. Pt reported she has noticed appetite changes, increased fatigue, difficulty concentrating, and increased feelings of guilt. Pt also reported excessive and frequent worry, muscle tension, increased irritability, difficulty sleeping,  and panic attacks. Pt experiences chronic IBS that may be influenced by anxiety.     Diagnosis:   1. Moderate episode of recurrent major depressive disorder        2. Generalized anxiety disorder with panic attacks            Treatment Goals and Plan: Pt plans to continue individual therapy. Pt plans to read psychoeducation on depression and behavioral activation. Pt plans to practice deep breathing. Pt plans to begin thinking of behavioral goals. Will review cognitive triangle and behavioral activation at the next session and develop SMART goals regarding behavioral modification.     Future treatment will utilize CBT, Mindfulness Techniques, Motivational Interviewing, and Solution-focused Therapy    Return to Clinic: 2 weeks  Plan to discuss case with [facility] consulting psychiatrist and further recommendations to be potentially be made at that time.    Length of Appointment: 60 Non face-to-face clinical time: 20

## 2024-12-16 ENCOUNTER — TELEPHONE (OUTPATIENT)
Dept: BEHAVIORAL HEALTH | Facility: CLINIC | Age: 64
End: 2024-12-16
Payer: OTHER GOVERNMENT

## 2024-12-16 NOTE — TELEPHONE ENCOUNTER
LCSW called pt to check on medication adherence. Pt reported she has been taking Lexapro and Wellbutrin daily (pt was not taking medication daily prior to intake appointment). Reported no negative side effects other than mild dizziness in the morning. Reminded pt of appointment tomorrow.

## 2024-12-17 ENCOUNTER — OFFICE VISIT (OUTPATIENT)
Dept: BEHAVIORAL HEALTH | Facility: CLINIC | Age: 64
End: 2024-12-17
Payer: OTHER GOVERNMENT

## 2024-12-17 DIAGNOSIS — F41.0 GENERALIZED ANXIETY DISORDER WITH PANIC ATTACKS: ICD-10-CM

## 2024-12-17 DIAGNOSIS — F41.1 GENERALIZED ANXIETY DISORDER WITH PANIC ATTACKS: ICD-10-CM

## 2024-12-17 DIAGNOSIS — F33.1 MODERATE EPISODE OF RECURRENT MAJOR DEPRESSIVE DISORDER: ICD-10-CM

## 2024-12-17 PROCEDURE — 90834 PSYTX W PT 45 MINUTES: CPT | Mod: ,,,

## 2024-12-17 NOTE — PROGRESS NOTES
Primary Care Behavioral Health Integration: Follow-up  Date:  12/17/2024  Patient Name: Brianna Cantu  Referral Source:  Rossana Mcclendon MD  Type of Visit:  In person  Site:  [facility]    History of Present Illness:  Brianna Cantu, a 64 y.o.  female with history of Anxiety disorders; generalized anxiety disorder [F41.1] and Major Depressive Disorder, Recurrent, Moderate (F33.1) referred by Rossana Mcclendon MD.  Patient was seen, examined and chart was reviewed.    Met with patient.     Current session:   Pt wanted to spend session discussing upsetting work experiences from her past. LCSW provided support to pt as she shared stories. Pt was also very upset because her mother caused her to be late to her appointment. LCSW walked pt through a mindfulness-based guided visualization, which helped pt calm down. LCSW discussed with pt other grounding and mindfulness techniques she can use to help with emotional regulation. Pt has been practicing deep breathing, which she reported has been helpful. Pt also made progress in cleaning her room, which provided a sense of accomplishment to her. Pt has a goal to make more progress on her room before her next session. LCSW briefly reviewed behavioral activation, and the helpfulness of increasing activity levels.     Pt reported she has continued to take Lexapro and Wellbutrin daily. Pt is only taking Wellbutrin once a day (prescription is for twice a day). Pt is worried about the amount of medication she is taking and doesn't want to take medication twice per day. LCSW will discuss pt's medication adherence with consulting psychiatrist.           12/16/2024     2:05 PM 12/3/2024    12:05 PM 11/11/2024    11:36 AM 8/26/2023     2:26 PM   Results of the PHQ8   Little interest or pleasure in doing things More than half the days Nearly every day Nearly every day Nearly every day   Feeling down, depressed, or hopeless Several days Nearly every day Nearly  every day Nearly every day   Trouble falling or staying asleep, or sleeping too much Nearly every day Nearly every day Nearly every day Nearly every day   Feeling tired or having little energy Nearly every day Nearly every day Nearly every day Nearly every day   Poor appetite or overeating Several days More than half the days Nearly every day Nearly every day   Feeling bad about yourself - or that you are a failure or have let yourself or your family down More than half the days Nearly every day Nearly every day Nearly every day   Trouble concentrating on things, such as reading the newspaper or watching television More than half the days Nearly every day Nearly every day Nearly every day   Moving or speaking so slowly that other people could have noticed. Or the opposite - being so fidgety or restless that you have been moving around a lot more than usual Several days Several days Several days Nearly every day   Total Score  15 21 22 24           12/16/2024     2:03 PM 12/3/2024    12:03 PM 11/11/2024    11:34 AM   GAD7   1. Feeling nervous, anxious, or on edge? 2  3  3    2. Not being able to stop or control worrying? 1  3  3    3. Worrying too much about different things? 2  3  3    4. Trouble relaxing? 3  3  3    5. Being so restless that it is hard to sit still? 1  1  1    6. Becoming easily annoyed or irritable? 2  2  3    7. Feeling afraid as if something awful might happen? 1  2  3    8. If you checked off any problems, how difficult have these problems made it for you to do your work, take care of things at home, or get along with other people? 1  2  3    JOSE ANGEL-7 Score 12  17  19        Patient-reported       Mental Status Exam  General Appearance:  unremarkable, age appropriate     Speech: normal tone, normal rate, normal pitch, normal volume         Level of Cooperation: cooperative        Thought Processes: normal and logical      Mood: steady        Thought Content: normal, no suicidality, no  homicidality, delusions, or paranoia     Affect: congruent and appropriate    Orientation: Oriented x3     Memory: recent >  reported some difficulty with short-term memory, remote >  intact     Attention Span & Concentration: intact     Fund of General Knowledge: intact and appropriate to age and level of education   Abstract Reasoning: interpretation of similarities was concrete   Judgment & Insight: good       Language:  intact       Treatment plan:  Target symptoms: depression, anxiety   Why chosen therapy is appropriate versus another modality: relevant to diagnosis, patient responds to this modality, evidence based practice  Outcome monitoring methods: self-report, checklist/rating scale  Therapeutic intervention type: insight oriented psychotherapy, behavior modifying psychotherapy, supportive psychotherapy    Risk parameters:  Patient reports no suicidal ideation  Patient reports no homicidal ideation  Patient reports no self-injurious behavior  Patient reports no violent behavior    Verbal deficits: None    Patient's response to intervention:  The patient's response to intervention is accepting.    Progress toward goals and other mental status changes:  The patient's progress toward goals is limited.    Patient advised to call [phone] or present the the nearest ED if they experience suicidal or homicidal ideation, plan or intent.       Impression:  Pt meets diagnostic criteria for MDD and JOSE ANGEL. Currently, pt is experiencing a depressed mood nearly every day and has no interest in normal activities. Pt reported she often spends most of the day in her room. Pt reported she has noticed appetite changes, increased fatigue, difficulty concentrating, and increased feelings of guilt. Pt also reported excessive and frequent worry, muscle tension, increased irritability, difficulty sleeping, and panic attacks. Pt experiences chronic IBS that may be influenced by anxiety.     Diagnosis:   1. Moderate episode of  recurrent major depressive disorder        2. Generalized anxiety disorder with panic attacks              Treatment Goals and Plan: Pt plans to continue individual therapy. Pt plans to continue practicing deep breathing. Pt plans to practice progressive muscle relaxation and grounding technique of holding an ice pack. Pt has a goal to continue making progress in cleaning room. Will review cognitive triangle and behavioral activation at the next session and develop SMART goals regarding behavioral modification. Check-in on sleep.     Future treatment will utilize CBT, Mindfulness Techniques, Motivational Interviewing, and Solution-focused Therapy    Return to Clinic: 3 weeks    Plan to discuss case with [facility] consulting psychiatrist and further recommendations to be potentially be made at that time.    Length of Appointment: 45 Non face-to-face clinical time: 20

## 2025-01-03 ENCOUNTER — PATIENT MESSAGE (OUTPATIENT)
Dept: BEHAVIORAL HEALTH | Facility: CLINIC | Age: 65
End: 2025-01-03
Payer: OTHER GOVERNMENT

## 2025-01-06 ENCOUNTER — OFFICE VISIT (OUTPATIENT)
Dept: BEHAVIORAL HEALTH | Facility: CLINIC | Age: 65
End: 2025-01-06
Payer: OTHER GOVERNMENT

## 2025-01-06 DIAGNOSIS — F33.1 MODERATE EPISODE OF RECURRENT MAJOR DEPRESSIVE DISORDER: ICD-10-CM

## 2025-01-06 DIAGNOSIS — F41.1 GENERALIZED ANXIETY DISORDER WITH PANIC ATTACKS: ICD-10-CM

## 2025-01-06 DIAGNOSIS — F41.0 GENERALIZED ANXIETY DISORDER WITH PANIC ATTACKS: ICD-10-CM

## 2025-01-06 PROCEDURE — 90837 PSYTX W PT 60 MINUTES: CPT | Mod: ,,,

## 2025-01-06 NOTE — PROGRESS NOTES
Primary Care Behavioral Health Integration: Follow-up  Date:  1/6/2025  Patient Name: Brianna Cantu  Referral Source:  Rossana Mcclendon MD  Type of Visit:  In person  Site:  [facility]    History of Present Illness:  Brianna Cantu, a 64 y.o.  female with history of Anxiety disorders; generalized anxiety disorder [F41.1] and Major Depressive Disorder, Recurrent, Moderate (F33.1) referred by Rossana Mcclendon MD.  Patient was seen, examined and chart was reviewed.    Met with patient.     Current session:   Pt discussed recent deaths in her family. Pt reported she is coping fine and is more concerned with her mother's grief. Pt discussed how she is working to provide support to her mother and continue to take care of herself. Pt has noticed some changes in her thinking patterns, and identified situations in which she thought more positively and was able to cope more easily. LCSW reviewed concept of cognitive triangle and the impact of thoughts on feelings/emotions. LCSW reviewed common cognitive distortions and discussed process of thought re-framing.     LCSW reviewed behavioral activation, and the helpfulness of increasing activity levels. Pt reported improvement in personal hygiene, and has a goal to shower and brush her teeth every day. Pt would also like to make more progress on cleaning her room, which has brought pt a sense of accomplishment. Pt plans to utilize credit list worksheet to track accomplishments/activities. Pt has continued practicing deep breathing, which she reported has been helpful.    Pt reported she has continued to take Lexapro and Wellbutrin daily. Pt reported some confusion over medication. Pt reported she is only taking Wellbutrin once a day (prescription is for twice a day). Pt is previously reported doesn't want to take medication twice per day. However, pt stated today that she believes she has been taking her Lexapro twice per day. LCSW encouraged pt to check  instructions on medication when she gets home and to discuss with PCP at her upcoming appointment next week. LCSW sent message to PCP regarding pt's medication confusion. LCSW will discuss pt's medication adherence with consulting psychiatrist.           1/5/2025    11:46 PM 12/16/2024     2:05 PM 12/3/2024    12:05 PM 11/11/2024    11:36 AM 8/26/2023     2:26 PM   Results of the PHQ8   Little interest or pleasure in doing things Several days More than half the days Nearly every day Nearly every day Nearly every day   Feeling down, depressed, or hopeless Nearly every day Several days Nearly every day Nearly every day Nearly every day   Trouble falling or staying asleep, or sleeping too much Several days Nearly every day Nearly every day Nearly every day Nearly every day   Feeling tired or having little energy Nearly every day Nearly every day Nearly every day Nearly every day Nearly every day   Poor appetite or overeating Several days Several days More than half the days Nearly every day Nearly every day   Feeling bad about yourself - or that you are a failure or have let yourself or your family down Several days More than half the days Nearly every day Nearly every day Nearly every day   Trouble concentrating on things, such as reading the newspaper or watching television Several days More than half the days Nearly every day Nearly every day Nearly every day   Moving or speaking so slowly that other people could have noticed. Or the opposite - being so fidgety or restless that you have been moving around a lot more than usual Several days Several days Several days Several days Nearly every day   Total Score  12 15 21 22 24           1/5/2025    11:43 PM 12/16/2024     2:03 PM 12/3/2024    12:03 PM   GAD7   1. Feeling nervous, anxious, or on edge? 1  2  3    2. Not being able to stop or control worrying? 1  1  3    3. Worrying too much about different things? 2  2  3    4. Trouble relaxing? 1  3  3    5. Being so  restless that it is hard to sit still? 1  1  1    6. Becoming easily annoyed or irritable? 1  2  2    7. Feeling afraid as if something awful might happen? 1  1  2    8. If you checked off any problems, how difficult have these problems made it for you to do your work, take care of things at home, or get along with other people? 1  1  2    JOSE ANGEL-7 Score 8  12  17        Patient-reported       Mental Status Exam  General Appearance:  unremarkable, age appropriate     Speech: normal tone, normal rate, normal pitch, normal volume         Level of Cooperation: cooperative        Thought Processes: normal and logical      Mood: steady        Thought Content: normal, no suicidality, no homicidality, delusions, or paranoia     Affect: congruent and appropriate    Orientation: Oriented x3     Memory: recent >  reported some difficulty with short-term memory, remote >  intact     Attention Span & Concentration: intact     Fund of General Knowledge: intact and appropriate to age and level of education   Abstract Reasoning: interpretation of similarities was concrete   Judgment & Insight: good       Language:  intact       Treatment plan:  Target symptoms: depression, anxiety   Why chosen therapy is appropriate versus another modality: relevant to diagnosis, patient responds to this modality, evidence based practice  Outcome monitoring methods: self-report, checklist/rating scale  Therapeutic intervention type: insight oriented psychotherapy, behavior modifying psychotherapy, supportive psychotherapy    Risk parameters:  Patient reports no suicidal ideation  Patient reports no homicidal ideation  Patient reports no self-injurious behavior  Patient reports no violent behavior    Verbal deficits: None    Patient's response to intervention:  The patient's response to intervention is accepting.    Progress toward goals and other mental status changes:  The patient's progress toward goals is limited.    Patient advised to call  [phone] or present the the nearest ED if they experience suicidal or homicidal ideation, plan or intent.       Impression:  Pt meets diagnostic criteria for MDD and JOSE ANGEL. Currently, pt is experiencing a depressed mood nearly every day and has no interest in normal activities. Pt reported she often spends most of the day in her room. Pt reported she has noticed appetite changes, increased fatigue, difficulty concentrating, and increased feelings of guilt. Pt also reported excessive and frequent worry, muscle tension, increased irritability, difficulty sleeping, and panic attacks. Pt experiences chronic IBS that may be influenced by anxiety.     Diagnosis:   1. Moderate episode of recurrent major depressive disorder        2. Generalized anxiety disorder with panic attacks              Treatment Goals and Plan: Pt plans to continue individual therapy. Pt plans to continue practicing deep breathing. Pt plans to review behavioral activation and cognitive distortion worksheet. Pt has a goal to continue making progress in cleaning room and to maintain personal hygiene routine. Next session, check-in on sleep.     Future treatment will utilize CBT, Mindfulness Techniques, Motivational Interviewing, and Solution-focused Therapy    Return to Clinic: 2 weeks    Plan to discuss case with [facility] consulting psychiatrist and further recommendations to be potentially be made at that time.    Length of Appointment: 60 Non face-to-face clinical time: 15

## 2025-01-16 ENCOUNTER — OFFICE VISIT (OUTPATIENT)
Dept: INTERNAL MEDICINE | Facility: CLINIC | Age: 65
End: 2025-01-16
Payer: MEDICARE

## 2025-01-16 ENCOUNTER — LAB VISIT (OUTPATIENT)
Dept: LAB | Facility: HOSPITAL | Age: 65
End: 2025-01-16
Payer: OTHER GOVERNMENT

## 2025-01-16 VITALS
SYSTOLIC BLOOD PRESSURE: 132 MMHG | DIASTOLIC BLOOD PRESSURE: 83 MMHG | WEIGHT: 248.44 LBS | HEIGHT: 66 IN | BODY MASS INDEX: 39.93 KG/M2 | OXYGEN SATURATION: 98 % | HEART RATE: 83 BPM

## 2025-01-16 DIAGNOSIS — E78.2 MIXED HYPERLIPIDEMIA: ICD-10-CM

## 2025-01-16 DIAGNOSIS — F33.1 MODERATE EPISODE OF RECURRENT MAJOR DEPRESSIVE DISORDER: Primary | ICD-10-CM

## 2025-01-16 DIAGNOSIS — Z13.220 SCREENING FOR LIPID DISORDERS: ICD-10-CM

## 2025-01-16 DIAGNOSIS — Z13.1 SCREENING FOR DIABETES MELLITUS: ICD-10-CM

## 2025-01-16 DIAGNOSIS — I10 BENIGN ESSENTIAL HYPERTENSION: ICD-10-CM

## 2025-01-16 DIAGNOSIS — M54.12 CERVICAL RADICULAR PAIN: ICD-10-CM

## 2025-01-16 DIAGNOSIS — I87.2 VENOUS INSUFFICIENCY OF BOTH LOWER EXTREMITIES: ICD-10-CM

## 2025-01-16 LAB
ALBUMIN SERPL BCP-MCNC: 3.5 G/DL (ref 3.5–5.2)
ALP SERPL-CCNC: 78 U/L (ref 40–150)
ALT SERPL W/O P-5'-P-CCNC: 21 U/L (ref 10–44)
ANION GAP SERPL CALC-SCNC: 10 MMOL/L (ref 8–16)
AST SERPL-CCNC: 18 U/L (ref 10–40)
BASOPHILS # BLD AUTO: 0.02 K/UL (ref 0–0.2)
BASOPHILS NFR BLD: 0.2 % (ref 0–1.9)
BILIRUB SERPL-MCNC: 0.2 MG/DL (ref 0.1–1)
BUN SERPL-MCNC: 9 MG/DL (ref 8–23)
CALCIUM SERPL-MCNC: 9.9 MG/DL (ref 8.7–10.5)
CHLORIDE SERPL-SCNC: 107 MMOL/L (ref 95–110)
CHOLEST SERPL-MCNC: 155 MG/DL (ref 120–199)
CHOLEST/HDLC SERPL: 3.3 {RATIO} (ref 2–5)
CO2 SERPL-SCNC: 25 MMOL/L (ref 23–29)
CREAT SERPL-MCNC: 1.1 MG/DL (ref 0.5–1.4)
DIFFERENTIAL METHOD BLD: ABNORMAL
EOSINOPHIL # BLD AUTO: 0.2 K/UL (ref 0–0.5)
EOSINOPHIL NFR BLD: 2.2 % (ref 0–8)
ERYTHROCYTE [DISTWIDTH] IN BLOOD BY AUTOMATED COUNT: 13.1 % (ref 11.5–14.5)
EST. GFR  (NO RACE VARIABLE): 56.1 ML/MIN/1.73 M^2
ESTIMATED AVG GLUCOSE: 105 MG/DL (ref 68–131)
GLUCOSE SERPL-MCNC: 91 MG/DL (ref 70–110)
HBA1C MFR BLD: 5.3 % (ref 4–5.6)
HCT VFR BLD AUTO: 43.5 % (ref 37–48.5)
HDLC SERPL-MCNC: 47 MG/DL (ref 40–75)
HDLC SERPL: 30.3 % (ref 20–50)
HGB BLD-MCNC: 13.4 G/DL (ref 12–16)
IMM GRANULOCYTES # BLD AUTO: 0.03 K/UL (ref 0–0.04)
IMM GRANULOCYTES NFR BLD AUTO: 0.3 % (ref 0–0.5)
LDLC SERPL CALC-MCNC: 73.8 MG/DL (ref 63–159)
LYMPHOCYTES # BLD AUTO: 3.1 K/UL (ref 1–4.8)
LYMPHOCYTES NFR BLD: 28.6 % (ref 18–48)
MAGNESIUM SERPL-MCNC: 2.1 MG/DL (ref 1.6–2.6)
MCH RBC QN AUTO: 28 PG (ref 27–31)
MCHC RBC AUTO-ENTMCNC: 30.8 G/DL (ref 32–36)
MCV RBC AUTO: 91 FL (ref 82–98)
MONOCYTES # BLD AUTO: 1 K/UL (ref 0.3–1)
MONOCYTES NFR BLD: 9.3 % (ref 4–15)
NEUTROPHILS # BLD AUTO: 6.5 K/UL (ref 1.8–7.7)
NEUTROPHILS NFR BLD: 59.4 % (ref 38–73)
NONHDLC SERPL-MCNC: 108 MG/DL
NRBC BLD-RTO: 0 /100 WBC
PLATELET # BLD AUTO: 422 K/UL (ref 150–450)
PMV BLD AUTO: 9.6 FL (ref 9.2–12.9)
POTASSIUM SERPL-SCNC: 4 MMOL/L (ref 3.5–5.1)
PROT SERPL-MCNC: 7.9 G/DL (ref 6–8.4)
RBC # BLD AUTO: 4.78 M/UL (ref 4–5.4)
SODIUM SERPL-SCNC: 142 MMOL/L (ref 136–145)
TRIGL SERPL-MCNC: 171 MG/DL (ref 30–150)
TSH SERPL DL<=0.005 MIU/L-ACNC: 1.7 UIU/ML (ref 0.4–4)
WBC # BLD AUTO: 10.87 K/UL (ref 3.9–12.7)

## 2025-01-16 PROCEDURE — 84443 ASSAY THYROID STIM HORMONE: CPT | Performed by: INTERNAL MEDICINE

## 2025-01-16 PROCEDURE — 83036 HEMOGLOBIN GLYCOSYLATED A1C: CPT | Performed by: INTERNAL MEDICINE

## 2025-01-16 PROCEDURE — 99214 OFFICE O/P EST MOD 30 MIN: CPT | Mod: PBBFAC | Performed by: INTERNAL MEDICINE

## 2025-01-16 PROCEDURE — 36415 COLL VENOUS BLD VENIPUNCTURE: CPT | Performed by: INTERNAL MEDICINE

## 2025-01-16 PROCEDURE — 83735 ASSAY OF MAGNESIUM: CPT | Performed by: INTERNAL MEDICINE

## 2025-01-16 PROCEDURE — 80061 LIPID PANEL: CPT | Performed by: INTERNAL MEDICINE

## 2025-01-16 PROCEDURE — 99214 OFFICE O/P EST MOD 30 MIN: CPT | Mod: S$GLB,,, | Performed by: INTERNAL MEDICINE

## 2025-01-16 PROCEDURE — 85025 COMPLETE CBC W/AUTO DIFF WBC: CPT | Performed by: INTERNAL MEDICINE

## 2025-01-16 PROCEDURE — 99999 PR PBB SHADOW E&M-EST. PATIENT-LVL IV: CPT | Mod: PBBFAC,,, | Performed by: INTERNAL MEDICINE

## 2025-01-16 PROCEDURE — 80053 COMPREHEN METABOLIC PANEL: CPT | Performed by: INTERNAL MEDICINE

## 2025-01-16 RX ORDER — GABAPENTIN 100 MG/1
100 CAPSULE ORAL NIGHTLY
Qty: 270 CAPSULE | Refills: 3 | Status: SHIPPED | OUTPATIENT
Start: 2025-01-16

## 2025-01-16 NOTE — PROGRESS NOTES
Subjective:       Patient ID: Brianna Cantu is a 64 y.o. female.    Chief Complaint: Follow-up    Having worsening depression during last visit and we started Wellbutrin. Has not helped and she is no longer taking.        neck and back pain.: has been doing PT and following with pain management. on gabapentin, flexeril and mobic.      VIVIAN with recent sleep study showing severe obstruction. on CPAP      Has IBS, tried fiber in the past. Takes imodium.      HTN: well controlled on amlodipine-valsartain      HLD: on lipitor.      JOSE ANGEL/ depression: as above on lexapro.      Current smoker: has tried patches but did not work.      [facility]      Health Maintenance:  Colon Cancer Screening: Negative cologaurd in 2021. Rectal stricture inable to traverse in colonoscopy of 2018.    Mammogram:  Normal May 20, 2024  CT lung done at [facility] was normal early was normal.   HIV: negative 2021  Hep C: negative 2021   Lipids: Order today   Pap Smear: Done April 2022 and was normal. Due in April 2023   Vaccines: up to date with vaccines.             Follow-up  Pertinent negatives include no abdominal pain, arthralgias, chest pain, chills, coughing, headaches, myalgias, nausea or vomiting.     Review of Systems   Constitutional:  Negative for activity change, appetite change and chills.   HENT:  Negative for ear pain, sinus pressure/congestion and sneezing.    Respiratory:  Negative for cough and shortness of breath.    Cardiovascular:  Negative for chest pain, palpitations and leg swelling.   Gastrointestinal:  Negative for abdominal distention, abdominal pain, constipation, diarrhea, nausea and vomiting.   Genitourinary:  Negative for dysuria and hematuria.   Musculoskeletal:  Negative for arthralgias, back pain and myalgias.   Neurological:  Negative for dizziness and headaches.   Psychiatric/Behavioral:  Negative for agitation. The patient is not nervous/anxious.            Past Medical History:   Diagnosis Date     Allergic rhinitis 04/27/2014    Anxiety 04/27/2014    Asthma     Bronchitis 11/08/2016    Overview:       Hx of cervical cancer 04/27/2014    Obesity 04/27/2014    Vitamin D deficiency 04/27/2014    Wheezing      Past Surgical History:   Procedure Laterality Date    COLONOSCOPY      COLONOSCOPY N/A 9/24/2018    Procedure: COLONOSCOPY;  Surgeon: Montrell Gan MD;  Location: [facility] ([address] [place] [address]);  Service: Endoscopy;  Laterality: N/A;    ESOPHAGOGASTRODUODENOSCOPY      ESOPHAGOGASTRODUODENOSCOPY N/A 9/24/2018    Procedure: EGD (ESOPHAGOGASTRODUODENOSCOPY);  Surgeon: Montrell Gan MD;  Location: [facility] ([address] [place] [address]);  Service: Endoscopy;  Laterality: N/A;    TOTAL ABDOMINAL HYSTERECTOMY        Patient Active Problem List   Diagnosis    Allergic rhinitis    Vitamin D deficiency    Hx of cervical cancer    Laryngopharyngeal reflux    Benign essential hypertension    Tobacco use disorder    Mild intermittent asthma without complication    Stenosis colon    Light cigarette smoker (1-9 cigs/day)    Lesion of hard palate    JOSE ANGEL (generalized anxiety disorder)    Hypersomnolence    Pelvic floor dysfunction    Refractive error    Nuclear sclerosis of both eyes    Vibration sensory loss    Severe obesity    Moderate episode of recurrent major depressive disorder    Chronic kidney disease, stage 3a    Chronic obstructive pulmonary disease, unspecified COPD type        Objective:      Physical Exam  Constitutional:       Appearance: Normal appearance.   HENT:      Head: Normocephalic.   Cardiovascular:      Rate and Rhythm: Normal rate and regular rhythm.      Pulses: Normal pulses.      Heart sounds: Normal heart sounds.   Pulmonary:      Effort: Pulmonary effort is normal.      Breath sounds: Normal breath sounds.   Abdominal:      General: Abdomen is flat. Bowel sounds are normal.      Palpations: Abdomen is soft.   Musculoskeletal:         General: Normal range of motion.      Cervical back: Normal range of motion and  neck supple.   Skin:     General: Skin is warm and dry.   Neurological:      General: No focal deficit present.      Mental Status: She is alert and oriented to person, place, and time.   Psychiatric:         Mood and Affect: Mood normal.         Assessment:       Problem List Items Addressed This Visit          Psychiatric    Moderate episode of recurrent major depressive disorder - Primary       Cardiac/Vascular    Benign essential hypertension    Relevant Orders    TSH (Completed)    Comprehensive Metabolic Panel (Completed)    CBC Auto Differential (Completed)    Magnesium (Completed)     Other Visit Diagnoses         Screening for lipid disorders        Relevant Orders    Lipid Panel (Completed)      Screening for diabetes mellitus        Relevant Orders    Hemoglobin A1C (Completed)      Mixed hyperlipidemia          Venous insufficiency of both lower extremities        Relevant Orders    Ambulatory referral/consult to Vascular Surgery      Cervical radicular pain        Relevant Orders    Ambulatory referral/consult to Pain Clinic            Plan:         Brianna was seen today for follow-up.    Diagnoses and all orders for this visit:    Moderate episode of recurrent major depressive disorder  Having worsening depression during last visit and we started Wellbutrin. Has not helped and she is no longer taking.   Continue lexapro 20 mg daily.     Benign essential hypertension  Well controlled on amlodipine. Tolerating medication well. BP is stable.     Screening for lipid disorders  -     Lipid Panel; Future    Screening for diabetes mellitus  -     Hemoglobin A1C; Future    Mixed hyperlipidemia  Continue statin. Check lipids.     Venous insufficiency of both lower extremities  -     Ambulatory referral/consult to Vascular Surgery; Future  Would like to discuss possible procedures.     Cervical radicular pain  -     Ambulatory referral/consult to Pain Clinic; Future  -     gabapentin (NEURONTIN) 100 MG  capsule; Take 1 capsule (100 mg total) by mouth every evening.    Add gabapentin and refer to pain management for possible injections.             Rossana Mcclendon MD   Internal Medicine   Primary Care

## 2025-01-22 ENCOUNTER — TELEPHONE (OUTPATIENT)
Dept: PSYCHIATRY | Facility: CLINIC | Age: 65
End: 2025-01-22
Payer: MEDICARE

## 2025-01-22 NOTE — TELEPHONE ENCOUNTER
"64 y.o. female with a psychiatric hx of depression and anxiety referred by [facility] Psychiatrist Dr. Dumas for med adherence discussion. Spoke with pt by phone today. Advised pt information discussed will be shared with the [facility] team (consulting psychiatrist and therapist) and pt agreed to proceed.     Pt reports only started bupropion 150mg once daily ~4-6 weeks ago with therapist's encouragement. Describes remote hx of intolerability to psychotropics under previous PCP which contributed to fear/anxiety about med start. Endorses initial tolerability issues, felt like she's "dancing on the inside". This has improved with time. However, also describes possibly slight increase in irritability. Not sure if med addition helpful. Past med experiences, initial tolerability issues and unclear benefits caused her to remain at once daily dosing.     [facility]. Didn't have bottles with her but says she confirmed after last appointment she's taking both escitalopram and bupropion as prescribed. Did note [facility] gives [facility] different Escitalopram tablet strength with different directions but taking as stated on label. Likely getting 2x10mg tablets to take daily.     As an aside, expresses worry/concern about shingles; describes "inner feeling" of rash wanting to come out and  also short-lived rash appearances. Feels providers dont her believe sxs. Encouraged pt to document rash photos in the future.     Discussed her preferences going forward (medication adjustments, psychotherapy alone, etc). Expresses desire to complete PC-allotted therapy sessions as she has found this helpful but likely wants ongoing therapy and also wants to continue bupropion once daily for now. Discussed differences between therapist and med mgmt providers. PVU.     "

## 2025-01-31 ENCOUNTER — OFFICE VISIT (OUTPATIENT)
Dept: BEHAVIORAL HEALTH | Facility: CLINIC | Age: 65
End: 2025-01-31
Payer: OTHER GOVERNMENT

## 2025-01-31 DIAGNOSIS — F41.1 GENERALIZED ANXIETY DISORDER WITH PANIC ATTACKS: ICD-10-CM

## 2025-01-31 DIAGNOSIS — F41.0 GENERALIZED ANXIETY DISORDER WITH PANIC ATTACKS: ICD-10-CM

## 2025-01-31 DIAGNOSIS — F33.1 MODERATE EPISODE OF RECURRENT MAJOR DEPRESSIVE DISORDER: Primary | ICD-10-CM

## 2025-01-31 PROCEDURE — 90837 PSYTX W PT 60 MINUTES: CPT | Mod: ,,,

## 2025-01-31 NOTE — PROGRESS NOTES
Primary Care Behavioral Health Integration: Follow-up  Date:  1/31/2025  Patient Name: Brianna Cantu  Referral Source:  Rossana Mcclendon MD  Type of Visit:  In person  Site:  [facility]    History of Present Illness:  Brianna Cantu, a 64 y.o.  female with history of Anxiety disorders; generalized anxiety disorder [F41.1] and Major Depressive Disorder, Recurrent, Moderate (F33.1) referred by Rossana Mcclendon MD.  Patient was seen, examined and chart was reviewed.    Met with patient.     Current session:   Pt discussed recent positive events, including spending time with her niece in the snow. Pt reported she has been reviewing materials previously given to her by LCSW, which included information on behavioral activation. Pt reported she has been thinking about what this would look like for her. LCSW reviewed previously identified goals with pt. Pt reported she has also been working to engage in more positive thinking and stated her jaz has helped her with this. LCSW reviewed cognitive restructuring practices with pt. Pt discussed recent situation in which she became angry and frustrated over having to wait for her appointment. LCSW discussed with pt how cognitive restructuring could be applied.     Pt reported clarity regarding previous medication confusion and stated she is taking her medication as prescribed. Pt has one more session through [facility] program. Pt reported uncertainty if she wants to find another therapist following completion of program. Pt reported greater concern with her memory and reading comprehension. Pt plans to schedule a follow-up appointment with her PCP to address these concerns.           1/28/2025     7:54 AM 1/5/2025    11:46 PM 12/16/2024     2:05 PM 12/3/2024    12:05 PM 11/11/2024    11:36 AM 8/26/2023     2:26 PM   Results of the PHQ8   Little interest or pleasure in doing things Several days Several days More than half the days Nearly every day Nearly every  day Nearly every day   Feeling down, depressed, or hopeless More than half the days Nearly every day Several days Nearly every day Nearly every day Nearly every day   Trouble falling or staying asleep, or sleeping too much Nearly every day Several days Nearly every day Nearly every day Nearly every day Nearly every day   Feeling tired or having little energy Nearly every day Nearly every day Nearly every day Nearly every day Nearly every day Nearly every day   Poor appetite or overeating Nearly every day Several days Several days More than half the days Nearly every day Nearly every day   Feeling bad about yourself - or that you are a failure or have let yourself or your family down Several days Several days More than half the days Nearly every day Nearly every day Nearly every day   Trouble concentrating on things, such as reading the newspaper or watching television More than half the days Several days More than half the days Nearly every day Nearly every day Nearly every day   Moving or speaking so slowly that other people could have noticed. Or the opposite - being so fidgety or restless that you have been moving around a lot more than usual Several days Several days Several days Several days Several days Nearly every day   Total Score  16 12 15 21 22 24           1/28/2025     7:53 AM 1/5/2025    11:43 PM 12/16/2024     2:03 PM   GAD7   1. Feeling nervous, anxious, or on edge? 2  1  2    2. Not being able to stop or control worrying? 3  1  1    3. Worrying too much about different things? 3  2  2    4. Trouble relaxing? 2  1  3    5. Being so restless that it is hard to sit still? 1  1  1    6. Becoming easily annoyed or irritable? 2  1  2    7. Feeling afraid as if something awful might happen? 1  1  1    8. If you checked off any problems, how difficult have these problems made it for you to do your work, take care of things at home, or get along with other people? 3  1  1    JOSE ANGEL-7 Score 14  8  12         Patient-reported       Mental Status Exam  General Appearance:  unremarkable, age appropriate     Speech: normal tone, normal rate, normal pitch, normal volume         Level of Cooperation: cooperative        Thought Processes: normal and logical      Mood: steady        Thought Content: normal, no suicidality, no homicidality, delusions, or paranoia     Affect: congruent and appropriate    Orientation: Oriented x3     Memory: recent >  reported some difficulty with short-term memory, remote >  intact     Attention Span & Concentration: intact     Fund of General Knowledge: intact and appropriate to age and level of education   Abstract Reasoning: interpretation of similarities was concrete   Judgment & Insight: good       Language:  intact       Treatment plan:  Target symptoms: depression, anxiety   Why chosen therapy is appropriate versus another modality: relevant to diagnosis, patient responds to this modality, evidence based practice  Outcome monitoring methods: self-report, checklist/rating scale  Therapeutic intervention type: insight oriented psychotherapy, behavior modifying psychotherapy, supportive psychotherapy    Risk parameters:  Patient reports no suicidal ideation  Patient reports no homicidal ideation  Patient reports no self-injurious behavior  Patient reports no violent behavior    Verbal deficits: None    Patient's response to intervention:  The patient's response to intervention is accepting.    Progress toward goals and other mental status changes:  The patient's progress toward goals is limited.    Patient advised to call [phone] or present the the nearest ED if they experience suicidal or homicidal ideation, plan or intent.       Impression:  Pt meets diagnostic criteria for MDD and JOSE ANGEL. Currently, pt is experiencing a depressed mood nearly every day and has no interest in normal activities. Pt reported she often spends most of the day in her room. Pt reported she has noticed appetite  changes, increased fatigue, difficulty concentrating, and increased feelings of guilt. Pt also reported excessive and frequent worry, muscle tension, increased irritability, difficulty sleeping, and panic attacks. Pt experiences chronic IBS that may be influenced by anxiety.     Diagnosis:   1. Moderate episode of recurrent major depressive disorder        2. Generalized anxiety disorder with panic attacks                Treatment Goals and Plan: Pt plans to continue individual therapy. Pt plans to continue practicing deep breathing. Pt has a goal to continue making progress in cleaning room and to maintain personal hygiene routine. Next session, check-in on sleep.     Future treatment will utilize CBT, Mindfulness Techniques, Motivational Interviewing, and Solution-focused Therapy    Return to Clinic: 3 weeks      Length of Appointment: 60 Non face-to-face clinical time: 15

## 2025-02-03 ENCOUNTER — TELEPHONE (OUTPATIENT)
Dept: OPHTHALMOLOGY | Facility: CLINIC | Age: 65
End: 2025-02-03
Payer: MEDICARE

## 2025-02-13 ENCOUNTER — TELEPHONE (OUTPATIENT)
Dept: INTERNAL MEDICINE | Facility: CLINIC | Age: 65
End: 2025-02-13
Payer: MEDICARE

## 2025-02-13 NOTE — TELEPHONE ENCOUNTER
----- Message from Sarabjitkelsy sent at 2/13/2025  9:29 AM CST -----  Contact: [phone]  .[email or web address]     Patient is calling for Medical Advice regarding: pt said she had been call back in Jan to get a call back about weight lost meds and never did get a call back. Pt would like a call back     How long has patient had these symptoms:    Pharmacy name and phone#:  [id number] - [place], [place] - [address] [place] [facility] [place] [address]  [address]  [place] [address]  Phone: [phone] Fax: [phone]        Patient wants a call back or thru [facility]: call back     Comments:    Please advise patient replies from provider may take up to 48 hours.

## 2025-02-21 ENCOUNTER — OFFICE VISIT (OUTPATIENT)
Dept: BEHAVIORAL HEALTH | Facility: CLINIC | Age: 65
End: 2025-02-21
Payer: OTHER GOVERNMENT

## 2025-02-21 DIAGNOSIS — F41.1 GENERALIZED ANXIETY DISORDER WITH PANIC ATTACKS: ICD-10-CM

## 2025-02-21 DIAGNOSIS — Z00.00 ENCOUNTER FOR MEDICARE ANNUAL WELLNESS EXAM: ICD-10-CM

## 2025-02-21 DIAGNOSIS — F33.1 MODERATE EPISODE OF RECURRENT MAJOR DEPRESSIVE DISORDER: Primary | ICD-10-CM

## 2025-02-21 DIAGNOSIS — F41.0 GENERALIZED ANXIETY DISORDER WITH PANIC ATTACKS: ICD-10-CM

## 2025-02-21 NOTE — PROGRESS NOTES
"Primary Care Behavioral Health Integration: Follow-up  Date:  2/21/2025  Patient Name: Brianna Cantu  Referral Source:  Rossana Mcclendon MD  Type of Visit:  In person  Site:  [facility]    History of Present Illness:  Brianna Cantu, a 64 y.o.  female with history of Anxiety disorders; generalized anxiety disorder [F41.1] and Major Depressive Disorder, Recurrent, Moderate (F33.1) referred by Rossana Mcclendon MD.  Patient was seen, examined and chart was reviewed.    Met with patient.     Current session:   Pt reported some improvement in depression and anxiety symptoms. Pt reported increased motivation, reduced restlessness, and improved memory/concentration. Pt reported she has experienced depression and anxiety symptoms for much of her life and has been able to adequately manage in the past. Pt expressed confidence that she will continue being able to cope and reported an increased sense of self-efficacy. Pt expressed motivation to increase her activity level through walks and obtaining a gym membership, to further improve her mental wellbeing. Pt reported prayer has helped with reducing worry.     Pt reported she discontinued Wellbutrin a few weeks ago due to feeling "antsy". Pt reported no adverse side effects since stopping medication. Pt reported she has not experienced an increase in depression or anxiety symptoms since stopping medication. Pt reported she has continued taking all other medication as prescribed. LCSW advised pt to follow-up with her PCP. Pt reported readiness to graduate from [facility] program and declined additional mental health resources at this time. LCSW encouraged pt to reach out if she would like a referral for additional therapy.           2/20/2025    11:09 AM 1/28/2025     7:54 AM 1/5/2025    11:46 PM 12/16/2024     2:05 PM 12/3/2024    12:05 PM 11/11/2024    11:36 AM 8/26/2023     2:26 PM   Results of the PHQ8   Little interest or pleasure in doing things More " than half the days Several days Several days More than half the days Nearly every day Nearly every day Nearly every day   Feeling down, depressed, or hopeless More than half the days More than half the days Nearly every day Several days Nearly every day Nearly every day Nearly every day   Trouble falling or staying asleep, or sleeping too much Nearly every day Nearly every day Several days Nearly every day Nearly every day Nearly every day Nearly every day   Feeling tired or having little energy Nearly every day Nearly every day Nearly every day Nearly every day Nearly every day Nearly every day Nearly every day   Poor appetite or overeating More than half the days Nearly every day Several days Several days More than half the days Nearly every day Nearly every day   Feeling bad about yourself - or that you are a failure or have let yourself or your family down Several days Several days Several days More than half the days Nearly every day Nearly every day Nearly every day   Trouble concentrating on things, such as reading the newspaper or watching television More than half the days More than half the days Several days More than half the days Nearly every day Nearly every day Nearly every day   Moving or speaking so slowly that other people could have noticed. Or the opposite - being so fidgety or restless that you have been moving around a lot more than usual Several days Several days Several days Several days Several days Several days Nearly every day   Total Score  16 16 12 15 21 22 24           2/20/2025    11:06 AM 1/28/2025     7:53 AM 1/5/2025    11:43 PM   GAD7   1. Feeling nervous, anxious, or on edge? 3 2 1   2. Not being able to stop or control worrying? 2 3 1   3. Worrying too much about different things? 2 3 2   4. Trouble relaxing? 3 2 1   5. Being so restless that it is hard to sit still? 1 1 1   6. Becoming easily annoyed or irritable? 2 2 1   7. Feeling afraid as if something awful might happen? 1 1  1   8. If you checked off any problems, how difficult have these problems made it for you to do your work, take care of things at home, or get along with other people? 2 3 1   JOSE ANGEL-7 Score 14  14  8        Patient-reported       Mental Status Exam  General Appearance:  unremarkable, age appropriate     Speech: normal tone, normal rate, normal pitch, normal volume         Level of Cooperation: cooperative        Thought Processes: normal and logical      Mood: steady        Thought Content: normal, no suicidality, no homicidality, delusions, or paranoia     Affect: congruent and appropriate    Orientation: Oriented x3     Memory: recent >  reported some difficulty with short-term memory, remote >  intact     Attention Span & Concentration: intact     Fund of General Knowledge: intact and appropriate to age and level of education   Abstract Reasoning: interpretation of similarities was concrete   Judgment & Insight: good       Language:  intact       Treatment plan:  Target symptoms: depression, anxiety   Why chosen therapy is appropriate versus another modality: relevant to diagnosis, patient responds to this modality, evidence based practice  Outcome monitoring methods: self-report, checklist/rating scale  Therapeutic intervention type: insight oriented psychotherapy, behavior modifying psychotherapy, supportive psychotherapy    Risk parameters:  Patient reports no suicidal ideation  Patient reports no homicidal ideation  Patient reports no self-injurious behavior  Patient reports no violent behavior    Verbal deficits: None    Patient's response to intervention:  The patient's response to intervention is accepting.    Progress toward goals and other mental status changes:  The patient's progress toward goals is limited.    Patient advised to call [phone] or present the the nearest ED if they experience suicidal or homicidal ideation, plan or intent.       Impression:  Pt meets diagnostic criteria for MDD and JOSE ANGEL.  Currently, pt is experiencing a depressed mood nearly every day and has no interest in normal activities. Pt reported she often spends most of the day in her room. Pt reported she has noticed appetite changes, increased fatigue, difficulty concentrating, and increased feelings of guilt. Pt also reported excessive and frequent worry, muscle tension, increased irritability, difficulty sleeping, and panic attacks. Pt experiences chronic IBS that may be influenced by anxiety.     Diagnosis:   1. Moderate episode of recurrent major depressive disorder        2. Generalized anxiety disorder with panic attacks              Treatment Goals and Plan: Pt plans to reach out for therapy referral if needed.       Return to Clinic: N/A      Length of Appointment: 60 Non face-to-face clinical time: 15

## 2025-02-26 ENCOUNTER — PATIENT MESSAGE (OUTPATIENT)
Dept: PAIN MEDICINE | Facility: CLINIC | Age: 65
End: 2025-02-26
Payer: MEDICARE

## 2025-02-28 ENCOUNTER — OFFICE VISIT (OUTPATIENT)
Dept: INTERNAL MEDICINE | Facility: CLINIC | Age: 65
End: 2025-02-28
Payer: MEDICARE

## 2025-02-28 VITALS
OXYGEN SATURATION: 95 % | BODY MASS INDEX: 39.4 KG/M2 | HEIGHT: 66 IN | DIASTOLIC BLOOD PRESSURE: 78 MMHG | HEART RATE: 86 BPM | SYSTOLIC BLOOD PRESSURE: 131 MMHG | WEIGHT: 245.13 LBS

## 2025-02-28 DIAGNOSIS — H52.7 REFRACTIVE ERROR: ICD-10-CM

## 2025-02-28 DIAGNOSIS — R06.2 WHEEZING: ICD-10-CM

## 2025-02-28 DIAGNOSIS — K21.9 LARYNGOPHARYNGEAL REFLUX: ICD-10-CM

## 2025-02-28 DIAGNOSIS — J30.9 ALLERGIC RHINITIS, UNSPECIFIED SEASONALITY, UNSPECIFIED TRIGGER: ICD-10-CM

## 2025-02-28 DIAGNOSIS — F17.200 TOBACCO USE DISORDER: ICD-10-CM

## 2025-02-28 DIAGNOSIS — I10 BENIGN ESSENTIAL HYPERTENSION: ICD-10-CM

## 2025-02-28 DIAGNOSIS — R20.8 VIBRATION SENSORY LOSS: ICD-10-CM

## 2025-02-28 DIAGNOSIS — F33.1 MODERATE EPISODE OF RECURRENT MAJOR DEPRESSIVE DISORDER: ICD-10-CM

## 2025-02-28 DIAGNOSIS — E66.01 SEVERE OBESITY: ICD-10-CM

## 2025-02-28 DIAGNOSIS — J45.20 MILD INTERMITTENT ASTHMA WITHOUT COMPLICATION: ICD-10-CM

## 2025-02-28 DIAGNOSIS — Z00.00 ENCOUNTER FOR PREVENTIVE HEALTH EXAMINATION: ICD-10-CM

## 2025-02-28 DIAGNOSIS — F17.210 LIGHT CIGARETTE SMOKER (1-9 CIGS/DAY): ICD-10-CM

## 2025-02-28 DIAGNOSIS — K13.79 LESION OF HARD PALATE: ICD-10-CM

## 2025-02-28 DIAGNOSIS — M62.89 PELVIC FLOOR DYSFUNCTION: ICD-10-CM

## 2025-02-28 DIAGNOSIS — H25.13 NUCLEAR SCLEROSIS OF BOTH EYES: ICD-10-CM

## 2025-02-28 DIAGNOSIS — F41.1 GAD (GENERALIZED ANXIETY DISORDER): ICD-10-CM

## 2025-02-28 DIAGNOSIS — Z00.00 ENCOUNTER FOR MEDICARE ANNUAL WELLNESS EXAM: Primary | ICD-10-CM

## 2025-02-28 DIAGNOSIS — J44.9 CHRONIC OBSTRUCTIVE PULMONARY DISEASE, UNSPECIFIED COPD TYPE: ICD-10-CM

## 2025-02-28 DIAGNOSIS — N18.31 CHRONIC KIDNEY DISEASE, STAGE 3A: ICD-10-CM

## 2025-02-28 DIAGNOSIS — F41.9 ANXIETY: ICD-10-CM

## 2025-02-28 DIAGNOSIS — G47.10 HYPERSOMNOLENCE: ICD-10-CM

## 2025-02-28 DIAGNOSIS — Z85.41 HX OF CERVICAL CANCER: ICD-10-CM

## 2025-02-28 DIAGNOSIS — E55.9 VITAMIN D DEFICIENCY: ICD-10-CM

## 2025-02-28 PROBLEM — R20.0 BILATERAL LEG NUMBNESS: Status: RESOLVED | Noted: 2017-09-05 | Resolved: 2025-02-28

## 2025-02-28 PROBLEM — R60.0 LEG EDEMA: Status: RESOLVED | Noted: 2019-11-07 | Resolved: 2025-02-28

## 2025-02-28 PROBLEM — R19.7 DIARRHEA: Status: RESOLVED | Noted: 2022-06-10 | Resolved: 2025-02-28

## 2025-02-28 PROBLEM — R25.2 MUSCLE CRAMP: Status: RESOLVED | Noted: 2022-08-23 | Resolved: 2025-02-28

## 2025-02-28 PROCEDURE — 99999 PR PBB SHADOW E&M-EST. PATIENT-LVL V: CPT | Mod: PBBFAC,,, | Performed by: NURSE PRACTITIONER

## 2025-02-28 NOTE — PROGRESS NOTES
"  Brianna Cantu presented for an initial Medicare AWV today. The following components were reviewed and updated:    Medical history  Family History  Social history  Allergies and Current Medications  Health Risk Assessment  Health Maintenance  Care Team    **See Completed Assessments for Annual Wellness visit with in the encounter summary    The following assessments were completed:  Depression Screening  Cognitive function Screening  Timed Get Up Test  Whisper Test      Opioid documentation:      Patient does not have a current opioid prescription.            Vitals:    02/28/25 1005   BP: 131/78   BP Location: Left arm   Patient Position: Sitting   Pulse: 86   SpO2: 95%   Weight: 111.2 kg (245 lb 2.4 oz)   Height: 5' 6" (1.676 m)     Body mass index is 39.57 kg/m².       Physical Exam  Constitutional:       Appearance: Normal appearance. She is obese.   Cardiovascular:      Rate and Rhythm: Normal rate and regular rhythm.   Pulmonary:      Effort: Pulmonary effort is normal.      Breath sounds: Normal breath sounds.   Neurological:      Mental Status: She is alert and oriented to person, place, and time.   Psychiatric:         Mood and Affect: Mood normal.           Diagnoses and health risks identified today and associated recommendations/orders:  1. Encounter for Medicare annual wellness exam  Annual Health Risk Assessment (HRA) visit today.  Counseling and referral of health maintenance and preventative health measures performed.  Patient given annual wellness paperwork to take home.  Encouraged to return in 1 year for subsequent HRA visit.   - Referral to Enhanced Annual Wellness Visit (eAWV) W+1    2. Severe obesity  Chronic. Stable. Encouraged to increase exercise as tolerated and improve diet to heart healthy, low sodium diet. Encouraged to increase exercise as tolerated and improve diet to heart healthy, low sodium diet. Continue current treatment plan as previously prescribed by PCP.    3. Moderate " episode of recurrent major depressive disorder  Chronic. Stable. Patient is on Lexapro. Continue current treatment plan as previously prescribed by PCP.    4. Chronic kidney disease, stage 3a  Chronic. Stable. Continue current treatment plan as previously prescribed by PCP.    5. Chronic obstructive pulmonary disease, unspecified COPD type  Chronic. Stable. Continue current treatment plan as previously prescribed by PCP.    6. Benign essential hypertension  Chronic. Stable. Controlled. Encouraged to increase exercise as tolerated (moderate-intensity aerobic activity and muscle-strengthening activities) improve diet to heart healthy, low sodium diet.  Continue current treatment plan as previously prescribed by PCP.    7. Pelvic floor dysfunction  Chronic. Stable. Continue current treatment plan as previously prescribed by PCP.    8. Hx of cervical cancer  Chronic. Stable. Continue current treatment plan as previously prescribed by PCP.    9. Vitamin D deficiency  Chronic. Stable. Continue current treatment plan as previously prescribed by PCP.    10. Laryngopharyngeal reflux  Chronic. Stable. Patient is on Protonix. Continue current treatment plan as previously prescribed by PCP.    11. Tobacco use disorder  Chronic. Stable. Continue current treatment plan as previously prescribed by PCP.    12. Hypersomnolence  Chronic. Stable. Continue current treatment plan as previously prescribed by PCP.    13. Light cigarette smoker (1-9 cigs/day)  Chronic. Stable. Continue current treatment plan as previously prescribed by PCP.    14. Mild intermittent asthma without complication  Chronic. Stable. Patient is on Albuterol inhaler prn. Continue current treatment plan as previously prescribed by PCP.    15. Wheezing  Chronic. Stable. Patient is on Albuterol inhaler prn. Continue current treatment plan as previously prescribed by PCP.    16. Allergic rhinitis, unspecified seasonality, unspecified trigger  Chronic. Stable. Continue  current treatment plan as previously prescribed by PCP.    17. Lesion of hard palate  Chronic. Stable. Continue current treatment plan as previously prescribed by PCP.    18. Nuclear sclerosis of both eyes  Chronic. Stable. Continue current treatment plan as previously prescribed by PCP.    19. Refractive error  Chronic. Stable. Continue current treatment plan as previously prescribed by PCP.    20. Anxiety  Chronic. Stable. Patient is on Lexapro. Continue current treatment plan as previously prescribed by PCP.    21. JOSE ANGEL (generalized anxiety disorder)  Chronic. Stable. Continue current treatment plan as previously prescribed by PCP.    22. Vibration sensory loss  Chronic. Stable. Continue current treatment plan as previously prescribed by PCP.      Provided Brianna with a 5-10 year written screening schedule and personal prevention plan. Recommendations were developed using the USPSTF age appropriate recommendations. Education, counseling, and referrals were provided as needed.  After Visit Summary printed and given to patient which includes a list of additional screenings\tests needed.    Follow up in about 1 year (around 2/28/2026).      Anthony Figueroa NP

## 2025-02-28 NOTE — PATIENT INSTRUCTIONS
Counseling and Referral of Other Preventative  (Italic type indicates deductible and co-insurance are waived)    Patient Name: Brianna Cantu  Today's Date: 2/28/2025    Health Maintenance       Date Due Completion Date    Annual UACr Never done ---    Pneumococcal Vaccines (Age 50+) (1 of 2 - PCV) Never done ---    RSV Vaccine (Age 60+ and Pregnant patients) (1 - Risk 60-74 years 1-dose series) Never done ---    COVID-19 Vaccine (4 - 2024-25 season) 09/01/2024 12/9/2021    Mammogram 05/30/2025 5/30/2024    Lipid Panel 01/16/2026 1/16/2025    Hemoglobin A1c (Diabetic Prevention Screening) 01/16/2028 1/16/2025    Colorectal Cancer Screening 09/24/2028 7/9/2024    TETANUS VACCINE 07/15/2029 7/15/2019        Orders Placed This Encounter   Procedures    Ambulatory referral/consult to Psychology     The following information is provided to all patients.  This information is to help you find resources for any of the problems found today that may be affecting your health:                  Living healthy guide: www.[facility].[place].gov      Understanding Diabetes: www.diabetes.org      Eating healthy: www.cdc.gov/healthyweight      CDC home safety checklist: www.cdc.gov/steadi/patient.html      Agency on Aging: www.goea.[place].[facility]      Alcoholics anonymous (AA): www.aa.org      Physical Activity: www.ramón.nih.gov/gr7occs      Tobacco use: www.quitwithusla.org          Health Maintenance Due   Topic Date Due   • COVID-19 Vaccine (2 - Booster for Ryan series) 10/07/2021   • Influenza Vaccine (1) 09/01/2022       Patient is due for topics as listed above but is not proceeding with Immunization(s) COVID-19 and Influenza at this time.

## 2025-03-03 ENCOUNTER — TELEPHONE (OUTPATIENT)
Dept: VASCULAR SURGERY | Facility: CLINIC | Age: 65
End: 2025-03-03
Payer: MEDICARE

## 2025-03-07 ENCOUNTER — TELEPHONE (OUTPATIENT)
Dept: INTERNAL MEDICINE | Facility: CLINIC | Age: 65
End: 2025-03-07
Payer: MEDICARE

## 2025-03-07 NOTE — TELEPHONE ENCOUNTER
Call pt pt stated that she will like a RX for weight loss medication pt stated that  she will really like for the to gave her a call back today to talk about it the steps she need to take for the weight loss medication.

## 2025-03-07 NOTE — TELEPHONE ENCOUNTER
----- Message from Gabi sent at 3/7/2025  9:56 AM CST -----  Contact: [phone]  .[email or web address] Patient is calling for Medical Advice regarding: Patient would like a call back, she states she has been waiting on a response over 72 hours. Patient wants a call back or thru [facility]: callComments: Please call, patient says this is her 3 rd time .

## 2025-03-12 ENCOUNTER — TELEPHONE (OUTPATIENT)
Dept: INTERNAL MEDICINE | Facility: CLINIC | Age: 65
End: 2025-03-12
Payer: MEDICARE

## 2025-03-12 DIAGNOSIS — E66.01 CLASS 2 SEVERE OBESITY WITH SERIOUS COMORBIDITY AND BODY MASS INDEX (BMI) OF 39.0 TO 39.9 IN ADULT, UNSPECIFIED OBESITY TYPE: Primary | ICD-10-CM

## 2025-03-12 DIAGNOSIS — G47.33 OSA (OBSTRUCTIVE SLEEP APNEA): ICD-10-CM

## 2025-03-12 DIAGNOSIS — E66.812 CLASS 2 SEVERE OBESITY WITH SERIOUS COMORBIDITY AND BODY MASS INDEX (BMI) OF 39.0 TO 39.9 IN ADULT, UNSPECIFIED OBESITY TYPE: Primary | ICD-10-CM

## 2025-03-12 NOTE — TELEPHONE ENCOUNTER
Pt is calling about weight loss medication pt stated she been calling and  waiting for the last 2 weeks on the Dr to call her about getting her weight loss medication pt sated they talk about at her Dr visit.

## 2025-03-12 NOTE — TELEPHONE ENCOUNTER
----- Message from Liana sent at 3/12/2025 12:59 PM CDT -----  Contact: Brianna [phone]  Would like to receive medical advice.Would they like a call back or a response via [facility]:  call backAdditional information:  Brianna is calling to speak to the provider or staff on behalf of weight loss pills and getting an appt as well with the provider. Pt states she spoke to the provider MA on Friday but never heard back after and would like to know about the weight loss medication & getting an appt as well with Dr. Mcclendon. Please call Brianna back for advice

## 2025-03-17 ENCOUNTER — OFFICE VISIT (OUTPATIENT)
Dept: PAIN MEDICINE | Facility: CLINIC | Age: 65
End: 2025-03-17
Payer: MEDICARE

## 2025-03-17 VITALS
OXYGEN SATURATION: 100 % | WEIGHT: 244.5 LBS | BODY MASS INDEX: 39.29 KG/M2 | RESPIRATION RATE: 18 BRPM | HEIGHT: 66 IN | DIASTOLIC BLOOD PRESSURE: 94 MMHG | SYSTOLIC BLOOD PRESSURE: 131 MMHG | TEMPERATURE: 98 F | HEART RATE: 94 BPM

## 2025-03-17 DIAGNOSIS — M54.16 LUMBAR RADICULOPATHY: Primary | ICD-10-CM

## 2025-03-17 DIAGNOSIS — M54.12 CERVICAL RADICULAR PAIN: ICD-10-CM

## 2025-03-17 DIAGNOSIS — M51.362 DEGENERATION OF INTERVERTEBRAL DISC OF LUMBAR REGION WITH DISCOGENIC BACK PAIN AND LOWER EXTREMITY PAIN: ICD-10-CM

## 2025-03-17 PROCEDURE — 1159F MED LIST DOCD IN RCRD: CPT | Mod: CPTII,S$GLB,, | Performed by: STUDENT IN AN ORGANIZED HEALTH CARE EDUCATION/TRAINING PROGRAM

## 2025-03-17 PROCEDURE — 3008F BODY MASS INDEX DOCD: CPT | Mod: CPTII,S$GLB,, | Performed by: STUDENT IN AN ORGANIZED HEALTH CARE EDUCATION/TRAINING PROGRAM

## 2025-03-17 PROCEDURE — G2211 COMPLEX E/M VISIT ADD ON: HCPCS | Mod: S$GLB,,, | Performed by: STUDENT IN AN ORGANIZED HEALTH CARE EDUCATION/TRAINING PROGRAM

## 2025-03-17 PROCEDURE — 3075F SYST BP GE 130 - 139MM HG: CPT | Mod: CPTII,S$GLB,, | Performed by: STUDENT IN AN ORGANIZED HEALTH CARE EDUCATION/TRAINING PROGRAM

## 2025-03-17 PROCEDURE — 3044F HG A1C LEVEL LT 7.0%: CPT | Mod: CPTII,S$GLB,, | Performed by: STUDENT IN AN ORGANIZED HEALTH CARE EDUCATION/TRAINING PROGRAM

## 2025-03-17 PROCEDURE — 99214 OFFICE O/P EST MOD 30 MIN: CPT | Mod: S$GLB,,, | Performed by: STUDENT IN AN ORGANIZED HEALTH CARE EDUCATION/TRAINING PROGRAM

## 2025-03-17 PROCEDURE — 1160F RVW MEDS BY RX/DR IN RCRD: CPT | Mod: CPTII,S$GLB,, | Performed by: STUDENT IN AN ORGANIZED HEALTH CARE EDUCATION/TRAINING PROGRAM

## 2025-03-17 PROCEDURE — 3080F DIAST BP >= 90 MM HG: CPT | Mod: CPTII,S$GLB,, | Performed by: STUDENT IN AN ORGANIZED HEALTH CARE EDUCATION/TRAINING PROGRAM

## 2025-03-17 PROCEDURE — 99999 PR PBB SHADOW E&M-EST. PATIENT-LVL V: CPT | Mod: PBBFAC,,, | Performed by: STUDENT IN AN ORGANIZED HEALTH CARE EDUCATION/TRAINING PROGRAM

## 2025-03-17 NOTE — PROGRESS NOTES
Chronic Pain - New Consult    Referring Physician: Rossana Mcclendon MD    Chief Complaint:   Chief Complaint   Patient presents with    Low-back Pain    Shoulder Pain    Hip Pain     SUBJECTIVE:    Brianna Cantu presents to the clinic for the evaluation of back and left leg (to the left foot) pain. The pain started 1 month ago following no specific inciting event and symptoms have been improving.The pain is located in the lower back area and radiates to the left foot.  The pain is described as aching and is rated as 8/10. The pain is rated with a score of  8/10 on the BEST day and a score of 10/10 on the WORST day.  Symptoms interfere with daily activity, sleeping, and work (on disability due to the pain). The pain is exacerbated by Bending and Getting out of bed/chair.  The pain is mitigated by heat and ibuprofen. The patient reports 4-5 hours of uninterrupted sleep per night.    She felt PT has helped her out a great deal in the past.  She has had success where the pain goes away after a few days, but this time the pain has not been relieved for     PRIOR HISTORY:  Interval History 9/22/2022:  Mrs Cantu presents for follow up of lower back pain and radicular pain in a Right L5 radicular pattern. She states most days she has no pain and PT has been beneficial but does reproduce some pain on days she attends. She is overall improved and ready to return to work. She currently is taking Neurontin at 300mg now in conjunction with zanaflex 2mg and Mobic 15mg qd. She denies SE of current medication regimen.      Initial HPI:  Brianna Cantu presents to the clinic for the evaluation of lower back pain. The pain started about a months ago following no incident and symptoms have been unchanged.The pain is located in the right lower back area area and radiates to the right leg.  The pain is described as aching, sharp and shooting and is rated as 7/10. The pain is rated with a score of  4/10 on the BEST day and  a score of 10/10 on the WORST day.  Symptoms interfere with daily activity and sleeping. The pain is exacerbated by Standing, Laying and Walking.  The pain is mitigated by sitting and medications.  Patient denies night fever/night sweats, urinary incontinence, bowel incontinence, significant weight loss, significant motor weakness and loss of sensations. She was started on Gabapentin and IM injection of steroid and possible NSAID was given.     Patient reports urinary incontinence and bowel incontinence (has had for years, related to gastrointestinal).    Physical Therapy/Home Exercise: yes, completed over a year ago; not doing HEP    Pain Disability Index Review:      3/17/2025     2:49 PM 9/22/2022    10:43 AM 8/8/2022     1:16 PM   Last 3 PDI Scores   Pain Disability Index (PDI) 40 8 49       Pain Medications:   - ibuprofen     report:  Reviewed and consistent with medication use as prescribed.    Pain Procedures:   None    Imaging:   MRI THORACIC SPINE DEMYELINATING W W/O CONTRAST     CLINICAL HISTORY:  Cramp and spasm     FINDINGS:  Patient was administered 10 cc of Gadavist.     No marrow replacement, marrow edema, infection, neoplasm is seen.  There is mild-moderate DJD mid and lower thoracic levels and there is mild DJD at upper thoracic levels and upper lumbar levels.  Fracture dislocation bone destruction seen.  Cord is normal in course, caliber and signal and there is no evidence of demyelination.  There are mild disc bulges seen throughout thoracic spine.  No spinal canal stenosis, neural foraminal stenosis, or significant disc herniations are seen.  There is a left renal cysts.  No paraspinal masses are seen.  No soft tissue injury or whiplash injury is seen.  Post-contrast images demonstrate no significant abnormal enhancement.  There is a left renal cyst.     Impression:     The cord is normal in course, caliber, and signal and no abnormal enhancement seen.  There is no evidence of demyelination or  spinal cord disease.     Degenerative changes seen throughout the thoracic spine with small disc bulges and small disc protrusions but no evidence of cord compression spinal canal stenosis or neural foraminal stenosis.        Electronically signed by:Jackson White MD  Date:                                            08/01/2024  Time:                                           09:07    MRI LUMBAR SPINE WITHOUT CONTRAST     CLINICAL HISTORY:  Low back pain, symptoms persist with > 6wks conservative treatment; Dorsalgia, unspecified     TECHNIQUE:  Multiplanar, multisequence MR images were acquired from the thoracolumbar junction to the sacrum without the administration of contrast.     COMPARISON:  Radiograph 07/27/2022.     FINDINGS:  Lumbar spine alignment demonstrates mild grade 1 anterolisthesis of L4 on L5.  No spondylolysis.  Vertebral body heights are well maintained without evidence for fracture.  No marrow signal abnormality to suggest an infiltrative process.     There is degenerative disc desiccation throughout the visualized thoracolumbar spine with mild associated height loss at L4-L5.  No endplate edema.     Distal spinal cord demonstrates normal contour and signal intensity.  Cauda equina appears normal without findings to suggest arachnoiditis.  Conus medullaris terminates at L1.     Bilateral cortical renal cysts.  SI joints are symmetric.  Paraspinal musculature demonstrates normal bulk.     T12-L1: No spinal canal stenosis or neural foraminal narrowing.     L1-L2: No spinal canal stenosis or neural foraminal narrowing.     L2-L3: No spinal canal stenosis or neural foraminal narrowing.     L3-L4: Left subarticular/foraminal zone broad-based disc bulge.  Bilateral facet arthropathy and bilateral ligamentum flavum buckling.  Findings contribute to mild-to-moderate left neural foraminal narrowing.  No spinal canal stenosis.     L4-L5: Mild grade 1 anterolisthesis.  Bilateral facet arthropathy and  bilateral ligamentum flavum buckling.  Findings contribute to mild right neural foraminal narrowing.     L5-S1: Circumferential disc bulge encroaches into the bilateral foraminal zones.  Mild bilateral facet arthropathy.  Findings contribute to moderate right and mild-to-moderate left neural foraminal narrowing.  No spinal canal stenosis.     Impression:     1. Lumbar degenerative changes contributing to mild-to-moderate neural foraminal narrowing from L3-L4 through L5-S1 as detailed above.        Electronically signed by:Koko Spencer MD  Date:                                            08/20/2022  Time:                                           09:56    Past Medical History:   Diagnosis Date    Allergic rhinitis 04/27/2014    Anxiety 04/27/2014    Asthma     Bronchitis 11/08/2016    Overview:       Hx of cervical cancer 04/27/2014    Obesity 04/27/2014    Vitamin D deficiency 04/27/2014    Wheezing      Past Surgical History:   Procedure Laterality Date    COLONOSCOPY      COLONOSCOPY N/A 9/24/2018    Procedure: COLONOSCOPY;  Surgeon: Montrell Gan MD;  Location: [address]);  Service: Endoscopy;  Laterality: N/A;    ESOPHAGOGASTRODUODENOSCOPY      ESOPHAGOGASTRODUODENOSCOPY N/A 9/24/2018    Procedure: EGD (ESOPHAGOGASTRODUODENOSCOPY);  Surgeon: Montrell Gan MD;  Location: [address]);  Service: Endoscopy;  Laterality: N/A;    TOTAL ABDOMINAL HYSTERECTOMY       Social History[1]  Family History   Problem Relation Name Age of Onset    Hypertension Mother      Allergies Mother      No Known Problems Father      Hypertension Sister      Allergies Sister      Hypertension Sister      Allergies Sister      No Known Problems Brother      Hypertension Maternal Aunt      No Known Problems Maternal Uncle      No Known Problems Paternal Aunt      No Known Problems Paternal Uncle      No Known Problems Maternal Grandmother      No Known Problems Maternal Grandfather      No Known Problems Paternal  Grandmother      No Known Problems Paternal Grandfather      No Known Problems Other      Heart disease Neg Hx         Review of patient's allergies indicates:  No Known Allergies    Current Outpatient Medications   Medication Sig    albuterol (PROVENTIL/VENTOLIN HFA) 90 mcg/actuation inhaler Inhale 2 puffs into the lungs every 6 (six) hours as needed for Wheezing.    amlodipine-valsartan (EXFORGE) 5-160 mg per tablet Take 1 tablet by mouth once daily.    atorvastatin (LIPITOR) 40 MG tablet Take 0.5 tablets (20 mg total) by mouth every evening.    ECHINACEA 1X ORAL Take by mouth.    EScitalopram oxalate (LEXAPRO) 20 MG tablet Take 1 tablet (20 mg total) by mouth Daily.    fluticasone propionate (FLONASE) 50 mcg/actuation nasal spray 1 spray (50 mcg total) by Each Nostril route daily as needed for Allergies.    fluticasone-salmeterol diskus inhaler 100-50 mcg Inhale 1 puff into the lungs 2 (two) times daily.    gabapentin (NEURONTIN) 100 MG capsule Take 1 capsule (100 mg total) by mouth every evening.    LIDOCAINE VISCOUS 2 % solution Take by mouth.    loratadine (CLARITIN) 10 mg tablet Take 10 mg by mouth.    meloxicam (MOBIC) 15 MG tablet 15 mg.    oxybutynin (DITROPAN-XL) 10 MG 24 hr tablet 10 mg.    pantoprazole (PROTONIX) 40 MG tablet Take 1 tablet (40 mg total) by mouth once daily.    tirzepatide 2.5 mg/0.5 mL PnIj Inject 2.5 mg into the skin every 7 days.    valACYclovir (VALTREX) 1000 MG tablet Take 2 pills po bid x 2 days prn first signs of outbreak    buPROPion (WELLBUTRIN SR) 150 MG TBSR 12 hr tablet Take 1 tablet (150 mg total) by mouth 2 (two) times daily. Start taking once daily for the first 3 days then increase to twice daily. (Patient not taking: Reported on 3/17/2025)     No current facility-administered medications for this visit.       REVIEW OF SYSTEMS:    GENERAL:  current fevers or chills, recent use of antibiotics denies.  HEENT:  History of migraines/headaches: denies, History of major throat  "surgery: denies  RESPIRATORY:  History of home oxygen or pulmonary hypertension/severe breathing dysfunction: denies; Smoking history: reports 1/2-1ppd  CARDIOVASCULAR:  Hx of palpitations/rhythm problems: denies Hx of Heart Attacks/Surgery: denies  GI:  Recent abdominal discomfort or recent change in bowel habits reports diarrhea  MUSCULOSKELETAL:  See HPI.  SKIN:  unhealed wounds or rashes: denies  PSYCH: major psychiatric history or recent psychosocial stressors reports depression/anxiety  HEMATOLOGY/LYMPHOLOGY:  Hx of prolonged bleeding, Hx of Blood thinner usage: denies  NEURO:   history of seizures, strokes, chronic/old weakness (such as paralysis or paresis of any body part): denies  All other reviewed and negative other than HPI.    OBJECTIVE:    BP (!) 131/94   Pulse 94   Temp 98 °F (36.7 °C)   Resp 18   Ht 5' 6" (1.676 m)   Wt 110.9 kg (244 lb 7.8 oz)   SpO2 100%   BMI 39.46 kg/m²     PHYSICAL EXAMINATION:  General appearance: Well appearing, in no acute distress, alert and appropriately communicative.  Psych:  Mood and affect appropriate.  Skin: Skin color, texture, turgor normal, no rashes or lesions, in both upper and lower body for exposed skin.  Head/face:  Atraumatic, normocephalic.  Cor: regular rate  Pulm: non-labored breathing  GI: Abdomen non-distended and non-tender.  Back: Straight leg raising in the sitting and supine positions is positive to radicular pain on the left.  pain to palpation over the spine and/or paraspinal muscles. Pain with lumbar extension and facet loading.  Extremities: No deformities, significant lymphedema, or skin discoloration. No significant open wounds. No major amputations.  Musculoskeletal: hip, sacroiliac and knee provocative maneuvers are negative. Bilateral upper and lower extremity strength is normal and symmetric.  No atrophy or tone abnormalities are noted.  Neuro: Bilateral upper and lower extremity coordination and muscle stretch reflexes abnormalities " noted: none.  Smith and/or Clonus: negative; loss of sensation to light touch: none  Gait: normal    CMP  Sodium   Date Value Ref Range Status   01/16/2025 142 136 - 145 mmol/L Final     Potassium   Date Value Ref Range Status   01/16/2025 4.0 3.5 - 5.1 mmol/L Final     Chloride   Date Value Ref Range Status   01/16/2025 107 95 - 110 mmol/L Final     CO2   Date Value Ref Range Status   01/16/2025 25 23 - 29 mmol/L Final     Glucose   Date Value Ref Range Status   01/16/2025 91 70 - 110 mg/dL Final     BUN   Date Value Ref Range Status   01/16/2025 9 8 - 23 mg/dL Final     Creatinine   Date Value Ref Range Status   01/16/2025 1.1 0.5 - 1.4 mg/dL Final     Calcium   Date Value Ref Range Status   01/16/2025 9.9 8.7 - 10.5 mg/dL Final     Total Protein   Date Value Ref Range Status   01/16/2025 7.9 6.0 - 8.4 g/dL Final     Albumin   Date Value Ref Range Status   01/16/2025 3.5 3.5 - 5.2 g/dL Final     Total Bilirubin   Date Value Ref Range Status   01/16/2025 0.2 0.1 - 1.0 mg/dL Final     Comment:     For infants and newborns, interpretation of results should be based  on gestational age, weight and in agreement with clinical  observations.    Premature Infant recommended reference ranges:  Up to 24 hours.............<8.0 mg/dL  Up to 48 hours............<12.0 mg/dL  3-5 days..................<15.0 mg/dL  6-29 days.................<15.0 mg/dL       Alkaline Phosphatase   Date Value Ref Range Status   01/16/2025 78 40 - 150 U/L Final     AST   Date Value Ref Range Status   01/16/2025 18 10 - 40 U/L Final     ALT   Date Value Ref Range Status   01/16/2025 21 10 - 44 U/L Final     Anion Gap   Date Value Ref Range Status   01/16/2025 10 8 - 16 mmol/L Final     eGFR   Date Value Ref Range Status   01/16/2025 56.1 (A) >60 mL/min/1.73 m^2 Final         ASSESSMENT: 64 y.o. year old female with chronic pain, consistent with:    1. Lumbar radiculopathy  Ambulatory referral/consult to [facility]      2. Cervical  radicular pain  Ambulatory referral/consult to Pain Clinic      3. Degeneration of intervertebral disc of lumbar region with discogenic back pain and lower extremity pain  Ambulatory referral/consult to [facility]        IMPRESSION: Brianna Cantu presents today for lower back and left leg pain.  History and physical exam are consistent with lumbar radiculopathy.  My independent interpretation of the imaging is consistent with lumbar degenerative disc disease and foraminal stenosis.  We will start with conservative management (physical therapy and non-narcotic medications). If their pain persists despite conservative measures, we will consider advanced imaging and/or interventions in an effort to give them additional relief for their pain.    PLAN:   - I have stressed the importance of physical activity and a home exercise plan to help with pain and improve health.  - Patient can continue with medications for now since they are providing benefits, using them appropriately, and without side effects.  - suggested slow increase to gabapentin 100mg BID (and then TID if tolerating)  - referral to healthy [facility] program (has had benefit with PT in the past, but wants one-on-one care)  - if her pain persists, we can consider advanced imaging and/or interventions  - RTC in 2 - 3 months to discuss advanced imaging and/or interventions, if indicated at that time   - we discussed the importance of smoking cessation in the reduction of chronic pain.  Smoking leads to worsened degenerative joint disease, osteoporosis, increased inflammation, increased risk of infection, and increased sensitivity to pain.  - Counseled patient regarding the importance of activity modification and physical therapy.    The above plan and management options were discussed at length with patient. Patient is in agreement with the above and verbalized understanding. It will be communicated with the referring physician via electronic record,  fax, or mail.    Sugar Holley  03/17/2025         [1]   Social History  Socioeconomic History    Marital status:    Tobacco Use    Smoking status: Every Day     Current packs/day: 0.50     Types: Cigarettes    Smokeless tobacco: Never   Substance and Sexual Activity    Alcohol use: Yes     Alcohol/week: 0.0 standard drinks of alcohol     Comment: occasional    Drug use: No    Sexual activity: Not Currently     Social Drivers of Health     Financial Resource Strain: Medium Risk (2/28/2025)    Overall Financial Resource Strain (CARDIA)     Difficulty of Paying Living Expenses: Somewhat hard   Food Insecurity: Food Insecurity Present (2/28/2025)    Hunger Vital Sign     Worried About Running Out of Food in the Last Year: Sometimes true     Ran Out of Food in the Last Year: Sometimes true   Transportation Needs: No Transportation Needs (2/28/2025)    PRAPARE - Transportation     Lack of Transportation (Medical): No     Lack of Transportation (Non-Medical): No   Physical Activity: Insufficiently Active (2/28/2025)    Exercise Vital Sign     Days of Exercise per Week: 1 day     Minutes of Exercise per Session: 30 min   Stress: Stress Concern Present (2/28/2025)    [employer] - Occupational Stress Questionnaire     Feeling of Stress : Very much   Housing Stability: Unknown (2/28/2025)    Housing Stability Vital Sign     Unable to Pay for Housing in the Last Year: Patient declined     Number of Times Moved in the Last Year: 0     Homeless in the Last Year: No

## 2025-04-23 ENCOUNTER — PATIENT MESSAGE (OUTPATIENT)
Dept: INTERNAL MEDICINE | Facility: CLINIC | Age: 65
End: 2025-04-23
Payer: MEDICARE

## 2025-04-23 DIAGNOSIS — K21.9 GASTROESOPHAGEAL REFLUX DISEASE WITHOUT ESOPHAGITIS: ICD-10-CM

## 2025-04-23 DIAGNOSIS — Z12.31 ENCOUNTER FOR SCREENING MAMMOGRAM FOR BREAST CANCER: Primary | ICD-10-CM

## 2025-04-23 DIAGNOSIS — J30.2 SEASONAL ALLERGIC RHINITIS, UNSPECIFIED TRIGGER: ICD-10-CM

## 2025-04-23 DIAGNOSIS — J40 BRONCHITIS: ICD-10-CM

## 2025-04-23 RX ORDER — ALBUTEROL SULFATE 90 UG/1
2 INHALANT RESPIRATORY (INHALATION) EVERY 6 HOURS PRN
Qty: 54 G | Refills: 2 | Status: SHIPPED | OUTPATIENT
Start: 2025-04-23 | End: 2026-04-23

## 2025-04-23 RX ORDER — GABAPENTIN 100 MG/1
100 CAPSULE ORAL NIGHTLY
Qty: 270 CAPSULE | Refills: 3 | Status: SHIPPED | OUTPATIENT
Start: 2025-04-23

## 2025-04-23 RX ORDER — FLUTICASONE PROPIONATE 50 MCG
1 SPRAY, SUSPENSION (ML) NASAL DAILY PRN
Qty: 48 G | Refills: 2 | Status: SHIPPED | OUTPATIENT
Start: 2025-04-23

## 2025-04-23 RX ORDER — ESCITALOPRAM OXALATE 20 MG/1
20 TABLET ORAL DAILY
Qty: 90 TABLET | Refills: 2 | Status: SHIPPED | OUTPATIENT
Start: 2025-04-23

## 2025-04-23 RX ORDER — PANTOPRAZOLE SODIUM 40 MG/1
40 TABLET, DELAYED RELEASE ORAL DAILY
Qty: 90 TABLET | Refills: 2 | Status: SHIPPED | OUTPATIENT
Start: 2025-04-23

## 2025-04-23 RX ORDER — AMLODIPINE AND VALSARTAN 5; 160 MG/1; MG/1
1 TABLET ORAL DAILY
Qty: 90 TABLET | Refills: 2 | Status: SHIPPED | OUTPATIENT
Start: 2025-04-23

## 2025-04-23 NOTE — TELEPHONE ENCOUNTER
No care due was identified.  [facility] Embedded Care Due Messages. Reference number: [id number].   4/23/2025 7:58:28 AM CDT

## 2025-04-23 NOTE — TELEPHONE ENCOUNTER
Refill Routing Note   Medication(s) are not appropriate for processing by [facility] for the following reason(s):        Required vitals abnormal  Outside of protocol    [facility] action(s):  Defer  Route  Approve               Appointments  past 12m or future 3m with PCP    Date Provider   Last Visit   1/16/2025 Rossana Mcclendon MD   Next Visit   7/10/2025 Rossana Mcclendon MD   ED visits in past 90 days: 0        Note composed:11:53 AM 04/23/2025

## 2025-04-24 ENCOUNTER — TELEPHONE (OUTPATIENT)
Dept: INTERNAL MEDICINE | Facility: CLINIC | Age: 65
End: 2025-04-24
Payer: MEDICARE

## 2025-04-24 ENCOUNTER — TELEPHONE (OUTPATIENT)
Dept: VASCULAR SURGERY | Facility: CLINIC | Age: 65
End: 2025-04-24
Payer: MEDICARE

## 2025-04-24 DIAGNOSIS — I87.2 VENOUS INSUFFICIENCY: Primary | ICD-10-CM

## 2025-04-24 DIAGNOSIS — Z12.31 SCREENING MAMMOGRAM FOR BREAST CANCER: Primary | ICD-10-CM

## 2025-04-24 NOTE — TELEPHONE ENCOUNTER
----- Message from [employer] sent at 4/23/2025  4:43 PM CDT -----  Type: General Call Back Name of Caller:ptSymptoms:pain breastWould the patient rather a call back or a response via [facility]? Brenda Call Back Number:[phone] Additional Information: Pt will need a diagnostic mammo ordered due to having pain in breast. Also. Pt's last mammo was 05/30. Please call pt with further info and assistance.Thank you.

## 2025-04-24 NOTE — TELEPHONE ENCOUNTER
Spoke with the pt in reference to message below.Informed her that no sooner vein appt is available but can be placed on waiting lists.Also informed her of u/s needed prior to appt.U/s appt scheduled and confirmed with the pt and she verbalized understanding of information received.  ----- Message from Shivam sent at 4/23/2025  4:46 PM CDT -----  Type: Sooner Appointment Request Name of Caller:pt When is the first available appointment?No accessSymptoms:leg pain consistent Would the patient rather a call back or a response via [facility]? callKibin Call Back Number:[phone] Additional Information: Pt states her leg pains are progressing and more consistent. Rhona call pt with further info and assistance. Thank you.

## 2025-04-24 NOTE — TELEPHONE ENCOUNTER
Call pt to let pt no she have to wait a day after her old mamogram order  for me to put a new order in.

## 2025-04-25 NOTE — TELEPHONE ENCOUNTER
----- Message from Samuel sent at 4/25/2025 10:54 AM CDT -----  Contact: pt @ [phone]  Name of Who is Calling: pt  What is the request in detail: calling to discuss new Rx tirzepatide 2.5 mg/0.5 mL PnIj or similar Rx  Can the clinic reply by [facility]: no  What [facility]: [phone]

## 2025-04-28 NOTE — TELEPHONE ENCOUNTER
I spoke with the patient and she stated she initially called to get the tripeptide removed from her medication list cause she wasn't taking it. She stated the person she talk to that day on the phone about the medication stated that she could try mounjaro to see if her insurance would pap for it. The patient stated that's where the conversattion came from. But now she does not wish to take either medication at this time.

## 2025-05-29 ENCOUNTER — TELEPHONE (OUTPATIENT)
Dept: VASCULAR SURGERY | Facility: CLINIC | Age: 65
End: 2025-05-29
Payer: MEDICARE

## 2025-05-29 NOTE — TELEPHONE ENCOUNTER
Spoke with the pt in reference to appt scheduled 8/06/25 and that it needs to be rescheduled with another provider due to Dr Tucker is no longer available at [facility]. Pt given the option to be seen at the [facility] or [facility] vein clinic. Pt chose to be seen at the [facility] and next avaible appt scheduled with Dr Zambrano.Pt verbalized understanding of information received and appt cancelled.

## 2025-06-03 ENCOUNTER — TELEPHONE (OUTPATIENT)
Dept: VASCULAR SURGERY | Facility: CLINIC | Age: 65
End: 2025-06-03
Payer: MEDICARE

## 2025-06-05 ENCOUNTER — TELEPHONE (OUTPATIENT)
Dept: VASCULAR SURGERY | Facility: CLINIC | Age: 65
End: 2025-06-05
Payer: MEDICARE

## 2025-06-06 DIAGNOSIS — I87.2 VENOUS INSUFFICIENCY OF LOWER EXTREMITY, UNSPECIFIED LATERALITY: Primary | ICD-10-CM

## 2025-06-26 ENCOUNTER — HOSPITAL ENCOUNTER (OUTPATIENT)
Dept: RADIOLOGY | Facility: HOSPITAL | Age: 65
Discharge: HOME | End: 2025-06-26
Attending: INTERNAL MEDICINE
Payer: MEDICARE

## 2025-06-26 DIAGNOSIS — Z12.31 ENCOUNTER FOR SCREENING MAMMOGRAM FOR BREAST CANCER: ICD-10-CM

## 2025-07-06 ENCOUNTER — TELEPHONE (OUTPATIENT)
Dept: INTERNAL MEDICINE | Facility: CLINIC | Age: 65
End: 2025-07-06
Payer: MEDICARE

## 2025-07-06 DIAGNOSIS — N63.10 MASS OF RIGHT BREAST, UNSPECIFIED QUADRANT: Primary | ICD-10-CM

## 2025-07-06 DIAGNOSIS — N63.41 UNSPECIFIED LUMP IN RIGHT BREAST, SUBAREOLAR: ICD-10-CM

## 2025-07-06 NOTE — TELEPHONE ENCOUNTER
----- Message from Shannan Martinez sent at 6/26/2025 11:14 AM CDT -----  Good morning  /  afternoon,  [facility]. Upon speaking to the patient, she mentioned a new right breast lump. Because this is a new problem and we are a screening only facility, we will need to schedule her at the [facility], where she will receive same day results. If you are in agreement, please order the following: Bilateral Diagnostic Mammogram and  Right  Breast U/S. Once orders are placed, our nurse will contact the patient to have her scheduled at her earliest convenience.   Thank You,  Michelle Sharma RTRM

## 2025-07-07 ENCOUNTER — HOSPITAL ENCOUNTER (OUTPATIENT)
Dept: RADIOLOGY | Facility: OTHER | Age: 65
Discharge: HOME | End: 2025-07-07
Attending: INTERNAL MEDICINE
Payer: MEDICARE

## 2025-07-07 ENCOUNTER — TELEPHONE (OUTPATIENT)
Dept: INTERNAL MEDICINE | Facility: CLINIC | Age: 65
End: 2025-07-07
Payer: MEDICARE

## 2025-07-07 DIAGNOSIS — Z13.220 SCREENING FOR LIPID DISORDERS: ICD-10-CM

## 2025-07-07 DIAGNOSIS — I10 BENIGN ESSENTIAL HYPERTENSION: ICD-10-CM

## 2025-07-07 DIAGNOSIS — Z13.1 SCREENING FOR DIABETES MELLITUS: ICD-10-CM

## 2025-07-07 PROCEDURE — 77067 SCR MAMMO BI INCL CAD: CPT | Mod: 26,,, | Performed by: RADIOLOGY

## 2025-07-07 PROCEDURE — 77067 SCR MAMMO BI INCL CAD: CPT | Mod: TC

## 2025-07-07 PROCEDURE — 77063 BREAST TOMOSYNTHESIS BI: CPT | Mod: 26,,, | Performed by: RADIOLOGY

## 2025-07-07 NOTE — TELEPHONE ENCOUNTER
Copied from CRM #[id number]. Topic: Appointments - Amb Referral  >> Jul 3, 2025  5:26 PM Brenda wrote:  :  Appointment Request    Name of Caller:Brianna HINES     When is the first available appointment?No access    Symptoms:Lab and Bone density test     Would the patient rather a call back or a response via [facility]? Call back     Best Call Back Number:[phone]     Additional Information: Pt states she is calling to get her lab orders extended to 07/08 so she can get them scheduled on 07/07 with her mammo appt.Pt also states she would like to get a bone density test done.Pt states to please call her back when this is completed.

## 2025-07-07 NOTE — TELEPHONE ENCOUNTER
Copied from CRM #[id number]. Topic: General Inquiry - Patient Advice  >> Jul 7, 2025  9:35 AM Samuel wrote:  Name of Who is Calling: pt        What is the request in detail: calling to schedule blood work        Can the clinic reply by [facility]: no        What Number to Call Back if not in [facility]:[phone]

## 2025-07-07 NOTE — TELEPHONE ENCOUNTER
Copied from CRM #[id number]. Topic: Appointments - Amb Referral  >> Jul 7, 2025  2:49 PM Clarissa wrote:  .Type:  Needs Medical Advice    Who Called: pt    Would the patient rather a call back or a response via [facility]? Call back  Best Call Back Number: [phone]  Additional Information:      Pt stated she would like a call back about getting an order for a bone density scan

## 2025-07-07 NOTE — TELEPHONE ENCOUNTER
Spoke with patient. Scheduled appt for July 10,2025 for labs. Patient stated she needs orders put in for a bone density test and would like to have test scheduled on the same day as labs.

## 2025-07-07 NOTE — TELEPHONE ENCOUNTER
Spoke with patient in reference to orders for bone density test. Patient states that she would like to get bone density test scheduled on the same day as labs.

## 2025-07-11 ENCOUNTER — TELEPHONE (OUTPATIENT)
Dept: INTERNAL MEDICINE | Facility: CLINIC | Age: 65
End: 2025-07-11
Payer: MEDICARE

## 2025-07-11 NOTE — TELEPHONE ENCOUNTER
Spoke with patient. Patient states that she needs a order for a bone density test. Patient also had concerns about results from her labs she recently had drawn.

## 2025-07-11 NOTE — TELEPHONE ENCOUNTER
Copied from CRM #[id number]. Topic: General Inquiry - Patient Advice  >> Jul 11, 2025  8:55 AM Tin wrote:  .1MEDICALADVICE     Patient is calling for Medical Advice regarding: Patient would like a bone density test. She said she put in request a while back, but no one reached out to tell her if this was okay. Please call patient at number provided.    How long has patient had these symptoms:    Pharmacy name and phone#:    Patient wants a call back or thru [facility], provide patient's call back phone number: Call; [phone] (M)      Comments:    Please advise patient replies from provider may take up to 48 hours.

## 2025-07-11 NOTE — TELEPHONE ENCOUNTER
Copied from CRM #[id number]. Topic: General Inquiry - Patient Advice  >> Jul 11, 2025  8:55 AM Tin wrote:  .1MEDICALADVICE     Patient is calling for Medical Advice regarding: Patient would like a bone density test. She said she put in request a while back, but no one reached out to tell her if this was okay. Please call patient at number provided.    How long has patient had these symptoms:    Pharmacy name and phone#:    Patient wants a call back or thru [facility], provide patient's call back phone number: Call; [phone] (M)      Comments:    Please advise patient replies from provider may take up to 48 hours.

## 2025-07-17 ENCOUNTER — LAB VISIT (OUTPATIENT)
Dept: LAB | Facility: OTHER | Age: 65
End: 2025-07-17
Attending: INTERNAL MEDICINE
Payer: MEDICARE

## 2025-07-17 DIAGNOSIS — I10 BENIGN ESSENTIAL HYPERTENSION: ICD-10-CM

## 2025-07-17 DIAGNOSIS — R25.2 MUSCLE CRAMP: ICD-10-CM

## 2025-07-17 LAB
ALBUMIN SERPL BCP-MCNC: 3.9 G/DL (ref 3.5–5.2)
ALP SERPL-CCNC: 71 UNIT/L (ref 40–150)
ALT SERPL W/O P-5'-P-CCNC: 14 UNIT/L (ref 10–44)
ANION GAP (OHS): 10 MMOL/L (ref 8–16)
AST SERPL-CCNC: 18 UNIT/L (ref 11–45)
BILIRUB SERPL-MCNC: 0.3 MG/DL (ref 0.1–1)
BUN SERPL-MCNC: 12 MG/DL (ref 8–23)
CALCIUM SERPL-MCNC: 9.5 MG/DL (ref 8.7–10.5)
CHLORIDE SERPL-SCNC: 109 MMOL/L (ref 95–110)
CO2 SERPL-SCNC: 21 MMOL/L (ref 23–29)
CREAT SERPL-MCNC: 1 MG/DL (ref 0.5–1.4)
GFR SERPLBLD CREATININE-BSD FMLA CKD-EPI: >60 ML/MIN/1.73/M2
GLUCOSE SERPL-MCNC: 95 MG/DL (ref 70–110)
POTASSIUM SERPL-SCNC: 3.9 MMOL/L (ref 3.5–5.1)
PROT SERPL-MCNC: 8.4 GM/DL (ref 6–8.4)
SODIUM SERPL-SCNC: 140 MMOL/L (ref 136–145)

## 2025-07-17 PROCEDURE — 86255 FLUORESCENT ANTIBODY SCREEN: CPT | Mod: 59

## 2025-07-17 PROCEDURE — 36415 COLL VENOUS BLD VENIPUNCTURE: CPT

## 2025-07-17 PROCEDURE — 84075 ASSAY ALKALINE PHOSPHATASE: CPT

## 2025-07-24 ENCOUNTER — HOSPITAL ENCOUNTER (OUTPATIENT)
Dept: CARDIOLOGY | Facility: HOSPITAL | Age: 65
Discharge: HOME | End: 2025-07-24
Attending: INTERNAL MEDICINE
Payer: MEDICARE

## 2025-07-24 ENCOUNTER — HOSPITAL ENCOUNTER (OUTPATIENT)
Dept: CARDIOLOGY | Facility: CLINIC | Age: 65
Discharge: HOME | End: 2025-07-24
Payer: MEDICARE

## 2025-07-24 ENCOUNTER — OFFICE VISIT (OUTPATIENT)
Dept: INTERNAL MEDICINE | Facility: CLINIC | Age: 65
End: 2025-07-24
Payer: MEDICARE

## 2025-07-24 ENCOUNTER — HOSPITAL ENCOUNTER (OUTPATIENT)
Dept: RADIOLOGY | Facility: HOSPITAL | Age: 65
Discharge: HOME | End: 2025-07-24
Attending: INTERNAL MEDICINE
Payer: MEDICARE

## 2025-07-24 VITALS
SYSTOLIC BLOOD PRESSURE: 143 MMHG | HEART RATE: 91 BPM | DIASTOLIC BLOOD PRESSURE: 79 MMHG | BODY MASS INDEX: 37.77 KG/M2 | HEIGHT: 66 IN | WEIGHT: 235 LBS

## 2025-07-24 VITALS
HEART RATE: 99 BPM | WEIGHT: 235.88 LBS | OXYGEN SATURATION: 98 % | SYSTOLIC BLOOD PRESSURE: 124 MMHG | BODY MASS INDEX: 37.91 KG/M2 | HEIGHT: 66 IN | DIASTOLIC BLOOD PRESSURE: 72 MMHG

## 2025-07-24 DIAGNOSIS — R07.2 PRECORDIAL PAIN: ICD-10-CM

## 2025-07-24 DIAGNOSIS — F17.200 CURRENT SMOKER: ICD-10-CM

## 2025-07-24 DIAGNOSIS — I27.20 PULMONARY HYPERTENSION: ICD-10-CM

## 2025-07-24 DIAGNOSIS — R07.9 CHEST PAIN, UNSPECIFIED TYPE: ICD-10-CM

## 2025-07-24 DIAGNOSIS — F17.200 CURRENT SMOKER: Primary | ICD-10-CM

## 2025-07-24 DIAGNOSIS — R06.02 SHORTNESS OF BREATH: ICD-10-CM

## 2025-07-24 DIAGNOSIS — Z87.891 PERSONAL HISTORY OF NICOTINE DEPENDENCE: ICD-10-CM

## 2025-07-24 DIAGNOSIS — R53.1 WEAKNESS: ICD-10-CM

## 2025-07-24 LAB
AORTIC SIZE INDEX (SOV): 1.2 CM/M2
AORTIC SIZE INDEX: 1.2 CM/M2
ASCENDING AORTA: 2.6 CM
AV LVOT MEAN GRADIENT: 2 MMHG
AV LVOT PEAK GRADIENT: 3 MMHG
BILIRUB UR QL STRIP.AUTO: NEGATIVE
BSA FOR ECHO PROCEDURE: 2.23 M2
CLARITY UR: CLEAR
COLOR UR AUTO: YELLOW
CV ECHO LV RWT: 0.47 CM
CV STRESS BASE HR: 91 BPM
DIASTOLIC BLOOD PRESSURE: 79 MMHG
DOP CALC LVOT AREA: 3.8 CM2
DOP CALC LVOT DIAMETER: 2.2 CM
DOP CALC LVOT PEAK VEL: 0.9 M/S
DOP CALC LVOT STROKE VOLUME: 58.9 CM3
DOP CALC RVOT AREA: 3.63 CM2
DOP CALC RVOT DIAMETER: 2.15 CM
DOP CALCLVOT PEAK VEL VTI: 15.5 CM
E WAVE DECELERATION TIME: 207 MS
E/A RATIO: 0.66
E/E' RATIO: 8 M/S
ECHO EF ESTIMATED: 52 %
ECHO LV POSTERIOR WALL: 1.1 CM (ref 0.6–1.1)
EJECTION FRACTION: 55 %
FRACTIONAL SHORTENING: 27.7 % (ref 28–44)
GLUCOSE UR QL STRIP: NEGATIVE
HGB UR QL STRIP: ABNORMAL
INTERVENTRICULAR SEPTUM: 0.5 CM (ref 0.6–1.1)
IVC DIAMETER: 1.1 CM
IVRT: 146 MS
KETONES UR QL STRIP: NEGATIVE
LA MAJOR: 4.9 CM
LA MINOR: 4.8 CM
LA WIDTH: 3.8 CM
LEFT ATRIUM SIZE: 3.1 CM
LEFT ATRIUM VOLUME INDEX MOD: 21 ML/M2
LEFT ATRIUM VOLUME INDEX: 23 ML/M2
LEFT ATRIUM VOLUME MOD: 46 ML
LEFT ATRIUM VOLUME: 49 CM3
LEFT INTERNAL DIMENSION IN SYSTOLE: 3.4 CM (ref 2.1–4)
LEFT VENTRICLE DIASTOLIC VOLUME INDEX: 46.73 ML/M2
LEFT VENTRICLE DIASTOLIC VOLUME: 100 ML
LEFT VENTRICLE MASS INDEX: 57.1 G/M2
LEFT VENTRICLE SYSTOLIC VOLUME INDEX: 22.4 ML/M2
LEFT VENTRICLE SYSTOLIC VOLUME: 48 ML
LEFT VENTRICULAR INTERNAL DIMENSION IN DIASTOLE: 4.7 CM (ref 3.5–6)
LEFT VENTRICULAR MASS: 122.3 G
LEUKOCYTE ESTERASE UR QL STRIP: NEGATIVE
LV LATERAL E/E' RATIO: 7.2
LV SEPTAL E/E' RATIO: 8.1
Lab: 1.5 CM/M
Lab: 1.6 CM/M
MV A" WAVE DURATION": 114.18 MS
MV MEAN GRADIENT: 2 MMHG
MV PEAK A VEL: 0.98 M/S
MV PEAK E VEL: 0.65 M/S
MV PEAK GRADIENT: 4 MMHG
NITRITE UR QL STRIP: NEGATIVE
OHS CV CPX 1 MINUTE RECOVERY HEART RATE: 122 BPM
OHS CV CPX 85 PERCENT MAX PREDICTED HEART RATE MALE: 133
OHS CV CPX ESTIMATED METS: 9
OHS CV CPX MAX PREDICTED HEART RATE: 156
OHS CV CPX PATIENT HEIGHT IN: 66
OHS CV CPX PATIENT IS FEMALE: 1
OHS CV CPX PATIENT IS MALE: 0
OHS CV CPX PEAK DIASTOLIC BLOOD PRESSURE: 69 MMHG
OHS CV CPX PEAK HEAR RATE: 157 BPM
OHS CV CPX PEAK RATE PRESSURE PRODUCT: ABNORMAL
OHS CV CPX PEAK SYSTOLIC BLOOD PRESSURE: 198 MMHG
OHS CV CPX PERCENT MAX PREDICTED HEART RATE ACHIEVED: 105
OHS CV CPX RATE PRESSURE PRODUCT PRESENTING: ABNORMAL
OHS CV RV/LV RATIO: 0.62 CM
PH UR STRIP: 6 [PH]
PISA TR MAX VEL: 3 M/S
PROT UR QL STRIP: NEGATIVE
PULM VEIN A" WAVE DURATION": 114.18 MS
PULM VEIN S/D RATIO: 1.52
PULMONIC VEIN PEAK A VELOCITY: 0.3 M/S
PV PEAK D VEL: 0.33 M/S
PV PEAK S VEL: 0.5 M/S
RA MAJOR: 4.29 CM
RA PRESSURE ESTIMATED: 3 MMHG
RA WIDTH: 3.72 CM
RIGHT ATRIAL AREA: 13.5 CM2
RIGHT VENTRICLE DIASTOLIC BASEL DIMENSION: 2.9 CM
RV TB RVSP: 6 MMHG
RV TISSUE DOPPLER FREE WALL SYSTOLIC VELOCITY 1 (APICAL 4 CHAMBER VIEW): 16.51 CM/S
SINUS: 2.5 CM
SP GR UR STRIP: 1.02
STJ: 2.3 CM
STRESS ECHO POST EXERCISE DUR MIN: 5 MINUTES
STRESS ECHO POST EXERCISE DUR SEC: 38 SECONDS
SYSTOLIC BLOOD PRESSURE: 143 MMHG
TDI LATERAL: 0.09 M/S
TDI SEPTAL: 0.08 M/S
TDI: 0.09 M/S
TRICUSPID ANNULAR PLANE SYSTOLIC EXCURSION: 2.3 CM
TV PEAK GRADIENT: 37 MMHG
TV REST PULMONARY ARTERY PRESSURE: 39 MMHG
UROBILINOGEN UR STRIP-ACNC: NEGATIVE EU/DL
Z-SCORE OF LEFT VENTRICULAR DIMENSION IN END DIASTOLE: -3.81
Z-SCORE OF LEFT VENTRICULAR DIMENSION IN END SYSTOLE: -1.66

## 2025-07-24 PROCEDURE — 93010 ELECTROCARDIOGRAM REPORT: CPT | Mod: S$GLB,,, | Performed by: STUDENT IN AN ORGANIZED HEALTH CARE EDUCATION/TRAINING PROGRAM

## 2025-07-24 PROCEDURE — 3074F SYST BP LT 130 MM HG: CPT | Mod: CPTII,S$GLB,, | Performed by: INTERNAL MEDICINE

## 2025-07-24 PROCEDURE — 71271 CT THORAX LUNG CANCER SCR C-: CPT | Mod: TC

## 2025-07-24 PROCEDURE — 1159F MED LIST DOCD IN RCRD: CPT | Mod: CPTII,S$GLB,, | Performed by: INTERNAL MEDICINE

## 2025-07-24 PROCEDURE — 3078F DIAST BP <80 MM HG: CPT | Mod: CPTII,S$GLB,, | Performed by: INTERNAL MEDICINE

## 2025-07-24 PROCEDURE — 99999 PR PBB SHADOW E&M-EST. PATIENT-LVL V: CPT | Mod: PBBFAC,,, | Performed by: INTERNAL MEDICINE

## 2025-07-24 PROCEDURE — 93351 STRESS TTE COMPLETE: CPT

## 2025-07-24 PROCEDURE — 93351 STRESS TTE COMPLETE: CPT | Mod: 26,,, | Performed by: INTERNAL MEDICINE

## 2025-07-24 PROCEDURE — 99214 OFFICE O/P EST MOD 30 MIN: CPT | Mod: S$GLB,,, | Performed by: INTERNAL MEDICINE

## 2025-07-24 PROCEDURE — 81003 URINALYSIS AUTO W/O SCOPE: CPT | Performed by: INTERNAL MEDICINE

## 2025-07-24 PROCEDURE — 4010F ACE/ARB THERAPY RXD/TAKEN: CPT | Mod: CPTII,S$GLB,, | Performed by: INTERNAL MEDICINE

## 2025-07-24 PROCEDURE — 3044F HG A1C LEVEL LT 7.0%: CPT | Mod: CPTII,S$GLB,, | Performed by: INTERNAL MEDICINE

## 2025-07-24 PROCEDURE — 3008F BODY MASS INDEX DOCD: CPT | Mod: CPTII,S$GLB,, | Performed by: INTERNAL MEDICINE

## 2025-07-24 PROCEDURE — 71271 CT THORAX LUNG CANCER SCR C-: CPT | Mod: 26,,, | Performed by: RADIOLOGY

## 2025-07-24 NOTE — PROGRESS NOTES
Subjective:       Patient ID: Brianna Cantu is a 64 y.o. female.    Chief Complaint: Follow-up, Back Pain, and Shoulder Pain    Feeling weak for the last 2 weeks.   Has been having shortness of breath on exertion.  Had episode of chest pain last week this has now resolved.  Days chest pain occurred at rest.  Has a associated dizziness.    Also having issues with chronic back and shoulder     neck and back pain.: has been doing PT and following with pain management. on gabapentin, flexeril and mobic.      VIVIAN with recent sleep study showing severe obstruction. on CPAP      Has IBS, tried fiber in the past. Takes imodium.      HTN: well controlled on amlodipine-valsartain      HLD: on lipitor.      JOSE ANGEL/ depression: as above on lexapro.      Current smoker: has tried patches but did not work.      Follow[facility]      Health Maintenance:  Colon Cancer Screening: Negative cologaurd in 2021. Rectal stricture inable to traverse in colonoscopy of 2018.    Mammogram:  Normal July 2025  CT lung done at [facility] was normal early was normal.   HIV: negative 2021  Hep C: negative 2021   Lipids: Order today   Pap Smear: Done April 2022 and was normal. Due in April 2023   Vaccines: up to date with vaccines.             Follow-up  Pertinent negatives include no abdominal pain, arthralgias, chest pain, chills, coughing, headaches, myalgias, nausea or vomiting.   Back Pain  Pertinent negatives include no abdominal pain, chest pain, dysuria or headaches.   Shoulder Pain   Pertinent negatives include no headaches.     Review of Systems   Constitutional:  Negative for activity change, appetite change and chills.   HENT:  Negative for ear pain, sinus pressure/congestion and sneezing.    Respiratory:  Negative for cough and shortness of breath.    Cardiovascular:  Negative for chest pain, palpitations and leg swelling.   Gastrointestinal:  Negative for abdominal distention, abdominal pain, constipation, diarrhea, nausea and vomiting.    Genitourinary:  Negative for dysuria and hematuria.   Musculoskeletal:  Positive for back pain. Negative for arthralgias and myalgias.   Neurological:  Negative for dizziness and headaches.   Psychiatric/Behavioral:  Negative for agitation. The patient is not nervous/anxious.            Past Medical History:   Diagnosis Date    Allergic rhinitis 04/27/2014    Anxiety 04/27/2014    Asthma     Bronchitis 11/08/2016    Overview:       Hx of cervical cancer 04/27/2014    Obesity 04/27/2014    Vitamin D deficiency 04/27/2014    Wheezing      Past Surgical History:   Procedure Laterality Date    COLONOSCOPY      COLONOSCOPY N/A 9/24/2018    Procedure: COLONOSCOPY;  Surgeon: Montrell Gan MD;  Location: [address]);  Service: Endoscopy;  Laterality: N/A;    ESOPHAGOGASTRODUODENOSCOPY      ESOPHAGOGASTRODUODENOSCOPY N/A 9/24/2018    Procedure: EGD (ESOPHAGOGASTRODUODENOSCOPY);  Surgeon: Montrell Gan MD;  Location: [address]);  Service: Endoscopy;  Laterality: N/A;    TOTAL ABDOMINAL HYSTERECTOMY        Problem List[1]     Objective:      Physical Exam  Constitutional:       Appearance: Normal appearance.   HENT:      Head: Normocephalic.   Cardiovascular:      Rate and Rhythm: Normal rate and regular rhythm.      Pulses: Normal pulses.      Heart sounds: Normal heart sounds.   Pulmonary:      Effort: Pulmonary effort is normal.      Breath sounds: Normal breath sounds.   Abdominal:      General: Abdomen is flat. Bowel sounds are normal.      Palpations: Abdomen is soft.   Musculoskeletal:         General: Normal range of motion.      Cervical back: Normal range of motion and neck supple.   Skin:     General: Skin is warm and dry.   Neurological:      General: No focal deficit present.      Mental Status: She is alert and oriented to person, place, and time.   Psychiatric:         Mood and Affect: Mood normal.         Assessment:       Problem List Items Addressed This Visit           Cardiac/Vascular    Pulmonary hypertension     Other Visit Diagnoses         Current smoker    -  Primary    Relevant Orders    Ambulatory referral/consult to Smoking Cessation Program    CT Chest Lung Screening Low Dose (Completed)      Precordial pain        Relevant Orders    Ambulatory referral/consult to Cardiology    SCHEDULED EKG 12-LEAD (to Muse) (Completed)      Shortness of breath        Relevant Orders    CBC Auto Differential (Completed)    NT-Pro Natriuretic Peptide (Completed)      Weakness        Relevant Orders    Comprehensive Metabolic Panel (Completed)    Iron and TIBC (Completed)    Vitamin B12 (Completed)    TSH (Completed)    Urinalysis, Reflex to Urine Culture Urine, Clean Catch (Completed)    GREY TOP URINE HOLD (Completed)    GREY TOP URINE HOLD (Completed)      Abnormal finding of blood chemistry, unspecified        Relevant Orders    Iron and TIBC (Completed)    Vitamin B12 (Completed)      Unspecified symptoms and signs involving cognitive functions and awareness        Relevant Orders    Vitamin B12 (Completed)      Chest pain, unspecified type        Relevant Orders    Stress Echo Which stress agent will be used? Treadmill Exercise; Color Flow Doppler? No (Completed)      Personal history of nicotine dependence        Relevant Orders    CT Chest Lung Screening Low Dose (Completed)            Plan:         Brianna was seen today for follow-up, back pain and shoulder pain.    Diagnoses and all orders for this visit:    Current smoker  Personal history of nicotine dependence  -     Ambulatory referral/consult to Smoking Cessation Program; Future  -     CT Chest Lung Screening Low Dose; Future  Given her symptoms of shortness of breath and history of smoking we will check screening CT lung  Referral to smoking cessation program again  Counseled on the importance of quitting smoking as this very well may be the cause of her shortness of breath    Precordial   Shortness of breath  Chest  pain, unspecified type  Pulmonary hypertension  -     Stress Echo Which stress agent will be used? Treadmill Exercise; Color Flow Doppler? No; Future  -     Ambulatory referral/consult to Cardiology; Future  -     SCHEDULED EKG 12-LEAD (to Muse); Future  -     CBC Auto Differential; Future  -     NT-Pro Natriuretic Peptide; Future  Referral placed to Cardiology.  Check EKG.  Order stress test today preferably to be done before cardiology appointment.    Weakness  -     Comprehensive Metabolic Panel; Future  -     Iron and TIBC; Future  -     Vitamin B12; Future  -     TSH; Future  -     Urinalysis, Reflex to Urine Culture Urine, Clean Catch  -     GREY TOP URINE HOLD  -     GREY TOP URINE HOLD  Check above labs.  Including urinalysis.                       Rossana Mcclendon MD   Internal Medicine   Primary Care           [1]   Patient Active Problem List  Diagnosis    Allergic rhinitis    Vitamin D deficiency    Hx of cervical cancer    Laryngopharyngeal reflux    Benign essential hypertension    Tobacco use disorder    Mild intermittent asthma without complication    Stenosis colon    Light cigarette smoker (1-9 cigs/day)    Lesion of hard palate    JOSE ANGEL (generalized anxiety disorder)    Hypersomnolence    Pelvic floor dysfunction    Refractive error    Nuclear sclerosis of both eyes    Vibration sensory loss    Severe obesity    Moderate episode of recurrent major depressive disorder    Chronic kidney disease, stage 3a    Chronic obstructive pulmonary disease, unspecified COPD type    Pulmonary hypertension

## 2025-07-25 ENCOUNTER — TELEPHONE (OUTPATIENT)
Dept: INTERNAL MEDICINE | Facility: CLINIC | Age: 65
End: 2025-07-25
Payer: MEDICARE

## 2025-07-25 ENCOUNTER — PATIENT MESSAGE (OUTPATIENT)
Dept: INTERNAL MEDICINE | Facility: CLINIC | Age: 65
End: 2025-07-25
Payer: MEDICARE

## 2025-07-25 LAB
HOLD SPECIMEN: NORMAL
HOLD SPECIMEN: NORMAL
OHS QRS DURATION: 90 MS
OHS QTC CALCULATION: 465 MS

## 2025-07-25 NOTE — TELEPHONE ENCOUNTER
Copied from CRM #[id number]. Topic: General Inquiry - Patient Advice  >> Jul 25, 2025  3:39 PM Tin wrote:  .1MEDICALADVICE     Patient is calling for Medical Advice regarding: Patient says she needs to speak to office in regard to insurance issues. She said they told her they need more information regarding some labs/tests ordered by Dr. Mcclendon. She said they told her these needed to be re-evaluated and that they need more information before approving coverage. The patient said she is confused about this and did not know about this until today. She would like to explain the situation and receive guidance on what to do next. Please call patient at number provided.    How long has patient had these symptoms:    Pharmacy name and phone#:    Patient wants a call back or thru [facility], provide patient's call back phone number: Call; [phone] (M)      Comments:    Please advise patient replies from provider may take up to 48 hours.

## 2025-07-25 NOTE — TELEPHONE ENCOUNTER
Spoke with patient.Patient states that she spoke with [employer] and was denied her ECHO due to it not being pre-authorized. Patient states that [employer] stated that they need pre-authorization from doctor before test can be ordered.

## 2025-07-28 ENCOUNTER — TELEPHONE (OUTPATIENT)
Dept: INTERNAL MEDICINE | Facility: CLINIC | Age: 65
End: 2025-07-28
Payer: MEDICARE

## 2025-07-28 PROBLEM — I27.20 PULMONARY HYPERTENSION: Status: ACTIVE | Noted: 2025-07-28

## 2025-07-31 ENCOUNTER — OFFICE VISIT (OUTPATIENT)
Dept: CARDIOLOGY | Facility: CLINIC | Age: 65
End: 2025-07-31
Payer: MEDICARE

## 2025-07-31 VITALS
SYSTOLIC BLOOD PRESSURE: 140 MMHG | HEIGHT: 66 IN | HEART RATE: 68 BPM | BODY MASS INDEX: 38.11 KG/M2 | DIASTOLIC BLOOD PRESSURE: 68 MMHG | OXYGEN SATURATION: 96 % | WEIGHT: 237.13 LBS

## 2025-07-31 DIAGNOSIS — R07.2 PRECORDIAL PAIN: ICD-10-CM

## 2025-07-31 PROCEDURE — 3044F HG A1C LEVEL LT 7.0%: CPT | Mod: CPTII,S$GLB,, | Performed by: INTERNAL MEDICINE

## 2025-07-31 PROCEDURE — 4010F ACE/ARB THERAPY RXD/TAKEN: CPT | Mod: CPTII,S$GLB,, | Performed by: INTERNAL MEDICINE

## 2025-07-31 PROCEDURE — 3008F BODY MASS INDEX DOCD: CPT | Mod: CPTII,S$GLB,, | Performed by: INTERNAL MEDICINE

## 2025-07-31 PROCEDURE — 99999 PR PBB SHADOW E&M-EST. PATIENT-LVL IV: CPT | Mod: PBBFAC,,, | Performed by: INTERNAL MEDICINE

## 2025-07-31 PROCEDURE — 1159F MED LIST DOCD IN RCRD: CPT | Mod: CPTII,S$GLB,, | Performed by: INTERNAL MEDICINE

## 2025-07-31 PROCEDURE — 99214 OFFICE O/P EST MOD 30 MIN: CPT | Mod: S$GLB,,, | Performed by: INTERNAL MEDICINE

## 2025-07-31 PROCEDURE — 3078F DIAST BP <80 MM HG: CPT | Mod: CPTII,S$GLB,, | Performed by: INTERNAL MEDICINE

## 2025-07-31 PROCEDURE — 3077F SYST BP >= 140 MM HG: CPT | Mod: CPTII,S$GLB,, | Performed by: INTERNAL MEDICINE

## 2025-07-31 PROCEDURE — 1160F RVW MEDS BY RX/DR IN RCRD: CPT | Mod: CPTII,S$GLB,, | Performed by: INTERNAL MEDICINE

## 2025-07-31 NOTE — PROGRESS NOTES
[facility]    Chief Complaint  Chief Complaint   Patient presents with    Chest Pain       HPI:    Returns for chest pain again.  Had an episode a few weeks ago.  Lasted off and on for about 4 days.  Burning and throbbing pain on the left side of her chest.  Felt a hot sensation running down her left arm.  Not related to activities or positions.  No aggravating or alleviating factors noted.  No associated dyspnea, nausea, vomiting, or diaphoresis.  Went about her normal activities in spite of the discomfort.  Resolved without any specific intervention.  Not very active.  Has baseline exertional dyspnea which is unchanged.    Medications  Current Medications[1]     History  Past Medical History:   Diagnosis Date    Allergic rhinitis 04/27/2014    Anxiety 04/27/2014    Asthma     Bronchitis 11/08/2016    Overview:       Hx of cervical cancer 04/27/2014    Obesity 04/27/2014    Vitamin D deficiency 04/27/2014    Wheezing      Past Surgical History:   Procedure Laterality Date    COLONOSCOPY      COLONOSCOPY N/A 9/24/2018    Procedure: COLONOSCOPY;  Surgeon: Montrell Gan MD;  Location: [address]);  Service: Endoscopy;  Laterality: N/A;    ESOPHAGOGASTRODUODENOSCOPY      ESOPHAGOGASTRODUODENOSCOPY N/A 9/24/2018    Procedure: EGD (ESOPHAGOGASTRODUODENOSCOPY);  Surgeon: Montrell Gan MD;  Location: [address]);  Service: Endoscopy;  Laterality: N/A;    TOTAL ABDOMINAL HYSTERECTOMY       Social History[2]  Family History   Problem Relation Name Age of Onset    Hypertension Mother      Allergies Mother      No Known Problems Father      Hypertension Sister      Allergies Sister      Hypertension Sister      Allergies Sister      No Known Problems Brother      Hypertension Maternal Aunt      No Known Problems Maternal Uncle      No Known Problems Paternal Aunt      No Known Problems Paternal Uncle      No Known Problems Maternal Grandmother      No Known Problems Maternal Grandfather       No Known Problems Paternal Grandmother      No Known Problems Paternal Grandfather      No Known Problems Other      Heart disease Neg Hx          Allergies  Review of patient's allergies indicates:  No Known Allergies    Review of Systems   Review of Systems   Constitutional: Negative for malaise/fatigue, weight gain and weight loss.   Eyes:  Negative for visual disturbance.   Cardiovascular:  Positive for chest pain and leg swelling. Negative for claudication, cyanosis, dyspnea on exertion, irregular heartbeat, near-syncope, orthopnea, palpitations, paroxysmal nocturnal dyspnea and syncope.   Respiratory:  Negative for cough, hemoptysis, shortness of breath, sleep disturbances due to breathing and wheezing.    Hematologic/Lymphatic: Negative for bleeding problem. Does not bruise/bleed easily.   Skin:  Negative for poor wound healing.   Musculoskeletal:  Negative for muscle cramps and myalgias.   Gastrointestinal:  Negative for abdominal pain, anorexia, diarrhea, heartburn, hematemesis, hematochezia, melena, nausea and vomiting.   Genitourinary:  Negative for hematuria and nocturia.   Neurological:  Negative for excessive daytime sleepiness, dizziness, focal weakness, light-headedness and weakness.       Physical Exam  Vitals:    07/31/25 1054   BP: (!) 140/68   Pulse: 68     Wt Readings from Last 1 Encounters:   07/31/25 107.5 kg (237 lb 1.6 oz)     Physical Exam  Vitals and nursing note reviewed.   Constitutional:       General: She is not in acute distress.     Appearance: She is obese. She is not toxic-appearing or diaphoretic.   HENT:      Head: Normocephalic and atraumatic.      Mouth/Throat:      Lips: Pink.      Mouth: Mucous membranes are moist.   Eyes:      General: No scleral icterus.     Conjunctiva/sclera: Conjunctivae normal.   Neck:      Thyroid: No thyromegaly.      Vascular: No carotid bruit, hepatojugular reflux or JVD.      Trachea: Trachea normal.   Cardiovascular:      Rate and Rhythm:  Normal rate and regular rhythm. No extrasystoles are present.     Chest Wall: PMI is not displaced.      Pulses:           Carotid pulses are 2+ on the right side and 2+ on the left side.       Radial pulses are 2+ on the right side and 2+ on the left side.        Dorsalis pedis pulses are 2+ on the right side and 2+ on the left side.        Posterior tibial pulses are 2+ on the right side and 2+ on the left side.      Heart sounds: S1 normal and S2 normal. No murmur heard.     No friction rub. No S3 or S4 sounds.   Pulmonary:      Effort: Pulmonary effort is normal. No accessory muscle usage or respiratory distress.      Breath sounds: Normal breath sounds and air entry. No decreased breath sounds, wheezing, rhonchi or rales.   Abdominal:      General: Bowel sounds are normal. There is no distension or abdominal bruit.      Palpations: Abdomen is soft. There is no hepatomegaly, splenomegaly or pulsatile mass.      Tenderness: There is no abdominal tenderness.   Musculoskeletal:         General: No tenderness or deformity.      Right lower leg: No edema.      Left lower leg: No edema.   Skin:     General: Skin is warm and dry.      Capillary Refill: Capillary refill takes less than 2 seconds.      Coloration: Skin is not cyanotic or pale.      Nails: There is no clubbing.   Neurological:      General: No focal deficit present.      Mental Status: She is alert and oriented to person, place, and time.   Psychiatric:         Attention and Perception: Attention normal.         Mood and Affect: Mood normal.         Speech: Speech normal.         Behavior: Behavior normal. Behavior is cooperative.         Labs  Hospital Outpatient Visit on 07/24/2025   Component Date Value Ref Range Status    QRS Duration 07/24/2025 90  ms Final    [facility] QTC Calculation 07/24/2025 465  ms Final   Hospital Outpatient Visit on 07/24/2025   Component Date Value Ref Range Status    LV Diastolic Volume 07/24/2025 100  mL Final    Echo EF  "Estimated 07/24/2025 52  % Final    LV Systolic Volume 07/24/2025 48  mL Final    IVS 07/24/2025 0.5 (A)  0.6 - 1.1 cm Final    LVIDd 07/24/2025 4.7  3.5 - 6.0 cm Final    LVIDs 07/24/2025 3.4  2.1 - 4.0 cm Final    LVOT diameter 07/24/2025 2.2  cm Final    PW 07/24/2025 1.1  0.6 - 1.1 cm Final    IVRT 07/24/2025 146  ms Final    AV LVOT peak gradient 07/24/2025 3  mmHg Final    LVOT mn grad 07/24/2025 2  mmHg Final    LVOT peak maxim 07/24/2025 0.9  m/s Final    LVOT peak VTI 07/24/2025 15.5  cm Final    RV- chowdhury basal diam 07/24/2025 2.9  cm Final    RVOT prox diameter 07/24/2025 2.15  cm Final    RV S' 07/24/2025 16.51  cm/s Final    LA size 07/24/2025 3.1  cm Final    Left Atrium Major Axis 07/24/2025 4.9  cm Final    Left Atrium Minor Axis 07/24/2025 4.8  cm Final    LA Vol (MOD) 07/24/2025 46  mL Final    RA Major Axis 07/24/2025 4.29  cm Final    RA Area 07/24/2025 13.5  cm2 Final    MV Peak A Maxim 07/24/2025 0.98  m/s Final    E wave deceleration time 07/24/2025 207  ms Final    MV Peak E Maxim 07/24/2025 0.65  m/s Final    E/A ratio 07/24/2025 0.66   Final    LV LATERAL E/E' RATIO 07/24/2025 7.2   Final    LV SEPTAL E/E' RATIO 07/24/2025 8.1   Final    TDI LATERAL 07/24/2025 0.09  m/s Final    TDI SEPTAL 07/24/2025 0.08  m/s Final    MV peak gradient 07/24/2025 4  mmHg Final    MV mean gradient 07/24/2025 2  mmHg Final    TV peak gradient 07/24/2025 37  mmHg Final    TR Max Maxim 07/24/2025 3.0  m/s Final    Ascending aorta 07/24/2025 2.6  cm Final    STJ 07/24/2025 2.3  cm Final    P vein A 07/24/2025 0.3  m/s Final    MV "A" wave duration 07/24/2025 114.18  ms Final    Pulm vein "A" wave 07/24/2025 114.18  ms Final    PV Peak D Maxim 07/24/2025 0.33  m/s Final    PV Peak S Maxim 07/24/2025 0.50  m/s Final    IVC diameter 07/24/2025 1.10  cm Final    Sinus 07/24/2025 2.5  cm Final    RA Width 07/24/2025 3.72  cm Final    TAPSE 07/24/2025 2.3  cm Final    LA WIDTH 07/24/2025 3.8  cm Final    BSA 07/24/2025 2.23  m2 " Final    [facility] CV CPX PATIENT HEIGHT IN 07/24/2025 66   Final    85% Max Predicted HR 07/24/2025 133   Final    Max Predicted HR 07/24/2025 156   Final    [facility] CV CPX PATIENT IS MALE 07/24/2025 0.0   Final    [facility] CV CPX PATIENT IS FEMALE 07/24/2025 1.0   Final    LVOT stroke volume 07/24/2025 58.9  cm3 Final    LV Systolic Volume Index 07/24/2025 22.4  mL/m2 Final    LV Diastolic Volume Index 07/24/2025 46.73  mL/m2 Final    LVOT area 07/24/2025 3.8  cm2 Final    FS 07/24/2025 27.7 (A)  28 - 44 % Final    Left Ventricle Relative Wall Thick* 07/24/2025 0.47  cm Final    LV mass 07/24/2025 122.3  g Final    LV Mass Index 07/24/2025 57.1  g/m2 Final    E/E' ratio 07/24/2025 8  m/s Final    MART 07/24/2025 23  mL/m2 Final    LA Vol 07/24/2025 49  cm3 Final    Pulm vein S/D ratio 07/24/2025 1.52   Final    RVOT area 07/24/2025 3.63  cm2 Final    RV/LV Ratio 07/24/2025 0.62  cm Final    MART (MOD) 07/24/2025 21  mL/m2 Final    ASI 07/24/2025 1.2  cm/m2 Final    Aortic Height Index (SOV) 07/24/2025 1.5  cm/m Final    ASI 07/24/2025 1.2  cm/m2 Final    Aortic Height Index 07/24/2025 1.6  cm/m Final    Mean e' 07/24/2025 0.09  m/s Final    ZLVIDS 07/24/2025 -1.66   Final    ZLVIDD 07/24/2025 -3.81   Final    HR at rest 07/24/2025 91  bpm Final    Systolic blood pressure 07/24/2025 143  mmHg Final    Diastolic blood pressure 07/24/2025 79  mmHg Final    RPP 07/24/2025 13,013   Final    Exercise duration (min) 07/24/2025 5  minutes Final    Exercise duration (sec) 07/24/2025 38  seconds Final    Peak HR 07/24/2025 157  bpm Final    Peak Systolic BP 07/24/2025 198  mmHg Final    Peak Diatolic BP 07/24/2025 69  mmHg Final    Peak RPP 07/24/2025 31,086   Final    Estimated METs 07/24/2025 9   Final    % Max HR Achieved 07/24/2025 105   Final    1 Minute Recovery HR 07/24/2025 122  bpm Final    EF 07/24/2025 55  % Final    TV resting pulmonary artery pressu* 07/24/2025 39  mmHg Final    RV TB RVSP 07/24/2025 6  mmHg Final    Est. RA  pres 07/24/2025 3  mmHg Final   Lab Visit on 07/24/2025   Component Date Value Ref Range Status    Sodium 07/24/2025 141  136 - 145 mmol/L Final    Potassium 07/24/2025 4.1  3.5 - 5.1 mmol/L Final    Chloride 07/24/2025 109  95 - 110 mmol/L Final    CO2 07/24/2025 25  23 - 29 mmol/L Final    Glucose 07/24/2025 95  70 - 110 mg/dL Final    BUN 07/24/2025 14  8 - 23 mg/dL Final    Creatinine 07/24/2025 1.2  0.5 - 1.4 mg/dL Final    Calcium 07/24/2025 9.6  8.7 - 10.5 mg/dL Final    Protein Total 07/24/2025 7.8  6.0 - 8.4 gm/dL Final    Albumin 07/24/2025 3.9  3.5 - 5.2 g/dL Final    Bilirubin Total 07/24/2025 0.3  0.1 - 1.0 mg/dL Final    For infants and newborns, interpretation of results should be based   on gestational age, weight and in agreement with clinical   observations.    Premature Infant recommended reference ranges:   0-24 hours:  <8.0 mg/dL   24-48 hours: <12.0 mg/dL   3-5 days:    <15.0 mg/dL   6-29 days:   <15.0 mg/dL    ALP 07/24/2025 72  40 - 150 unit/L Final    AST 07/24/2025 26  11 - 45 unit/L Final      An activated reagent was used, which may result in slightly higher values compared to standard method.    ALT 07/24/2025 16  10 - 44 unit/L Final      An activated reagent was used, which may result in slightly higher values compared to standard method.    Anion Gap 07/24/2025 7 (L)  8 - 16 mmol/L Final    UNABLE TO CALCULATE    eGFR 07/24/2025 51 (L)  >60 mL/min/1.73/m2 Final    Estimated GFR calculated using the CKD-EPI creatinine (2021) equation.    Iron Level 07/24/2025 71  30 - 160 ug/dL Final    Transferrin 07/24/2025 279  200 - 375 mg/dL Final    Iron Binding Capacity Total 07/24/2025 413  250 - 450 ug/dL Final    Iron Saturation 07/24/2025 17 (L)  20 - 50 % Final    Vitamin B12 07/24/2025 729  210 - 950 pg/mL Final    TSH 07/24/2025 1.257  0.400 - 4.000 uIU/mL Final    NT-proBNP 07/24/2025 53  <300 pg/mL Final    For patients presenting to the ED, with clinical suspicion of HF, the results  should be interpreted as indicated:  <300 pg/mL  Negative: HF unlikely.      WBC 07/24/2025 10.47  3.90 - 12.70 K/uL Final    RBC 07/24/2025 4.95  4.00 - 5.40 M/uL Final    HGB 07/24/2025 13.9  12.0 - 16.0 gm/dL Final    HCT 07/24/2025 43.9  37.0 - 48.5 % Final    MCV 07/24/2025 89  82 - 98 fL Final    MCH 07/24/2025 28.1  27.0 - 31.0 pg Final    MCHC 07/24/2025 31.7 (L)  32.0 - 36.0 g/dL Final    RDW 07/24/2025 13.9  11.5 - 14.5 % Final    Platelet Count 07/24/2025 380  150 - 450 K/uL Final    MPV 07/24/2025 9.6  9.2 - 12.9 fL Final    Nucleated RBC 07/24/2025 0  <=0 /100 WBC Final    Neut % 07/24/2025 63.1  38 - 73 % Final    Lymph % 07/24/2025 25.6  18 - 48 % Final    Mono % 07/24/2025 8.4  4 - 15 % Final    Eos % 07/24/2025 2.0  <=8 % Final    Basophil % 07/24/2025 0.4  <=1.9 % Final    Imm Grans % 07/24/2025 0.5  0.0 - 0.5 % Final    Neut # 07/24/2025 6.61  1.8 - 7.7 K/uL Final    Lymph # 07/24/2025 2.68  1 - 4.8 K/uL Final    Mono # 07/24/2025 0.88  0.3 - 1 K/uL Final    Eos # 07/24/2025 0.21  <=0.5 K/uL Final    Baso # 07/24/2025 0.04  <=0.2 K/uL Final    Imm Grans # 07/24/2025 0.05 (H)  0.00 - 0.04 K/uL Final    Mild elevation in immature granulocytes is non specific and can be seen in a variety of conditions including stress response, acute inflammation, trauma and pregnancy. Correlation with other laboratory and clinical findings is essential.   Office Visit on 07/24/2025   Component Date Value Ref Range Status    Color, UA 07/24/2025 Yellow  Straw, Debbi, Yellow, Light-Orange Final    Appearance, UA 07/24/2025 Clear  Clear Final    pH, UA 07/24/2025 6.0  5.0 - 8.0 Final    Spec Grav UA 07/24/2025 1.020  1.005 - 1.030 Final    Protein, UA 07/24/2025 Negative  Negative Final    Recommend a 24 hour urine protein or a urine protein/creatinine ratio if globulin induced proteinuria is clinically suspected.    Glucose, UA 07/24/2025 Negative  Negative Final    Ketones, UA 07/24/2025 Negative  Negative Final     Bilirubin, UA 07/24/2025 Negative  Negative Final    Blood, UA 07/24/2025 Trace (A)  Negative Final    Nitrites, UA 07/24/2025 Negative  Negative Final    Urobilinogen, UA 07/24/2025 Negative  <2.0 EU/dL Final    Leukocyte Esterase, UA 07/24/2025 Negative  Negative Final    Extra Tube 07/24/2025 Hold for add-ons.   Final    Auto resulted.       Extra Tube 07/24/2025 Hold for add-ons.   Final    Auto resulted.      Lab Visit on 07/17/2025   Component Date Value Ref Range Status    Movement Disorder Interp, S 07/17/2025 SEE COMMENTS   Final    No informative autoantibodies were detected in this   evaluation. However, a negative result does not exclude an   autoimmune movement disorder. Sensitivity is enhanced by   testing both serum and CSF.    Amphiphysin Ab, S 07/17/2025 Negative  Negative Final       -------------------ADDITIONAL INFORMATION-------------------  This test was developed and its performance characteristics   determined by [facility] in a manner consistent with CLIA   requirements. This test has not been cleared or approved by   the U.S. Food and Drug Administration.    AGNA-1, S 07/17/2025 Negative  Negative Final       -------------------ADDITIONAL INFORMATION-------------------  This test was developed and its performance characteristics   determined by [facility] in a manner consistent with CLIA   requirements. This test has not been cleared or approved by   the U.S. Food and Drug Administration.    TWYLA-1, S 07/17/2025 Negative  Negative Final       -------------------ADDITIONAL INFORMATION-------------------  This test was developed and its performance characteristics   determined by [facility] in a manner consistent with CLIA   requirements. This test has not been cleared or approved by   the U.S. Food and Drug Administration.    TWYLA-2, S 07/17/2025 Negative  Negative Final       -------------------ADDITIONAL INFORMATION-------------------  This test was developed and its performance  characteristics   determined by [facility] in a manner consistent with CLIA   requirements. This test has not been cleared or approved by   the U.S. Food and Drug Administration.    TWYLA-3, S 07/17/2025 Negative  Negative Final       -------------------ADDITIONAL INFORMATION-------------------  This test was developed and its performance characteristics   determined by [facility] in a manner consistent with CLIA   requirements. This test has not been cleared or approved by   the U.S. Food and Drug Administration.    CASPR2-IgG CBA, S 07/17/2025 Negative  Negative Final       -------------------ADDITIONAL INFORMATION-------------------  This test was developed and its performance characteristics   determined by [facility] in a manner consistent with CLIA   requirements. This test has not been cleared or approved by   the U.S. Food and Drug Administration.    AMPA-R Ab CBA, S 07/17/2025 Negative  Negative Final       -------------------ADDITIONAL INFORMATION-------------------  This test was developed and its performance characteristics   determined by [facility] in a manner consistent with CLIA   requirements. This test has not been cleared or approved by   the U.S. Food and Drug Administration.    GRAF1 IFA, S 07/17/2025 Negative  Negative Final       -------------------ADDITIONAL INFORMATION-------------------  This test was developed and its performance characteristics   determined by [facility] in a manner consistent with CLIA   requirements. This test has not been cleared or approved by   the U.S. Food and Drug Administration.    ITPR1 IFA, S 07/17/2025 Negative  Negative Final       -------------------ADDITIONAL INFORMATION-------------------  This test was developed and its performance characteristics   determined by [facility] in a manner consistent with CLIA   requirements. This test has not been cleared or approved by   the U.S. Food and Drug Administration.    LGI1-IgG CBA, S 07/17/2025 Negative   Negative Final       -------------------ADDITIONAL INFORMATION-------------------  This test was developed and its performance characteristics   determined by [facility] in a manner consistent with CLIA   requirements. This test has not been cleared or approved by   the U.S. Food and Drug Administration.    mGluR1 Ab [facility], S 07/17/2025 Negative  Negative Final       -------------------ADDITIONAL INFORMATION-------------------  This test was developed and its performance characteristics   determined by [facility] in a manner consistent with CLIA   requirements. This test has not been cleared or approved by   the U.S. Food and Drug Administration.    NIF [facility], S 07/17/2025 Negative  Negative Final       -------------------ADDITIONAL INFORMATION-------------------  This test was developed and its performance characteristics   determined by [facility] in a manner consistent with CLIA   requirements. This test has not been cleared or approved by   the U.S. Food and Drug Administration.    NMDA-R Ab [facility], S 07/17/2025 Negative  Negative Final       -------------------ADDITIONAL INFORMATION-------------------  This test was developed and its performance characteristics   determined by [facility] in a manner consistent with CLIA   requirements. This test has not been cleared or approved by   the U.S. Food and Drug Administration.    P/Q-Type Calcium Channel Ab 07/17/2025 0.00  <=0.02 nmol/L Final       -------------------ADDITIONAL INFORMATION-------------------  This test was developed and its performance characteristics   determined by [facility] in a manner consistent with CLIA   requirements. This test has not been cleared or approved by   the U.S. Food and Drug Administration.    PCA-1, S 07/17/2025 Negative  Negative Final       -------------------ADDITIONAL INFORMATION-------------------  This test was developed and its performance characteristics   determined by [facility] in a manner consistent with CLIA    requirements. This test has not been cleared or approved by   the U.S. Food and Drug Administration.    PCA-2, S 07/17/2025 Negative  Negative Final       -------------------ADDITIONAL INFORMATION-------------------  This test was developed and its performance characteristics   determined by [facility] in a manner consistent with CLIA   requirements. This test has not been cleared or approved by   the U.S. Food and Drug Administration.    PCA-Tr, S 07/17/2025 Negative  Negative Final       -------------------ADDITIONAL INFORMATION-------------------  This test was developed and its performance characteristics   determined by [facility] in a manner consistent with CLIA   requirements. This test has not been cleared or approved by   the U.S. Food and Drug Administration.    AP3B2 IFA 07/17/2025 Negative  Negative Final       -------------------ADDITIONAL INFORMATION-------------------  This test was developed and its performance characteristics   determined by [facility] in a manner consistent with CLIA   requirements. This test has not been cleared or approved by   the U.S. Food and Drug Administration.    ELLEN-B-R Ab CBA, S 07/17/2025 Negative  Negative Final       -------------------ADDITIONAL INFORMATION-------------------  This test was developed and its performance characteristics   determined by [facility] in a manner consistent with CLIA   requirements. This test has not been cleared or approved by   the U.S. Food and Drug Administration.    GAD65 Ab Assay, S 07/17/2025 0.00  <=0.02 nmol/L Final       -------------------ADDITIONAL INFORMATION-------------------  This test was developed and its performance characteristics   determined by [facility] in a manner consistent with CLIA   requirements. This test has not been cleared or approved by   the U.S. Food and Drug Administration.    IFA NOTES 07/17/2025 None.   Final    M SEPTIN-5 IFA, S 07/17/2025 Negative  Negative Final        -------------------ADDITIONAL INFORMATION-------------------  This test was developed and its performance characteristics   determined by [facility] in a manner consistent with CLIA   requirements. This test has not been cleared or approved by   the U.S. Food and Drug Administration.    M SEPTIN-7 IFA, S 07/17/2025 Negative  Negative Final       -------------------ADDITIONAL INFORMATION-------------------  This test was developed and its performance characteristics   determined by [facility] in a manner consistent with CLIA   requirements. This test has not been cleared or approved by   the U.S. Food and Drug Administration.    KLH11 Ab CBA, S 07/17/2025 Negative  Negative Final       -------------------ADDITIONAL INFORMATION-------------------  This test was developed and its performance characteristics   determined by [facility] in a manner consistent with CLIA   requirements. This test has not been cleared or approved by   the U.S. Food and Drug Administration.    CRMP-5-IgG Western Blot, S 07/17/2025 Negative  Negative Final       -------------------ADDITIONAL INFORMATION-------------------  This test was developed and its performance characteristics   determined by [facility] in a manner consistent with CLIA   requirements. This test has not been cleared or approved by   the U.S. Food and Drug Administration.    DPPX Ab CBA, S 07/17/2025 Negative  Negative Final       -------------------ADDITIONAL INFORMATION-------------------  This test was developed and its performance characteristics   determined by [facility] in a manner consistent with CLIA   requirements. This test has not been cleared or approved by   the U.S. Food and Drug Administration.    GFAP IFA, S 07/17/2025 Negative  Negative Final       -------------------ADDITIONAL INFORMATION-------------------  This test was developed and its performance characteristics   determined by [facility] in a manner consistent with CLIA   requirements. This  test has not been cleared or approved by   the U.S. Food and Drug Administration.    IgLON5 CBA, S 07/17/2025 Negative  Negative Final       -------------------ADDITIONAL INFORMATION-------------------  This test was developed and its performance characteristics   determined by [facility] in a manner consistent with CLIA   requirements. This test has not been cleared or approved by   the U.S. Food and Drug Administration.    NEUROCHONDRIN, IFA, S 07/17/2025 Negative  Negative Final       -------------------ADDITIONAL INFORMATION-------------------  This test was developed and its performance characteristics   determined by [facility] in a manner consistent with CLIA   requirements. This test has not been cleared or approved by   the U.S. Food and Drug Administration.    Phosphodesterase 10A (PDE10A) IgG,* 07/17/2025 Negative  Negative Final       -------------------ADDITIONAL INFORMATION-------------------  This test was developed and its performance characteristics   determined by [facility] in a manner consistent with CLIA   requirements. This test has not been cleared or approved by   the U.S. Food and Drug Administration.    TRIM46 Ab, IFA, Serum 07/17/2025 Negative  Negative Final       -------------------ADDITIONAL INFORMATION-------------------  This test was developed and its performance characteristics   determined by [facility] in a manner consistent with CLIA   requirements. This test has not been cleared or approved by   the U.S. Food and Drug Administration.     Test Performed by:  [facility] - [address] [address]  : Jaun Shields Ph.D.; CLIA# [id number]    Sodium 07/17/2025 140  136 - 145 mmol/L Final    Potassium 07/17/2025 3.9  3.5 - 5.1 mmol/L Final    Chloride 07/17/2025 109  95 - 110 mmol/L Final    CO2 07/17/2025 21 (L)  23 - 29 mmol/L Final    Glucose 07/17/2025 95  70 - 110 mg/dL Final    BUN 07/17/2025 12  8 - 23  mg/dL Final    Creatinine 07/17/2025 1.0  0.5 - 1.4 mg/dL Final    Calcium 07/17/2025 9.5  8.7 - 10.5 mg/dL Final    Protein Total 07/17/2025 8.4  6.0 - 8.4 gm/dL Final    Albumin 07/17/2025 3.9  3.5 - 5.2 g/dL Final    Bilirubin Total 07/17/2025 0.3  0.1 - 1.0 mg/dL Final    For infants and newborns, interpretation of results should be based   on gestational age, weight and in agreement with clinical   observations.    Premature Infant recommended reference ranges:   0-24 hours:  <8.0 mg/dL   24-48 hours: <12.0 mg/dL   3-5 days:    <15.0 mg/dL   6-29 days:   <15.0 mg/dL    ALP 07/17/2025 71  40 - 150 unit/L Final    AST 07/17/2025 18  11 - 45 unit/L Final    ALT 07/17/2025 14  10 - 44 unit/L Final    Anion Gap 07/17/2025 10  8 - 16 mmol/L Final    eGFR 07/17/2025 >60  >60 mL/min/1.73/m2 Final    Estimated GFR calculated using the CKD-EPI creatinine (2021) equation.       Imaging  CT Chest Lung Screening Low Dose  Result Date: 7/25/2025  EXAMINATION: CT CHEST LUNG SCREENING LOW DOSE CLINICAL HISTORY: Nicotine dependence, unspecified, uncomplicated Lung cancer screening, >= 20 pk-yr smoking history, risk factor(s) (Age >= 50y); 63 y/o  old F.  Patient meets established criteria for low dose CT lung cancer screening. TECHNIQUE: Volumetric data acquisition at low radiation dose settings through the chest without intravenous contrast with images constructed as contiguous 1 mm and 3 mm axial images. Axial 7 mm Maximum Intensity Projection (MIP) images and 2 mm sagittal and coronal images were reconstructed. COMPARISON: None FINDINGS: Statements: Low radiation dose was utilized per lung cancer screening protocol which can limit subtle detail visualization. This exam is not a substitution for a diagnostic chest CT. Lymph Nodes: No adenopathy by CT size criteria. Mediastinum: No cardiomegaly or pericardial effusion. No mediastinal adenopathy.  The thyroid gland was unremarkable. Coronary Calcium: Left Main: None detected.  Left Anterior Descending (LAD): None detected. Right Coronary Artery (RCA): None detected. Left Circumflex (LCX): None detected. Pulmonary artery diameter: Normal. Lungs and Pleura: No pleural effusion. No pulmonary consolidation. No central endobronchial masses.  Small calcified granuloma noted in the right lower lobe.  One and 3 mm Freya fissural lymph nodes identified along the left major fissure (see series 4, image 244) there is a noncalcified nodule measuring 4 mm in the lingula (series 4, image 217).  A 4 mm nodule, noncalcified is in the right upper lobe (on series 4, image 158 image).  There is a 3 mm noncalcified nodule seen in the medial right upper lobe (series 4, image 159). Abdomen: Partial visualization of a midpole left renal cyst noted.  It measures 2.8 cm in diameter.  The adrenal glands are normal. MSK: Multilevel degenerative disc changes involving the lower thoracic spine.  Please also refer to the more detailed analysis on the August 1st MRI of the thoracic spine.  Macro calcification is noted within the right breast tissue.     Multiple small bilateral nodules identified. Lung-RADS Category: 2  - Benign. Based on imaging features or indolent behavior. Management: 12-month screening LDCT (CT Chest Lung Cancer Screening Annual). Lung-RADS Modifier: None there is multilevel degenerative disc disease of the thoracic spine which has been previously identified.  Please refer to the August 1st MRI of the thoracic spine. Coronary artery atherosclerotic calcification: None detected. For multiple solid nodules all <6 mm, Fleischner Society 2017 guidelines recommend no routine follow up for a low risk patient, or follow up with non-contrast chest CT at 12 months after discovery in a high risk patient. Electronically signed by: Lissa Ray Date:    07/25/2025 Time:    07:54    Stress Echo Which stress agent will be used? Treadmill Exercise; Color Flow Doppler? No  Result Date: 7/24/2025    Stress  Protocol: The patient exercised for 5 minutes 38 seconds on a high ramp protocol, achieving a peak heart rate of 157 bpm, which is 105% of the age predicted maximum heart rate. Their exercise capacity was below average. The patient reported no symptoms during the stress test. The test was stopped because the patient experienced fatigue.   Stress ECG: There is no ST segment deviation identified during the protocol. During stress, occasional PVCs are noted. There is normal blood pressure response with stress.   ECG Conclusion: The ECG portion of the study is negative for ischemia.   Left Ventricle: The left ventricle is normal in size measuring 4.7 cm. Normal wall thickness. There is normal systolic function. Ejection fraction is approximately 55%. There is normal diastolic function.   Right Ventricle: The right ventricle is normal in size measuring 2.9 cm. Wall thickness is normal. . Systolic function is normal.   Pulmonary Artery: There is borderline elevated pulmonary hypertension. The estimated pulmonary artery systolic pressure is 39 mmHg.   Post-stress Echo: The left ventricle systolic function is normal. Right ventricle systolic function is normal.   Post-stress Conclusion: The study is negative with no echocardiographic evidence of stress induced ischemia.     Mammo Digital Screening Bilat w/ Quang (XPD)  Result Date: 7/11/2025  Facility: [facility] [address] [place] [address] [phone] Name: Brianna Cantu MRN: [id number] Result: Mammo Digital Screening Bilat w/ Quang (XPD) History: Patient is 64 y.o. and is seen for a screening mammogram. Films Compared: Compared to: 05/30/2024 Mammo Digital Screening Bilat w/ Quang, 05/29/2023 Mammo Digital Screening Bilat w/ Quang, and 04/28/2022 Mammo Digital Screening Bilat w/ Quang Findings: This procedure was performed using tomosynthesis. Computer-aided detection was utilized in the interpretation of this  examination. There are scattered areas of fibroglandular density. There is no evidence of suspicious masses, microcalcifications or architectural distortion. Findings are stable.      No mammographic evidence of malignancy. BI-RADS Category 1: Negative Recommendation: Routine screening mammogram in 1 year is recommended. Your estimated lifetime risk of breast cancer (to age 85) based on Tyrer-Cuzick risk assessment model is 5.55%.  According to the American Cancer Society, patients with a lifetime breast cancer risk of 20% or higher might benefit from supplemental screening tests, such as screening breast MRI. Electronically signed by, Naseem Gaming MD       Assessment  1. Precordial pain  Noncardiac.  Stress test was negative.  - Ambulatory referral/consult to Cardiology      Plan and Discussion    Discussed her stress test.  Discussed potential noncardiac etiologies of chest pain.  No further investigations at this point since the chest pain has resolved.  Discussed the importance of risk factor modification for prevention of future cardiac events with the emphasis on smoking cessation.    The 10-year ASCVD risk score (Aundrea DK, et al., 2019) is: 17.1%    Values used to calculate the score:      Age: 64 years      Sex: Female      Is Non- : Yes      Diabetic: No      Tobacco smoker: Yes      Systolic Blood Pressure: 140 mmHg      Is BP treated: Yes      HDL Cholesterol: 47 mg/dL      Total Cholesterol: 155 mg/dL     Follow Up  No follow-ups on file.      Rom Sadler MD       [1]   Current Outpatient Medications   Medication Sig Dispense Refill    albuterol (PROVENTIL/VENTOLIN HFA) 90 mcg/actuation inhaler Inhale 2 puffs into the lungs every 6 (six) hours as needed for Wheezing. 54 g 2    amlodipine-valsartan (EXFORGE) 5-160 mg per tablet Take 1 tablet by mouth once daily. 90 tablet 2    atorvastatin (LIPITOR) 40 MG tablet Take 0.5 tablets (20 mg total) by mouth every evening.  45 tablet 3    buPROPion (WELLBUTRIN SR) 150 MG TBSR 12 hr tablet Take 1 tablet (150 mg total) by mouth 2 (two) times daily. Start taking once daily for the first 3 days then increase to twice daily. 60 tablet 11    ECHINACEA 1X ORAL Take by mouth.      EScitalopram oxalate (LEXAPRO) 20 MG tablet Take 1 tablet (20 mg total) by mouth Daily. 90 tablet 2    fluticasone propionate (FLONASE) 50 mcg/actuation nasal spray 1 spray (50 mcg total) by Each Nostril route daily as needed for Allergies. 48 g 2    gabapentin (NEURONTIN) 100 MG capsule Take 1 capsule (100 mg total) by mouth every evening. 270 capsule 3    loratadine (CLARITIN) 10 mg tablet Take 10 mg by mouth.      meloxicam (MOBIC) 15 MG tablet 15 mg.      oxybutynin (DITROPAN-XL) 10 MG 24 hr tablet 10 mg.      pantoprazole (PROTONIX) 40 MG tablet Take 1 tablet (40 mg total) by mouth once daily. 90 tablet 2    valACYclovir (VALTREX) 1000 MG tablet Take 2 pills po bid x 2 days prn first signs of outbreak 30 tablet 3    fluticasone-salmeterol diskus inhaler 100-50 mcg Inhale 1 puff into the lungs 2 (two) times daily. (Patient not taking: Reported on 7/31/2025)      LIDOCAINE VISCOUS 2 % solution Take by mouth. (Patient not taking: Reported on 7/31/2025)       No current facility-administered medications for this visit.   [2]   Social History  Socioeconomic History    Marital status:    Tobacco Use    Smoking status: Every Day     Current packs/day: 0.50     Types: Cigarettes    Smokeless tobacco: Never   Substance and Sexual Activity    Alcohol use: Yes     Alcohol/week: 0.0 standard drinks of alcohol     Comment: occasional    Drug use: No    Sexual activity: Not Currently     Social Drivers of Health     Financial Resource Strain: High Risk (6/27/2025)    Received from [facility] SDOH Screening     In the past year, have you been unable to get any of the following when you really needed them? choose all that apply.: Internet     In the past  year, have you been unable to get any of the following when you really needed them? choose all that apply.: Medicine or health care   Food Insecurity: High Risk (6/27/2025)    Received from [facility] SDOH Screening     In the past 2 months, did you or others you live with eat smaller meals or skip meals because you didn't have money for food?: Yes   Transportation Needs: High Risk (6/27/2025)    Received from [facility] SDOH Screening     Has lack of transportation kept you from medical appointments, meetings, work or from getting things needed for daily living? choose all that apply.: Yes, it has kept me from medical appointments or from getting my medications     Has lack of transportation kept you from medical appointments, meetings, work or from getting things needed for daily living? choose all that apply.: Yes, it has kept me from non-medical meetings, appointments, work or from getting things that i need   Physical Activity: Insufficiently Active (2/28/2025)    Exercise Vital Sign     Days of Exercise per Week: 1 day     Minutes of Exercise per Session: 30 min   Stress: Stress Concern Present (2/28/2025)    [place] [employer] - Occupational Stress Questionnaire     Feeling of Stress : Very much   Housing Stability: High Risk (6/27/2025)    Received from [facility] SDOH Screening     In the past year, have you been unable to get any of the following when you really needed them? choose all that apply.: Utilities (electric, gas, and water)

## 2025-08-07 ENCOUNTER — HOSPITAL ENCOUNTER (OUTPATIENT)
Dept: RADIOLOGY | Facility: OTHER | Age: 65
Discharge: HOME | End: 2025-08-07
Attending: SURGERY
Payer: MEDICARE

## 2025-08-07 DIAGNOSIS — I87.2 VENOUS INSUFFICIENCY OF LOWER EXTREMITY, UNSPECIFIED LATERALITY: ICD-10-CM

## 2025-08-07 PROCEDURE — 93970 EXTREMITY STUDY: CPT | Mod: TC

## 2025-08-07 PROCEDURE — 93970 EXTREMITY STUDY: CPT | Mod: 26,,, | Performed by: RADIOLOGY

## 2025-08-08 ENCOUNTER — OFFICE VISIT (OUTPATIENT)
Dept: VASCULAR SURGERY | Facility: CLINIC | Age: 65
End: 2025-08-08
Payer: MEDICARE

## 2025-08-08 ENCOUNTER — HOSPITAL ENCOUNTER (OUTPATIENT)
Dept: VASCULAR SURGERY | Facility: CLINIC | Age: 65
Discharge: HOME | End: 2025-08-08
Attending: SURGERY
Payer: MEDICARE

## 2025-08-08 DIAGNOSIS — I87.2 VENOUS INSUFFICIENCY OF BOTH LOWER EXTREMITIES: ICD-10-CM

## 2025-08-08 DIAGNOSIS — I87.2 VENOUS INSUFFICIENCY: ICD-10-CM

## 2025-08-08 PROCEDURE — 99999 PR PBB SHADOW E&M-EST. PATIENT-LVL III: CPT | Mod: PBBFAC,,, | Performed by: STUDENT IN AN ORGANIZED HEALTH CARE EDUCATION/TRAINING PROGRAM

## 2025-08-08 NOTE — PROGRESS NOTES
VASCULAR SURGERY SERVICE    REFERRING DOCTOR: Rossana Mcclendon MD    CHIEF COMPLAINT: Varicose veins    HISTORY OF PRESENT ILLNESS: Brianna Cantu is a 64 y.o. female with anxiety, depression, vitamin-D deficiency, hypertension, current tobacco abuse who presents for evaluation of varicose veins, left greater than right.  She has had these for many years.  She used to wear compression on a daily basis for years.  She stopped this roughly 3 years ago as she had a very hard time getting the compression socks on.  Her symptoms include pain, swelling as well as an achiness and some numbness and tingling in her feet, particularly the end of a long day on her feet.  Her symptoms are improved in the morning after she has been able to elevate her legs.  She had plan to get her veins treated twice, but had very traumatizing experiences a both institutions and so she let it slide.  She is now interested in having her veins treated.  She is otherwise well and active.  She denies any personal history of DVT.  She denies any history of clotting disorders or need for blood thinner use.    Past Medical History:   Diagnosis Date    Allergic rhinitis 04/27/2014    Anxiety 04/27/2014    Asthma     Bronchitis 11/08/2016    Overview:       Hx of cervical cancer 04/27/2014    Obesity 04/27/2014    Vitamin D deficiency 04/27/2014    Wheezing        Past Surgical History:   Procedure Laterality Date    COLONOSCOPY      COLONOSCOPY N/A 9/24/2018    Procedure: COLONOSCOPY;  Surgeon: Montrell Gan MD;  Location: [address]);  Service: Endoscopy;  Laterality: N/A;    ESOPHAGOGASTRODUODENOSCOPY      ESOPHAGOGASTRODUODENOSCOPY N/A 9/24/2018    Procedure: EGD (ESOPHAGOGASTRODUODENOSCOPY);  Surgeon: Montrell Gan MD;  Location: [address]);  Service: Endoscopy;  Laterality: N/A;    TOTAL ABDOMINAL HYSTERECTOMY         Current Medications[1]    Review of patient's allergies indicates:  No Known Allergies    Family  History   Problem Relation Name Age of Onset    Hypertension Mother      Allergies Mother      No Known Problems Father      Hypertension Sister      Allergies Sister      Hypertension Sister      Allergies Sister      No Known Problems Brother      Hypertension Maternal Aunt      No Known Problems Maternal Uncle      No Known Problems Paternal Aunt      No Known Problems Paternal Uncle      No Known Problems Maternal Grandmother      No Known Problems Maternal Grandfather      No Known Problems Paternal Grandmother      No Known Problems Paternal Grandfather      No Known Problems Other      Heart disease Neg Hx         Social History[2]    REVIEW OF SYSTEMS:  General: No chills, fever, malaise, changes in weight  HEENT: No visual changes, difficulty hearing  Cardiovascular: No chest pain, palpitations, claudication  Pulmonary: No dyspnea, cough, wheezing  Gastrointestinal: No nausea, vomiting, diarrhea, constipation  Genitourinary: No dysuria, low urine output, hematuria  Endocrine: No polydipsia, polyphagia  Hematologic: No fatigue with exertion, pica, pallor  Musculoskeletal: No extremity or joint pain, no back pain, no difficulty with ambulation  Neurologic: no seizures, no headaches, no weakness  Psychiatric: no mood disturbance      PHYSICAL EXAM:   There were no vitals taken for this visit.  Constitutional:  Alert,   Well-appearing  In no distress.   Neurological: Normal speech  no focal findings  CN II - XII grossly intact.    Psychiatric: Mood and affect appropriate and symmetric.   HEENT: Normocephalic / atraumatic  PERRLA  Midline trachea  No scars across the neck   Cardiac: Regular rate and rhythm.   Pulmonary: Normal pulmonary effort.   Abdomen: Soft, not distended.     Skin: Warm and well perfused.    Vascular:  2+ DP bilaterally   Extremities/  Musculoskeletal: No edema.   No wounds.  She has extensive varicosities of her left leg specifically, photos in epic     IMAGING:  I have independently  reviewed her venous reflux studies.  On her right she has reflux throughout the greater saphenous vein up to greater than 4000 milliseconds.  She also has reflux in her left lesser saphenous vein up to 1600 milliseconds.  On the left she has reflux in the greater saphenous vein with a max diameter 14 mm    IMPRESSION:  64-year-old female with symptomatic venous reflux of her left leg    PLAN:   We had a long discussion about venous reflux in clinic today.  I explained that some of her leg symptoms may not be due to venous reflux, but that she has significant reflux particularly in her left leg.  We discussed what an EVLT procedure means.  We discussed risks of the procedure including but not limited to DVT, pain, scarring, infection, burns.  She wishes to have her left leg treated.  I will provide a script for compression stockings for her in clinic today.  We will submit her case, and she will continue wearing compression socks in the meantime.  I will be see her back in 3 months' time to see how her symptoms are doing.               [1]   Current Outpatient Medications:     albuterol (PROVENTIL/VENTOLIN HFA) 90 mcg/actuation inhaler, Inhale 2 puffs into the lungs every 6 (six) hours as needed for Wheezing., Disp: 54 g, Rfl: 2    amlodipine-valsartan (EXFORGE) 5-160 mg per tablet, Take 1 tablet by mouth once daily., Disp: 90 tablet, Rfl: 2    buPROPion (WELLBUTRIN SR) 150 MG TBSR 12 hr tablet, Take 1 tablet (150 mg total) by mouth 2 (two) times daily. Start taking once daily for the first 3 days then increase to twice daily., Disp: 60 tablet, Rfl: 11    ECHINACEA 1X ORAL, Take by mouth., Disp: , Rfl:     EScitalopram oxalate (LEXAPRO) 20 MG tablet, Take 1 tablet (20 mg total) by mouth Daily., Disp: 90 tablet, Rfl: 2    fluticasone propionate (FLONASE) 50 mcg/actuation nasal spray, 1 spray (50 mcg total) by Each Nostril route daily as needed for Allergies., Disp: 48 g, Rfl: 2    fluticasone-salmeterol diskus inhaler  100-50 mcg, Inhale 1 puff into the lungs 2 (two) times daily., Disp: , Rfl:     gabapentin (NEURONTIN) 100 MG capsule, Take 1 capsule (100 mg total) by mouth every evening., Disp: 270 capsule, Rfl: 3    LIDOCAINE VISCOUS 2 % solution, Take by mouth., Disp: , Rfl:     loratadine (CLARITIN) 10 mg tablet, Take 10 mg by mouth., Disp: , Rfl:     meloxicam (MOBIC) 15 MG tablet, 15 mg., Disp: , Rfl:     oxybutynin (DITROPAN-XL) 10 MG 24 hr tablet, 10 mg., Disp: , Rfl:     pantoprazole (PROTONIX) 40 MG tablet, Take 1 tablet (40 mg total) by mouth once daily., Disp: 90 tablet, Rfl: 2    valACYclovir (VALTREX) 1000 MG tablet, Take 2 pills po bid x 2 days prn first signs of outbreak, Disp: 30 tablet, Rfl: 3    atorvastatin (LIPITOR) 40 MG tablet, Take 0.5 tablets (20 mg total) by mouth every evening., Disp: 45 tablet, Rfl: 3  [2]   Social History  Tobacco Use    Smoking status: Every Day     Current packs/day: 0.50     Types: Cigarettes    Smokeless tobacco: Never   Substance Use Topics    Alcohol use: Yes     Alcohol/week: 0.0 standard drinks of alcohol     Comment: occasional    Drug use: No

## 2025-08-11 DIAGNOSIS — I87.2 VENOUS INSUFFICIENCY: Primary | ICD-10-CM
